# Patient Record
Sex: MALE | Race: WHITE | Employment: OTHER | URBAN - METROPOLITAN AREA
[De-identification: names, ages, dates, MRNs, and addresses within clinical notes are randomized per-mention and may not be internally consistent; named-entity substitution may affect disease eponyms.]

---

## 2017-01-04 ENCOUNTER — APPOINTMENT (OUTPATIENT)
Dept: LAB | Facility: CLINIC | Age: 77
End: 2017-01-04
Payer: MEDICARE

## 2017-01-04 ENCOUNTER — TRANSCRIBE ORDERS (OUTPATIENT)
Dept: LAB | Facility: CLINIC | Age: 77
End: 2017-01-04

## 2017-01-04 DIAGNOSIS — E72.11 HOMOCYSTINURIA (HCC): ICD-10-CM

## 2017-01-04 DIAGNOSIS — I25.10 ATHEROSCLEROTIC HEART DISEASE OF NATIVE CORONARY ARTERY WITHOUT ANGINA PECTORIS: ICD-10-CM

## 2017-01-04 LAB
CHOLEST SERPL-MCNC: 89 MG/DL (ref 50–200)
HCYS SERPL-SCNC: 10.1 UMOL/L (ref 5.3–14.2)
HDLC SERPL-MCNC: 43 MG/DL (ref 40–60)
LDLC SERPL CALC-MCNC: 34 MG/DL (ref 0–100)
TRIGL SERPL-MCNC: 59 MG/DL

## 2017-01-04 PROCEDURE — 83090 ASSAY OF HOMOCYSTEINE: CPT

## 2017-01-04 PROCEDURE — 36415 COLL VENOUS BLD VENIPUNCTURE: CPT

## 2017-01-04 PROCEDURE — 80061 LIPID PANEL: CPT

## 2017-03-31 ENCOUNTER — ALLSCRIPTS OFFICE VISIT (OUTPATIENT)
Dept: OTHER | Facility: OTHER | Age: 77
End: 2017-03-31

## 2017-06-30 ENCOUNTER — ALLSCRIPTS OFFICE VISIT (OUTPATIENT)
Dept: OTHER | Facility: OTHER | Age: 77
End: 2017-06-30

## 2017-09-12 ENCOUNTER — ALLSCRIPTS OFFICE VISIT (OUTPATIENT)
Dept: OTHER | Facility: OTHER | Age: 77
End: 2017-09-12

## 2017-10-26 NOTE — PROGRESS NOTES
Assessment  Assessed    1  Elevated homocysteine (270 4) (E72 11)   2  NSTEMI (non-ST elevated myocardial infarction) (410 70) (I21 4)   3  S/P CABG (coronary artery bypass graft) (V45 81) (Z95 1)   4  CAD (coronary artery disease), native coronary artery (414 01) (I25 10)    Plan  CABG, Elevated BP without diagnosis of hypertension, NSTEMI (non-ST elevated  myocardial infarction)    · Metoprolol Succinate ER 25 MG Oral Tablet Extended Release 24 Hour; TAKE 1  TABLET DAILY   Rx By: Keon Diamond; Dispense: 90 Days ; #:90 Tablet Extended Release 24 Hour; Refill: 3;For: CABG, Elevated BP without diagnosis of hypertension, NSTEMI (non-ST elevated myocardial infarction); LEXUS = N; Verified Transmission to [facility]; Last Updated By: System, SureScript[employer]; 9/12/2017 8:40:07 AM  CAD (coronary artery disease), native coronary artery, Elevated BP without diagnosis of  hypertension, NSTEMI (non-ST elevated myocardial infarction)    · Rosuvastatin Calcium 20 MG Oral Tablet; Take one tablet daily at night   Rx By: Keon Diamond; Dispense: 90 Days ; #:90 Tablet; Refill: 3;For: CAD (coronary artery disease), native coronary artery, Elevated BP without diagnosis of hypertension, NSTEMI (non-ST elevated myocardial infarction); LEXUS = N; Verified Transmission to [facility]; Last Updated By: System, SureScripts; 9/12/2017 8:35:59 AM  CAD (coronary artery disease), native coronary artery, Health Maintenance    · EKG/ECG- POC; Status:Complete;   Done: 75JWE1147   Perform: In Office; Due:30Cjr3165; Last Updated By:Freddie Garner; 9/12/2017 9:25:56 AM;Ordered; For:CAD (coronary artery disease), native coronary artery, Health Maintenance; Ordered By:Dayron Kaye;    Discussion/Summary  Cardiology Discussion Summary Free Text Note Form [facility]:   CABG/3V CAD-- healed well  asymptomatic  LDL at goal  Blood pressure well-controlledaspirin 162mgmetoprolol 25mg dailyswitch to rosuvastatin 20mg daily  recheck lipid panel  keep ldl below 11 GIDPY exercise   folic acid, Y-68, Q-9TBDLAWD homocysteine plus lipids on next blood draw  remains with low HDLfish oil + switch to rosuvastatin  12 months  Counseling Documentation With Imm: total time of encounter was 30 minutes minutes-- and-- Lab results plus med management minutes was spent counseling  Chief Complaint  Chief Complaint Free Text Note Form: Alfred Addison is here for a 12 month follow up and lab results  He denies any cardiac complaints at this time  A yearly EKG was done in the office today  History of Present Illness  HPI: initial visit: 74 yo gentleman with prior NSTEMI s/p 3V-CABG (LIMA to LAD, SVG to OM2, and SVG to PDA) s/p cardiac rehab walking one mile daily  I have reviewed all of his physical therapy notes and am appreciative of the excellent care  Compliant with his medications denying any significant symptoms  Denying any bleeding or rash  He denies having any chest pain or shortness of breath  No exercise limitations  Denies having any chest tightness  Denies any any muscle aches  Denies any bleeding or bruising  Has been very active  Does daily walks without any symptoms  12, 2017: He reports exercising without any limitations  He denies any recurrence of chest discomfort  He denies having any dizziness or lightheadedness  He denies having any medication intolerance  He denies any significant bleeding or bruising  Review of Systems  Cardiology Male ROS:     Cardiac: No complaints of chest pain, no palpitations, no fainiting  Skin: No complaints of nonhealing sores or skin rash  Genitourinary: frequent urination at night   Psychological: No complaints of feeling depressed, anxiety, panic attacks, or difficulty concentrating  Respiratory: no shortness of breath  HEENT: hearing problems   Gastrointestinal: heartburn   Hematologic: No complaints of bleeding disorders, anemia, blood clots, or excessive brusing     Neurological: No complaints of numbness, tingling, dizziness, weakness, seizures, headaches, syncope or fainting, AM fatigue, daytime sleepiness, no witnessed apnea episodes  Musculoskeletal: arthritis      Active Problems  Problems    1  Body mass index (BMI) of 25 0 to 25 9 in adult (V85 21) (Z68 25)   2  CABG   3  CAD (coronary artery disease), native coronary artery (414 01) (I25 10)   4  Elevated BP without diagnosis of hypertension (796 2) (R03 0)   5  Elevated homocysteine (270 4) (E72 11)   6  Encounter for prostate cancer screening (V76 44) (Z12 5)   7  Encounter for screening for malignant neoplasm of colon (V76 51) (Z12 11)   8  Flu vaccine need (V04 81) (Z23)   9  Medicare annual wellness visit, initial (V70 0) (Z00 00)   10  MTHFR mutation (270 4) (E72 12)   11  Need for influenza vaccination (V04 81) (Z23)   12  NSTEMI (non-ST elevated myocardial infarction) (410 70) (I21 4)   13  Plantar fasciitis (728 71) (M72 2)   14  Prediabetes (790 29) (R73 09)   15  S/P CABG (coronary artery bypass graft) (V45 81) (Z95 1)   16  Screening for genitourinary condition (V81 6) (Z13 89)   17  Vitamin D deficiency (268 9) (E55 9)          CAD (coronary artery disease), native coronary artery (414 01) (I25 10)          Past Medical History  Problems    1  Acute frontal sinusitis (461 1) (J01 10)   2  History of CAD (coronary artery disease) (414 00) (I25 10)   3  History of GERD (gastroesophageal reflux disease) (530 81) (K21 9)   4  History of Heart attack (410 90) (I21 3)   5  History of arthritis (V13 4) (Z87 39)   6  History of cataract (V12 49) (Z86 69)   7  History of colonic polyps (V12 72) (Z86 010)   8  History of herpes zoster (V12 09) (Z86 19)   9  History of hyperlipidemia (V12 29) (Z86 39)   10  History of hypertension (V12 59) (Z86 79)  Active Problems And Past Medical History Reviewed: The active problems and past medical history were reviewed and updated today  Surgical History  Problems    1  History of Back Surgery   2  History of CABG   3  CABG   4  History of Cataract Surgery   5  History of Colonoscopy (Fiberoptic) Screening   6  History of Hernia Repair   7  History of Hip Replacement   8  History of Inguinal Hernia Repair  Surgical History Reviewed: The surgical history was reviewed and updated today  Family History  Father    1  Family history of CAD (coronary artery disease)  Brother    2  Family history of Hyperlipidemia   3  Family history of Sudden cardiac death  Family History    4  Family history of Arthritis  Family History Reviewed: The family history was reviewed and updated today  Social History  Problems    · Exercise: Cycling   · Exercise: Walking   · Exercises daily (V49 89) (Z78 9)   · Former smoker (V15 82) (T11 739)   · Good sleep hygiene   ·    · No alcohol use   · Sleeps 8 - 10 hours a day  Social History Reviewed: The social history was reviewed and updated today  Former smoker (V15 82) (P37 839)     - Quit in JO[place] 22   1  Aspirin 81 MG TABS; TAKE 1 TABLET DAILY; Therapy: (Recorded:30Jun2017) to Recorded   2  Atorvastatin Calcium 80 MG Oral Tablet; TAKE 1/2 TABLET DAILY; Therapy: 52KAG3752 to (Evaluate:15Ddp1553)  Requested for: 82YGP5003; Last   Rx:94Old9437 Ordered   3  B-Complex/B-12 Oral Tablet; TAKE 1 TABLET DAILY; Therapy: (Recorded:03Oct2016) to Recorded   4  Calcium TABS; once daily; Therapy: (Recorded:31Mar2017) to Recorded   5  Fish Oil 1200 MG Oral Capsule; Once a day; Therapy: (Recorded:30Jun2017) to Recorded   6  Metoprolol Tartrate 25 MG Oral Tablet; take one tablet by mouth twice daily; Therapy: 54DLQ6220 to (Evaluate:36Fju5859)  Requested for: 30Jun2017; Last   Rx:37Med0033 Ordered   7  Vitamin D 2000 UNIT Oral Capsule; take 1 capsule daily; Therapy: 91RIN3723 to (Last Rx:47Icn2006) Ordered  Medication List Reviewed: The medication list was reviewed and updated today  Allergies  Medication    1   No Known Drug Allergies    Vitals  Vital Signs    Recorded: 12Sep2017 08:15AM   Heart Rate 55   Systolic 026, RUE, Sitting   Diastolic 70, RUE, Sitting   Height 5 ft 8 in   Weight 162 lb 9 oz   BMI Calculated 24 72   BSA Calculated 1 87   O2 Saturation 98     Physical Exam    Constitutional   General appearance: No acute distress, well appearing and well nourished  -- Elderly, Pleasant  Eyes   Conjunctiva and Sclera examination: Conjunctiva pink, sclera anicteric  Ears, Nose, Mouth, and Throat - External inspection of ears and nose: Normal without deformities or discharge  -- Nasal mucosa, septum, and turbinates: Normal, no edema or discharge  Neck   Neck and thyroid: Normal, supple, trachea midline, no thyromegaly  -- No JVD  Pulmonary   Respiratory effort: No increased work of breathing or signs of respiratory distress  -- CTAB  Cardiovascular   Palpation of heart: Normal PMI, no thrills  -- pmi mildly displaced  Auscultation of heart: Abnormal  -- rrr +s1 +s2 no murmurs  Pedal pulses: Normal, 2+ bilaterally  -- no edema  Examination of extremities for edema and/or varicosities: Normal     Chest - Chest: Abnormal -- well-healing sternal incision, sutures noted mid-line where prior external pacers/alice drain placed  -- well-healing, no evidence of dehiscience  Abdomen   Liver and spleen: No hepatomegaly or splenomegaly  Musculoskeletal Gait and station: Normal gait  -- Inspection/palpation of joints, bones, and muscles: Normal, ROM normal     Neurologic - Cranial nerves: II - XII intact  -- Sensation: No sensory loss  Psychiatric - Orientation to person, place, and time: Normal -- Mood and affect: Normal       Results/Data  Diagnostic Studies Reviewed Cardio: I personally reviewed the recording/images in the office today  My interpretation follows  Lab Review: [facility] Heart Panel Reviewed- Low vitamin D  Elevated Homocysteine  Elevated Blood Sugars  advanced lipid panel was reviewed in detail   It shows that his liver production of cholesterol has been adequately suppressed with statin medications  However his LDL type is predominantly small dense LDL  His HDL is currently his suboptimal  However he has a more predominant of alpha-1 HDL which is cardiac protective  His cholesterol absorption is also suboptimal  He has a high saturated fat and trans-saturated fat intake  Also his omega-3 fatty acid intake is low  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 and he has an elevated homocystine level  Therefore he is at higher risk for cardiac events  He's also noted to have elevated fasting blood sugars and a high insulin production and high insulin resistance which is suggestive of pre-diabetes  Given his recent non-STEMI and triple vessel bypass is crucial that we improve his cholesterol balance and his resting blood sugars  Echocardiogram/DAYANARA: 2014 Normal LV size, borderline LVH, normal LV systolci function EF55-60%  Normal atria  Catheterization: Cath- Proximal LAD 90% 1st diagonal CTO1st OM-100%RCA   Vascular imagin2014 Carotids- <50% right <50% left  Other: Surgical report reviewed- 65 minutes clamp time Dr Alex Kumar     ECG Report: 2014 NSR incomplete RBBB, no acute st changes      Signatures   Electronically signed by : MARGUERITE Prakash ; Sep 12 2017 12:33PM EST                       (Author)

## 2017-10-30 ENCOUNTER — GENERIC CONVERSION - ENCOUNTER (OUTPATIENT)
Dept: OTHER | Facility: OTHER | Age: 77
End: 2017-10-30

## 2017-10-30 ENCOUNTER — APPOINTMENT (OUTPATIENT)
Dept: LAB | Facility: CLINIC | Age: 77
End: 2017-10-30
Payer: MEDICARE

## 2017-10-30 ENCOUNTER — TRANSCRIBE ORDERS (OUTPATIENT)
Dept: LAB | Facility: CLINIC | Age: 77
End: 2017-10-30

## 2017-10-30 DIAGNOSIS — I21.4 NON-ST ELEVATION (NSTEMI) MYOCARDIAL INFARCTION (HCC): ICD-10-CM

## 2017-10-30 DIAGNOSIS — I25.10 ATHEROSCLEROTIC HEART DISEASE OF NATIVE CORONARY ARTERY WITHOUT ANGINA PECTORIS: ICD-10-CM

## 2017-10-30 DIAGNOSIS — E72.11 HOMOCYSTINURIA (HCC): ICD-10-CM

## 2017-10-30 LAB
ALBUMIN SERPL BCP-MCNC: 4.1 G/DL (ref 3.5–5)
ALP SERPL-CCNC: 71 U/L (ref 46–116)
ALT SERPL W P-5'-P-CCNC: 34 U/L (ref 12–78)
ANION GAP SERPL CALCULATED.3IONS-SCNC: 6 MMOL/L (ref 4–13)
AST SERPL W P-5'-P-CCNC: 20 U/L (ref 5–45)
BILIRUB SERPL-MCNC: 0.75 MG/DL (ref 0.2–1)
BUN SERPL-MCNC: 16 MG/DL (ref 5–25)
CALCIUM SERPL-MCNC: 8.8 MG/DL (ref 8.3–10.1)
CHLORIDE SERPL-SCNC: 109 MMOL/L (ref 100–108)
CHOLEST SERPL-MCNC: 89 MG/DL (ref 50–200)
CO2 SERPL-SCNC: 28 MMOL/L (ref 21–32)
CREAT SERPL-MCNC: 0.98 MG/DL (ref 0.6–1.3)
GFR SERPL CREATININE-BSD FRML MDRD: 74 ML/MIN/1.73SQ M
GLUCOSE P FAST SERPL-MCNC: 104 MG/DL (ref 65–99)
HCYS SERPL-SCNC: 11.3 UMOL/L (ref 5.3–14.2)
HDLC SERPL-MCNC: 44 MG/DL (ref 40–60)
LDLC SERPL CALC-MCNC: 37 MG/DL (ref 0–100)
POTASSIUM SERPL-SCNC: 3.9 MMOL/L (ref 3.5–5.3)
PROT SERPL-MCNC: 7.5 G/DL (ref 6.4–8.2)
SODIUM SERPL-SCNC: 143 MMOL/L (ref 136–145)
TRIGL SERPL-MCNC: 38 MG/DL

## 2017-10-30 PROCEDURE — 83090 ASSAY OF HOMOCYSTEINE: CPT

## 2017-10-30 PROCEDURE — 36415 COLL VENOUS BLD VENIPUNCTURE: CPT

## 2017-10-30 PROCEDURE — 80061 LIPID PANEL: CPT

## 2017-10-30 PROCEDURE — 80053 COMPREHEN METABOLIC PANEL: CPT

## 2018-01-11 NOTE — RESULT NOTES
Discussion/Summary   labs stable continue medications     Verified Results  (1) COMPREHENSIVE METABOLIC PANEL 79XWX0549 82:85CA [facility] Order Number: [id number]     Test Name Result Flag Reference   SODIUM 143 mmol/L  136-145   POTASSIUM 3 9 mmol/L  3 5-5 3   CHLORIDE 109 mmol/L H 100-108   CARBON DIOXIDE 28 mmol/L  21-32   ANION GAP (CALC) 6 mmol/L  4-13   BLOOD UREA NITROGEN 16 mg/dL  5-25   CREATININE 0 98 mg/dL  0 60-1 30   Standardized to IDMS reference method   CALCIUM 8 8 mg/dL  8 3-10 1   BILI, TOTAL 0 75 mg/dL  0 20-1 00   ALK PHOSPHATAS 71 U/L     ALT (SGPT) 34 U/L  12-78   Specimen collection should occur prior to Sulfasalazine and/or Sulfapyridine administration due to the potential for falsely depressed results  AST(SGOT) 20 U/L  5-45   Specimen collection should occur prior to Sulfasalazine administration due to the potential for falsely depressed results  ALBUMIN 4 1 g/dL  3 5-5 0   TOTAL PROTEIN 7 5 g/dL  6 4-8 2   eGFR 74 ml/min/1 73sq [facility] Disease Education Program recommendations are as follows:  GFR calculation is accurate only with a steady state creatinine  Chronic Kidney disease less than 60 ml/min/1 73 sq  meters  Kidney failure less than 15 ml/min/1 73 sq  meters  GLUCOSE FASTING 104 mg/dL H 65-99   Specimen collection should occur prior to Sulfasalazine administration due to the potential for falsely depressed results  Specimen collection should occur prior to Sulfapyridine administration due to the potential for falsely elevated results       (1) HOMOCYSTEINE 85YSK2119 07:57AM [place] Order Number: [id number]     Test Name Result Flag Reference   HOMOCYSTEINE 11 3 umol/L  5 3-14 2     (1) LIPID PANEL, FASTING 28XQY0466 07:57AM [place] Order Number: [id number]     Test Name Result Flag Reference   CHOLESTEROL 89 mg/dL     HDL,DIRECT 44 mg/dL  40-60   Specimen collection should occur prior to Metamizole administration due to the potential for falsley depressed results  LDL CHOLESTEROL CALCULATED 37 mg/dL  0-100   Triglyceride:        Normal <150 mg/dl   Borderline High 150-199 mg/dl   High 200-499 mg/dl   Very High >499 mg/dl      Cholesterol:       Desirable <200 mg/dl    Borderline High 200-239 mg/dl    High >239 mg/dl      HDL Cholesterol:       High>59 mg/dL    Low <41 mg/dL      This screening LDL is a calculated result  It does not have the accuracy of the Direct Measured LDL in the monitoring of patients with hyperlipidemia and/or statin therapy  Direct Measure LDL (XAV649) must be ordered separately in these patients  TRIGLYCERIDES 38 mg/dL  <=150   Specimen collection should occur prior to N-Acetylcysteine or Metamizole administration due to the potential for falsely depressed results

## 2018-01-12 NOTE — PROGRESS NOTES
Assessment    1  Medicare annual wellness visit, initial (V70 0) (Z00 00)   2  S/P CABG (coronary artery bypass graft) (V45 81) (Z95 1)   3  NSTEMI (non-ST elevated myocardial infarction) (410 70) (I21 4)   4  MTHFR mutation (270 4) (E72 12)   5  Body mass index (BMI) of 25 0 to 25 9 in adult (V85 21) (Z68 25)    Plan  CABG, CAD (coronary artery disease), native coronary artery, Elevated blood pressure  reading without diagnosis of hypertension, Elevated homocysteine, NSTEMI (non-ST  elevated myocardial infarction), Vitamin D deficiency    · (1) COMPREHENSIVE METABOLIC PANEL; Status:Active; Requested KRZYSZTOF:56GEM0905;    · (1) LIPID PANEL, FASTING; Status:Active; Requested OKC:37JWI9569;    · (1) VITAMIN D 25-HYDROXY; Status:Active; Requested HCO:49TGG8323; Health Maintenance    · Follow-up visit in 6 months Evaluation and Treatment  Follow-up  Status: Hold For -  Scheduling  Requested for: 27Jun2016  Medicare annual wellness visit, initial    · Decreasing the stress in your life may help your condition improve ; Status:Complete;    Done: 43SIC2890   · Eat foods that are high in calcium ; Status:Complete;   Done: 19UYT2169   · Stretch and warm up your muscles during the first 10 minutes , then cool down your  muscles for the last 10 minutes of exercise ; Status:Complete;   Done: 33DQY9597   · The plan of care for falls is detailed in the plan and/or discussion section of today's note ;  Status:Complete;   Done: 74PKG9648   · There are many things to consider when choosing a nursing home or long-term care  facility ; Status:Complete;   Done: 34EFG0433   · There are ways to decrease your stress and improve your sense of well-being  We  encourage you to keep active and exercise regularly  Make time to take care of yourself  and participate in activities that you enjoy  Stay connected to friends and family that can  support and comfort you    If at any time you have thoughts of harming yourself or  someone else, contact us immediately ; Status:Active; Requested RBY:10OKX0734;    · We encourage all of our patients to exercise regularly  30 minutes of exercise or physical  activity five or more days a week is recommended for children and adults ;  Status:Complete;   Done: 52VOB8567   · We recommend a colonoscopy ; Status:Complete;   Done: 10QGR2019   · We recommend routine visits to a dentist ; Status:Complete;   Done: 79YPY6559   · We recommend that you create an advance directive ; Status:Complete;   Done:  87DNT7856    Discussion/Summary    F/u labs PHM  continue medications  low fat diet  Impression: Initial Annual Wellness Visit  Cardiovascular screening and counseling: the risks and benefits of screening were discussed  Diabetes screening and counseling: the risks and benefits of screening were discussed  Colorectal cancer screening and counseling: the risks and benefits of screening were discussed and screening is current  Osteoporosis screening and counseling: the risks and benefits of screening were discussed  Glaucoma screening and counseling: the risks and benefits of screening were discussed  HIV screening and counseling: the risks and benefits of screening were discussed  Chief Complaint  pt present for an AWV  pt has not had recent labs  Colon done  ot feels well today  hg      History of Present Illness  The patient is being seen for the initial annual wellness visit  Medicare Screening and Risk Factors   Hospitalizations: no previous hospitalizations  Medicare Screening Tests Risk Questions   Abdominal aortic aneurysm risk assessment: over 72years of age  Osteoporosis risk assessment:  and over 48years of age  HIV risk assessment: none indicated  Drug and Alcohol Use: The patient is a former cigarette smoker and pt quit over 30 years ago  He is not ready to quit using tobacco  The patient reports rare alcohol use  Alcohol concern:    The patient has no concerns about alcohol abuse  He is not ready to quit drinking  He has never used illicit drugs  Diet and Physical Activity: Current diet includes well balanced meals and 3 cups of coffee per day  He exercises infrequently and pt walks  Exercise: walking 30 minutes per day  Mood Disorder and Cognitive Impairment Screening: PHQ-9 Depression Scale   Depression screening  negative for symptoms  He denies feeling down, depressed, or hopeless over the past two weeks  He denies feeling little interest or pleasure in doing things over the past two weeks  Cognitive impairment screening: denies difficulty learning/retaining new information, denies difficulty handling complex tasks, denies difficulty with reasoning, denies difficulty with spatial ability and orientation, denies difficulty with language and denies difficulty with behavior  Functional Ability/Level of Safety: Hearing is normal bilaterally, normal in the right ear, normal in the left ear and a hearing aid is not used  The patient is currently able to do activities of daily living without limitations, able to do instrumental activities of daily living without limitations, able to participate in social activities without limitations and able to drive without limitations  Activities of daily living details: does not need help using the phone, no transportation help needed, does not need help shopping, does not need help doing housework, does not need help doing laundry, does not need help managing medications and does not need help managing money  Fall risk factors:  no polypharmacy, no alcohol use, no mobility impairment, no deconditioning, no sedative use, up and go test was normal and no previous fall  Home safety risk factors:  no unfamiliar surroundings, no loose rugs, no poor household lighting, no uneven floors, no household clutter, grab bars in the bathroom and handrails on the stairs     Advance Directives: Advance directives: living will, but no durable power of  for health care directives and no advance directives  Concerns with the patient's end of life decisions: need copy  Co-Managers and Medical Equipment/Suppliers: See Patient Care Team   The patient is currently asymptomatic Symptoms Include: The patient currently has no urinary incontinence symptoms  Date of last glaucoma screen was       Patient Care Team    Care Team Member Role Specialty Office Number   [place] [facility] [phone]   Satya ALLEN  Cardiology [phone]   [place] [facility]  III  Cardiac Surgery [phone]     Review of Systems    Constitutional: negative  Head and Face: negative  Eyes: negative  ENT: negative  Cardiovascular: negative  Respiratory: negative  Gastrointestinal: negative  Integumentary and Breasts: no rashes  Neurological: no headache  Endocrine: no hot flashes and no night sweats  Active Problems    1  CABG   2  CAD (coronary artery disease), native coronary artery (414 01) (I25 10)   3  Elevated blood pressure reading without diagnosis of hypertension (796 2) (R03 0)   4  Elevated homocysteine (270 4) (E72 11)   5  Encounter for prostate cancer screening (V76 44) (Z12 5)   6  Encounter for screening for malignant neoplasm of colon (V76 51) (Z12 11)   7  MTHFR mutation (270 4) (E72 12)   8  Need for influenza vaccination (V04 81) (Z23)   9  NSTEMI (non-ST elevated myocardial infarction) (410 70) (I21 4)   10  Prediabetes (790 29) (R73 09)   11  S/P CABG (coronary artery bypass graft) (V45 81) (Z95 1)   12  Vitamin D deficiency (268 9) (E55 9)    Past Medical History    1  Acute frontal sinusitis (461 1) (J01 10)   2  History of CAD (coronary artery disease) (414 00) (I25 10)   3  History of GERD (gastroesophageal reflux disease) (530 81) (K21 9)   4  History of Heart attack (410 90) (I21 3)   5  History of arthritis (V13 4) (Z87 39)   6  History of cataract (V12 49) (Z86 69)   7   History of colonic polyps (V12 72) (Z86 010)   8  History of herpes zoster (V12 09) (Z86 19)   9  History of hyperlipidemia (V12 29) (Z86 39)   10  History of hypertension (V12 59) (Z86 79)    The active problems and past medical history were reviewed and updated today  Surgical History    1  History of Back Surgery   2  CABG   3  History of CABG   4  History of Cataract Surgery   5  History of Colonoscopy (Fiberoptic) Screening   6  History of Hernia Repair   7  History of Hip Replacement   8  History of Inguinal Hernia Repair    The surgical history was reviewed and updated today  Family History  Father    1  Family history of CAD (coronary artery disease)  Brother    2  Family history of Hyperlipidemia   3  Family history of Sudden cardiac death  Family History    4  Family history of Arthritis    The family history was reviewed and updated today  Social History    · Exercise: Cycling   · Exercise: Walking   · Exercises daily (V49 89) (Z78 9)   · Former smoker (V15 82) (I83 519)   · Good sleep hygiene   ·    · No alcohol use   · Sleeps 8 - 10 hours a day  The social history was reviewed and updated today  The social history was reviewed and is unchanged  Current Meds   1  Aspirin 81 MG TABS; TAKE 1 TABLET DAILY; Therapy: (Recorded:97Sjd0064) to Recorded   2  Atorvastatin Calcium 80 MG Oral Tablet; TAKE 1/2 TABLET DAILY; Therapy: 84CFQ1713 to (Evaluate:16Oct2016)  Requested for: 89Bmd5785; Last   Rx:22Oct2015 Ordered   3  Fish Oil 1200 MG Oral Capsule; Take as directed Recorded   4  Folic Acid 1 MG Oral Tablet; 1 every day; Therapy: 35VNC7539 to (Last Rx:25Jun2015) Ordered   5  Metoprolol Tartrate 25 MG Oral Tablet; TAKE 1 TABLET TWICE DAILY; Therapy: 31AQM5328 to (Evaluate:25Jun2016)  Requested for: 27Sxx0509; Last   Rx:94Vvp6830 Ordered   6  Vitamin D 2000 UNIT Oral Capsule; take 1 capsule daily;    Therapy: 24ISI5768 to (Last Rx:25Jun2015) Ordered    The medication list was reviewed and updated today  Allergies    1   No Known Drug Allergies    Immunizations  Influenza --- Catherine Reuben: 61YTC8371   PNEUMO (POLY) --- Catherine Reuben: 71AOB6632     Vitals  Signs [Data Includes: Current Encounter]   Recorded: 84UXF2999 12:39PM   Temperature: 97 4 F  Heart Rate: 70  Respiration: 16  Systolic: 700  Diastolic: 58  Height: 5 ft 8 in  Weight: 166 lb   BMI Calculated: 25 24  BSA Calculated: 1 89    Signatures   Electronically signed by : Leeann Amin MD; Jun 27 2016 12:53PM EST                       (Author)

## 2018-01-12 NOTE — RESULT NOTES
Verified Results  (1) COMPREHENSIVE METABOLIC PANEL [id number] 64:57PF Supriya Bridges Fee Order Number: [id number]  TW Order Number: [id number] Order Number: [id number]     Test Name Result Flag Reference   GLUCOSE,RANDM 104 mg/dL     If the patient is fasting, the ADA then defines impaired fasting glucose as > 100 mg/dL and diabetes as > or equal to 123 mg/dL  SODIUM 138 mmol/L  136-145   POTASSIUM 4 1 mmol/L  3 5-5 3   CHLORIDE 106 mmol/L  100-108   CARBON DIOXIDE 27 mmol/L  21-32   ANION GAP (CALC) 5 mmol/L  4-13   BLOOD UREA NITROGEN 17 mg/dL  5-25   CREATININE 0 93 mg/dL  0 60-1 30   Standardized to IDMS reference method   CALCIUM 8 9 mg/dL  8 3-10 1   BILI, TOTAL 0 61 mg/dL  0 20-1 00   ALK PHOSPHATAS 91 U/L     ALT (SGPT) 32 U/L  12-78   AST(SGOT) 22 U/L  5-45   ALBUMIN 3 9 g/dL  3 5-5 0   TOTAL PROTEIN 7 6 g/dL  6 4-8 2   eGFR Non-African American      >60 0 ml/min/1 73sq [facility] Disease Education Program recommendations are as follows:  GFR calculation is accurate only with a steady state creatinine  Chronic Kidney disease less than 60 ml/min/1 73 sq  meters  Kidney failure less than 15 ml/min/1 73 sq  meters  (1) LIPID PANEL, FASTING 28Jun2016 07:57AM Yogi Bridgesritesh Fee Order Number: [id number]  TW Order Number: [id number] Order Number: [id number]     Test Name Result Flag Reference   CHOLESTEROL 89 mg/dL     HDL,DIRECT 34 mg/dL L 40-60   Specimen collection should occur prior to Metamizole administration due to the potential for falsely depressed results     LDL CHOLESTEROL CALCULATED 44 mg/dL  0-100   Triglyceride:         Normal              <150 mg/dl       Borderline High    150-199 mg/dl       High               200-499 mg/dl       Very High          >499 mg/dl  Cholesterol:         Desirable        <200 mg/dl      Borderline High  200-239 mg/dl      High             >239 mg/dl  HDL Cholesterol:        High    >59 mg/dL      Low     <41 mg/dL  LDL CALCULATED:    This screening LDL is a calculated result  It does not have the accuracy of the Direct Measured LDL in the monitoring of patients with hyperlipidemia and/or statin therapy  Direct Measure LDL (IAA198) must be ordered separately in these patients  TRIGLYCERIDES 53 mg/dL  <=150   Specimen collection should occur prior to N-Acetylcysteine or Metamizole administration due to the potential for falsely depressed results  (1) VITAMIN D 25-HYDROXY 28Jun2016 07:57AM Mauricio Bridges Order Number: [id number]  TW Order Number: [id number]     Test Name Result Flag Reference   VIT D 25-HYDROX 40 6 ng/mL  30 0-100 0   This assay is a certified procedure of the CDC Vitamin D Standardization Certification Program (VDSCP)     Deficiency <20ng/ml   Insufficiency 20-30ng/ml   Sufficient  ng/ml     *Patients undergoing fluorescein dye angiography may retain small amounts of fluorescein in the body for 48-72 hours post procedure  Samples containing fluorescein can produce falsely elevated Vitamin D values  If the patient had this procedure, a specimen should be resubmitted post fluorescein clearance  Discussion/Summary   all labs stable or normal range   Continue medications

## 2018-01-14 VITALS
HEART RATE: 74 BPM | WEIGHT: 168 LBS | RESPIRATION RATE: 14 BRPM | TEMPERATURE: 98 F | BODY MASS INDEX: 25.46 KG/M2 | DIASTOLIC BLOOD PRESSURE: 60 MMHG | SYSTOLIC BLOOD PRESSURE: 130 MMHG | HEIGHT: 68 IN

## 2018-01-14 VITALS
DIASTOLIC BLOOD PRESSURE: 70 MMHG | WEIGHT: 162.56 LBS | HEART RATE: 55 BPM | OXYGEN SATURATION: 98 % | BODY MASS INDEX: 24.64 KG/M2 | HEIGHT: 68 IN | SYSTOLIC BLOOD PRESSURE: 140 MMHG

## 2018-01-14 VITALS
SYSTOLIC BLOOD PRESSURE: 126 MMHG | TEMPERATURE: 97.2 F | RESPIRATION RATE: 18 BRPM | DIASTOLIC BLOOD PRESSURE: 70 MMHG | WEIGHT: 161 LBS | HEART RATE: 76 BPM | HEIGHT: 68 IN | BODY MASS INDEX: 24.4 KG/M2

## 2018-03-02 RX ORDER — ROSUVASTATIN CALCIUM 20 MG/1
1 TABLET, COATED ORAL
COMMUNITY
Start: 2017-09-12 | End: 2018-05-29

## 2018-03-02 RX ORDER — AMOXICILLIN 500 MG
CAPSULE ORAL
COMMUNITY
End: 2019-06-02 | Stop reason: HOSPADM

## 2018-03-02 RX ORDER — METOPROLOL SUCCINATE 25 MG/1
1 TABLET, EXTENDED RELEASE ORAL DAILY
COMMUNITY
Start: 2017-09-12 | End: 2018-12-19 | Stop reason: SDUPTHER

## 2018-03-02 RX ORDER — MULTIVIT-MIN/IRON/FOLIC ACID/K 18-600-40
1 CAPSULE ORAL DAILY
COMMUNITY
Start: 2015-06-25

## 2018-03-06 ENCOUNTER — OFFICE VISIT (OUTPATIENT)
Dept: FAMILY MEDICINE CLINIC | Facility: CLINIC | Age: 78
End: 2018-03-06
Payer: MEDICARE

## 2018-03-06 VITALS
RESPIRATION RATE: 16 BRPM | BODY MASS INDEX: 25.16 KG/M2 | SYSTOLIC BLOOD PRESSURE: 128 MMHG | DIASTOLIC BLOOD PRESSURE: 60 MMHG | TEMPERATURE: 98.4 F | WEIGHT: 166 LBS | HEIGHT: 68 IN | HEART RATE: 64 BPM

## 2018-03-06 DIAGNOSIS — Z95.1 HX OF CABG: Primary | ICD-10-CM

## 2018-03-06 DIAGNOSIS — I21.4 NSTEMI (NON-ST ELEVATED MYOCARDIAL INFARCTION) (HCC): ICD-10-CM

## 2018-03-06 DIAGNOSIS — Z12.5 PROSTATE CANCER SCREENING: ICD-10-CM

## 2018-03-06 DIAGNOSIS — I25.10 CORONARY ARTERY DISEASE INVOLVING NATIVE CORONARY ARTERY OF NATIVE HEART WITHOUT ANGINA PECTORIS: ICD-10-CM

## 2018-03-06 DIAGNOSIS — G89.29 CHRONIC PAIN OF RIGHT HIP: ICD-10-CM

## 2018-03-06 DIAGNOSIS — M25.551 CHRONIC PAIN OF RIGHT HIP: ICD-10-CM

## 2018-03-06 DIAGNOSIS — I25.10 CORONARY ARTERIOSCLEROSIS: Primary | ICD-10-CM

## 2018-03-06 PROCEDURE — 99214 OFFICE O/P EST MOD 30 MIN: CPT | Performed by: FAMILY MEDICINE

## 2018-03-06 NOTE — PROGRESS NOTES
Subjective:           Sukhdev Galindo was seen today for hyperlipidemia, follow-up and hip pain  Diagnoses and all orders for this visit:    Hx of CABG stable , continue medications, Cardiology follow up    NSTEMI (non-ST elevated myocardial infarction) [facility])    Coronary artery disease involving native coronary artery of native heart without angina pectoris    Chronic pain of right hip  Ortho eval for x-ray and assessment prior surgery  Local aspercreme/lidocaine rub  Tylenol as needed  Labs as oredered            No orders of the defined types were placed in this encounter  Patient Instructions   F/u cardiology  Orthopedics   Labs as or   Continue medicationsdered  Kenton Martin is in for evaluation and follow-up stable coronary artery disease non STEMI MI history of CABG  He is stable does have some issues with his hip for which he had surgery some 15 years ago  He is compliant with his medications and up-to-date with labs    Vitals:    03/06/18 1010   BP: 128/60   Pulse: 64   Resp: 16   Temp: 98 4 °F (36 9 °C)       No Known Allergies    Current Outpatient Prescriptions on File Prior to Visit   Medication Sig Dispense Refill    aspirin 81 MG tablet Take 1 tablet by mouth daily      B Complex Vitamins (B-COMPLEX/B-12 PO) Take 1 tablet by mouth daily      Calcium 150 MG TABS Take by mouth daily      Cholecalciferol (VITAMIN D) 2000 units CAPS Take 1 capsule by mouth daily      metoprolol succinate (TOPROL-XL) 25 mg 24 hr tablet Take 1 tablet by mouth daily      Omega-3 Fatty Acids (FISH OIL) 1200 MG CAPS Take by mouth daily      rosuvastatin (CRESTOR) 20 MG tablet Take 1 tablet by mouth      metoprolol tartrate (LOPRESSOR) 25 mg tablet Take 1 tablet by mouth 2 (two) times a day       No current facility-administered medications on file prior to visit          Past Medical History:   Diagnosis Date    Arthritis     CAD (coronary artery disease)     Cataract     GERD (gastroesophageal reflux disease)     Heart attack     Herpes zoster     Hx of colonic polyps     Hyperlipidemia     Hypertension        Past Surgical History:   Procedure Laterality Date    BACK SURGERY      disc removed 1985 / R   [address]  11[place]13/2014    [place]    6/30/16   & CABG X3 with LIMA to LAD <SVG to OM-2, SVG to PDA  [place] Janice Kovacs / Vicky Rising   12/19[place]14       EYE SURGERY      cataract /  R   [address]      Groin / R   [address] /  Tarah Taylor   [address] [facility] [address]    2010            reports that he quit smoking about 39 years ago  He has never used smokeless tobacco  He reports that he does not drink alcohol or use drugs  reports that he quit smoking about 39 years ago  He has never used smokeless tobacco     The following portions of the patient's history were reviewed and updated as appropriate: allergies, current medications, past family history, past medical history, past social history, past surgical history and problem list     Review of Systems   Constitutional: Negative for appetite change, diaphoresis and fever  HENT: Negative for congestion, ear pain, postnasal drip, sinus pressure, tinnitus and voice change  Eyes: Negative for discharge and itching  Respiratory: Negative for cough, chest tightness, shortness of breath and stridor  Cardiovascular: Negative for chest pain and palpitations  Gastrointestinal: Negative for abdominal distention, blood in stool, diarrhea and vomiting  Endocrine: Negative for cold intolerance  Genitourinary: Negative for flank pain, genital sores and testicular pain  Musculoskeletal: Positive for arthralgias  Negative for joint swelling and myalgias  R Hip   Skin: Negative for rash  Neurological: Negative for tremors, speech difficulty and headaches  Hematological: Negative for adenopathy     Psychiatric/Behavioral: Negative for behavioral problems, dysphoric mood, hallucinations and suicidal ideas  Physical Exam   Constitutional: He is oriented to person, place, and time  He appears well-developed and well-nourished  HENT:   Head: Normocephalic  Eyes: Conjunctivae are normal  Pupils are equal, round, and reactive to light  Neck: Normal range of motion  Cardiovascular: Normal rate  Pulmonary/Chest: Effort normal and breath sounds normal    Abdominal: Soft  Bowel sounds are normal    Musculoskeletal: He exhibits edema  Neurological: He is alert and oriented to person, place, and time  Skin: Skin is warm  Capillary refill takes less than 2 seconds  Psychiatric: He has a normal mood and affect  His behavior is normal  Judgment and thought content normal    Nursing note and vitals reviewed

## 2018-03-12 ENCOUNTER — TRANSCRIBE ORDERS (OUTPATIENT)
Dept: ADMINISTRATIVE | Facility: HOSPITAL | Age: 78
End: 2018-03-12

## 2018-03-12 ENCOUNTER — APPOINTMENT (OUTPATIENT)
Dept: LAB | Facility: HOSPITAL | Age: 78
End: 2018-03-12
Attending: FAMILY MEDICINE
Payer: MEDICARE

## 2018-03-12 DIAGNOSIS — I25.10 CORONARY ARTERIOSCLEROSIS: ICD-10-CM

## 2018-03-12 DIAGNOSIS — Z12.5 PROSTATE CANCER SCREENING: ICD-10-CM

## 2018-03-12 LAB
ALBUMIN SERPL BCP-MCNC: 4.1 G/DL (ref 3.5–5)
ALP SERPL-CCNC: 60 U/L (ref 46–116)
ALT SERPL W P-5'-P-CCNC: 42 U/L (ref 12–78)
ANION GAP SERPL CALCULATED.3IONS-SCNC: 6 MMOL/L (ref 4–13)
AST SERPL W P-5'-P-CCNC: 26 U/L (ref 5–45)
BILIRUB SERPL-MCNC: 0.6 MG/DL (ref 0.2–1)
BUN SERPL-MCNC: 19 MG/DL (ref 5–25)
CALCIUM SERPL-MCNC: 9.4 MG/DL (ref 8.3–10.1)
CHLORIDE SERPL-SCNC: 106 MMOL/L (ref 100–108)
CHOLEST SERPL-MCNC: 112 MG/DL (ref 50–200)
CO2 SERPL-SCNC: 30 MMOL/L (ref 21–32)
CREAT SERPL-MCNC: 1 MG/DL (ref 0.6–1.3)
GFR SERPL CREATININE-BSD FRML MDRD: 71 ML/MIN/1.73SQ M
GLUCOSE P FAST SERPL-MCNC: 107 MG/DL (ref 65–99)
HDLC SERPL-MCNC: 44 MG/DL (ref 40–60)
LDLC SERPL CALC-MCNC: 54 MG/DL (ref 0–100)
POTASSIUM SERPL-SCNC: 4.1 MMOL/L (ref 3.5–5.3)
PROT SERPL-MCNC: 7.5 G/DL (ref 6.4–8.2)
PSA SERPL-MCNC: 1.6 NG/ML (ref 0–4)
SODIUM SERPL-SCNC: 142 MMOL/L (ref 136–145)
TRIGL SERPL-MCNC: 69 MG/DL

## 2018-03-12 PROCEDURE — 36415 COLL VENOUS BLD VENIPUNCTURE: CPT

## 2018-03-12 PROCEDURE — G0103 PSA SCREENING: HCPCS

## 2018-03-12 PROCEDURE — 80061 LIPID PANEL: CPT

## 2018-03-12 PROCEDURE — 80053 COMPREHEN METABOLIC PANEL: CPT

## 2018-03-13 ENCOUNTER — APPOINTMENT (OUTPATIENT)
Dept: RADIOLOGY | Facility: CLINIC | Age: 78
End: 2018-03-13
Payer: MEDICARE

## 2018-03-13 ENCOUNTER — OFFICE VISIT (OUTPATIENT)
Dept: OBGYN CLINIC | Facility: CLINIC | Age: 78
End: 2018-03-13
Payer: MEDICARE

## 2018-03-13 VITALS
BODY MASS INDEX: 25.52 KG/M2 | SYSTOLIC BLOOD PRESSURE: 169 MMHG | HEIGHT: 68 IN | HEART RATE: 66 BPM | DIASTOLIC BLOOD PRESSURE: 69 MMHG | WEIGHT: 168.4 LBS

## 2018-03-13 DIAGNOSIS — M25.551 PAIN IN RIGHT HIP: ICD-10-CM

## 2018-03-13 DIAGNOSIS — R26.9 ABNORMAL GAIT: ICD-10-CM

## 2018-03-13 DIAGNOSIS — Z96.641 HISTORY OF RIGHT HIP HEMIARTHROPLASTY: ICD-10-CM

## 2018-03-13 DIAGNOSIS — M70.61 TROCHANTERIC BURSITIS OF RIGHT HIP: Primary | ICD-10-CM

## 2018-03-13 PROCEDURE — 73502 X-RAY EXAM HIP UNI 2-3 VIEWS: CPT

## 2018-03-13 PROCEDURE — 99204 OFFICE O/P NEW MOD 45 MIN: CPT | Performed by: ORTHOPAEDIC SURGERY

## 2018-03-13 NOTE — PROGRESS NOTES
Assessment/Plan:  1  Trochanteric bursitis of right hip  Ambulatory referral to Physical Therapy    diclofenac sodium (VOLTAREN) 1 %   2  Pain in right hip  XR hip/pelv 2-3 vws right if performed    Ambulatory referral to Physical Therapy    diclofenac sodium (VOLTAREN) 1 %   3  Abnormal gait  Ambulatory referral to Physical Therapy   4  History of right hip hemiarthroplasty  Ambulatory referral to Physical Therapy     Los Degroot is a very pleasant 77year old male with right hip pain consistent with trochanteric bursitis, weakness of his right abductors causing a Trendelenburg gait, and a history of a right hip hemiarthroplasty 34 years ago that appears stable and without complication  We discussed these findings with him and his wife here in the office  They were significantly relieved that his prosthesis is not likely to be the source  We have referred him to formal physical therapy for strengthening of his abductors as well as stretching/modalities for his right trochanteric bursitis  We prescribed voltaren gel to use up to 4 times per day as needed for pain  He can continue with his ADLs without restriction  We would like him to return in 2 months for clinical reevaluation  Subjective: Right hip pain  Patient ID: Chelsea Shabazz is a 77 y o  male  Los Degroot is a very pleasant 77year old male presenting with his wife for a few years worth of activity related right hip pain  He does have a history significant for a jessi-arthroplasty of the right hip in 1984 for 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 a bone stimulator implant  He actually brought his operative report with him today  He denies any complications from that procedure  He has not has any dislocations  Over the past few years, his wife has noticed him limping on the right  He has lateral hip pain with increased activity such as walking or climbing ladders  He has not tried any walking aids, braces, or medications   The hip does not bother him at night or wake him from sleep  He denies back pain or parasthesias into the right lower extremity  Review of Systems   Constitutional: Negative  HENT: Negative  Eyes: Negative  Respiratory: Negative  Cardiovascular: Negative  Gastrointestinal: Negative  Endocrine: Negative  Genitourinary: Negative  Musculoskeletal: Positive for arthralgias, joint swelling and myalgias  Skin: Negative  Allergic/Immunologic: Negative  Neurological: Negative  Hematological: Negative  Psychiatric/Behavioral: Negative  Past Medical History:   Diagnosis Date    Arthritis     CAD (coronary artery disease)     Cataract     GERD (gastroesophageal reflux disease)     Heart attack     Herpes zoster     Hx of colonic polyps     Hyperlipidemia     Hypertension        Past Surgical History:   Procedure Laterality Date    BACK SURGERY      disc removed 1985 / R   [address]  11/13/2014    [place]    6/30/16   & CABG X3 with LIMA to LAD <SVG to OM-2, SVG to PDA  [place] Terrance Chapman / Zoey Filter   12/19[place]14       EYE SURGERY      cataract /  R   [address]      Groin / R   [address] [facility] /  Crystal Tejada   [address] [facility] [address]    2010         Family History   Problem Relation Age of Onset    Coronary artery disease Father     Hyperlipidemia Brother     Sudden death Brother      cardiac    Arthritis Family        Social History     Occupational History    Not on file       Social History Main Topics    Smoking status: Former Smoker     Quit date: 1979    Smokeless tobacco: Never Used    Alcohol use No    Drug use: No    Sexual activity: Not on file         Current Outpatient Prescriptions:     Ascorbic Acid (VITAMIN C PO), Take by mouth daily, Disp: , Rfl:     aspirin 81 MG tablet, Take 1 tablet by mouth daily, Disp: , Rfl:     B Complex Vitamins (B-COMPLEX/B-12 PO), Take 1 tablet by mouth daily, Disp: , Rfl:    Calcium 150 MG TABS, Take by mouth daily, Disp: , Rfl:     Cholecalciferol (VITAMIN D) 2000 units CAPS, Take 1 capsule by mouth daily, Disp: , Rfl:     diclofenac sodium (VOLTAREN) 1 %, Apply 2 g topically 4 (four) times a day as needed (lateral hip pain), Disp: 1 Tube, Rfl: 1    metoprolol succinate (TOPROL-XL) 25 mg 24 hr tablet, Take 1 tablet by mouth daily, Disp: , Rfl:     Omega-3 Fatty Acids (FISH OIL) 1200 MG CAPS, Take by mouth daily, Disp: , Rfl:     rosuvastatin (CRESTOR) 20 MG tablet, Take 1 tablet by mouth, Disp: , Rfl:     No Known Allergies    Objective:  Vitals:    03/13/18 1403   BP: 169/69   Pulse: 66       Body mass index is 25 61 kg/m²  Right Hip Exam     Comments:  Examination of his right hip reveals two well healed lateral incisions  It does appear that he has some atrophy of the lateral hip  There is no erythema, ecchymosis, or edema of the skin  He is non-tender over the lumbosacral spinous processes or paraspinal muscles  Negative straight leg raise bilaterally  Normal sensation to light touch in the L2-S1 nerve distributions  He does not have nay discomfort with log rolling the right hip  He has normal range of motion with no instability  There is mild groin and lateral pain with internal rotation  He does have a positive Veronika's sign with tenderness to palpation over the greater trochanter  There is no other tenderness  He has a negative KAYLEIGH's and Stingefield test  He ambulates with a slightly antalgic gait on the right  Physical Exam   Constitutional: He is oriented to person, place, and time  He appears well-developed and well-nourished  HENT:   Head: Normocephalic and atraumatic  Eyes: EOM are normal    Neck: Normal range of motion  Cardiovascular: Intact distal pulses  Pulmonary/Chest: Effort normal    Musculoskeletal:   See ortho exam   Neurological: He is alert and oriented to person, place, and time  Skin: Skin is warm and dry     Psychiatric: He has a normal mood and affect  His behavior is normal  Judgment and thought content normal        I have personally reviewed pertinent films in PACS of his pelvis and right hip which show a hemiarthroplasty in adequate position  There does not appear to be any loosening, fractures, or dislocation  There are small ossicifactions superolateral to the greater trochanter  There has been settling of the prosthesis, but no signs of loosening as there is good cortical fixation

## 2018-03-28 ENCOUNTER — EVALUATION (OUTPATIENT)
Dept: PHYSICAL THERAPY | Facility: CLINIC | Age: 78
End: 2018-03-28
Payer: MEDICARE

## 2018-03-28 DIAGNOSIS — R26.9 ABNORMAL GAIT: ICD-10-CM

## 2018-03-28 DIAGNOSIS — M70.61 TROCHANTERIC BURSITIS OF RIGHT HIP: ICD-10-CM

## 2018-03-28 DIAGNOSIS — M25.551 PAIN IN RIGHT HIP: Primary | ICD-10-CM

## 2018-03-28 DIAGNOSIS — Z96.641 HISTORY OF RIGHT HIP HEMIARTHROPLASTY: ICD-10-CM

## 2018-03-28 PROCEDURE — G8979 MOBILITY GOAL STATUS: HCPCS

## 2018-03-28 PROCEDURE — G8978 MOBILITY CURRENT STATUS: HCPCS

## 2018-03-28 PROCEDURE — 97161 PT EVAL LOW COMPLEX 20 MIN: CPT

## 2018-03-28 NOTE — PROGRESS NOTES
PT Evaluation     Today's date: 3/28/2018  Patient name: Marylene Sheehan  : 1940  MRN: [id number]  Referring provider: Sheri Elkins DO  Dx:   Encounter Diagnosis     ICD-10-CM    1  Pain in right hip M25 551 Ambulatory referral to Physical Therapy   2  Trochanteric bursitis of right hip M70 61 Ambulatory referral to Physical Therapy   3  Abnormal gait R26 9 Ambulatory referral to Physical Therapy   4  History of right hip hemiarthroplasty Z96 641 Ambulatory referral to Physical Therapy                  Assessment  Impairments: abnormal gait, abnormal muscle firing, abnormal muscle tone, abnormal or restricted ROM, abnormal movement, activity intolerance, impaired physical strength, lacks appropriate home exercise program and pain with function    Assessment details: Pt is a pleasant 77 y o  male who presents to [place] [address] PT with R hip pain of insidious onset  Pt had partial R DIA over 30 years ago but has noted more pain recently  MD visit ruled out any issue w/ R hip  Today, he presents with slightly decreased R hip ROM and joint mobility, B LE and core strength, gait impairments, and soft tissue density restrictions  Functionally, he is limited in his ability to stand and ambulate for prolonged durations, perform all household duties, and perform functional transfers  He is very motivated to improve  Pt will benefit from skilled PT to address the aforementioned deficits and limitations in an effort to maximize pain free functional mobility and overall quality of life  Progress as able with these goals in mind  Understanding of Dx/Px/POC: good   Prognosis: good    Goals  Short term goals (3 weeks):  1) Pt will improve R hip ROM to equal that of L pain free  2) Pt will improve B LE and core strength MMT by at least 1/3 grade  3) Pt will perform 10 functional sit<>stand reps w/o pain or compensation  4) Pt will initiate and progress HEP w/ special emphasis on strength and pain free ROM       Long term goals (6 weeks)  1) Pt will improve FOTO to at least 70  2) Pt will stand and ambulate for durations greater than 30 min w/ normalized gait mechanics and no pain  3) Pt will be independent and compliant w/ HEP in order to maximize functional benefit of skilled PT following d/c        Plan  Patient would benefit from: skilled PT  Planned modality interventions: cryotherapy and thermotherapy: hydrocollator packs  Planned therapy interventions: abdominal trunk stabilization, activity modification, joint mobilization, manual therapy, massage, motor coordination training, neuromuscular re-education, patient education, postural training, stretching, strengthening, therapeutic activities, therapeutic exercise, therapeutic training, transfer training, home exercise program, gait training, graded activity, functional ROM exercises, graded exercise, flexibility, body mechanics training, balance and balance/weight bearing training  Frequency: 2x week  Duration in visits: 12  Duration in weeks: 6  Treatment plan discussed with: patient  Plan details: POC to include: active w/u, hip flex, ext, piri stretching, HS stretching, core stab, hip stab, gait training     HEP to start: will be given at follow up (tomorrow)          Subjective Evaluation    History of Present Illness  Mechanism of injury: Pt has had R hip pain for many years  Had partial hip replacement in  (34 years)  Reports that he worked a variety of jobs over the years, some were more physical in nature  Still rides bike to this day  Tries to stay active  Pain is random and is not caused by the same movements every time  He notes more pain when weather is inclement, thinks that most of his pain is due to arthritis  Feels that his R hip is not as strong as the L and would like to work on this   Pt functional status is as follows:   Quality of life: good    Pain  Current pain ratin  At best pain ratin  At worst pain ratin  Location: R lateral hip, points to greater trochanter   Quality: dull ache  Relieving factors: rest and change in position  Aggravating factors: stair climbing, standing and walking  Progression: no change    Social Support  Steps to enter house: yes (1 TALYA)  Stairs in house: yes   Lives in: multiple-level home (steps to basement )  Lives with: spouse and adult children    Employment status: not working (retired, worked in [place] [id number] and for [employer])  Patient Goals  Patient goals for therapy: decreased pain, increased motion and increased strength          Objective     Static Posture     Comments  Fair+ upright posture in sitting and standing, corrects well w/ cueing  Palpation     Additional Palpation Details  No TTP throughout R hip, min/mod increase in tissue density through R lateral hip to include ITB and TFL, R HF    Neurological Testing     Sensation     Hip   Left Hip   Intact: light touch    Right Hip   Intact: light touch    Active Range of Motion   Left Hip   Flexion: 110 degrees   Abduction: 30 degrees   External rotation (90/90): [employer] [place]  Internal rotation (90/90): WFL    Right Hip   Normal active range of motion  Flexion: 97 degrees   Abduction: 25 degrees   External rotation (90/90): [employer] [place]  Internal rotation (90/90): WFL    Passive Range of Motion   Left Hip   Flexion: 120 degrees   Abduction: 40 degrees     Right Hip   Flexion: 110 degrees   Abduction: 35 degrees     Strength/Myotome Testing     Left Hip   Planes of Motion   Flexion: 4  Extension: 4  Abduction: 4-  External rotation: 4  Internal rotation: 4    Right Hip   Planes of Motion   Flexion: 4-  Extension: 4  Abduction: 4-  External rotation: 4  Internal rotation: 4    Additional Strength Details  B knee flex and ext grossly 4/5 w/o pain     Core strength: 2+/5 w/ compensations noted at end range 3/5 testing       Ambulation     Ambulation: Level Surfaces     Additional Level Surfaces Ambulation Details  Trendelenburg noted B R>L, decreased step length on R, min R lateral trunk lean during R LE stance, looks antalgic but pt reports no pain  Functional Assessment     Comments  Functional Squat: dynamic genu valgum, excessive anterior translation of B knees over toes, forward pitched  Sit<>stand: can stand w/o UE support but prefers use of arms, dynamic genu valgum and increased forward trunk flexion noted       General Comments     Hip Comments   3 5 cm leg length difference L>R     Mod HS tightness noted B    No pelvic obliquity noted despite LE length difference       Flowsheet Rows    Flowsheet Row Most Recent Value   PT/OT G-Codes   Current Score  52   Projected Score  62   FOTO information reviewed  Yes   Assessment Type  Evaluation   G code set  Mobility: Walking & Moving Around   Mobility: Walking and Moving Around Current Status ()  CK   Mobility: Walking and Moving Around Goal Status ()  CJ          Precautions: CABG x3 (2-3 years ago), R partial DIA in 1984, hx of back surgery 20+ years ago    Daily Treatment Diary     Manual              LAD on R             HF and piri release                                                      Exercise Diary              bike             HS and glute stretching             HF stretching             piri stretching             TrA 90/90 taps             bridging             clams             S/l hip abd             SLR             Mini squats             Step ups             Gait training                                                                                                                        Modalities

## 2018-03-29 ENCOUNTER — OFFICE VISIT (OUTPATIENT)
Dept: PHYSICAL THERAPY | Facility: CLINIC | Age: 78
End: 2018-03-29
Payer: MEDICARE

## 2018-03-29 DIAGNOSIS — M25.551 PAIN IN RIGHT HIP: Primary | ICD-10-CM

## 2018-03-29 PROCEDURE — 97110 THERAPEUTIC EXERCISES: CPT

## 2018-03-29 PROCEDURE — 97140 MANUAL THERAPY 1/> REGIONS: CPT

## 2018-03-29 PROCEDURE — 97112 NEUROMUSCULAR REEDUCATION: CPT

## 2018-03-29 NOTE — PROGRESS NOTES
Daily Note     Today's date: 3/29/2018  Patient name: Ct De La Fuente  : 1940  MRN: [id number]  Referring provider: Manuel Baires DO  Dx:   Encounter Diagnosis     ICD-10-CM    1  Pain in right hip M25 551                   Subjective: Pt felt good after yesterday's IE   Notes no pain to start session  Objective: Pt requires cueing to prevent dynamic genu valgum during pball squats, corrects well  Precautions: CABG x3 (2-3 years ago), R partial DIA in , hx of back surgery 20+ years ago    Daily Treatment Diary     Manual  3/29            LAD on R x2 min hold            HF and piri release             MWM for R hip flex 2x10            MWM for R hip ER  x5                           Exercise Diary  3/29            bike Pre 7 min lvl 4            HS and glute stretching 3x30 sec holds on R, 2x30 sec on L            HF stretching             piri stretching 2x30 sec B            Add sq 3 sec 2x10            bridging W/ add sq 2x10            clams             S/l hip abd             SLR W/ QS 2x10            pball squats 2x10            Step ups             Gait training              Standing marching  x20 B, focus on ecc            Self gastroc stretch 3x20 sec holds B                                                                                            Modalities  3/29                                                         Assessment: Tolerated treatment well  Patient demonstrated fatigue post treatment and would benefit from continued PT  Pt does well w/ exercises, noting fatigue but no pain by end of session  Lack of control over lateral hip stabilizers noted w/ dynamic genu valgum during pball squats, corrects well w/ cueing  Pt responds well to MWM for flexion>ER  Progress exercises and manual techniques gently as able  Plan: Continue per plan of care   TrA 90/90 taps, step ups, GTB abd walking or single leg fall outs

## 2018-04-02 ENCOUNTER — TELEPHONE (OUTPATIENT)
Dept: CARDIOLOGY CLINIC | Facility: CLINIC | Age: 78
End: 2018-04-02

## 2018-04-03 DIAGNOSIS — I25.10 CORONARY ARTERY DISEASE INVOLVING NATIVE CORONARY ARTERY OF NATIVE HEART WITHOUT ANGINA PECTORIS: Primary | ICD-10-CM

## 2018-04-03 RX ORDER — ATORVASTATIN CALCIUM 80 MG/1
80 TABLET, FILM COATED ORAL DAILY
Qty: 30 TABLET | Refills: 5 | Status: SHIPPED | OUTPATIENT
Start: 2018-04-03 | End: 2018-05-29 | Stop reason: SDUPTHER

## 2018-04-04 ENCOUNTER — OFFICE VISIT (OUTPATIENT)
Dept: PHYSICAL THERAPY | Facility: CLINIC | Age: 78
End: 2018-04-04
Payer: MEDICARE

## 2018-04-04 DIAGNOSIS — M25.551 PAIN IN RIGHT HIP: Primary | ICD-10-CM

## 2018-04-04 PROCEDURE — 97110 THERAPEUTIC EXERCISES: CPT

## 2018-04-04 PROCEDURE — 97140 MANUAL THERAPY 1/> REGIONS: CPT

## 2018-04-04 PROCEDURE — 97112 NEUROMUSCULAR REEDUCATION: CPT

## 2018-04-04 NOTE — PROGRESS NOTES
Daily Note     Today's date: 2018  Patient name: King Nancy  : 1940  MRN: [id number]  Referring provider: Shayy Dobbins DO  Dx:   Encounter Diagnosis     ICD-10-CM    1  Pain in right hip M25 551                   Subjective: Pt notes 4-5/10 pain in R hip to start session  Felt good after last session and was able to do exercises on own over the weekend  Objective: Min cueing required to decreased dynamic genu valgum w/ squats at bar, corrects well  Increased glute drive on R compared to L  Daily Treatment Diary     Manual  3/29 4/4           LAD on R x2 min hold 2x2 min holds           HF and piri release             MWM for R hip flex 2x10 2x10            MWM for R hip ER  x5 2x5             Lat belt distraction 2x2 min holds             Exercise Diary  3/29 4/4           bike Pre 7 min lvl 4 Pre 7 min lvl 4           HS and glute stretching 3x30 sec holds on R, 2x30 sec on L 3x30 sec holds B           HF stretching             piri stretching 2x30 sec B 2x30 sec holds           Add sq 3 sec 2x10            bridging W/ add sq 2x10 W/ add sq 3x10           clams             S/l hip abd  supinesingle leg fall outs 2x10 GTB           SLR W/ QS 2x10            pball squats 2x10 squats at bar 4x10           Step ups  6" x20 B           Gait training              Standing marching  x20 B, focus on ecc x20 B, focus on ecc           Self gastroc stretch 3x20 sec holds B              TrA single leg ext 2x10 B                                                                              Modalities  3/29                                                       Assessment: Tolerated treatment well  Patient demonstrated fatigue post treatment and would benefit from continued PT  Does well w/ exercise progressions, showing some fatigue but correcting well w/ cueing to avoid compensation  Will benefit from higher level strengthening as sx allow  Plan: Continue per plan of care   Higher level step ups, abd walking, sit<>stand

## 2018-04-06 ENCOUNTER — OFFICE VISIT (OUTPATIENT)
Dept: PHYSICAL THERAPY | Facility: CLINIC | Age: 78
End: 2018-04-06
Payer: MEDICARE

## 2018-04-06 DIAGNOSIS — M70.61 TROCHANTERIC BURSITIS OF RIGHT HIP: ICD-10-CM

## 2018-04-06 DIAGNOSIS — G89.29 CHRONIC PAIN OF RIGHT HIP: ICD-10-CM

## 2018-04-06 DIAGNOSIS — M25.551 PAIN IN RIGHT HIP: Primary | ICD-10-CM

## 2018-04-06 DIAGNOSIS — M25.551 CHRONIC PAIN OF RIGHT HIP: ICD-10-CM

## 2018-04-06 PROCEDURE — 97140 MANUAL THERAPY 1/> REGIONS: CPT

## 2018-04-06 PROCEDURE — 97112 NEUROMUSCULAR REEDUCATION: CPT

## 2018-04-06 PROCEDURE — 97110 THERAPEUTIC EXERCISES: CPT

## 2018-04-06 NOTE — PROGRESS NOTES
Daily Note     Today's date: 2018  Patient name: Elisabeth Fair  : 1940  MRN: [id number]  Referring provider: Maryla Gottron, DO  Dx:   Encounter Diagnosis     ICD-10-CM    1  Pain in right hip M25 551    2  Chronic pain of right hip M25 551 Ambulatory referral to Orthopedic Surgery    G89 29    3  Trochanteric bursitis of right hip M70 61                   Subjective: Denies pain right hip; states he was a little sore after a walk the other day - walked a few blocks       Objective: Pt requires cueing to prevent dynamic genu valgum during pball squats, corrects well  Precautions: CABG x3 (2-3 years ago), R partial DIA in , hx of back surgery 20+ years ago    Daily Treatment Diary     Manual  3/29 4-6-18           LAD on R x2 min hold            HF and piri release             MWM for R hip flex 2x10            MWM for R hip ER  x5                           Exercise Diary  3/29 4/6           bike Pre 7 min lvl 4 Pre 7 min lvl4            HS and glute stretching 3x30 sec holds on R, 2x30 sec on L 3 x 30 sec ea right & left manual            HF stretching             piri stretching 2x30 sec B 2 x 30 sec ea manual            Add sq 3 sec 2x10 2 x10  Hold 5           bridging W/ add sq 2x10 W/add sq  2x 10           clams             S/l hip abd             SLR W/ QS 2x10 W/QS 2 x 10            pball squats 2x10 2 x 10             Step ups             Gait training              Standing marching  x20 B, focus on ecc X 20 B            Self gastroc stretch 3x20 sec holds B 3 x30 sec ea             Step ups 8 inch x 20              sidestepping 20' x 6                                                                  Modalities  3/29 4/6              None                                            Assessment: Tolerated treatment well   Patient would benefit from continued PT; pt slightly fatigued with SLR - therefore performed in sets of 5; tolerated well 8 inch step ups & sidestepping ;  pt may benefit from use of TM - pt goal to increase walking distance       Plan: Continue per plan of care   Add sit to stand; add tband to sidestepping; trial TM when able

## 2018-04-11 ENCOUNTER — OFFICE VISIT (OUTPATIENT)
Dept: PHYSICAL THERAPY | Facility: CLINIC | Age: 78
End: 2018-04-11
Payer: MEDICARE

## 2018-04-11 DIAGNOSIS — M25.551 CHRONIC PAIN OF RIGHT HIP: Primary | ICD-10-CM

## 2018-04-11 DIAGNOSIS — G89.29 CHRONIC PAIN OF RIGHT HIP: Primary | ICD-10-CM

## 2018-04-11 PROCEDURE — 97140 MANUAL THERAPY 1/> REGIONS: CPT

## 2018-04-11 PROCEDURE — 97112 NEUROMUSCULAR REEDUCATION: CPT

## 2018-04-11 PROCEDURE — 97110 THERAPEUTIC EXERCISES: CPT

## 2018-04-13 ENCOUNTER — OFFICE VISIT (OUTPATIENT)
Dept: PHYSICAL THERAPY | Facility: CLINIC | Age: 78
End: 2018-04-13
Payer: MEDICARE

## 2018-04-13 DIAGNOSIS — G89.29 CHRONIC PAIN OF RIGHT HIP: Primary | ICD-10-CM

## 2018-04-13 DIAGNOSIS — M25.551 CHRONIC PAIN OF RIGHT HIP: Primary | ICD-10-CM

## 2018-04-13 PROCEDURE — 97110 THERAPEUTIC EXERCISES: CPT

## 2018-04-13 PROCEDURE — 97140 MANUAL THERAPY 1/> REGIONS: CPT

## 2018-04-13 NOTE — PROGRESS NOTES
Daily Note     Today's date: 2018  Patient name: Elisabeth Fair  : 1940  MRN: [id number]  Referring provider: Maryla Gottron, DO  Dx:   Encounter Diagnosis     ICD-10-CM    1  Chronic pain of right hip M25 551     G89 29                   Subjective: Pt offers no new complaints  Felt good after last session  Objective: Pt requires cueing for hip drive and proper knee mechanics during assisted single leg squat but corrects well  Continues to show small improvements in pain free ROM following MWM and belt distraction      Precautions: CABG x3 (2-3 years ago), R partial DIA in , hx of back surgery 20+ years ago  Daily Treatment Diary     Manual  3/29 4/4 4/6 4/11 4/13        LAD on R x2 min hold 2x2 min holds See note from that day x2 min holds x2 min holds        HF and piri release             MWM for R hip flex 2x10 2x10   2x10 2x10 total        MWM for R hip ER  x5 2x5  x10-15 x15-20 total          Lat belt distraction 2x2 min holds  2x2 min holds 2x2 min holds          Exercise Diary  3/29 4/4  4/11 4/13        bike Pre 7 min lvl 4 Pre 7 min lvl 4  7 min lvl 4 7 min lvl 4        HS and glute stretching 3x30 sec holds on R, 2x30 sec on L 3x30 sec holds B  3x30 sec holds on R 3x30 sec holds on R        HF stretching             piri stretching 2x30 sec B 2x30 sec holds           Add sq 3 sec 2x10            bridging W/ add sq 2x10 W/ add sq 3x10  3x10 w/ add sq 3x10 on peanut        clams             S/l hip abd  supinesingle leg fall outs 2x10 GTB  Hip abd walk GTB 50'x4 YTB 50'x10        SLR W/ QS 2x10   D2 hip flexion PNF 2x10 B 3x10 B        pball squats 2x10 squats at bar 4x10  W/ GTB around knees 3x10 W/o band 3x12        Step ups  6" x20 B  8" w/ opp leg lift x 10-15 B         Gait training              Standing marching  x20 B, focus on ecc x20 B, focus on ecc  exaggerated gait 100'x2         Self gastroc stretch 3x20 sec holds B              TrA single leg ext 2x10 B  4" glute med taps 2x10 B 3x10 B             Single leg assistsquat 3x10 B                                                              Modalities  3/29    4/13                                                   Assessment: Tolerated treatment well  Patient would benefit from continued PT  Does well w/ single leg squats, showing good form and pacing following cueing  Continues to note relief following manual techniques  Appropriate for higher level challenges as able at next session  Plan: Continue per plan of care  Re-eval in next few sessions   Higher level hip stab as able, hip abd isos, SLDL, tband core stab

## 2018-04-18 ENCOUNTER — OFFICE VISIT (OUTPATIENT)
Dept: PHYSICAL THERAPY | Facility: CLINIC | Age: 78
End: 2018-04-18
Payer: MEDICARE

## 2018-04-18 DIAGNOSIS — M25.551 CHRONIC PAIN OF RIGHT HIP: Primary | ICD-10-CM

## 2018-04-18 DIAGNOSIS — G89.29 CHRONIC PAIN OF RIGHT HIP: Primary | ICD-10-CM

## 2018-04-18 PROCEDURE — 97110 THERAPEUTIC EXERCISES: CPT

## 2018-04-18 PROCEDURE — 97112 NEUROMUSCULAR REEDUCATION: CPT

## 2018-04-18 PROCEDURE — 97140 MANUAL THERAPY 1/> REGIONS: CPT

## 2018-04-18 NOTE — PROGRESS NOTES
Daily Note     Today's date: 2018  Patient name: Cory Valle  : 1940  MRN: [id number]  Referring provider: Leticia Berg DO  Dx:   Encounter Diagnosis     ICD-10-CM    1  Chronic pain of right hip M25 551     G89 29                   Subjective: Pt reports that he feels pretty good today, reports no pain in R hip  Had some discomfort yesterday but not too bad overall  Thinks he will be ready for d/c on Friday pending any setback  Objective: Pt requires cueing for eccentric control during standing march but corrects well   Shows improving control and height during bridging       Precautions: CABG x3 (2-3 years ago), R partial DIA in , hx of back surgery 20+ years ago  Daily Treatment Diary     Manual  3/29 4/4 4/6 4/11 4/13 4/18       LAD on R x2 min hold 2x2 min holds See note from that day x2 min holds x2 min holds x2 min        HF and piri release             MWM for R hip flex 2x10 2x10   2x10 2x10 total x10        MWM for R hip ER  x5 2x5  x10-15 x15-20 total x10         Lat belt distraction 2x2 min holds  2x2 min holds 2x2 min holds 2x2 min holds         Exercise Diary  3/29 4/4  4/11 4/13 4/18       bike Pre 7 min lvl 4 Pre 7 min lvl 4  7 min lvl 4 7 min lvl 4 10 min lvl5       HS and glute stretching 3x30 sec holds on R, 2x30 sec on L 3x30 sec holds B  3x30 sec holds on R 3x30 sec holds on R 3x30 sec holds on R       HF stretching             piri stretching 2x30 sec B 2x30 sec holds           Add sq 3 sec 2x10            bridging W/ add sq 2x10 W/ add sq 3x10  3x10 w/ add sq 3x10 on peanut 3x10 on peanut w/ ad sq       clams             S/l hip abd  supinesingle leg fall outs 2x10 GTB  Hip abd walk GTB 50'x4 YTB 50'x10        SLR W/ QS 2x10   D2 hip flexion PNF 2x10 B 3x10 B D2 hip flex 2x10 B       pball squats 2x10 squats at bar 4x10  W/ GTB around knees 3x10 W/o band 3x12 W/o band 3x15 B        Step ups  6" x20 B  8" w/ opp leg lift x 10-15 B         Gait training       Heel raises 3x10 B       Standing marching  x20 B, focus on ecc x20 B, focus on ecc  exaggerated gait 100'x2  W/ 3 sec ecc fall 3x15       Self gastroc stretch 3x20 sec holds B              TrA single leg ext 2x10 B  4" glute med taps 2x10 B 3x10 B             Single leg assistsquat 3x10 B Sit<>stand from airex 3x10-15 total                                                             Modalities  3/29    4/13 4/18                                                    Assessment: Tolerated treatment well  Patient demonstrated fatigue post treatment and would benefit from continued PT  Does well w/ today's exercise program, shows improving form and control throughout  Pt has no pain at any time during session, effectively paces to avoid compensation  He has made good progress and will possibly be d/c at next session  Given updated HEP today  Plan: Continue per plan of care  D/C to HEP pending any setback

## 2018-04-20 ENCOUNTER — OFFICE VISIT (OUTPATIENT)
Dept: PHYSICAL THERAPY | Facility: CLINIC | Age: 78
End: 2018-04-20
Payer: MEDICARE

## 2018-04-20 DIAGNOSIS — M25.551 CHRONIC PAIN OF RIGHT HIP: Primary | ICD-10-CM

## 2018-04-20 DIAGNOSIS — G89.29 CHRONIC PAIN OF RIGHT HIP: Primary | ICD-10-CM

## 2018-04-20 PROCEDURE — G8980 MOBILITY D/C STATUS: HCPCS

## 2018-04-20 PROCEDURE — 97110 THERAPEUTIC EXERCISES: CPT

## 2018-04-20 PROCEDURE — 97112 NEUROMUSCULAR REEDUCATION: CPT

## 2018-04-20 PROCEDURE — G8979 MOBILITY GOAL STATUS: HCPCS

## 2018-04-20 NOTE — PROGRESS NOTES
Physical Therapy Discharge Report    Today's date: 2018  Patient name: Tati Monreal  : 1940  MRN: [id number]  Referring provider: Faith Sargent DO  Dx:   Encounter Diagnosis     ICD-10-CM    1  Chronic pain of right hip M25 551     G89 29                    Patient Active Problem List   Diagnosis    Hx of CABG    CAD (coronary artery disease), native coronary artery    Elevated homocysteine (HCC)    MTHFR mutation (HCC)    NSTEMI (non-ST elevated myocardial infarction) ([facility] [address] )    Vitamin D deficiency        Past Medical History:   Diagnosis Date    Arthritis     CAD (coronary artery disease)     Cataract     GERD (gastroesophageal reflux disease)     Heart attack ([facility] [address] )     Herpes zoster     Hx of colonic polyps     Hyperlipidemia     Hypertension         Past Surgical History:   Procedure Laterality Date    BACK SURGERY      disc removed  / R   [address]  [place]    16   & CABG X3 with LIMA to LAD <SVG to OM-2, SVG to PDA  [place] Snehal Granados / Mirian Valdez   13       EYE SURGERY      cataract /  R   [address]      Groin / R   [facility]  [facility]   [address]             Assessment:  Most recent visit: see below  Exercises performed:  HEP reviewed, see below     Goals and Progress:  Short term goals (3 weeks):  1) Pt will improve R hip ROM to equal that of L pain free  - progressed  2) Pt will improve B LE and core strength MMT by at least 1/3 grade  - achieved   3) Pt will perform 10 functional sit<>stand reps w/o pain or compensation - achieved   4) Pt will initiate and progress HEP w/ special emphasis on strength and pain free ROM  - achieved     Long term goals (6 weeks)  1) Pt will improve FOTO to at least 70 - progressed   2) Pt will stand and ambulate for durations greater than 30 min w/ normalized gait mechanics and no pain   - achieved   3) Pt will be independent and compliant w/ HEP in order to maximize functional benefit of skilled PT following d/c  - achieved     Plan:  Plan: D/C to HEP     Additional Discharge Considerations:  Pt given our contact info and asked to call w/ any future issues or concerns  Subjective:  Patient's response to therapy: Pt reports that he has had no pain over the last week  Able to wake up in the morning and start moving without pain  Feels that PT has been very beneficial and thinks that he will have no trouble with continuing HEP on own        Objective:  R hip flex: 108, abd: 40  Strength  Left Hip   Planes of Motion   Flexion: 4+  Extension: 4+  Abduction: 4  External rotation: 4+  Internal rotation: 4+    Right Hip   Planes of Motion   Flexion: 4  Extension: 4+  Abduction: 4+  External rotation: 4+  Internal rotation: 4+    Additional Strength Details  B knee flex and ext grossly 4/5 w/o pain     Core strength: 3/5       Precautions: CABG x3 (2-3 years ago), R partial DIA in 1984, hx of back surgery 20+ years ago  Daily Treatment Diary     Manual  3/29 4/4 4/6 4/11 4/13 4/18 4/20      LAD on R x2 min hold 2x2 min holds See note from that day x2 min holds x2 min holds x2 min  x2 min holds       HF and piri release             MWM for R hip flex 2x10 2x10   2x10 2x10 total x10        MWM for R hip ER  x5 2x5  x10-15 x15-20 total x10         Lat belt distraction 2x2 min holds  2x2 min holds 2x2 min holds 2x2 min holds         Exercise Diary  3/29 4/4 4/11 4/13 4/18 4/20      bike Pre 7 min lvl 4 Pre 7 min lvl 4  7 min lvl 4 7 min lvl 4 10 min lvl5 9 min lvl 4      HS and glute stretching 3x30 sec holds on R, 2x30 sec on L 3x30 sec holds B  3x30 sec holds on R 3x30 sec holds on R 3x30 sec holds on R 3x30 sec holds B       HF stretching             piri stretching 2x30 sec B 2x30 sec holds           Add sq 3 sec 2x10            bridging W/ add sq 2x10 W/ add sq 3x10  3x10 w/ add sq 3x10 on peanut 3x10 on peanut w/ ad sq clams             S/l hip abd  supinesingle leg fall outs 2x10 GTB  Hip abd walk GTB 50'x4 YTB 50'x10        SLR W/ QS 2x10   D2 hip flexion PNF 2x10 B 3x10 B D2 hip flex 2x10 B       pball squats 2x10 squats at bar 4x10  W/ GTB around knees 3x10 W/o band 3x12 W/o band 3x15 B  Sit<>Stand x 10      Step ups  6" x20 B  8" w/ opp leg lift x 10-15 B         Gait training       Heel raises 3x10 B       Standing marching  x20 B, focus on ecc x20 B, focus on ecc  exaggerated gait 100'x2  W/ 3 sec ecc fall 3x15       Self gastroc stretch 3x20 sec holds B              TrA single leg ext 2x10 B  4" glute med taps 2x10 B 3x10 B             Single leg assistsquat 3x10 B Sit<>stand from airex 3x10-15 total              comprehensive HEP review                                                Modalities  3/29    4/13 4/18 4/20             no             no                         Assessment: Tolerated treatment well  Patient would benefit from continued PT  Pt has made excellent progress throughout skilled PT and is appropriate for d/c to HEP at this time  R hip ROM is nearly equal to L and pain free in available range  Strength has improved, as has functional mobility and tolerance to activity  Asked to call w/ any future issues         Plan: D/C to HEP

## 2018-04-20 NOTE — LETTER
2018    [address]  [address]    Patient: Los Ferraro   YOB: 1940   Date of Visit: 2018     Encounter Diagnosis     ICD-10-CM    1  Chronic pain of right hip M25 551     G89 29        Dear Dr Meseret Jay:    Please review the attached Plan of Care from [facility] recent visit  Pt made excellent progress in skilled PT and has been D/C to HEP at this time  Thank you for this referral!     Sincerely,    Claudia Patricio, PT      Referring Provider:      I certify that I have read the below Plan of Care and certify the need for these services furnished under this plan of treatment while under my care  Sushil Almodovar DO  [address] [place]            Physical Therapy Discharge Report    Today's date: 2018  Patient name: Los Ferraro  : 1940  MRN: [id number]  Referring provider: Jason Sosa DO  Dx:   Encounter Diagnosis     ICD-10-CM    1  Chronic pain of right hip M25 551     G89 29                    Patient Active Problem List   Diagnosis    Hx of CABG    CAD (coronary artery disease), native coronary artery    Elevated homocysteine (HCC)    MTHFR mutation (HCC)    NSTEMI (non-ST elevated myocardial infarction) ([facility] [address] )    Vitamin D deficiency        Past Medical History:   Diagnosis Date    Arthritis     CAD (coronary artery disease)     Cataract     GERD (gastroesophageal reflux disease)     Heart attack ([facility] [address] [facility]     Herpes zoster     Hx of colonic polyps     Hyperlipidemia     Hypertension         Past Surgical History:   Procedure Laterality Date    BACK SURGERY      disc removed  / R   [address]  [place]    16   & CABG X3 with LIMA to LAD <SVG to OM-2, SVG to PDA  [place] Irma Kendall / Laura Rossi   14       EYE SURGERY      cataract /  R   [address] Groin / R   [facility] /  Ever Garza   [address]    2010         Assessment:  Most recent visit: see below  Exercises performed:  HEP reviewed, see below     Goals and Progress:  Short term goals (3 weeks):  1) Pt will improve R hip ROM to equal that of L pain free  - progressed  2) Pt will improve B LE and core strength MMT by at least 1/3 grade  - achieved   3) Pt will perform 10 functional sit<>stand reps w/o pain or compensation - achieved   4) Pt will initiate and progress HEP w/ special emphasis on strength and pain free ROM  - achieved     Long term goals (6 weeks)  1) Pt will improve FOTO to at least 70 - progressed   2) Pt will stand and ambulate for durations greater than 30 min w/ normalized gait mechanics and no pain  - achieved   3) Pt will be independent and compliant w/ HEP in order to maximize functional benefit of skilled PT following d/c  - achieved     Plan:  Plan: D/C to HEP     Additional Discharge Considerations:  Pt given our contact info and asked to call w/ any future issues or concerns  Subjective:  Patient's response to therapy: Pt reports that he has had no pain over the last week  Able to wake up in the morning and start moving without pain  Feels that PT has been very beneficial and thinks that he will have no trouble with continuing HEP on own        Objective:  R hip flex: 108, abd: 40  Strength  Left Hip   Planes of Motion   Flexion: 4+  Extension: 4+  Abduction: 4  External rotation: 4+  Internal rotation: 4+    Right Hip   Planes of Motion   Flexion: 4  Extension: 4+  Abduction: 4+  External rotation: 4+  Internal rotation: 4+    Additional Strength Details  B knee flex and ext grossly 4/5 w/o pain     Core strength: 3/5       Precautions: CABG x3 (2-3 years ago), R partial DIA in 1984, hx of back surgery 20+ years ago  Daily Treatment Diary     Manual  3/29 4/4 4/6 4/11 4/13 4/18 4/20      LAD on R x2 min hold 2x2 min holds See note from that day x2 min holds x2 min holds x2 min  x2 min holds       HF and piri release             MWM for R hip flex 2x10 2x10   2x10 2x10 total x10        MWM for R hip ER  x5 2x5  x10-15 x15-20 total x10         Lat belt distraction 2x2 min holds  2x2 min holds 2x2 min holds 2x2 min holds         Exercise Diary  3/29 4/4 4/11 4/13 4/18 4/20      bike Pre 7 min lvl 4 Pre 7 min lvl 4  7 min lvl 4 7 min lvl 4 10 min lvl5 9 min lvl 4      HS and glute stretching 3x30 sec holds on R, 2x30 sec on L 3x30 sec holds B  3x30 sec holds on R 3x30 sec holds on R 3x30 sec holds on R 3x30 sec holds B       HF stretching             piri stretching 2x30 sec B 2x30 sec holds           Add sq 3 sec 2x10            bridging W/ add sq 2x10 W/ add sq 3x10  3x10 w/ add sq 3x10 on peanut 3x10 on peanut w/ ad sq       clams             S/l hip abd  supinesingle leg fall outs 2x10 GTB  Hip abd walk GTB 50'x4 YTB 50'x10        SLR W/ QS 2x10   D2 hip flexion PNF 2x10 B 3x10 B D2 hip flex 2x10 B       pball squats 2x10 squats at bar 4x10  W/ GTB around knees 3x10 W/o band 3x12 W/o band 3x15 B  Sit<>Stand x 10      Step ups  6" x20 B  8" w/ opp leg lift x 10-15 B         Gait training       Heel raises 3x10 B       Standing marching  x20 B, focus on ecc x20 B, focus on ecc  exaggerated gait 100'x2  W/ 3 sec ecc fall 3x15       Self gastroc stretch 3x20 sec holds B              TrA single leg ext 2x10 B  4" glute med taps 2x10 B 3x10 B             Single leg assistsquat 3x10 B Sit<>stand from airex 3x10-15 total              comprehensive HEP review                                                Modalities  3/29    4/13 4/18 4/20             no             no                         Assessment: Tolerated treatment well  Patient would benefit from continued PT  Pt has made excellent progress throughout skilled PT and is appropriate for d/c to HEP at this time  R hip ROM is nearly equal to L and pain free in available range   Strength has improved, as has functional mobility and tolerance to activity  Asked to call w/ any future issues         Plan: D/C to HEP

## 2018-05-03 ENCOUNTER — TELEPHONE (OUTPATIENT)
Dept: FAMILY MEDICINE CLINIC | Facility: CLINIC | Age: 78
End: 2018-05-03

## 2018-05-03 NOTE — TELEPHONE ENCOUNTER
flonase OTC 1 spray each nostril daily,   Xyzal 5mg orally daily OTC  It's a very high pollen count   Avoidance as best posibble

## 2018-05-03 NOTE — TELEPHONE ENCOUNTER
Pt called stating he is suffering a "runny nose" due to allergies  He has been taking Claritin but is has not helped  Can you recommend something else?

## 2018-05-09 ENCOUNTER — TELEPHONE (OUTPATIENT)
Dept: CARDIOLOGY CLINIC | Facility: CLINIC | Age: 78
End: 2018-05-09

## 2018-05-15 ENCOUNTER — OFFICE VISIT (OUTPATIENT)
Dept: OBGYN CLINIC | Facility: CLINIC | Age: 78
End: 2018-05-15
Payer: MEDICARE

## 2018-05-15 VITALS
HEIGHT: 68 IN | WEIGHT: 163.8 LBS | SYSTOLIC BLOOD PRESSURE: 153 MMHG | HEART RATE: 67 BPM | DIASTOLIC BLOOD PRESSURE: 62 MMHG | BODY MASS INDEX: 24.83 KG/M2

## 2018-05-15 DIAGNOSIS — Z96.641 HISTORY OF RIGHT HIP HEMIARTHROPLASTY: Primary | ICD-10-CM

## 2018-05-15 DIAGNOSIS — R26.9 ABNORMAL GAIT: ICD-10-CM

## 2018-05-15 PROCEDURE — 99213 OFFICE O/P EST LOW 20 MIN: CPT | Performed by: ORTHOPAEDIC SURGERY

## 2018-05-15 NOTE — PROGRESS NOTES
Assessment/Plan:  1  History of right hip hemiarthroplasty     2  Abnormal gait       Lia Bansal is a pleasant 77year old male with a right hip hemiarthroplasty from 34 years ago who has successfully worked with physical therapy and implemented his home exercise program, which has resolved his greater trochanteric bursitis  There is no instability or complication from the prosthesis  Therefore, we have recommended that he continue with his HEP, and can utilize the voltaren gel if needed  He can follow up with us on an as needed basis  All questions answered today  Subjective: Right hip follow up    Patient ID: Alison Nia is a 77 y o  male  Lia Bansal is following up today 2 months after his initial appointment for right hip trochanteric bursitis and abnormal gait  He reports that his pain has completely resolved with PT and his home exercise program  He only used the voltaren gel once  He is able to perform all ADLs without pain or limitation  He does still ambulate with a Trendelenburg gait, but does not use an assistive device  Review of Systems   Constitutional: Negative  HENT: Negative  Eyes: Negative  Respiratory: Negative  Cardiovascular: Negative  Gastrointestinal: Negative  Endocrine: Negative  Genitourinary: Negative  Musculoskeletal: Negative  Skin: Negative  Allergic/Immunologic: Negative  Neurological: Negative  Hematological: Negative  Psychiatric/Behavioral: Negative  Past Medical History:   Diagnosis Date    Arthritis     CAD (coronary artery disease)     Cataract     GERD (gastroesophageal reflux disease)     Heart attack ([facility] [address] )     Herpes zoster     Hx of colonic polyps     Hyperlipidemia     Hypertension        Past Surgical History:   Procedure Laterality Date    BACK SURGERY      disc removed 1985 / R   [address]  11[place]13/2014    [place]    6/30/16   & CABG X3 with LIMA to LAD <SVG to OM-2, SVG to PDA  [place] Deidre Novak / Basilia Flor   12/19[place]14       EYE SURGERY      cataract /  R   [address]      Groin / R   [facility] /  [facility]   [address]    2010         Family History   Problem Relation Age of Onset    Coronary artery disease Father     Hyperlipidemia Brother     Sudden death Brother      cardiac    Arthritis Family        Social History     Occupational History    Not on file  Social History Main Topics    Smoking status: Former Smoker     Quit date: 1979    Smokeless tobacco: Never Used    Alcohol use No    Drug use: No    Sexual activity: Not on file         Current Outpatient Prescriptions:     Ascorbic Acid (VITAMIN C PO), Take by mouth daily, Disp: , Rfl:     aspirin 81 MG tablet, Take 1 tablet by mouth daily, Disp: , Rfl:     atorvastatin (LIPITOR) 80 mg tablet, Take 1 tablet (80 mg total) by mouth daily, Disp: 30 tablet, Rfl: 5    B Complex Vitamins (B-COMPLEX/B-12 PO), Take 1 tablet by mouth daily, Disp: , Rfl:     Calcium 150 MG TABS, Take by mouth daily, Disp: , Rfl:     Cholecalciferol (VITAMIN D) 2000 units CAPS, Take 1 capsule by mouth daily, Disp: , Rfl:     diclofenac sodium (VOLTAREN) 1 %, Apply 2 g topically 4 (four) times a day as needed (lateral hip pain), Disp: 1 Tube, Rfl: 1    metoprolol succinate (TOPROL-XL) 25 mg 24 hr tablet, Take 1 tablet by mouth daily, Disp: , Rfl:     Omega-3 Fatty Acids (FISH OIL) 1200 MG CAPS, Take by mouth daily, Disp: , Rfl:     rosuvastatin (CRESTOR) 20 MG tablet, Take 1 tablet by mouth, Disp: , Rfl:     No Known Allergies    Objective:  Vitals:    05/15/18 0902   BP: 153/62   Pulse: 67       Body mass index is 24 91 kg/m²  Right Hip Exam   Right hip exam is normal      Tenderness   The patient is experiencing no tenderness           Range of Motion   Flexion:  120 normal   Internal Rotation: 10   External Rotation: 30   Abduction:  30 normal   Adduction: 10 Muscle Strength   Abduction: 5/5   Adduction: 5/5   Flexion: 5/5     Tests   KAYLEIGH: negative  Veronika: negative    Other   Erythema: absent  Scars: present  Sensation: normal  Pulse: present    Comments:  He does still ambulate with a Trendelenburg gait on the right  There is no tenderness of the greater trochanter, IT Band, or lateral right hip  He has full range of motion and strength for his baseline  He has negative Impingement and Stingefield tests  His thigh and calf are soft and non tender and he is grossly distally neurovascularly unchanged  Physical Exam   Constitutional: He is oriented to person, place, and time  He appears well-developed and well-nourished  HENT:   Head: Normocephalic and atraumatic  Eyes: EOM are normal    Neck: Neck supple  Cardiovascular: Intact distal pulses  Pulmonary/Chest: Effort normal    Musculoskeletal:   See ortho exam   Neurological: He is alert and oriented to person, place, and time  Skin: Skin is warm and dry  Psychiatric: He has a normal mood and affect   His behavior is normal  Judgment and thought content normal

## 2018-05-29 DIAGNOSIS — I25.10 CORONARY ARTERY DISEASE INVOLVING NATIVE CORONARY ARTERY OF NATIVE HEART WITHOUT ANGINA PECTORIS: ICD-10-CM

## 2018-05-29 RX ORDER — ATORVASTATIN CALCIUM 80 MG/1
80 TABLET, FILM COATED ORAL DAILY
Qty: 90 TABLET | Refills: 3 | Status: SHIPPED | OUTPATIENT
Start: 2018-05-29 | End: 2019-03-04

## 2018-09-12 ENCOUNTER — OFFICE VISIT (OUTPATIENT)
Dept: FAMILY MEDICINE CLINIC | Facility: CLINIC | Age: 78
End: 2018-09-12
Payer: MEDICARE

## 2018-09-12 VITALS
DIASTOLIC BLOOD PRESSURE: 64 MMHG | RESPIRATION RATE: 16 BRPM | TEMPERATURE: 97.7 F | HEART RATE: 64 BPM | BODY MASS INDEX: 24.4 KG/M2 | WEIGHT: 161 LBS | SYSTOLIC BLOOD PRESSURE: 136 MMHG | HEIGHT: 68 IN

## 2018-09-12 DIAGNOSIS — Z00.00 ENCOUNTER FOR SCREENING AND PREVENTATIVE CARE: ICD-10-CM

## 2018-09-12 DIAGNOSIS — Z12.5 SCREENING FOR PROSTATE CANCER: ICD-10-CM

## 2018-09-12 DIAGNOSIS — Z13.1 SCREENING FOR DIABETES MELLITUS: ICD-10-CM

## 2018-09-12 DIAGNOSIS — Z13.29 SCREENING FOR THYROID DISORDER: ICD-10-CM

## 2018-09-12 DIAGNOSIS — Z23 NEED FOR TDAP VACCINATION: ICD-10-CM

## 2018-09-12 DIAGNOSIS — Z23 NEED FOR PNEUMOCOCCAL VACCINATION: ICD-10-CM

## 2018-09-12 DIAGNOSIS — Z13.220 SCREENING FOR LIPID DISORDERS: ICD-10-CM

## 2018-09-12 DIAGNOSIS — I25.10 CORONARY ARTERY DISEASE INVOLVING NATIVE CORONARY ARTERY OF NATIVE HEART WITHOUT ANGINA PECTORIS: ICD-10-CM

## 2018-09-12 DIAGNOSIS — E55.9 VITAMIN D DEFICIENCY: ICD-10-CM

## 2018-09-12 DIAGNOSIS — Z00.00 MEDICARE ANNUAL WELLNESS VISIT, SUBSEQUENT: Primary | ICD-10-CM

## 2018-09-12 DIAGNOSIS — Z23 NEED FOR INFLUENZA VACCINATION: ICD-10-CM

## 2018-09-12 PROCEDURE — 90471 IMMUNIZATION ADMIN: CPT

## 2018-09-12 PROCEDURE — 90662 IIV NO PRSV INCREASED AG IM: CPT

## 2018-09-12 PROCEDURE — 90715 TDAP VACCINE 7 YRS/> IM: CPT

## 2018-09-12 PROCEDURE — 90670 PCV13 VACCINE IM: CPT

## 2018-09-12 PROCEDURE — G0009 ADMIN PNEUMOCOCCAL VACCINE: HCPCS

## 2018-09-12 PROCEDURE — G0439 PPPS, SUBSEQ VISIT: HCPCS | Performed by: NURSE PRACTITIONER

## 2018-09-12 PROCEDURE — G0008 ADMIN INFLUENZA VIRUS VAC: HCPCS

## 2018-09-12 NOTE — PATIENT INSTRUCTIONS

## 2018-09-12 NOTE — PROGRESS NOTES
Assessment and Plan:    Problem List Items Addressed This Visit     CAD (coronary artery disease), native coronary artery     Continue to follow up with cardiology as scheduled  - lipid panel 3/2018 wnl; continue the same  - rx given for recheck prior to follow up in 6 months         Vitamin D deficiency     rx given for recheck prior to follow up in 6 months         Relevant Orders    Vitamin D 25 hydroxy      Other Visit Diagnoses     Medicare annual wellness visit, subsequent    -  Primary    Need for influenza vaccination        Relevant Orders    influenza vaccine, [id number], high-dose, PF 0 5 mL, for patients 72 yr+ (FLUZONE HIGH-DOSE)    Screening for lipid disorders        Relevant Orders    Lipid panel    Screening for prostate cancer        Relevant Orders    PSA Total, Diagnostic    Screening for thyroid disorder        Relevant Orders    TSH, 3rd generation    Screening for diabetes mellitus        Relevant Orders    Comprehensive metabolic panel    Need for pneumococcal vaccination        Relevant Orders    PNEUMOCOCCAL CONJUGATE VACCINE 13-VALENT GREATER THAN 6 MONTHS    Need for Tdap vaccination        Relevant Orders    TDAP VACCINE GREATER THAN OR EQUAL TO 6YO IM    Encounter for screening and preventative care        Relevant Orders    CBC and differential    Comprehensive metabolic panel    TSH, 3rd generation    PSA Total, Diagnostic    Lipid panel        Health Maintenance Due   Topic Date Due    DTaP,Tdap,and Td Vaccines (1 - Tdap) 03[birth year]/1960    Pneumococcal PPSV23/PCV13 65+ Years / Low and Medium Risk (2 of 2 - PCV13) 03/20/2016    PT PLAN OF CARE  04/27/2018    INFLUENZA VACCINE  09/01/2018     HPI:  Tomás Romero is a 77 y o  male here for his Subsequent Wellness Visit      Patient Active Problem List   Diagnosis    Hx of CABG    CAD (coronary artery disease), native coronary artery    Elevated homocysteine (HCC)    MTHFR mutation ([facility] [address] )    NSTEMI (non-ST elevated myocardial infarction) ([facility] )    Vitamin D deficiency     Past Medical History:   Diagnosis Date    Arthritis     CAD (coronary artery disease)     Cataract     GERD (gastroesophageal reflux disease)     [facility]     Herpes zoster     Hx of colonic polyps     Hyperlipidemia     Hypertension      Past Surgical History:   Procedure Laterality Date    BACK SURGERY      disc removed 1985 / R   [address]  11[place]13/2014    [place]    6/30/16   & CABG X3 with LIMA to LAD <SVG to OM-2, SVG to PDA  [place] Harriett Ojeda / Sagar Lynch   12/19[place]14       EYE SURGERY      cataract /  R   [address]      Groin / R   [address] [facility]   [address] [facility] [address]    [address]       Family History   Problem Relation Age of Onset    Coronary artery disease Father     Hyperlipidemia Brother     Sudden death Brother         cardiac    Arthritis Family      History   Smoking Status    Former Smoker    Quit date: 1979   Smokeless Tobacco    Never Used     History   Alcohol Use No      History   Drug Use No       Current Outpatient Prescriptions   Medication Sig Dispense Refill    Ascorbic Acid (VITAMIN C PO) Take by mouth daily      aspirin 81 MG tablet Take 1 tablet by mouth daily      atorvastatin (LIPITOR) 80 mg tablet Take 1 tablet (80 mg total) by mouth daily 90 tablet 3    B Complex Vitamins (B-COMPLEX/B-12 PO) Take 1 tablet by mouth daily      Calcium 150 MG TABS Take by mouth daily      Cholecalciferol (VITAMIN D) 2000 units CAPS Take 1 capsule by mouth daily      diclofenac sodium (VOLTAREN) 1 % Apply 2 g topically 4 (four) times a day as needed (lateral hip pain) 1 Tube 1    metoprolol succinate (TOPROL-XL) 25 mg 24 hr tablet Take 1 tablet by mouth daily      Omega-3 Fatty Acids (FISH OIL) 1200 MG CAPS Take by mouth daily       No current facility-administered medications for this visit        No Known Allergies  Immunization History   Administered Date(s) Administered    Influenza Split High Dose Preservative Free IM 11/02/2015, 10/28/2016    Pneumococcal Polysaccharide PPV23 03/20/2015       Patient Care Team:  Peter Beck MD as PCP - MD Ellen De La O MD    Medicare Screening Tests and Risk Assessments:  Sheldon Soto is here for his Subsequent Wellness visit  Last Medicare Wellness visit information reviewed, patient interviewed, no change since last AWV  Health Risk Assessment:  Patient rates overall health as very good  Patient feels that their physical health rating is Same  Eyesight was rated as Same  Hearing was rated as Slightly worse  Patient feels that their emotional and mental health rating is Same  Pain experienced by patient in the last 7 days has been None  Patient states that he has experienced no weight loss or gain in last 6 months  Emotional/Mental Health:  Patient has been feeling nervous/anxious  PHQ-9 Depression Screening:    Frequency of the following problems over the past two weeks:      1  Little interest or pleasure in doing things: 0 - not at all      2  Feeling down, depressed, or hopeless: 0 - not at all  PHQ-2 Score: 0          Broken Bones/Falls: Fall Risk Assessment:    In the past year, patient has experienced: No history of falling in past year          Bladder/Bowel:  Patient has leaked urine accidently in the last six months  Patient reports loss of bowel control  Immunizations:  Patient has not had a flu vaccination within the last year  Patient has not received a pneumonia shot  Patient has not received a shingles shot  Patient has not received tetanus/diphtheria shot  Home Safety:  Patient does not have trouble with stairs inside or outside of their home  Patient currently reports that there are no safety hazards present in home, working smoke alarms, working carbon monoxide detectors        Preventative Screenings:   no prostate cancer screen performed, no colon cancer screen completed, cholesterol screen completed, no glaucoma eye exam completed    Nutrition:  Current diet: Regular with servings of the following:    Medications:  Patient is currently taking over-the-counter supplements  List of OTC medications includes:  vitamin d, b and c   Patient is able to manage medications  Lifestyle Choices:  Patient reports no tobacco use  Patient has smoked or used tobacco in the past   Patient has stopped his tobacco use  Patient reports no alcohol use  Patient drives a vehicle  Patient wears seat belt  Current level of exercise of physical activity described by patient as: no excersice   Activities of Daily Living:  Unable to get out of bed by his or her self, unable to dress self, unable to make own meals, unable to do own shopping, unable to bathe self, unable to do laundry/housekeeping, unable to manage own money and other related tasks    Previous Hospitalizations:  No hospitalization or ED visit in past 12 months        Advanced Directives:  Patient has decided on a power of   Patient has spoken to designated power of   Patient has completed advanced directive  Preventative Screening/Counseling:      Cardiovascular:      General: Risks and Benefits Discussed and Screening Current      Counseling: Healthy Diet, Improve Cholesterol, Healthy Weight and Improve Blood Pressure          Diabetes:      Counseling: Healthy Diet and Healthy Weight          Colorectal Cancer:      General: Screening Not Indicated and Risks and Benefits Discussed          Prostate Cancer:      General: Risks and Benefits Discussed and Screening Current          Osteoporosis:      General: Risks and Benefits Discussed      Counseling: Calcium and Vitamin D Intake          AAA:  Male patient with age over [de-identified] years  Patient has history of tobacco use        General: Risks and Benefits Discussed and Screening Not Indicated          Glaucoma:      General: Risks and Benefits Discussed and Screening Current      Comments: Instructed pt to follow up with ophthalmology         HIV:      General: Screening Not Indicated          Hepatitis C:      General: Risks and Benefits Discussed and Screening Not Indicated        Advanced Directives:   Patient has living will for healthcare, has durable POA for healthcare,     Immunizations:      Influenza: Influenza Recommended Annually      Pneumococcal: Risks & Benefits Discussed and Pneumococcal Due Today      TDAP: Risks & Benefits Discussed and Tdap Vaccine Needed Today      Other Preventative Counseling (Non-Medicare):   Fall Prevention and Nutrition Counseling

## 2018-09-12 NOTE — ASSESSMENT & PLAN NOTE
Continue to follow up with cardiology as scheduled  - lipid panel 3/2018 wnl; continue the same  - rx given for recheck prior to follow up in 6 months

## 2018-09-17 ENCOUNTER — OFFICE VISIT (OUTPATIENT)
Dept: CARDIOLOGY CLINIC | Facility: CLINIC | Age: 78
End: 2018-09-17
Payer: MEDICARE

## 2018-09-17 VITALS
SYSTOLIC BLOOD PRESSURE: 120 MMHG | OXYGEN SATURATION: 98 % | HEART RATE: 60 BPM | DIASTOLIC BLOOD PRESSURE: 70 MMHG | WEIGHT: 160.5 LBS | BODY MASS INDEX: 24.4 KG/M2

## 2018-09-17 DIAGNOSIS — D50.9 IRON DEFICIENCY ANEMIA, UNSPECIFIED IRON DEFICIENCY ANEMIA TYPE: ICD-10-CM

## 2018-09-17 DIAGNOSIS — R06.00 EXERTIONAL DYSPNEA: ICD-10-CM

## 2018-09-17 DIAGNOSIS — D64.9 ANEMIA, UNSPECIFIED TYPE: Primary | ICD-10-CM

## 2018-09-17 PROCEDURE — 99214 OFFICE O/P EST MOD 30 MIN: CPT | Performed by: INTERNAL MEDICINE

## 2018-09-17 PROCEDURE — 93000 ELECTROCARDIOGRAM COMPLETE: CPT | Performed by: INTERNAL MEDICINE

## 2018-09-17 NOTE — PROGRESS NOTES
Cardiology Follow Up  King Nancy  1940  [id number]  [address] [facility] [address] [address] [phone]    1  Anemia, unspecified type  POCT ECG   2  Exertional dyspnea        Discussion/Plan:  CABG/3V CAD-- healed well  asymptomatic  LDL at goal  Blood pressure well-controlled  - aspirin 162mg  - metoprolol 25mg daily  - rosuvastatin 20mg daily  - daily exercise     Anemia/Dark stools- possible iron defiency anemia/has not had colonopscy? Health screening- not up to date- psa/colon screening     6 months  Interval History:  initial visit: 76 yo gentleman with prior NSTEMI s/p 3V-CABG (LIMA to LAD, SVG to OM2, and SVG to PDA) s/p cardiac rehab walking one mile daily  I have reviewed all of his physical therapy notes and am appreciative of the excellent care  Compliant with his medications denying any significant symptoms  Denying any bleeding or rash  He denies having any chest pain or shortness of breath      /3: No exercise limitations  Denies having any chest tightness  Denies any any muscle aches  Denies any bleeding or bruising  Has been very active  Does daily walks without any symptoms  12, 2017: He reports exercising without any limitations  He denies any recurrence of chest discomfort  He denies having any dizziness or lightheadedness  He denies having any medication intolerance  He denies any significant bleeding or bruising  09/17/2018: He denies having any chest tightness  He denies having any exertional shortness of breath  He has been compliant with his medications  He denies having any falls  He denies having any easy bleeding or bruising  Reviewed through his records         Patient Active Problem List   Diagnosis    Hx of CABG    CAD (coronary artery disease), native coronary artery    Elevated homocysteine (HCC)    [facility] mutation ([facility]    NSTEMI (non-ST elevated myocardial infarction) ([facility] )    Vitamin D deficiency     Past Medical History:   Diagnosis Date    Arthritis     CAD (coronary artery disease)     Cataract     GERD (gastroesophageal reflux disease)     Heart attack ([facility] )     Herpes zoster     Hx of colonic polyps     Hyperlipidemia     Hypertension      Social History     Social History    Marital status: /Civil Union     Spouse name: N/A    Number of children: N/A    Years of education: N/A     Occupational History    Not on file  Social History Main Topics    Smoking status: Former Smoker     Quit date: 1979    Smokeless tobacco: Never Used    Alcohol use No    Drug use: No    Sexual activity: Not on file     Other Topics Concern    Not on file     Social History Narrative    Exercise cycling / walking    Exercise daily     Good sleep hygiene    Sleeps 8-10 hrs a day       Family History   Problem Relation Age of Onset    Coronary artery disease Father     Hyperlipidemia Brother     Sudden death Brother         cardiac    Arthritis Family      Past Surgical History:   Procedure Laterality Date    BACK SURGERY      disc removed 1985 / R   [address]  11[place]13/2014    [place]    6/30/16   & CABG X3 with LIMA to LAD <SVG to OM-2, SVG to PDA  [place] Millie Cullen / Jose Bentley   12/19[place]14       EYE SURGERY      cataract /  R   [address]      Groin / R   [facility]  [facility]   [address]    2010         Current Outpatient Prescriptions:     Ascorbic Acid (VITAMIN C PO), Take by mouth daily, Disp: , Rfl:     aspirin 81 MG tablet, Take 1 tablet by mouth daily, Disp: , Rfl:     atorvastatin (LIPITOR) 80 mg tablet, Take 1 tablet (80 mg total) by mouth daily, Disp: 90 tablet, Rfl: 3    B Complex Vitamins (B-COMPLEX/B-12 PO), Take 1 tablet by mouth daily, Disp: , Rfl:     Calcium 150 MG TABS, Take by mouth daily, Disp: , Rfl:     Cholecalciferol (VITAMIN D) 2000 units CAPS, Take 1 capsule by mouth daily, Disp: , Rfl:     diclofenac sodium (VOLTAREN) 1 %, Apply 2 g topically 4 (four) times a day as needed (lateral hip pain), Disp: 1 Tube, Rfl: 1    metoprolol succinate (TOPROL-XL) 25 mg 24 hr tablet, Take 1 tablet by mouth daily, Disp: , Rfl:     Omega-3 Fatty Acids (FISH OIL) 1200 MG CAPS, Take by mouth daily, Disp: , Rfl:   No Known Allergies    Review of Systems:  Review of Systems   Constitutional: Negative  HENT: Negative  Eyes: Negative  Respiratory: Negative  Cardiovascular: Negative  Gastrointestinal: Negative  Endocrine: Negative  Genitourinary: Negative  Musculoskeletal: Negative  Skin: Negative  Allergic/Immunologic: Negative  Neurological: Negative  Hematological: Negative  Psychiatric/Behavioral: Negative  Vitals:    09/17/18 0937   BP: 120/70   BP Location: Right arm   Patient Position: Sitting   Cuff Size: Standard   Pulse: 60   SpO2: 98%   Weight: 72 8 kg (160 lb 8 oz)     Physical Exam:  Physical Exam   Constitutional: He is oriented to person, place, and time  No distress  HENT:   Head: Normocephalic and atraumatic  Right Ear: External ear normal    Left Ear: External ear normal    pale   Eyes: Conjunctivae are normal  Pupils are equal, round, and reactive to light  Right eye exhibits no discharge  Left eye exhibits no discharge  No scleral icterus  Neck: Normal range of motion  Neck supple  No JVD present  No tracheal deviation present  No thyromegaly present  Cardiovascular: Normal rate and regular rhythm  Exam reveals gallop  Exam reveals no friction rub  No murmur heard  Pulmonary/Chest: Effort normal and breath sounds normal  No stridor  No respiratory distress  He has no wheezes  He has no rales  He exhibits no tenderness  Abdominal: Soft  Bowel sounds are normal  He exhibits no distension and no mass  There is no tenderness   There is no rebound and no guarding  Musculoskeletal: Normal range of motion  He exhibits no edema, tenderness or deformity  Neurological: He is alert and oriented to person, place, and time  He has normal reflexes  No cranial nerve deficit  He exhibits normal muscle tone  Coordination normal    Skin: Skin is warm and dry  No rash noted  He is not diaphoretic  No erythema  No pallor  Psychiatric: He has a normal mood and affect  His behavior is normal  Judgment and thought content normal        Labs:     Lab Results   Component Value Date    WBC 13 55 (H) 11/17/2014    HGB 7 9 (L) 11/17/2014    HCT 22 4 (L) 11/17/2014    MCV 93 11/17/2014     11/17/2014     Lab Results   Component Value Date     03/12/2018    K 4 1 03/12/2018     03/12/2018    CO2 30 03/12/2018    ANIONGAP 12 8 12/30/2015    BUN 19 03/12/2018    CREATININE 1 00 03/12/2018    GLUCOSE 104 12/30/2015    GLUF 107 (H) 03/12/2018    CALCIUM 9 4 03/12/2018    AST 26 03/12/2018    ALT 42 03/12/2018    ALKPHOS 60 03/12/2018    PROT 7 5 12/30/2015    BILITOT 0 6 12/30/2015    EGFR 71 03/12/2018     Lab Results   Component Value Date    CHOL 95 (L) 12/30/2015    CHOL 80 (L) 06/24/2015    CHOL 105 11/13/2014     Lab Results   Component Value Date    HDL 44 03/12/2018    HDL 44 10/30/2017    HDL 43 01/04/2017     Lab Results   Component Value Date    LDLCALC 54 03/12/2018    LDLCALC 37 10/30/2017    LDLCALC 34 01/04/2017     Lab Results   Component Value Date    TRIG 69 03/12/2018    TRIG 38 10/30/2017    TRIG 59 01/04/2017     Lab Results   Component Value Date    HGBA1C 5 6 06/24/2015       Imaging & Testing   I have personally reviewed pertinent reports  EKG: Personally reviewed  Sinus bradycardia incomplete RBBB    Cardiac testing:   No results found for this or any previous visit        Bruce Yeboah  Please call with any questions or suggestions    A description of the counseling:   Goals and Barriers:  Patient's ability to self care:  Medication side effect reviewed with patient in detail and all their questions answered  "This note has been constructed using a voice recognition system  Therefore there may be syntax, spelling, and/or grammatical errors   Please call if you have any questions  "

## 2018-10-17 ENCOUNTER — TRANSCRIBE ORDERS (OUTPATIENT)
Dept: ADMINISTRATIVE | Facility: HOSPITAL | Age: 78
End: 2018-10-17

## 2018-10-17 ENCOUNTER — LAB (OUTPATIENT)
Dept: LAB | Facility: HOSPITAL | Age: 78
End: 2018-10-17
Payer: MEDICARE

## 2018-10-17 DIAGNOSIS — Z13.29 SCREENING FOR THYROID DISORDER: ICD-10-CM

## 2018-10-17 DIAGNOSIS — D64.9 ANEMIA, UNSPECIFIED TYPE: Primary | ICD-10-CM

## 2018-10-17 DIAGNOSIS — E55.9 VITAMIN D DEFICIENCY: ICD-10-CM

## 2018-10-17 DIAGNOSIS — D64.9 ANEMIA, UNSPECIFIED TYPE: ICD-10-CM

## 2018-10-17 DIAGNOSIS — Z00.00 ENCOUNTER FOR SCREENING AND PREVENTATIVE CARE: ICD-10-CM

## 2018-10-17 DIAGNOSIS — Z12.5 SCREENING FOR PROSTATE CANCER: ICD-10-CM

## 2018-10-17 DIAGNOSIS — Z13.220 SCREENING FOR LIPID DISORDERS: ICD-10-CM

## 2018-10-17 DIAGNOSIS — Z13.1 SCREENING FOR DIABETES MELLITUS: ICD-10-CM

## 2018-10-17 DIAGNOSIS — D50.9 IRON DEFICIENCY ANEMIA, UNSPECIFIED IRON DEFICIENCY ANEMIA TYPE: ICD-10-CM

## 2018-10-17 DIAGNOSIS — R06.00 EXERTIONAL DYSPNEA: ICD-10-CM

## 2018-10-17 LAB
25(OH)D3 SERPL-MCNC: 41.8 NG/ML (ref 30–100)
ALBUMIN SERPL BCP-MCNC: 3.8 G/DL (ref 3.5–5)
ALP SERPL-CCNC: 68 U/L (ref 46–116)
ALT SERPL W P-5'-P-CCNC: 35 U/L (ref 12–78)
ANION GAP SERPL CALCULATED.3IONS-SCNC: 7 MMOL/L (ref 4–13)
AST SERPL W P-5'-P-CCNC: 23 U/L (ref 5–45)
BASOPHILS # BLD AUTO: 0.02 THOUSANDS/ΜL (ref 0–0.1)
BASOPHILS NFR BLD AUTO: 0 % (ref 0–1)
BILIRUB SERPL-MCNC: 0.6 MG/DL (ref 0.2–1)
BUN SERPL-MCNC: 17 MG/DL (ref 5–25)
CALCIUM SERPL-MCNC: 8.9 MG/DL (ref 8.3–10.1)
CHLORIDE SERPL-SCNC: 107 MMOL/L (ref 100–108)
CHOLEST SERPL-MCNC: 91 MG/DL (ref 50–200)
CO2 SERPL-SCNC: 29 MMOL/L (ref 21–32)
CREAT SERPL-MCNC: 1.01 MG/DL (ref 0.6–1.3)
EOSINOPHIL # BLD AUTO: 0.14 THOUSAND/ΜL (ref 0–0.61)
EOSINOPHIL NFR BLD AUTO: 3 % (ref 0–6)
ERYTHROCYTE [DISTWIDTH] IN BLOOD BY AUTOMATED COUNT: 12.8 % (ref 11.6–15.1)
FERRITIN SERPL-MCNC: 124 NG/ML (ref 8–388)
GFR SERPL CREATININE-BSD FRML MDRD: 70 ML/MIN/1.73SQ M
GLUCOSE P FAST SERPL-MCNC: 103 MG/DL (ref 65–99)
HCT VFR BLD AUTO: 38.8 % (ref 36.5–49.3)
HDLC SERPL-MCNC: 43 MG/DL (ref 40–60)
HGB BLD-MCNC: 12.7 G/DL (ref 12–17)
IMM GRANULOCYTES # BLD AUTO: 0.05 THOUSAND/UL (ref 0–0.2)
IMM GRANULOCYTES NFR BLD AUTO: 1 % (ref 0–2)
LDLC SERPL CALC-MCNC: 42 MG/DL (ref 0–100)
LYMPHOCYTES # BLD AUTO: 1.41 THOUSANDS/ΜL (ref 0.6–4.47)
LYMPHOCYTES NFR BLD AUTO: 29 % (ref 14–44)
MCH RBC QN AUTO: 33.3 PG (ref 26.8–34.3)
MCHC RBC AUTO-ENTMCNC: 32.7 G/DL (ref 31.4–37.4)
MCV RBC AUTO: 102 FL (ref 82–98)
MONOCYTES # BLD AUTO: 0.69 THOUSAND/ΜL (ref 0.17–1.22)
MONOCYTES NFR BLD AUTO: 14 % (ref 4–12)
NEUTROPHILS # BLD AUTO: 2.64 THOUSANDS/ΜL (ref 1.85–7.62)
NEUTS SEG NFR BLD AUTO: 53 % (ref 43–75)
NONHDLC SERPL-MCNC: 48 MG/DL
NRBC BLD AUTO-RTO: 0 /100 WBCS
PLATELET # BLD AUTO: 176 THOUSANDS/UL (ref 149–390)
PMV BLD AUTO: 9.7 FL (ref 8.9–12.7)
POTASSIUM SERPL-SCNC: 4 MMOL/L (ref 3.5–5.3)
PROT SERPL-MCNC: 7 G/DL (ref 6.4–8.2)
PSA SERPL-MCNC: 1.6 NG/ML (ref 0–4)
RBC # BLD AUTO: 3.81 MILLION/UL (ref 3.88–5.62)
SODIUM SERPL-SCNC: 143 MMOL/L (ref 136–145)
TRIGL SERPL-MCNC: 29 MG/DL
TSH SERPL DL<=0.05 MIU/L-ACNC: 3.39 UIU/ML (ref 0.36–3.74)
WBC # BLD AUTO: 4.95 THOUSAND/UL (ref 4.31–10.16)

## 2018-10-17 PROCEDURE — 82306 VITAMIN D 25 HYDROXY: CPT

## 2018-10-17 PROCEDURE — 80053 COMPREHEN METABOLIC PANEL: CPT | Performed by: INTERNAL MEDICINE

## 2018-10-17 PROCEDURE — 36415 COLL VENOUS BLD VENIPUNCTURE: CPT | Performed by: INTERNAL MEDICINE

## 2018-10-17 PROCEDURE — 85025 COMPLETE CBC W/AUTO DIFF WBC: CPT

## 2018-10-17 PROCEDURE — 82728 ASSAY OF FERRITIN: CPT | Performed by: INTERNAL MEDICINE

## 2018-10-17 PROCEDURE — G0103 PSA SCREENING: HCPCS

## 2018-10-17 PROCEDURE — 80061 LIPID PANEL: CPT

## 2018-10-17 PROCEDURE — 84443 ASSAY THYROID STIM HORMONE: CPT

## 2018-10-24 ENCOUNTER — TELEPHONE (OUTPATIENT)
Dept: CARDIOLOGY CLINIC | Facility: CLINIC | Age: 78
End: 2018-10-24

## 2018-10-24 NOTE — TELEPHONE ENCOUNTER
----- Message from Dary Tolentino MD sent at 10/24/2018  9:09 AM EDT -----  Javier Baird looks good except for his fasting blood sugar   Kidney function/liver function are normal

## 2018-10-26 ENCOUNTER — TELEPHONE (OUTPATIENT)
Dept: FAMILY MEDICINE CLINIC | Facility: CLINIC | Age: 78
End: 2018-10-26

## 2018-10-26 NOTE — TELEPHONE ENCOUNTER
Spoke with Graham Rubio regarding pt getting a bill from [facility], advised that I contact pt to tell him to contact our billing department and ask what he is being billed for and if there is anything on our end that we can change we will  I called pt and spoke with him and he is aware of this and will call billing and give us an updated if we need to fix anything  Keeping this message open and awaiting a call back from the pt   Lars Ledesma

## 2018-10-26 NOTE — TELEPHONE ENCOUNTER
patient came into office today and dropped of bill from [facility] he has never received a bill before  af/rma

## 2018-12-19 ENCOUNTER — TELEPHONE (OUTPATIENT)
Dept: CARDIOLOGY CLINIC | Facility: CLINIC | Age: 78
End: 2018-12-19

## 2018-12-19 DIAGNOSIS — I21.4 NSTEMI (NON-ST ELEVATED MYOCARDIAL INFARCTION) (HCC): Primary | ICD-10-CM

## 2018-12-19 RX ORDER — METOPROLOL SUCCINATE 25 MG/1
25 TABLET, EXTENDED RELEASE ORAL DAILY
Qty: 90 TABLET | Refills: 3 | Status: SHIPPED | OUTPATIENT
Start: 2018-12-19 | End: 2019-03-04 | Stop reason: SDUPTHER

## 2019-01-29 ENCOUNTER — OFFICE VISIT (OUTPATIENT)
Dept: FAMILY MEDICINE CLINIC | Facility: CLINIC | Age: 79
End: 2019-01-29
Payer: MEDICARE

## 2019-01-29 VITALS
SYSTOLIC BLOOD PRESSURE: 144 MMHG | TEMPERATURE: 97.9 F | DIASTOLIC BLOOD PRESSURE: 68 MMHG | HEART RATE: 68 BPM | RESPIRATION RATE: 16 BRPM | BODY MASS INDEX: 24.37 KG/M2 | WEIGHT: 160.8 LBS | HEIGHT: 68 IN

## 2019-01-29 DIAGNOSIS — Z95.1 HX OF CABG: ICD-10-CM

## 2019-01-29 DIAGNOSIS — J06.9 UPPER RESPIRATORY TRACT INFECTION, UNSPECIFIED TYPE: Primary | ICD-10-CM

## 2019-01-29 DIAGNOSIS — I25.10 CORONARY ARTERY DISEASE INVOLVING NATIVE CORONARY ARTERY OF NATIVE HEART WITHOUT ANGINA PECTORIS: ICD-10-CM

## 2019-01-29 DIAGNOSIS — I21.4 NSTEMI (NON-ST ELEVATED MYOCARDIAL INFARCTION) (HCC): ICD-10-CM

## 2019-01-29 PROCEDURE — 99214 OFFICE O/P EST MOD 30 MIN: CPT | Performed by: FAMILY MEDICINE

## 2019-01-29 RX ORDER — AMOXICILLIN AND CLAVULANATE POTASSIUM 875; 125 MG/1; MG/1
1 TABLET, FILM COATED ORAL EVERY 12 HOURS SCHEDULED
Qty: 20 TABLET | Refills: 0 | Status: SHIPPED | OUTPATIENT
Start: 2019-01-29 | End: 2019-02-08

## 2019-01-29 NOTE — PROGRESS NOTES
Assessment/Plan:    Problem List Items Addressed This Visit     Hx of CABG    CAD (coronary artery disease), native coronary artery     Pt is on statin transferring to this office I will get a lipid panel and cmp         Relevant Orders    Comprehensive metabolic panel    Lipid Panel with Direct LDL reflex    NSTEMI (non-ST elevated myocardial infarction) (HCC)     No chest pain no SOB, will avoid sudafed in cold meds          Relevant Orders    Comprehensive metabolic panel    Lipid Panel with Direct LDL reflex      Other Visit Diagnoses     Upper respiratory tract infection, unspecified type    -  Primary    Relevant Medications    amoxicillin-clavulanate (AUGMENTIN) 875-125 mg per tablet      pt advised it is ok to mucinex DM  Pt would like to transfer back to [facility]    There are no Patient Instructions on file for this visit  Return in about 3 months (around 4/29/2019) for Recheck after his labs  Subjective:      Patient ID: Watson Rosas is a 77 y o  male  Chief Complaint   Patient presents with    Sore Throat    Cough     [facility]       Pt states his throat is sore  States every once in a while get chills  Stomach ach now and then  Head not very congested  Pt states he does cough - productive for phlem  This started 4 days ago  Pt states he has tried tea and honey as well as robitussin    Pt states he is on a blood thinner but I do not see on his med list        URI    This is a new problem  The current episode started in the past 7 days  Associated symptoms include abdominal pain, congestion, coughing and a sore throat  Pertinent negatives include no chest pain, diarrhea, dysuria, ear pain, headaches, joint pain, joint swelling, nausea, neck pain, plugged ear sensation, rash, rhinorrhea, sinus pain, sneezing, swollen glands, vomiting or wheezing         The following portions of the patient's history were reviewed and updated as appropriate: allergies, current medications, past family history, past medical history, past social history, past surgical history and problem list     Review of Systems   Constitutional: Negative for activity change, appetite change, chills, diaphoresis, fatigue, fever and unexpected weight change  HENT: Positive for congestion and sore throat  Negative for dental problem, ear pain, mouth sores, rhinorrhea, sinus pain, sinus pressure, sneezing and trouble swallowing  Eyes: Negative for photophobia, discharge and itching  Respiratory: Positive for cough  Negative for apnea, chest tightness, shortness of breath and wheezing  Cardiovascular: Negative for chest pain, palpitations and leg swelling  Gastrointestinal: Positive for abdominal pain  Negative for abdominal distention, blood in stool, diarrhea, nausea and vomiting  Endocrine: Negative for cold intolerance, heat intolerance, polydipsia, polyphagia and polyuria  Genitourinary: Negative for difficulty urinating and dysuria  Musculoskeletal: Negative for arthralgias, joint pain and neck pain  Skin: Negative for color change, rash and wound  Neurological: Negative for dizziness, syncope, speech difficulty and headaches  Hematological: Negative for adenopathy  Psychiatric/Behavioral: Negative for agitation and behavioral problems           Current Outpatient Prescriptions   Medication Sig Dispense Refill    Ascorbic Acid (VITAMIN C PO) Take by mouth daily      aspirin 81 MG tablet Take 1 tablet by mouth daily      atorvastatin (LIPITOR) 80 mg tablet Take 1 tablet (80 mg total) by mouth daily 90 tablet 3    B Complex Vitamins (B-COMPLEX/B-12 PO) Take 1 tablet by mouth daily      Calcium 150 MG TABS Take by mouth daily      Cholecalciferol (VITAMIN D) 2000 units CAPS Take 1 capsule by mouth daily      diclofenac sodium (VOLTAREN) 1 % Apply 2 g topically 4 (four) times a day as needed (lateral hip pain) 1 Tube 1    metoprolol succinate (TOPROL-XL) 25 mg 24 hr tablet Take 1 tablet (25 mg total) by mouth daily 90 tablet 3    Omega-3 Fatty Acids (FISH OIL) 1200 MG CAPS Take by mouth daily      amoxicillin-clavulanate (AUGMENTIN) 875-125 mg per tablet Take 1 tablet by mouth every 12 (twelve) hours for 10 days 20 tablet 0     No current facility-administered medications for this visit  Objective:    /68   Pulse 68   Temp 97 9 °F (36 6 °C)   Resp 16   Ht 5' 8" (1 727 m)   Wt 72 9 kg (160 lb 12 8 oz)   BMI 24 45 kg/m²        Physical Exam   Constitutional: He appears well-developed and well-nourished  No distress  HENT:   Head: Normocephalic and atraumatic  Right Ear: External ear normal  Tympanic membrane is bulging  Left Ear: External ear normal  Tympanic membrane is bulging  Nose: Mucosal edema present  Mouth/Throat: Oropharynx is clear and moist  No oropharyngeal exudate  Eyes: Pupils are equal, round, and reactive to light  EOM are normal  Right eye exhibits no discharge  Left eye exhibits no discharge  No scleral icterus  Neck: No thyromegaly present  Cardiovascular: Normal rate and normal heart sounds  No murmur heard  Pulmonary/Chest: Effort normal and breath sounds normal  No respiratory distress  He has no wheezes  Abdominal: Soft  Bowel sounds are normal  He exhibits no distension and no mass  There is no tenderness  There is no rebound and no guarding  Musculoskeletal: Normal range of motion  Neurological: He is alert  He displays normal reflexes  Coordination normal    Skin: Skin is warm and dry  No rash noted  He is not diaphoretic  No erythema  Psychiatric: He has a normal mood and affect  His behavior is normal    Nursing note and vitals reviewed               Zaid Gary DO

## 2019-03-04 ENCOUNTER — OFFICE VISIT (OUTPATIENT)
Dept: CARDIOLOGY CLINIC | Facility: CLINIC | Age: 79
End: 2019-03-04
Payer: MEDICARE

## 2019-03-04 ENCOUNTER — TELEPHONE (OUTPATIENT)
Dept: FAMILY MEDICINE CLINIC | Facility: CLINIC | Age: 79
End: 2019-03-04

## 2019-03-04 VITALS
WEIGHT: 162.2 LBS | HEART RATE: 61 BPM | SYSTOLIC BLOOD PRESSURE: 130 MMHG | DIASTOLIC BLOOD PRESSURE: 60 MMHG | BODY MASS INDEX: 24.58 KG/M2 | HEIGHT: 68 IN | OXYGEN SATURATION: 97 %

## 2019-03-04 DIAGNOSIS — Z12.11 SCREEN FOR COLON CANCER: Primary | ICD-10-CM

## 2019-03-04 DIAGNOSIS — I21.4 NSTEMI (NON-ST ELEVATED MYOCARDIAL INFARCTION) (HCC): ICD-10-CM

## 2019-03-04 PROCEDURE — 93000 ELECTROCARDIOGRAM COMPLETE: CPT | Performed by: INTERNAL MEDICINE

## 2019-03-04 PROCEDURE — 99214 OFFICE O/P EST MOD 30 MIN: CPT | Performed by: INTERNAL MEDICINE

## 2019-03-04 RX ORDER — ATORVASTATIN CALCIUM 40 MG/1
40 TABLET, FILM COATED ORAL DAILY
Qty: 90 TABLET | Refills: 3 | Status: SHIPPED | OUTPATIENT
Start: 2019-03-04 | End: 2020-03-03 | Stop reason: SDUPTHER

## 2019-03-04 RX ORDER — METOPROLOL SUCCINATE 25 MG/1
25 TABLET, EXTENDED RELEASE ORAL DAILY
Qty: 90 TABLET | Refills: 3 | Status: SHIPPED | OUTPATIENT
Start: 2019-03-04 | End: 2020-03-03 | Stop reason: SDUPTHER

## 2019-03-04 NOTE — PROGRESS NOTES
Cardiology Follow Up  Watson Rosas  1940  [id number]  [address] [facility] [place] [place]  [address] [id number] [phone]    1  NSTEMI (non-ST elevated myocardial infarction) (HCC)  POCT ECG    atorvastatin (LIPITOR) 40 mg tablet    metoprolol succinate (TOPROL-XL) 25 mg 24 hr tablet    aspirin 81 MG tablet      Discussion/Plan:  CABG/3V CAD-- ekg unchanged  healed well  asymptomatic  LDL at goal  Blood pressure well-controlled  - aspirin 81mg  - metoprolol 25mg daily  - atorvastatin 40 daily + recheck lipid six months  - daily exercise     Mild carotid artery disease bilateral    Dark stools/lower hemoglobin -consider cologuard? colonoscopy    Health screening- psa- normal     Interval History:  initial visit: 74 yo gentleman with prior NSTEMI s/p 3V-CABG (LIMA to LAD, SVG to OM2, and SVG to PDA) s/p cardiac rehab walking one mile daily  I have reviewed all of his physical therapy notes and am appreciative of the excellent care  Compliant with his medications denying any significant symptoms  Denying any bleeding or rash  He denies having any chest pain or shortness of breath      /3: No exercise limitations  Denies having any chest tightness  Denies any any muscle aches  Denies any bleeding or bruising  Has been very active  Does daily walks without any symptoms  12, 2017: He reports exercising without any limitations  He denies any recurrence of chest discomfort  He denies having any dizziness or lightheadedness  He denies having any medication intolerance  He denies any significant bleeding or bruising  09/17/2018: He denies having any chest tightness  He denies having any exertional shortness of breath  He has been compliant with his medications  He denies having any falls  He denies having any easy bleeding or bruising  Reviewed through his records      03/04/2019:  He denies having any exertional chest pain  He denies having exertional shortness of breath  He is compliant with his medications  We reviewed through his recent lab work which shows his cholesterol is well controlled         Patient Active Problem List   Diagnosis    Hx of CABG    CAD (coronary artery disease), native coronary artery    Elevated homocysteine (HCC)    MTHFR mutation (HCC)    NSTEMI (non-ST elevated myocardial infarction) ([place] [address] )    Vitamin D deficiency     Past Medical History:   Diagnosis Date    Arthritis     CAD (coronary artery disease)     Cataract     GERD (gastroesophageal reflux disease)     Heart attack ([facility] )     Herpes zoster     Hx of colonic polyps     Hyperlipidemia     Hypertension      Social History     Socioeconomic History    Marital status: /Civil Union     Spouse name: Not on file    Number of children: Not on file    Years of education: Not on file    Highest education level: Not on file   Occupational History    Not on file   Social Needs    Financial resource strain: Not on file    Food insecurity:     Worry: Not on file     Inability: Not on file    Transportation needs:     Medical: Not on file     Non-medical: Not on file   Tobacco Use    Smoking status: Former Smoker     Last attempt to quit:      Years since quittin 1    Smokeless tobacco: Never Used   Substance and Sexual Activity    Alcohol use: No    Drug use: No    Sexual activity: Not on file   Lifestyle    Physical activity:     Days per week: Not on file     Minutes per session: Not on file    Stress: Not on file   Relationships    Social connections:     Talks on phone: Not on file     Gets together: Not on file     Attends Mandaeism service: Not on file     Active member of club or organization: Not on file     Attends meetings of clubs or organizations: Not on file     Relationship status: Not on file    Intimate partner violence:     Fear of current or ex partner: Not on file Emotionally abused: Not on file     Physically abused: Not on file     Forced sexual activity: Not on file   Other Topics Concern    Not on file   Social History Narrative    Exercise cycling / walking    Exercise daily     Good sleep hygiene    Sleeps 8-10 hrs a day       Family History   Problem Relation Age of Onset    Coronary artery disease Father     Hyperlipidemia Brother     Sudden death Brother         cardiac    Arthritis Family      Past Surgical History:   Procedure Laterality Date    BACK SURGERY      disc removed 1985 / Janese Purchase   [address] [place]  11/13/2014    [place]    6/30/16   & CABG X3 with LIMA to LAD <SVG to OM-2, SVG to PDA  [place] Roz Umana / [place]   12/19[place]14       EYE SURGERY      cataract /  R   [address]      Groin / R   [address] /  Janese Purchase   [address] [facility]    2010         Current Outpatient Medications:     Ascorbic Acid (VITAMIN C PO), Take by mouth daily, Disp: , Rfl:     aspirin 81 MG tablet, Take 1 tablet (81 mg total) by mouth daily, Disp: 90 tablet, Rfl: 3    B Complex Vitamins (B-COMPLEX/B-12 PO), Take 1 tablet by mouth daily, Disp: , Rfl:     Calcium 150 MG TABS, Take by mouth daily, Disp: , Rfl:     Cholecalciferol (VITAMIN D) 2000 units CAPS, Take 1 capsule by mouth daily, Disp: , Rfl:     metoprolol succinate (TOPROL-XL) 25 mg 24 hr tablet, Take 1 tablet (25 mg total) by mouth daily, Disp: 90 tablet, Rfl: 3    Omega-3 Fatty Acids (FISH OIL) 1200 MG CAPS, Take by mouth daily, Disp: , Rfl:     atorvastatin (LIPITOR) 40 mg tablet, Take 1 tablet (40 mg total) by mouth daily, Disp: 90 tablet, Rfl: 3    diclofenac sodium (VOLTAREN) 1 %, Apply 2 g topically 4 (four) times a day as needed (lateral hip pain) (Patient not taking: Reported on 3/4/2019), Disp: 1 Tube, Rfl: 1  No Known Allergies    Review of Systems:  Review of Systems   Constitutional: Negative  HENT: Negative  Eyes: Negative  Respiratory: Negative  Cardiovascular: Negative  Gastrointestinal: Negative  Endocrine: Negative  Genitourinary: Negative  Musculoskeletal: Negative  Skin: Negative  Allergic/Immunologic: Negative  Neurological: Negative  Hematological: Negative  Psychiatric/Behavioral: Negative  Vitals:    03/04/19 1001   BP: 130/60   BP Location: Right arm   Patient Position: Sitting   Cuff Size: Standard   Pulse: 61   SpO2: 97%   Weight: 73 6 kg (162 lb 3 2 oz)   Height: 5' 8" (1 727 m)     Physical Exam:  Physical Exam   Constitutional: He is oriented to person, place, and time  No distress  HENT:   Head: Normocephalic and atraumatic  Right Ear: External ear normal    Left Ear: External ear normal    pale   Eyes: Pupils are equal, round, and reactive to light  Conjunctivae are normal  Right eye exhibits no discharge  Left eye exhibits no discharge  No scleral icterus  Neck: Normal range of motion  Neck supple  No JVD present  No tracheal deviation present  No thyromegaly present  Cardiovascular: Normal rate and regular rhythm  Exam reveals gallop  Exam reveals no friction rub  No murmur heard  Pulmonary/Chest: Effort normal and breath sounds normal  No stridor  No respiratory distress  He has no wheezes  He has no rales  He exhibits no tenderness  Abdominal: Soft  Bowel sounds are normal  He exhibits no distension and no mass  There is no tenderness  There is no rebound and no guarding  Musculoskeletal: Normal range of motion  He exhibits no edema, tenderness or deformity  Neurological: He is alert and oriented to person, place, and time  He has normal reflexes  No cranial nerve deficit  He exhibits normal muscle tone  Coordination normal    Skin: Skin is warm and dry  No rash noted  He is not diaphoretic  No erythema  No pallor  Psychiatric: He has a normal mood and affect   His behavior is normal  Judgment and thought content normal        Labs: Lab Results   Component Value Date    WBC 4 95 10/17/2018    HGB 12 7 10/17/2018    HCT 38 8 10/17/2018     (H) 10/17/2018     10/17/2018     Lab Results   Component Value Date     12/30/2015    K 4 0 10/17/2018     10/17/2018    CO2 29 10/17/2018    ANIONGAP 12 8 12/30/2015    BUN 17 10/17/2018    CREATININE 1 01 10/17/2018    GLUCOSE 104 12/30/2015    GLUF 103 (H) 10/17/2018    CALCIUM 8 9 10/17/2018    AST 23 10/17/2018    ALT 35 10/17/2018    ALKPHOS 68 10/17/2018    PROT 7 5 12/30/2015    BILITOT 0 6 12/30/2015    EGFR 70 10/17/2018     Lab Results   Component Value Date    CHOL 95 (L) 12/30/2015    CHOL 80 (L) 06/24/2015    CHOL 105 11/13/2014     Lab Results   Component Value Date    HDL 43 10/17/2018    HDL 44 03/12/2018    HDL 44 10/30/2017     Lab Results   Component Value Date    LDLCALC 42 10/17/2018    LDLCALC 54 03/12/2018    LDLCALC 37 10/30/2017     Lab Results   Component Value Date    TRIG 29 10/17/2018    TRIG 69 03/12/2018    TRIG 38 10/30/2017     Lab Results   Component Value Date    HGBA1C 5 6 06/24/2015       Imaging & Testing   I have personally reviewed pertinent reports  EKG: Personally reviewed  Sinus bradycardia incomplete RBBB    Cardiac testing:   No results found for this or any previous visit  Den Y[place]oah  Please call with any questions or suggestions    A description of the counseling:   Goals and Barriers:  Patient's ability to self care:  Medication side effect reviewed with patient in detail and all their questions answered  "This note has been constructed using a voice recognition system  Therefore there may be syntax, spelling, and/or grammatical errors   Please call if you have any questions  "

## 2019-03-04 NOTE — TELEPHONE ENCOUNTER
Josh Spence saw dr Jose G Navarro today and was told to call PCP to get an order for cologaurd  Please call wife, Marisela Abebasalina      Thank you

## 2019-04-29 ENCOUNTER — TRANSCRIBE ORDERS (OUTPATIENT)
Dept: ADMINISTRATIVE | Facility: HOSPITAL | Age: 79
End: 2019-04-29

## 2019-04-29 ENCOUNTER — TELEPHONE (OUTPATIENT)
Dept: FAMILY MEDICINE CLINIC | Facility: CLINIC | Age: 79
End: 2019-04-29

## 2019-04-29 ENCOUNTER — OFFICE VISIT (OUTPATIENT)
Dept: FAMILY MEDICINE CLINIC | Facility: CLINIC | Age: 79
End: 2019-04-29
Payer: MEDICARE

## 2019-04-29 ENCOUNTER — APPOINTMENT (OUTPATIENT)
Dept: LAB | Facility: CLINIC | Age: 79
End: 2019-04-29
Payer: MEDICARE

## 2019-04-29 VITALS
RESPIRATION RATE: 16 BRPM | HEIGHT: 68 IN | DIASTOLIC BLOOD PRESSURE: 50 MMHG | WEIGHT: 164 LBS | HEART RATE: 60 BPM | BODY MASS INDEX: 24.86 KG/M2 | TEMPERATURE: 97 F | SYSTOLIC BLOOD PRESSURE: 122 MMHG

## 2019-04-29 DIAGNOSIS — Z15.89 MTHFR MUTATION: ICD-10-CM

## 2019-04-29 DIAGNOSIS — Z95.1 HX OF CABG: ICD-10-CM

## 2019-04-29 DIAGNOSIS — N39.44 NOCTURNAL ENURESIS: ICD-10-CM

## 2019-04-29 DIAGNOSIS — I21.4 NSTEMI (NON-ST ELEVATED MYOCARDIAL INFARCTION) (HCC): ICD-10-CM

## 2019-04-29 DIAGNOSIS — E72.11 HOMOCYSTINURIA (HCC): ICD-10-CM

## 2019-04-29 DIAGNOSIS — I25.10 CORONARY ARTERY DISEASE INVOLVING NATIVE CORONARY ARTERY OF NATIVE HEART WITHOUT ANGINA PECTORIS: Primary | ICD-10-CM

## 2019-04-29 DIAGNOSIS — N40.0 ENLARGED PROSTATE ON RECTAL EXAMINATION: ICD-10-CM

## 2019-04-29 DIAGNOSIS — R79.89 ELEVATED HOMOCYSTEINE: Chronic | ICD-10-CM

## 2019-04-29 LAB — PSA SERPL-MCNC: 2.3 NG/ML (ref 0–4)

## 2019-04-29 PROCEDURE — 36415 COLL VENOUS BLD VENIPUNCTURE: CPT

## 2019-04-29 PROCEDURE — G0103 PSA SCREENING: HCPCS

## 2019-04-29 PROCEDURE — 99214 OFFICE O/P EST MOD 30 MIN: CPT | Performed by: FAMILY MEDICINE

## 2019-04-29 RX ORDER — TAMSULOSIN HYDROCHLORIDE 0.4 MG/1
0.4 CAPSULE ORAL
Qty: 30 CAPSULE | Refills: 5 | Status: SHIPPED | OUTPATIENT
Start: 2019-04-29 | End: 2019-05-06

## 2019-05-06 ENCOUNTER — TELEPHONE (OUTPATIENT)
Dept: FAMILY MEDICINE CLINIC | Facility: CLINIC | Age: 79
End: 2019-05-06

## 2019-05-06 DIAGNOSIS — N40.0 ENLARGED PROSTATE ON RECTAL EXAMINATION: Primary | ICD-10-CM

## 2019-05-06 RX ORDER — DUTASTERIDE 0.5 MG/1
0.5 CAPSULE, LIQUID FILLED ORAL DAILY
Qty: 30 CAPSULE | Refills: 5 | Status: SHIPPED | OUTPATIENT
Start: 2019-05-06 | End: 2019-12-27 | Stop reason: SDUPTHER

## 2019-05-28 ENCOUNTER — APPOINTMENT (OUTPATIENT)
Dept: LAB | Facility: HOSPITAL | Age: 79
DRG: 698 | End: 2019-05-28
Attending: UROLOGY
Payer: MEDICARE

## 2019-05-28 ENCOUNTER — TRANSCRIBE ORDERS (OUTPATIENT)
Dept: ADMINISTRATIVE | Facility: HOSPITAL | Age: 79
End: 2019-05-28

## 2019-05-28 DIAGNOSIS — R39.14 FEELING OF INCOMPLETE BLADDER EMPTYING: ICD-10-CM

## 2019-05-28 DIAGNOSIS — R39.12 WEAK URINARY STREAM: Primary | ICD-10-CM

## 2019-05-28 DIAGNOSIS — R39.12 WEAK URINARY STREAM: ICD-10-CM

## 2019-05-28 LAB
ANION GAP SERPL CALCULATED.3IONS-SCNC: 8 MMOL/L (ref 4–13)
BUN SERPL-MCNC: 16 MG/DL (ref 5–25)
CALCIUM SERPL-MCNC: 9.1 MG/DL (ref 8.3–10.1)
CHLORIDE SERPL-SCNC: 104 MMOL/L (ref 100–108)
CO2 SERPL-SCNC: 28 MMOL/L (ref 21–32)
CREAT SERPL-MCNC: 0.99 MG/DL (ref 0.6–1.3)
GFR SERPL CREATININE-BSD FRML MDRD: 72 ML/MIN/1.73SQ M
GLUCOSE SERPL-MCNC: 103 MG/DL (ref 65–140)
POTASSIUM SERPL-SCNC: 4 MMOL/L (ref 3.5–5.3)
SODIUM SERPL-SCNC: 140 MMOL/L (ref 136–145)

## 2019-05-28 PROCEDURE — 80048 BASIC METABOLIC PNL TOTAL CA: CPT

## 2019-05-28 PROCEDURE — 36415 COLL VENOUS BLD VENIPUNCTURE: CPT

## 2019-05-30 ENCOUNTER — TELEPHONE (OUTPATIENT)
Dept: CARDIOLOGY CLINIC | Facility: CLINIC | Age: 79
End: 2019-05-30

## 2019-05-31 ENCOUNTER — APPOINTMENT (EMERGENCY)
Dept: RADIOLOGY | Facility: HOSPITAL | Age: 79
DRG: 698 | End: 2019-05-31
Payer: MEDICARE

## 2019-05-31 ENCOUNTER — HOSPITAL ENCOUNTER (INPATIENT)
Facility: HOSPITAL | Age: 79
LOS: 2 days | Discharge: HOME | DRG: 698 | End: 2019-06-02
Attending: EMERGENCY MEDICINE | Admitting: INTERNAL MEDICINE
Payer: MEDICARE

## 2019-05-31 DIAGNOSIS — N39.0 UTI (URINARY TRACT INFECTION): Primary | ICD-10-CM

## 2019-05-31 DIAGNOSIS — A41.9 SEPSIS (HCC): ICD-10-CM

## 2019-05-31 DIAGNOSIS — R33.9 URINARY RETENTION: ICD-10-CM

## 2019-05-31 DIAGNOSIS — I21.4 NSTEMI (NON-ST ELEVATED MYOCARDIAL INFARCTION) (HCC): ICD-10-CM

## 2019-05-31 PROBLEM — T83.511A URINARY TRACT INFECTION ASSOCIATED WITH INDWELLING URETHRAL CATHETER (HCC): Status: ACTIVE | Noted: 2019-05-31

## 2019-05-31 LAB
ALBUMIN SERPL BCP-MCNC: 3.5 G/DL (ref 3.5–5)
ALP SERPL-CCNC: 62 U/L (ref 46–116)
ALT SERPL W P-5'-P-CCNC: 17 U/L (ref 12–78)
ANION GAP SERPL CALCULATED.3IONS-SCNC: 10 MMOL/L (ref 4–13)
AST SERPL W P-5'-P-CCNC: 12 U/L (ref 5–45)
BACTERIA UR QL AUTO: ABNORMAL /HPF
BASOPHILS # BLD MANUAL: 0 THOUSAND/UL (ref 0–0.1)
BASOPHILS NFR MAR MANUAL: 0 % (ref 0–1)
BILIRUB SERPL-MCNC: 0.7 MG/DL (ref 0.2–1)
BILIRUB UR QL STRIP: NEGATIVE
BUN SERPL-MCNC: 26 MG/DL (ref 5–25)
CALCIUM SERPL-MCNC: 9 MG/DL (ref 8.3–10.1)
CHLORIDE SERPL-SCNC: 102 MMOL/L (ref 100–108)
CLARITY UR: ABNORMAL
CO2 SERPL-SCNC: 24 MMOL/L (ref 21–32)
COLOR UR: YELLOW
CREAT SERPL-MCNC: 1.26 MG/DL (ref 0.6–1.3)
EOSINOPHIL # BLD MANUAL: 0 THOUSAND/UL (ref 0–0.4)
EOSINOPHIL NFR BLD MANUAL: 0 % (ref 0–6)
ERYTHROCYTE [DISTWIDTH] IN BLOOD BY AUTOMATED COUNT: 12.5 % (ref 11.6–15.1)
GFR SERPL CREATININE-BSD FRML MDRD: 54 ML/MIN/1.73SQ M
GLUCOSE SERPL-MCNC: 114 MG/DL (ref 65–140)
GLUCOSE UR STRIP-MCNC: NEGATIVE MG/DL
HCT VFR BLD AUTO: 35.6 % (ref 36.5–49.3)
HGB BLD-MCNC: 11.8 G/DL (ref 12–17)
HGB UR QL STRIP.AUTO: ABNORMAL
KETONES UR STRIP-MCNC: ABNORMAL MG/DL
LACTATE SERPL-SCNC: 1 MMOL/L (ref 0.5–2)
LEUKOCYTE ESTERASE UR QL STRIP: ABNORMAL
LIPASE SERPL-CCNC: 88 U/L (ref 73–393)
LYMPHOCYTES # BLD AUTO: 1.66 THOUSAND/UL (ref 0.6–4.47)
LYMPHOCYTES # BLD AUTO: 10 % (ref 14–44)
MCH RBC QN AUTO: 33.1 PG (ref 26.8–34.3)
MCHC RBC AUTO-ENTMCNC: 33.1 G/DL (ref 31.4–37.4)
MCV RBC AUTO: 100 FL (ref 82–98)
MONOCYTES # BLD AUTO: 1.99 THOUSAND/UL (ref 0–1.22)
MONOCYTES NFR BLD: 12 % (ref 4–12)
MYELOCYTES NFR BLD MANUAL: 2 % (ref 0–1)
NEUTROPHILS # BLD MANUAL: 12.58 THOUSAND/UL (ref 1.85–7.62)
NEUTS BAND NFR BLD MANUAL: 4 % (ref 0–8)
NEUTS SEG NFR BLD AUTO: 72 % (ref 43–75)
NITRITE UR QL STRIP: POSITIVE
NON-SQ EPI CELLS URNS QL MICRO: ABNORMAL /HPF
NRBC BLD AUTO-RTO: 0 /100 WBCS
OVALOCYTES BLD QL SMEAR: PRESENT
PH UR STRIP.AUTO: 5.5 [PH]
PLATELET # BLD AUTO: 164 THOUSANDS/UL (ref 149–390)
PLATELET BLD QL SMEAR: ADEQUATE
PMV BLD AUTO: 10 FL (ref 8.9–12.7)
POTASSIUM SERPL-SCNC: 3.9 MMOL/L (ref 3.5–5.3)
PROT SERPL-MCNC: 7 G/DL (ref 6.4–8.2)
PROT UR STRIP-MCNC: ABNORMAL MG/DL
RBC # BLD AUTO: 3.57 MILLION/UL (ref 3.88–5.62)
RBC #/AREA URNS AUTO: ABNORMAL /HPF
RBC MORPH BLD: PRESENT
SODIUM SERPL-SCNC: 136 MMOL/L (ref 136–145)
SP GR UR STRIP.AUTO: 1.02 (ref 1–1.03)
TOTAL CELLS COUNTED SPEC: 100
UROBILINOGEN UR QL STRIP.AUTO: 0.2 E.U./DL
WBC # BLD AUTO: 16.55 THOUSAND/UL (ref 4.31–10.16)
WBC #/AREA URNS AUTO: ABNORMAL /HPF

## 2019-05-31 PROCEDURE — 85007 BL SMEAR W/DIFF WBC COUNT: CPT | Performed by: EMERGENCY MEDICINE

## 2019-05-31 PROCEDURE — 87040 BLOOD CULTURE FOR BACTERIA: CPT | Performed by: EMERGENCY MEDICINE

## 2019-05-31 PROCEDURE — 1124F ACP DISCUSS-NO DSCNMKR DOCD: CPT | Performed by: INTERNAL MEDICINE

## 2019-05-31 PROCEDURE — 87077 CULTURE AEROBIC IDENTIFY: CPT | Performed by: EMERGENCY MEDICINE

## 2019-05-31 PROCEDURE — 71045 X-RAY EXAM CHEST 1 VIEW: CPT

## 2019-05-31 PROCEDURE — 81001 URINALYSIS AUTO W/SCOPE: CPT | Performed by: EMERGENCY MEDICINE

## 2019-05-31 PROCEDURE — 99285 EMERGENCY DEPT VISIT HI MDM: CPT

## 2019-05-31 PROCEDURE — 85027 COMPLETE CBC AUTOMATED: CPT | Performed by: EMERGENCY MEDICINE

## 2019-05-31 PROCEDURE — 87086 URINE CULTURE/COLONY COUNT: CPT | Performed by: EMERGENCY MEDICINE

## 2019-05-31 PROCEDURE — 87081 CULTURE SCREEN ONLY: CPT | Performed by: NURSE PRACTITIONER

## 2019-05-31 PROCEDURE — 80053 COMPREHEN METABOLIC PANEL: CPT | Performed by: EMERGENCY MEDICINE

## 2019-05-31 PROCEDURE — 36415 COLL VENOUS BLD VENIPUNCTURE: CPT | Performed by: EMERGENCY MEDICINE

## 2019-05-31 PROCEDURE — 96365 THER/PROPH/DIAG IV INF INIT: CPT

## 2019-05-31 PROCEDURE — 87186 SC STD MICRODIL/AGAR DIL: CPT | Performed by: EMERGENCY MEDICINE

## 2019-05-31 PROCEDURE — 83690 ASSAY OF LIPASE: CPT | Performed by: EMERGENCY MEDICINE

## 2019-05-31 PROCEDURE — 93005 ELECTROCARDIOGRAM TRACING: CPT

## 2019-05-31 PROCEDURE — 96361 HYDRATE IV INFUSION ADD-ON: CPT

## 2019-05-31 PROCEDURE — 99223 1ST HOSP IP/OBS HIGH 75: CPT | Performed by: INTERNAL MEDICINE

## 2019-05-31 PROCEDURE — 87185 SC STD ENZYME DETCJ PER NZM: CPT | Performed by: EMERGENCY MEDICINE

## 2019-05-31 PROCEDURE — 83605 ASSAY OF LACTIC ACID: CPT | Performed by: EMERGENCY MEDICINE

## 2019-05-31 RX ORDER — METOPROLOL SUCCINATE 25 MG/1
25 TABLET, EXTENDED RELEASE ORAL DAILY
Status: DISCONTINUED | OUTPATIENT
Start: 2019-06-01 | End: 2019-06-02 | Stop reason: HOSPADM

## 2019-05-31 RX ORDER — VITAMIN B COMPLEX
1 TABLET ORAL DAILY
Status: DISCONTINUED | OUTPATIENT
Start: 2019-06-01 | End: 2019-05-31 | Stop reason: ALTCHOICE

## 2019-05-31 RX ORDER — ASPIRIN 81 MG/1
81 TABLET, CHEWABLE ORAL
Status: DISCONTINUED | OUTPATIENT
Start: 2019-05-31 | End: 2019-05-31

## 2019-05-31 RX ORDER — ONDANSETRON 2 MG/ML
4 INJECTION INTRAMUSCULAR; INTRAVENOUS EVERY 6 HOURS PRN
Status: DISCONTINUED | OUTPATIENT
Start: 2019-05-31 | End: 2019-06-02 | Stop reason: HOSPADM

## 2019-05-31 RX ORDER — SACCHAROMYCES BOULARDII 250 MG
250 CAPSULE ORAL 2 TIMES DAILY
Status: DISCONTINUED | OUTPATIENT
Start: 2019-05-31 | End: 2019-06-02 | Stop reason: HOSPADM

## 2019-05-31 RX ORDER — CALCIUM CARBONATE 500(1250)
1 TABLET ORAL
Status: DISCONTINUED | OUTPATIENT
Start: 2019-06-01 | End: 2019-06-02 | Stop reason: HOSPADM

## 2019-05-31 RX ORDER — ASPIRIN 81 MG/1
81 TABLET, CHEWABLE ORAL
Status: DISCONTINUED | OUTPATIENT
Start: 2019-05-31 | End: 2019-06-01

## 2019-05-31 RX ORDER — ATORVASTATIN CALCIUM 40 MG/1
40 TABLET, FILM COATED ORAL DAILY
Status: DISCONTINUED | OUTPATIENT
Start: 2019-06-01 | End: 2019-06-02 | Stop reason: HOSPADM

## 2019-05-31 RX ORDER — CHOLECALCIFEROL (VITAMIN D3) 125 MCG
500 CAPSULE ORAL DAILY
Status: DISCONTINUED | OUTPATIENT
Start: 2019-06-01 | End: 2019-06-02 | Stop reason: HOSPADM

## 2019-05-31 RX ORDER — HEPARIN SODIUM 5000 [USP'U]/ML
5000 INJECTION, SOLUTION INTRAVENOUS; SUBCUTANEOUS EVERY 8 HOURS SCHEDULED
Status: DISCONTINUED | OUTPATIENT
Start: 2019-05-31 | End: 2019-06-02 | Stop reason: HOSPADM

## 2019-05-31 RX ORDER — CEFTRIAXONE 1 G/50ML
1000 INJECTION, SOLUTION INTRAVENOUS ONCE
Status: COMPLETED | OUTPATIENT
Start: 2019-05-31 | End: 2019-05-31

## 2019-05-31 RX ORDER — TAMSULOSIN HYDROCHLORIDE 0.4 MG/1
0.4 CAPSULE ORAL
Status: DISCONTINUED | OUTPATIENT
Start: 2019-05-31 | End: 2019-06-02 | Stop reason: HOSPADM

## 2019-05-31 RX ORDER — CEFTRIAXONE 1 G/50ML
1000 INJECTION, SOLUTION INTRAVENOUS EVERY 24 HOURS
Status: DISCONTINUED | OUTPATIENT
Start: 2019-06-01 | End: 2019-06-02 | Stop reason: HOSPADM

## 2019-05-31 RX ORDER — CHLORAL HYDRATE 500 MG
1000 CAPSULE ORAL 2 TIMES DAILY
Status: DISCONTINUED | OUTPATIENT
Start: 2019-05-31 | End: 2019-06-01

## 2019-05-31 RX ORDER — MELATONIN
2000 DAILY
Status: DISCONTINUED | OUTPATIENT
Start: 2019-06-01 | End: 2019-06-02 | Stop reason: HOSPADM

## 2019-05-31 RX ORDER — SODIUM CHLORIDE 9 MG/ML
75 INJECTION, SOLUTION INTRAVENOUS CONTINUOUS
Status: DISCONTINUED | OUTPATIENT
Start: 2019-05-31 | End: 2019-06-02 | Stop reason: HOSPADM

## 2019-05-31 RX ORDER — ASCORBIC ACID 500 MG
500 TABLET ORAL DAILY
Status: DISCONTINUED | OUTPATIENT
Start: 2019-06-01 | End: 2019-06-02 | Stop reason: HOSPADM

## 2019-05-31 RX ORDER — ACETAMINOPHEN 325 MG/1
650 TABLET ORAL EVERY 6 HOURS PRN
Status: DISCONTINUED | OUTPATIENT
Start: 2019-05-31 | End: 2019-06-02 | Stop reason: HOSPADM

## 2019-05-31 RX ADMIN — SODIUM CHLORIDE 75 ML/HR: 0.9 INJECTION, SOLUTION INTRAVENOUS at 19:24

## 2019-05-31 RX ADMIN — TAMSULOSIN HYDROCHLORIDE 0.4 MG: 0.4 CAPSULE ORAL at 19:26

## 2019-05-31 RX ADMIN — ASPIRIN 81 MG 81 MG: 81 TABLET ORAL at 22:21

## 2019-05-31 RX ADMIN — Medication 250 MG: at 19:26

## 2019-05-31 RX ADMIN — HEPARIN SODIUM 5000 UNITS: 5000 INJECTION INTRAVENOUS; SUBCUTANEOUS at 22:21

## 2019-05-31 RX ADMIN — Medication 1000 MG: at 19:26

## 2019-05-31 RX ADMIN — SODIUM CHLORIDE 1000 ML: 0.9 INJECTION, SOLUTION INTRAVENOUS at 14:05

## 2019-05-31 RX ADMIN — CEFTRIAXONE 1000 MG: 1 INJECTION, SOLUTION INTRAVENOUS at 15:14

## 2019-06-01 LAB
ANION GAP SERPL CALCULATED.3IONS-SCNC: 9 MMOL/L (ref 4–13)
BUN SERPL-MCNC: 24 MG/DL (ref 5–25)
CALCIUM SERPL-MCNC: 8.6 MG/DL (ref 8.3–10.1)
CHLORIDE SERPL-SCNC: 109 MMOL/L (ref 100–108)
CO2 SERPL-SCNC: 24 MMOL/L (ref 21–32)
CREAT SERPL-MCNC: 1.05 MG/DL (ref 0.6–1.3)
ERYTHROCYTE [DISTWIDTH] IN BLOOD BY AUTOMATED COUNT: 12.5 % (ref 11.6–15.1)
GFR SERPL CREATININE-BSD FRML MDRD: 67 ML/MIN/1.73SQ M
GLUCOSE SERPL-MCNC: 105 MG/DL (ref 65–140)
HCT VFR BLD AUTO: 30.8 % (ref 36.5–49.3)
HGB BLD-MCNC: 10.3 G/DL (ref 12–17)
LG PLATELETS BLD QL SMEAR: PRESENT
MAGNESIUM SERPL-MCNC: 2.3 MG/DL (ref 1.6–2.6)
MCH RBC QN AUTO: 33.6 PG (ref 26.8–34.3)
MCHC RBC AUTO-ENTMCNC: 33.4 G/DL (ref 31.4–37.4)
MCV RBC AUTO: 100 FL (ref 82–98)
NRBC BLD AUTO-RTO: 0 /100 WBCS
OVALOCYTES BLD QL SMEAR: PRESENT
PLATELET # BLD AUTO: 130 THOUSANDS/UL (ref 149–390)
PLATELET BLD QL SMEAR: ABNORMAL
PMV BLD AUTO: 10.2 FL (ref 8.9–12.7)
POTASSIUM SERPL-SCNC: 3.8 MMOL/L (ref 3.5–5.3)
RBC # BLD AUTO: 3.07 MILLION/UL (ref 3.88–5.62)
RBC MORPH BLD: PRESENT
SODIUM SERPL-SCNC: 142 MMOL/L (ref 136–145)
WBC # BLD AUTO: 10.05 THOUSAND/UL (ref 4.31–10.16)

## 2019-06-01 PROCEDURE — G8988 SELF CARE GOAL STATUS: HCPCS

## 2019-06-01 PROCEDURE — G8978 MOBILITY CURRENT STATUS: HCPCS

## 2019-06-01 PROCEDURE — 85027 COMPLETE CBC AUTOMATED: CPT | Performed by: NURSE PRACTITIONER

## 2019-06-01 PROCEDURE — 85007 BL SMEAR W/DIFF WBC COUNT: CPT | Performed by: NURSE PRACTITIONER

## 2019-06-01 PROCEDURE — 99232 SBSQ HOSP IP/OBS MODERATE 35: CPT | Performed by: NURSE PRACTITIONER

## 2019-06-01 PROCEDURE — G8979 MOBILITY GOAL STATUS: HCPCS

## 2019-06-01 PROCEDURE — 80048 BASIC METABOLIC PNL TOTAL CA: CPT | Performed by: NURSE PRACTITIONER

## 2019-06-01 PROCEDURE — G8987 SELF CARE CURRENT STATUS: HCPCS

## 2019-06-01 PROCEDURE — 97166 OT EVAL MOD COMPLEX 45 MIN: CPT

## 2019-06-01 PROCEDURE — 97530 THERAPEUTIC ACTIVITIES: CPT

## 2019-06-01 PROCEDURE — 83735 ASSAY OF MAGNESIUM: CPT | Performed by: NURSE PRACTITIONER

## 2019-06-01 PROCEDURE — 97162 PT EVAL MOD COMPLEX 30 MIN: CPT

## 2019-06-01 RX ORDER — ASPIRIN 81 MG/1
40.5 TABLET, CHEWABLE ORAL ONCE
Status: COMPLETED | OUTPATIENT
Start: 2019-06-01 | End: 2019-06-01

## 2019-06-01 RX ORDER — CHLORAL HYDRATE 500 MG
1000 CAPSULE ORAL ONCE
Status: COMPLETED | OUTPATIENT
Start: 2019-06-01 | End: 2019-06-01

## 2019-06-01 RX ORDER — DOCUSATE SODIUM 100 MG/1
100 CAPSULE, LIQUID FILLED ORAL DAILY PRN
Status: DISCONTINUED | OUTPATIENT
Start: 2019-06-01 | End: 2019-06-02 | Stop reason: HOSPADM

## 2019-06-01 RX ADMIN — ATORVASTATIN CALCIUM 40 MG: 40 TABLET, FILM COATED ORAL at 09:07

## 2019-06-01 RX ADMIN — HEPARIN SODIUM 5000 UNITS: 5000 INJECTION INTRAVENOUS; SUBCUTANEOUS at 14:30

## 2019-06-01 RX ADMIN — ACETAMINOPHEN 650 MG: 325 TABLET, FILM COATED ORAL at 00:09

## 2019-06-01 RX ADMIN — Medication 250 MG: at 09:07

## 2019-06-01 RX ADMIN — OXYCODONE HYDROCHLORIDE AND ACETAMINOPHEN 500 MG: 500 TABLET ORAL at 09:07

## 2019-06-01 RX ADMIN — CEFTRIAXONE 1000 MG: 1 INJECTION, SOLUTION INTRAVENOUS at 14:30

## 2019-06-01 RX ADMIN — Medication 1000 MG: at 09:07

## 2019-06-01 RX ADMIN — METOPROLOL SUCCINATE 25 MG: 25 TABLET, EXTENDED RELEASE ORAL at 09:07

## 2019-06-01 RX ADMIN — Medication 1000 MG: at 17:18

## 2019-06-01 RX ADMIN — CYANOCOBALAMIN TAB 500 MCG 500 MCG: 500 TAB at 09:07

## 2019-06-01 RX ADMIN — HEPARIN SODIUM 5000 UNITS: 5000 INJECTION INTRAVENOUS; SUBCUTANEOUS at 06:36

## 2019-06-01 RX ADMIN — DOCUSATE SODIUM 100 MG: 100 CAPSULE, LIQUID FILLED ORAL at 21:26

## 2019-06-01 RX ADMIN — VITAMIN D, TAB 1000IU (100/BT) 2000 UNITS: 25 TAB at 09:07

## 2019-06-01 RX ADMIN — CALCIUM 1 TABLET: 500 TABLET ORAL at 09:07

## 2019-06-01 RX ADMIN — TAMSULOSIN HYDROCHLORIDE 0.4 MG: 0.4 CAPSULE ORAL at 16:43

## 2019-06-01 RX ADMIN — ASPIRIN 81 MG 40.5 MG: 81 TABLET ORAL at 21:26

## 2019-06-01 RX ADMIN — HEPARIN SODIUM 5000 UNITS: 5000 INJECTION INTRAVENOUS; SUBCUTANEOUS at 21:26

## 2019-06-01 RX ADMIN — Medication 250 MG: at 17:18

## 2019-06-02 VITALS
HEIGHT: 70 IN | TEMPERATURE: 98 F | WEIGHT: 159.38 LBS | OXYGEN SATURATION: 98 % | SYSTOLIC BLOOD PRESSURE: 144 MMHG | DIASTOLIC BLOOD PRESSURE: 63 MMHG | RESPIRATION RATE: 18 BRPM | BODY MASS INDEX: 22.82 KG/M2 | HEART RATE: 50 BPM

## 2019-06-02 LAB
ANION GAP SERPL CALCULATED.3IONS-SCNC: 7 MMOL/L (ref 4–13)
BUN SERPL-MCNC: 17 MG/DL (ref 5–25)
CALCIUM SERPL-MCNC: 8.1 MG/DL (ref 8.3–10.1)
CHLORIDE SERPL-SCNC: 107 MMOL/L (ref 100–108)
CO2 SERPL-SCNC: 25 MMOL/L (ref 21–32)
CREAT SERPL-MCNC: 0.87 MG/DL (ref 0.6–1.3)
ERYTHROCYTE [DISTWIDTH] IN BLOOD BY AUTOMATED COUNT: 12.6 % (ref 11.6–15.1)
GFR SERPL CREATININE-BSD FRML MDRD: 82 ML/MIN/1.73SQ M
GLUCOSE SERPL-MCNC: 104 MG/DL (ref 65–140)
HCT VFR BLD AUTO: 31.5 % (ref 36.5–49.3)
HGB BLD-MCNC: 10.5 G/DL (ref 12–17)
MCH RBC QN AUTO: 33.1 PG (ref 26.8–34.3)
MCHC RBC AUTO-ENTMCNC: 33.3 G/DL (ref 31.4–37.4)
MCV RBC AUTO: 99 FL (ref 82–98)
MRSA NOSE QL CULT: NORMAL
PLATELET # BLD AUTO: 149 THOUSANDS/UL (ref 149–390)
PMV BLD AUTO: 9.8 FL (ref 8.9–12.7)
POTASSIUM SERPL-SCNC: 3.6 MMOL/L (ref 3.5–5.3)
RBC # BLD AUTO: 3.17 MILLION/UL (ref 3.88–5.62)
SODIUM SERPL-SCNC: 139 MMOL/L (ref 136–145)
WBC # BLD AUTO: 8.33 THOUSAND/UL (ref 4.31–10.16)

## 2019-06-02 PROCEDURE — 85027 COMPLETE CBC AUTOMATED: CPT | Performed by: NURSE PRACTITIONER

## 2019-06-02 PROCEDURE — 80048 BASIC METABOLIC PNL TOTAL CA: CPT | Performed by: NURSE PRACTITIONER

## 2019-06-02 PROCEDURE — 99239 HOSP IP/OBS DSCHRG MGMT >30: CPT | Performed by: NURSE PRACTITIONER

## 2019-06-02 RX ORDER — LEVOFLOXACIN 750 MG/1
750 TABLET ORAL EVERY 24 HOURS
Qty: 4 TABLET | Refills: 0 | Status: SHIPPED | OUTPATIENT
Start: 2019-06-03 | End: 2019-06-06 | Stop reason: HOSPADM

## 2019-06-02 RX ADMIN — CEFTRIAXONE 1000 MG: 1 INJECTION, SOLUTION INTRAVENOUS at 14:18

## 2019-06-02 RX ADMIN — LEVOFLOXACIN 750 MG: 500 TABLET, FILM COATED ORAL at 15:13

## 2019-06-02 RX ADMIN — Medication 250 MG: at 08:51

## 2019-06-02 RX ADMIN — ATORVASTATIN CALCIUM 40 MG: 40 TABLET, FILM COATED ORAL at 08:51

## 2019-06-02 RX ADMIN — HEPARIN SODIUM 5000 UNITS: 5000 INJECTION INTRAVENOUS; SUBCUTANEOUS at 14:18

## 2019-06-02 RX ADMIN — METOPROLOL SUCCINATE 25 MG: 25 TABLET, EXTENDED RELEASE ORAL at 08:51

## 2019-06-02 RX ADMIN — CYANOCOBALAMIN TAB 500 MCG 500 MCG: 500 TAB at 08:51

## 2019-06-02 RX ADMIN — CALCIUM 1 TABLET: 500 TABLET ORAL at 08:51

## 2019-06-02 RX ADMIN — VITAMIN D, TAB 1000IU (100/BT) 2000 UNITS: 25 TAB at 08:51

## 2019-06-02 RX ADMIN — OXYCODONE HYDROCHLORIDE AND ACETAMINOPHEN 500 MG: 500 TABLET ORAL at 08:51

## 2019-06-02 RX ADMIN — HEPARIN SODIUM 5000 UNITS: 5000 INJECTION INTRAVENOUS; SUBCUTANEOUS at 06:03

## 2019-06-03 ENCOUNTER — ANESTHESIA EVENT (OUTPATIENT)
Dept: PERIOP | Facility: HOSPITAL | Age: 79
End: 2019-06-03
Payer: MEDICARE

## 2019-06-03 ENCOUNTER — TRANSITIONAL CARE MANAGEMENT (OUTPATIENT)
Dept: FAMILY MEDICINE CLINIC | Facility: CLINIC | Age: 79
End: 2019-06-03

## 2019-06-03 LAB
BACTERIA UR CULT: ABNORMAL
BACTERIA UR CULT: ABNORMAL

## 2019-06-03 RX ORDER — CHLORAL HYDRATE 500 MG
1000 CAPSULE ORAL DAILY
COMMUNITY
End: 2019-06-06 | Stop reason: HOSPADM

## 2019-06-04 ENCOUNTER — ANESTHESIA (OUTPATIENT)
Dept: PERIOP | Facility: HOSPITAL | Age: 79
End: 2019-06-04
Payer: MEDICARE

## 2019-06-04 ENCOUNTER — HOSPITAL ENCOUNTER (OUTPATIENT)
Facility: HOSPITAL | Age: 79
Setting detail: OUTPATIENT SURGERY
Discharge: HOME | End: 2019-06-06
Attending: UROLOGY | Admitting: UROLOGY
Payer: MEDICARE

## 2019-06-04 DIAGNOSIS — N40.1 ENLARGED PROSTATE WITH LOWER URINARY TRACT SYMPTOMS (LUTS): ICD-10-CM

## 2019-06-04 PROCEDURE — 88305 TISSUE EXAM BY PATHOLOGIST: CPT | Performed by: PATHOLOGY

## 2019-06-04 RX ORDER — FENTANYL CITRATE/PF 50 MCG/ML
25 SYRINGE (ML) INJECTION
Status: DISCONTINUED | OUTPATIENT
Start: 2019-06-04 | End: 2019-06-04 | Stop reason: HOSPADM

## 2019-06-04 RX ORDER — SODIUM CHLORIDE, SODIUM LACTATE, POTASSIUM CHLORIDE, CALCIUM CHLORIDE 600; 310; 30; 20 MG/100ML; MG/100ML; MG/100ML; MG/100ML
125 INJECTION, SOLUTION INTRAVENOUS CONTINUOUS
Status: DISCONTINUED | OUTPATIENT
Start: 2019-06-04 | End: 2019-06-04 | Stop reason: SDUPTHER

## 2019-06-04 RX ORDER — DIPHENHYDRAMINE HYDROCHLORIDE 50 MG/ML
12.5 INJECTION INTRAMUSCULAR; INTRAVENOUS ONCE AS NEEDED
Status: DISCONTINUED | OUTPATIENT
Start: 2019-06-04 | End: 2019-06-04 | Stop reason: HOSPADM

## 2019-06-04 RX ORDER — ONDANSETRON 2 MG/ML
4 INJECTION INTRAMUSCULAR; INTRAVENOUS ONCE AS NEEDED
Status: DISCONTINUED | OUTPATIENT
Start: 2019-06-04 | End: 2019-06-04 | Stop reason: HOSPADM

## 2019-06-04 RX ORDER — ONDANSETRON 2 MG/ML
INJECTION INTRAMUSCULAR; INTRAVENOUS AS NEEDED
Status: DISCONTINUED | OUTPATIENT
Start: 2019-06-04 | End: 2019-06-04 | Stop reason: SURG

## 2019-06-04 RX ORDER — PROPOFOL 10 MG/ML
INJECTION, EMULSION INTRAVENOUS AS NEEDED
Status: DISCONTINUED | OUTPATIENT
Start: 2019-06-04 | End: 2019-06-04 | Stop reason: SURG

## 2019-06-04 RX ORDER — MAGNESIUM HYDROXIDE 1200 MG/15ML
LIQUID ORAL AS NEEDED
Status: DISCONTINUED | OUTPATIENT
Start: 2019-06-04 | End: 2019-06-04 | Stop reason: HOSPADM

## 2019-06-04 RX ORDER — DEXAMETHASONE SODIUM PHOSPHATE 4 MG/ML
INJECTION, SOLUTION INTRA-ARTICULAR; INTRALESIONAL; INTRAMUSCULAR; INTRAVENOUS; SOFT TISSUE AS NEEDED
Status: DISCONTINUED | OUTPATIENT
Start: 2019-06-04 | End: 2019-06-04 | Stop reason: SURG

## 2019-06-04 RX ORDER — LEVOFLOXACIN 500 MG/1
500 TABLET, FILM COATED ORAL EVERY 24 HOURS
Status: DISCONTINUED | OUTPATIENT
Start: 2019-06-05 | End: 2019-06-06 | Stop reason: HOSPADM

## 2019-06-04 RX ORDER — SODIUM CHLORIDE, SODIUM LACTATE, POTASSIUM CHLORIDE, CALCIUM CHLORIDE 600; 310; 30; 20 MG/100ML; MG/100ML; MG/100ML; MG/100ML
75 INJECTION, SOLUTION INTRAVENOUS CONTINUOUS
Status: DISCONTINUED | OUTPATIENT
Start: 2019-06-04 | End: 2019-06-06 | Stop reason: HOSPADM

## 2019-06-04 RX ORDER — CIPROFLOXACIN 2 MG/ML
400 INJECTION, SOLUTION INTRAVENOUS ONCE
Status: COMPLETED | OUTPATIENT
Start: 2019-06-04 | End: 2019-06-04

## 2019-06-04 RX ORDER — LABETALOL 20 MG/4 ML (5 MG/ML) INTRAVENOUS SYRINGE
10 ONCE
Status: COMPLETED | OUTPATIENT
Start: 2019-06-04 | End: 2019-06-04

## 2019-06-04 RX ORDER — GLYCINE 1.5 G/100ML
SOLUTION IRRIGATION AS NEEDED
Status: DISCONTINUED | OUTPATIENT
Start: 2019-06-04 | End: 2019-06-04 | Stop reason: HOSPADM

## 2019-06-04 RX ORDER — MEPERIDINE HYDROCHLORIDE 25 MG/ML
12.5 INJECTION INTRAMUSCULAR; INTRAVENOUS; SUBCUTANEOUS
Status: DISCONTINUED | OUTPATIENT
Start: 2019-06-04 | End: 2019-06-04 | Stop reason: HOSPADM

## 2019-06-04 RX ORDER — FENTANYL CITRATE 50 UG/ML
INJECTION, SOLUTION INTRAMUSCULAR; INTRAVENOUS AS NEEDED
Status: DISCONTINUED | OUTPATIENT
Start: 2019-06-04 | End: 2019-06-04 | Stop reason: SURG

## 2019-06-04 RX ORDER — METOPROLOL SUCCINATE 25 MG/1
25 TABLET, EXTENDED RELEASE ORAL DAILY
Status: DISCONTINUED | OUTPATIENT
Start: 2019-06-04 | End: 2019-06-06 | Stop reason: HOSPADM

## 2019-06-04 RX ORDER — SODIUM CHLORIDE, SODIUM LACTATE, POTASSIUM CHLORIDE, CALCIUM CHLORIDE 600; 310; 30; 20 MG/100ML; MG/100ML; MG/100ML; MG/100ML
50 INJECTION, SOLUTION INTRAVENOUS CONTINUOUS
Status: DISCONTINUED | OUTPATIENT
Start: 2019-06-04 | End: 2019-06-04 | Stop reason: ALTCHOICE

## 2019-06-04 RX ORDER — LIDOCAINE HYDROCHLORIDE 10 MG/ML
INJECTION, SOLUTION INFILTRATION; PERINEURAL AS NEEDED
Status: DISCONTINUED | OUTPATIENT
Start: 2019-06-04 | End: 2019-06-04 | Stop reason: SURG

## 2019-06-04 RX ADMIN — DEXAMETHASONE SODIUM PHOSPHATE 4 MG: 4 INJECTION, SOLUTION INTRA-ARTICULAR; INTRALESIONAL; INTRAMUSCULAR; INTRAVENOUS; SOFT TISSUE at 12:50

## 2019-06-04 RX ADMIN — FENTANYL CITRATE 12.5 MCG: 50 INJECTION, SOLUTION INTRAMUSCULAR; INTRAVENOUS at 12:55

## 2019-06-04 RX ADMIN — ONDANSETRON 4 MG: 2 INJECTION INTRAMUSCULAR; INTRAVENOUS at 12:50

## 2019-06-04 RX ADMIN — FENTANYL CITRATE 37.5 MCG: 50 INJECTION, SOLUTION INTRAMUSCULAR; INTRAVENOUS at 13:00

## 2019-06-04 RX ADMIN — CIPROFLOXACIN 400 MG: 2 INJECTION, SOLUTION INTRAVENOUS at 12:01

## 2019-06-04 RX ADMIN — FENTANYL CITRATE 25 MCG: 50 INJECTION, SOLUTION INTRAMUSCULAR; INTRAVENOUS at 14:26

## 2019-06-04 RX ADMIN — PROPOFOL 120 MG: 10 INJECTION, EMULSION INTRAVENOUS at 12:45

## 2019-06-04 RX ADMIN — FENTANYL CITRATE 25 MCG: 50 INJECTION, SOLUTION INTRAMUSCULAR; INTRAVENOUS at 13:03

## 2019-06-04 RX ADMIN — LABETALOL 20 MG/4 ML (5 MG/ML) INTRAVENOUS SYRINGE 10 MG: at 14:43

## 2019-06-04 RX ADMIN — FENTANYL CITRATE 25 MCG: 50 INJECTION, SOLUTION INTRAMUSCULAR; INTRAVENOUS at 13:28

## 2019-06-04 RX ADMIN — SODIUM CHLORIDE, SODIUM LACTATE, POTASSIUM CHLORIDE, AND CALCIUM CHLORIDE 125 ML/HR: .6; .31; .03; .02 INJECTION, SOLUTION INTRAVENOUS at 11:03

## 2019-06-04 RX ADMIN — LIDOCAINE HYDROCHLORIDE 50 MG: 10 INJECTION, SOLUTION INFILTRATION; PERINEURAL at 12:45

## 2019-06-05 LAB
ANION GAP SERPL CALCULATED.3IONS-SCNC: 9 MMOL/L (ref 4–13)
ATRIAL RATE: 73 BPM
BACTERIA BLD CULT: NORMAL
BACTERIA BLD CULT: NORMAL
BUN SERPL-MCNC: 14 MG/DL (ref 5–25)
CALCIUM SERPL-MCNC: 8.7 MG/DL (ref 8.3–10.1)
CHLORIDE SERPL-SCNC: 109 MMOL/L (ref 100–108)
CO2 SERPL-SCNC: 25 MMOL/L (ref 21–32)
CREAT SERPL-MCNC: 0.91 MG/DL (ref 0.6–1.3)
ERYTHROCYTE [DISTWIDTH] IN BLOOD BY AUTOMATED COUNT: 12.2 % (ref 11.6–15.1)
GFR SERPL CREATININE-BSD FRML MDRD: 79 ML/MIN/1.73SQ M
GLUCOSE SERPL-MCNC: 111 MG/DL (ref 65–140)
HCT VFR BLD AUTO: 33.3 % (ref 36.5–49.3)
HGB BLD-MCNC: 10.8 G/DL (ref 12–17)
MCH RBC QN AUTO: 32.8 PG (ref 26.8–34.3)
MCHC RBC AUTO-ENTMCNC: 32.4 G/DL (ref 31.4–37.4)
MCV RBC AUTO: 101 FL (ref 82–98)
P AXIS: 61 DEGREES
PLATELET # BLD AUTO: 194 THOUSANDS/UL (ref 149–390)
PMV BLD AUTO: 10.2 FL (ref 8.9–12.7)
POTASSIUM SERPL-SCNC: 3.8 MMOL/L (ref 3.5–5.3)
PR INTERVAL: 218 MS
QRS AXIS: 22 DEGREES
QRSD INTERVAL: 100 MS
QT INTERVAL: 382 MS
QTC INTERVAL: 420 MS
RBC # BLD AUTO: 3.29 MILLION/UL (ref 3.88–5.62)
SODIUM SERPL-SCNC: 143 MMOL/L (ref 136–145)
T WAVE AXIS: 57 DEGREES
VENTRICULAR RATE: 73 BPM
WBC # BLD AUTO: 8.77 THOUSAND/UL (ref 4.31–10.16)

## 2019-06-05 PROCEDURE — 93010 ELECTROCARDIOGRAM REPORT: CPT | Performed by: INTERNAL MEDICINE

## 2019-06-05 PROCEDURE — 80048 BASIC METABOLIC PNL TOTAL CA: CPT | Performed by: UROLOGY

## 2019-06-05 PROCEDURE — 85027 COMPLETE CBC AUTOMATED: CPT | Performed by: UROLOGY

## 2019-06-05 RX ORDER — DOCUSATE SODIUM 100 MG/1
100 CAPSULE, LIQUID FILLED ORAL 2 TIMES DAILY
Status: DISCONTINUED | OUTPATIENT
Start: 2019-06-05 | End: 2019-06-06 | Stop reason: HOSPADM

## 2019-06-05 RX ADMIN — LEVOFLOXACIN 500 MG: 500 TABLET, FILM COATED ORAL at 13:19

## 2019-06-05 RX ADMIN — DOCUSATE SODIUM 100 MG: 100 CAPSULE, LIQUID FILLED ORAL at 17:26

## 2019-06-05 RX ADMIN — METOPROLOL SUCCINATE 25 MG: 25 TABLET, EXTENDED RELEASE ORAL at 10:13

## 2019-06-05 RX ADMIN — DOCUSATE SODIUM 100 MG: 100 CAPSULE, LIQUID FILLED ORAL at 10:14

## 2019-06-05 RX ADMIN — SODIUM CHLORIDE, SODIUM LACTATE, POTASSIUM CHLORIDE, AND CALCIUM CHLORIDE 75 ML/HR: .6; .31; .03; .02 INJECTION, SOLUTION INTRAVENOUS at 04:18

## 2019-06-06 VITALS
HEIGHT: 70 IN | OXYGEN SATURATION: 98 % | BODY MASS INDEX: 22.41 KG/M2 | HEART RATE: 61 BPM | DIASTOLIC BLOOD PRESSURE: 53 MMHG | TEMPERATURE: 97.3 F | SYSTOLIC BLOOD PRESSURE: 140 MMHG | RESPIRATION RATE: 18 BRPM | WEIGHT: 156.53 LBS

## 2019-06-06 RX ADMIN — DOCUSATE SODIUM 100 MG: 100 CAPSULE, LIQUID FILLED ORAL at 08:17

## 2019-06-06 RX ADMIN — METOPROLOL SUCCINATE 25 MG: 25 TABLET, EXTENDED RELEASE ORAL at 08:17

## 2019-06-24 ENCOUNTER — OFFICE VISIT (OUTPATIENT)
Dept: FAMILY MEDICINE CLINIC | Facility: CLINIC | Age: 79
End: 2019-06-24
Payer: MEDICARE

## 2019-06-24 VITALS
BODY MASS INDEX: 22.22 KG/M2 | HEIGHT: 70 IN | HEART RATE: 60 BPM | TEMPERATURE: 98.1 F | WEIGHT: 155.2 LBS | RESPIRATION RATE: 16 BRPM | DIASTOLIC BLOOD PRESSURE: 52 MMHG | SYSTOLIC BLOOD PRESSURE: 128 MMHG

## 2019-06-24 DIAGNOSIS — I10 ESSENTIAL HYPERTENSION: ICD-10-CM

## 2019-06-24 DIAGNOSIS — I25.10 CORONARY ARTERY DISEASE INVOLVING NATIVE CORONARY ARTERY OF NATIVE HEART WITHOUT ANGINA PECTORIS: ICD-10-CM

## 2019-06-24 DIAGNOSIS — R35.1 BENIGN PROSTATIC HYPERPLASIA WITH NOCTURIA: ICD-10-CM

## 2019-06-24 DIAGNOSIS — N40.1 BENIGN PROSTATIC HYPERPLASIA WITH NOCTURIA: ICD-10-CM

## 2019-06-24 DIAGNOSIS — E78.2 MIXED HYPERLIPIDEMIA: ICD-10-CM

## 2019-06-24 DIAGNOSIS — T83.511A URINARY TRACT INFECTION ASSOCIATED WITH INDWELLING URETHRAL CATHETER, INITIAL ENCOUNTER (HCC): Primary | ICD-10-CM

## 2019-06-24 DIAGNOSIS — N39.0 URINARY TRACT INFECTION ASSOCIATED WITH INDWELLING URETHRAL CATHETER, INITIAL ENCOUNTER (HCC): Primary | ICD-10-CM

## 2019-06-24 DIAGNOSIS — N39.44 NOCTURNAL ENURESIS: ICD-10-CM

## 2019-06-24 DIAGNOSIS — Z95.1 HX OF CABG: ICD-10-CM

## 2019-06-24 PROBLEM — A41.9 SEPSIS (HCC): Status: RESOLVED | Noted: 2019-05-31 | Resolved: 2019-06-24

## 2019-06-24 PROBLEM — R33.9 URINARY RETENTION: Status: RESOLVED | Noted: 2019-05-31 | Resolved: 2019-06-24

## 2019-06-24 PROBLEM — Z90.79 S/P TURP: Status: ACTIVE | Noted: 2019-06-24

## 2019-06-24 PROCEDURE — 99214 OFFICE O/P EST MOD 30 MIN: CPT | Performed by: FAMILY MEDICINE

## 2019-06-24 RX ORDER — LEVOFLOXACIN 500 MG/1
500 TABLET, FILM COATED ORAL DAILY
Refills: 0 | COMMUNITY
Start: 2019-06-06 | End: 2019-06-24 | Stop reason: ALTCHOICE

## 2019-06-26 ENCOUNTER — APPOINTMENT (OUTPATIENT)
Dept: LAB | Facility: CLINIC | Age: 79
End: 2019-06-26
Payer: MEDICARE

## 2019-06-26 DIAGNOSIS — I10 ESSENTIAL HYPERTENSION: ICD-10-CM

## 2019-06-26 DIAGNOSIS — Z95.1 HX OF CABG: ICD-10-CM

## 2019-06-26 DIAGNOSIS — T83.511A URINARY TRACT INFECTION ASSOCIATED WITH INDWELLING URETHRAL CATHETER, INITIAL ENCOUNTER (HCC): ICD-10-CM

## 2019-06-26 DIAGNOSIS — R35.1 BENIGN PROSTATIC HYPERPLASIA WITH NOCTURIA: ICD-10-CM

## 2019-06-26 DIAGNOSIS — E78.2 MIXED HYPERLIPIDEMIA: ICD-10-CM

## 2019-06-26 DIAGNOSIS — N39.0 URINARY TRACT INFECTION ASSOCIATED WITH INDWELLING URETHRAL CATHETER, INITIAL ENCOUNTER (HCC): ICD-10-CM

## 2019-06-26 DIAGNOSIS — N40.1 BENIGN PROSTATIC HYPERPLASIA WITH NOCTURIA: ICD-10-CM

## 2019-06-26 DIAGNOSIS — I25.10 CORONARY ARTERY DISEASE INVOLVING NATIVE CORONARY ARTERY OF NATIVE HEART WITHOUT ANGINA PECTORIS: ICD-10-CM

## 2019-06-26 DIAGNOSIS — N39.44 NOCTURNAL ENURESIS: ICD-10-CM

## 2019-06-26 LAB
ALBUMIN SERPL BCP-MCNC: 4 G/DL (ref 3.5–5)
ALP SERPL-CCNC: 67 U/L (ref 46–116)
ALT SERPL W P-5'-P-CCNC: 27 U/L (ref 12–78)
ANION GAP SERPL CALCULATED.3IONS-SCNC: 3 MMOL/L (ref 4–13)
AST SERPL W P-5'-P-CCNC: 15 U/L (ref 5–45)
BILIRUB SERPL-MCNC: 0.79 MG/DL (ref 0.2–1)
BUN SERPL-MCNC: 21 MG/DL (ref 5–25)
CALCIUM SERPL-MCNC: 9.4 MG/DL (ref 8.3–10.1)
CHLORIDE SERPL-SCNC: 107 MMOL/L (ref 100–108)
CHOLEST SERPL-MCNC: 116 MG/DL (ref 50–200)
CO2 SERPL-SCNC: 28 MMOL/L (ref 21–32)
CREAT SERPL-MCNC: 1.2 MG/DL (ref 0.6–1.3)
ERYTHROCYTE [DISTWIDTH] IN BLOOD BY AUTOMATED COUNT: 13.2 % (ref 11.6–15.1)
GFR SERPL CREATININE-BSD FRML MDRD: 57 ML/MIN/1.73SQ M
GLUCOSE P FAST SERPL-MCNC: 103 MG/DL (ref 65–99)
HCT VFR BLD AUTO: 37.3 % (ref 36.5–49.3)
HDLC SERPL-MCNC: 43 MG/DL (ref 40–60)
HGB BLD-MCNC: 12.3 G/DL (ref 12–17)
LDLC SERPL CALC-MCNC: 60 MG/DL (ref 0–100)
MCH RBC QN AUTO: 33.3 PG (ref 26.8–34.3)
MCHC RBC AUTO-ENTMCNC: 33 G/DL (ref 31.4–37.4)
MCV RBC AUTO: 101 FL (ref 82–98)
PLATELET # BLD AUTO: 151 THOUSANDS/UL (ref 149–390)
PMV BLD AUTO: 11.3 FL (ref 8.9–12.7)
POTASSIUM SERPL-SCNC: 4.3 MMOL/L (ref 3.5–5.3)
PROT SERPL-MCNC: 7.6 G/DL (ref 6.4–8.2)
RBC # BLD AUTO: 3.69 MILLION/UL (ref 3.88–5.62)
SODIUM SERPL-SCNC: 138 MMOL/L (ref 136–145)
TRIGL SERPL-MCNC: 65 MG/DL
WBC # BLD AUTO: 6.17 THOUSAND/UL (ref 4.31–10.16)

## 2019-06-26 PROCEDURE — 85027 COMPLETE CBC AUTOMATED: CPT

## 2019-06-26 PROCEDURE — 36415 COLL VENOUS BLD VENIPUNCTURE: CPT

## 2019-06-26 PROCEDURE — 80061 LIPID PANEL: CPT

## 2019-06-26 PROCEDURE — 80053 COMPREHEN METABOLIC PANEL: CPT

## 2019-09-10 ENCOUNTER — OFFICE VISIT (OUTPATIENT)
Dept: CARDIOLOGY CLINIC | Facility: CLINIC | Age: 79
End: 2019-09-10
Payer: MEDICARE

## 2019-09-10 VITALS
HEIGHT: 70 IN | OXYGEN SATURATION: 97 % | WEIGHT: 158 LBS | DIASTOLIC BLOOD PRESSURE: 74 MMHG | SYSTOLIC BLOOD PRESSURE: 120 MMHG | BODY MASS INDEX: 22.62 KG/M2 | HEART RATE: 64 BPM

## 2019-09-10 DIAGNOSIS — Z95.1 HX OF CABG: ICD-10-CM

## 2019-09-10 DIAGNOSIS — I21.4 NSTEMI (NON-ST ELEVATED MYOCARDIAL INFARCTION) (HCC): Primary | ICD-10-CM

## 2019-09-10 PROCEDURE — 93000 ELECTROCARDIOGRAM COMPLETE: CPT | Performed by: INTERNAL MEDICINE

## 2019-09-10 PROCEDURE — 99214 OFFICE O/P EST MOD 30 MIN: CPT | Performed by: INTERNAL MEDICINE

## 2019-09-10 PROCEDURE — 1124F ACP DISCUSS-NO DSCNMKR DOCD: CPT | Performed by: INTERNAL MEDICINE

## 2019-09-10 RX ORDER — ASPIRIN 81 MG/1
81 TABLET ORAL
Qty: 90 TABLET | Refills: 3 | Status: SHIPPED | OUTPATIENT
Start: 2019-09-10 | End: 2020-09-17

## 2019-09-10 NOTE — PROGRESS NOTES
Cardiology Follow Up  Lloyd Goode  1940  [id number]  [address] [facility] [address] [address] [phone]    1  NSTEMI (non-ST elevated myocardial infarction) (HCC)  POCT ECG    aspirin (ECOTRIN LOW STRENGTH) 81 mg EC tablet   2  Hx of CABG  aspirin (ECOTRIN LOW STRENGTH) 81 mg EC tablet      Discussion/Plan:  CABG/3V CAD-- ekg unchanged  Significant 3 vessel cad   healed well  asymptomatic  LDL at goal  Blood pressure well-controlled  - aspirin 81mg  - metoprolol 25mg daily  - atorvastatin 40 daily  - daily exercise     Mild carotid artery disease bilateral- atorvastatin    Dark stools/lower hemoglobin -cologuard negative    Health screening- psa- normal    rtc- 6 months     Interval History:  initial visit: 76 yo gentleman with prior NSTEMI s/p 3V-CABG (LIMA to LAD, SVG to OM2, and SVG to PDA) s/p cardiac rehab walking one mile daily  I have reviewed all of his physical therapy notes and am appreciative of the excellent care  Compliant with his medications denying any significant symptoms  Denying any bleeding or rash  He denies having any chest pain or shortness of breath      /3: No exercise limitations  Denies having any chest tightness  Denies any any muscle aches  Denies any bleeding or bruising  Has been very active  Does daily walks without any symptoms  12, 2017: He reports exercising without any limitations  He denies any recurrence of chest discomfort  He denies having any dizziness or lightheadedness  He denies having any medication intolerance  He denies any significant bleeding or bruising  09/17/2018: He denies having any chest tightness  He denies having any exertional shortness of breath  He has been compliant with his medications  He denies having any falls  He denies having any easy bleeding or bruising  Reviewed through his records      03/04/2019:  He denies having any exertional chest pain  He denies having exertional shortness of breath  He is compliant with his medications  We reviewed through his recent lab work which shows his cholesterol is well controlled  09/10/2018:  He denies having exertional chest pain or shortness of breath  He is compliant with his medications  Reports having colon cancer screening  Which was negative  He denies having any bright red blood per rectum  He is compliant with his aspirin therapy  His EKG remains unchanged      Patient Active Problem List   Diagnosis    Hx of CABG    Coronary artery disease involving native coronary artery of native heart without angina pectoris    Elevated homocysteine (HCC)    MTHFR mutation ([place] [address] )    NSTEMI (non-ST elevated myocardial infarction) ([place] [address] )    Vitamin D deficiency    Nocturnal enuresis    Benign prostatic hyperplasia with nocturia    Essential hypertension    Mixed hyperlipidemia    GERD (gastroesophageal reflux disease)    Urinary tract infection associated with indwelling urethral catheter ([place] [address] )    S/P TURP     Past Medical History:   Diagnosis Date    Arthritis     CAD (coronary artery disease)     Cataract     Colon polyp     GERD (gastroesophageal reflux disease)     Heart attack ([place] [address] )     Herpes zoster     Hx of colonic polyps     Hyperlipidemia     Hypertension      Social History     Socioeconomic History    Marital status: /Civil Union     Spouse name: Not on file    Number of children: Not on file    Years of education: Not on file    Highest education level: Not on file   Occupational History    Not on file   Social Needs    Financial resource strain: Not on file    Food insecurity:     Worry: Not on file     Inability: Not on file    Transportation needs:     Medical: Not on file     Non-medical: Not on file   Tobacco Use    Smoking status: Former Smoker     Last attempt to quit:      Years since quittin 7    Smokeless tobacco: Never Used   Substance and Sexual Activity    Alcohol use: Not Currently    Drug use: No    Sexual activity: Not on file   Lifestyle    Physical activity:     Days per week: Not on file     Minutes per session: Not on file    Stress: Not on file   Relationships    Social connections:     Talks on phone: Not on file     Gets together: Not on file     Attends [employer] service: Not on file     Active member of club or organization: Not on file     Attends meetings of clubs or organizations: Not on file     Relationship status: Not on file    Intimate partner violence:     Fear of current or ex partner: Not on file     Emotionally abused: Not on file     Physically abused: Not on file     Forced sexual activity: Not on file   Other Topics Concern    Not on file   Social History Narrative    Exercise cycling / walking    Exercise daily     Good sleep hygiene    Sleeps 8-10 hrs a day       Family History   Problem Relation Age of Onset    Coronary artery disease Father     Hyperlipidemia Brother     Sudden death Brother         cardiac    Arthritis Family     Mental illness Neg Hx      Past Surgical History:   Procedure Laterality Date    BACK SURGERY      disc removed 1985 / Esme Chol   1985      CATARACT EXTRACTION      COLONOSCOPY      [place]ONARY ARTERY BYPASS GRAFT  11[place]13/2014    [place]    6/30/16   & CABG X3 with LIMA to LAD <SVG to OM-2, SVG to PDA  [place] Christopher Narayan / Gonzalez Garza   12/19[place]14       EYE SURGERY      cataract /  R   Aretha Jerryo [address]      Groin / R   [address] /  Esme Chol   [address]    2010      JOINT REPLACEMENT  1985     right hip    DE TRANSURETHRAL ELEC-SURG PROSTATECTOM N/A 6/4/2019    Procedure: CYSTOSCOPY, TRANSURETHRAL RESECTION OF PROSTATE (TURP);   Surgeon: Krystal Mishra MD;  Location: [facility];  Service: Urology       Current Outpatient Medications:     Ascorbic Acid (VITAMIN C PO), Take 500 mg by mouth daily , Disp: , Rfl:     atorvastatin (LIPITOR) 40 mg tablet, Take 1 tablet (40 mg total) by mouth daily, Disp: 90 tablet, Rfl: 3    Calcium 150 MG TABS, Take by mouth daily, Disp: , Rfl:     Cholecalciferol (VITAMIN D) 2000 units CAPS, Take 1 capsule by mouth daily, Disp: , Rfl:     dutasteride (AVODART) 0 5 mg capsule, Take 1 capsule (0 5 mg total) by mouth daily, Disp: 30 capsule, Rfl: 5    metoprolol succinate (TOPROL-XL) 25 mg 24 hr tablet, Take 1 tablet (25 mg total) by mouth daily, Disp: 90 tablet, Rfl: 3    B Complex Vitamins (B-COMPLEX/B-12 PO), Take 1 tablet by mouth daily, Disp: , Rfl:   No Known Allergies    Review of Systems:  Review of Systems   Constitutional: Negative  HENT: Negative  Eyes: Negative  Respiratory: Negative  Cardiovascular: Negative  Gastrointestinal: Negative  Endocrine: Negative  Genitourinary: Negative  Musculoskeletal: Negative  Skin: Negative  Allergic/Immunologic: Negative  Neurological: Negative  Hematological: Negative  Psychiatric/Behavioral: Negative  Vitals:    09/10/19 1252   BP: 120/74   BP Location: Right arm   Patient Position: Sitting   Cuff Size: Large   Pulse: 64   SpO2: 97%   Weight: 71 7 kg (158 lb)   Height: 5' 10" (1 778 m)     Physical Exam:  Physical Exam   Constitutional: He is oriented to person, place, and time  No distress  HENT:   Head: Normocephalic and atraumatic  Right Ear: External ear normal    Left Ear: External ear normal    pale   Eyes: Pupils are equal, round, and reactive to light  Conjunctivae are normal  Right eye exhibits no discharge  Left eye exhibits no discharge  No scleral icterus  Neck: Normal range of motion  Neck supple  No JVD present  No tracheal deviation present  No thyromegaly present  Cardiovascular: Normal rate and regular rhythm  Exam reveals gallop  Exam reveals no friction rub  No murmur heard    Pulmonary/Chest: Effort normal and breath sounds normal  No stridor  No respiratory distress  He has no wheezes  He has no rales  He exhibits no tenderness  Abdominal: Soft  Bowel sounds are normal  He exhibits no distension and no mass  There is no tenderness  There is no rebound and no guarding  Musculoskeletal: Normal range of motion  He exhibits no edema, tenderness or deformity  Neurological: He is alert and oriented to person, place, and time  He has normal reflexes  No cranial nerve deficit  He exhibits normal muscle tone  Coordination normal    Skin: Skin is warm and dry  No rash noted  He is not diaphoretic  No erythema  No pallor  Psychiatric: He has a normal mood and affect  His behavior is normal  Judgment and thought content normal        Labs:     Lab Results   Component Value Date    WBC 6 17 06/26/2019    HGB 12 3 06/26/2019    HCT 37 3 06/26/2019     (H) 06/26/2019     06/26/2019     Lab Results   Component Value Date     12/30/2015    K 4 3 06/26/2019     06/26/2019    CO2 28 06/26/2019    ANIONGAP 12 8 12/30/2015    BUN 21 06/26/2019    CREATININE 1 20 06/26/2019    GLUCOSE 104 12/30/2015    GLUF 103 (H) 06/26/2019    CALCIUM 9 4 06/26/2019    AST 15 06/26/2019    ALT 27 06/26/2019    ALKPHOS 67 06/26/2019    PROT 7 5 12/30/2015    BILITOT 0 6 12/30/2015    EGFR 57 06/26/2019     Lab Results   Component Value Date    CHOL 95 (L) 12/30/2015    CHOL 80 (L) 06/24/2015    CHOL 105 11/13/2014     Lab Results   Component Value Date    HDL 43 06/26/2019    HDL 43 10/17/2018    HDL 44 03/12/2018     Lab Results   Component Value Date    LDLCALC 60 06/26/2019    LDLCALC 42 10/17/2018    LDLCALC 54 03/12/2018     Lab Results   Component Value Date    TRIG 65 06/26/2019    TRIG 29 10/17/2018    TRIG 69 03/12/2018     Lab Results   Component Value Date    HGBA1C 5 6 06/24/2015       Imaging & Testing   I have personally reviewed pertinent reports  EKG: Personally reviewed      Sinus bradycardia incomplete RBBB  09/10/2019 normal sinus rhythm incomplete right bundle branch block no acute ST T wave changes from prior EKG    Cardiac testing:   CORONARY CIRCULATION:   Proximal LAD: There was a 90 % stenosis  1st diagonal: There was a 100 % stenosis  This lesion is a chronic total   occlusion  Mid circumflex: There was a 80 % stenosis  1st obtuse marginal: There was a 100 % stenosis  This lesion is a chronic    total   occlusion  Distal RCA: There was a 100 % stenosis  This lesion is a chronic   total occlusion  CARDIAC STRUCTURES:   Global left ventricular function was normal  EF calculated by contrast   ventriculography was 65 %  Gia Yeboah  Please call with any questions or suggestions    A description of the counseling:   Goals and Barriers:  Patient's ability to self care:  Medication side effect reviewed with patient in detail and all their questions answered  "This note has been constructed using a voice recognition system  Therefore there may be syntax, spelling, and/or grammatical errors   Please call if you have any questions  "

## 2019-10-24 ENCOUNTER — OFFICE VISIT (OUTPATIENT)
Dept: FAMILY MEDICINE CLINIC | Facility: CLINIC | Age: 79
End: 2019-10-24
Payer: MEDICARE

## 2019-10-24 VITALS
DIASTOLIC BLOOD PRESSURE: 76 MMHG | HEIGHT: 70 IN | TEMPERATURE: 97.6 F | OXYGEN SATURATION: 97 % | RESPIRATION RATE: 16 BRPM | HEART RATE: 54 BPM | WEIGHT: 161.4 LBS | BODY MASS INDEX: 23.11 KG/M2 | SYSTOLIC BLOOD PRESSURE: 138 MMHG

## 2019-10-24 DIAGNOSIS — E78.2 MIXED HYPERLIPIDEMIA: ICD-10-CM

## 2019-10-24 DIAGNOSIS — N39.0 URINARY TRACT INFECTION ASSOCIATED WITH INDWELLING URETHRAL CATHETER, INITIAL ENCOUNTER (HCC): ICD-10-CM

## 2019-10-24 DIAGNOSIS — I25.10 CORONARY ARTERY DISEASE INVOLVING NATIVE CORONARY ARTERY OF NATIVE HEART WITHOUT ANGINA PECTORIS: ICD-10-CM

## 2019-10-24 DIAGNOSIS — T83.511A URINARY TRACT INFECTION ASSOCIATED WITH INDWELLING URETHRAL CATHETER, INITIAL ENCOUNTER (HCC): ICD-10-CM

## 2019-10-24 DIAGNOSIS — Z13.820 SCREENING FOR OSTEOPOROSIS: ICD-10-CM

## 2019-10-24 DIAGNOSIS — Z00.00 MEDICARE ANNUAL WELLNESS VISIT, SUBSEQUENT: Primary | ICD-10-CM

## 2019-10-24 DIAGNOSIS — I10 ESSENTIAL HYPERTENSION: ICD-10-CM

## 2019-10-24 PROCEDURE — G0439 PPPS, SUBSEQ VISIT: HCPCS | Performed by: FAMILY MEDICINE

## 2019-10-24 RX ORDER — AMLODIPINE BESYLATE 2.5 MG/1
2.5 TABLET ORAL DAILY
Qty: 90 TABLET | Refills: 3 | Status: SHIPPED | OUTPATIENT
Start: 2019-10-24 | End: 2020-06-29 | Stop reason: SDUPTHER

## 2019-10-24 NOTE — PATIENT INSTRUCTIONS
Medicare Preventive Visit Patient Instructions  Thank you for completing your Welcome to Medicare Visit or Medicare Annual Wellness Visit today  Your next wellness visit will be due in one year (10/24/2020)  The screening/preventive services that you may require over the next 5-10 years are detailed below  Some tests may not apply to you based off risk factors and/or age  Screening tests ordered at today's visit but not completed yet may show as past due  Also, please note that scanned in results may not display below  Preventive Screenings:  Service Recommendations Previous Testing/Comments   Colorectal Cancer Screening  · Colonoscopy    · Fecal Occult Blood Test (FOBT)/Fecal Immunochemical Test (FIT)  · Fecal DNA/Cologuard Test  · Flexible Sigmoidoscopy Age: 54-65 years old   Colonoscopy: every 10 years (May be performed more frequently if at higher risk)  OR  FOBT/FIT: every 1 year  OR  Cologuard: every 3 years  OR  Sigmoidoscopy: every 5 years  Screening may be recommended earlier than age 48 if at higher risk for colorectal cancer  Also, an individualized decision between you and your healthcare provider will decide whether screening between the ages of 74-80 would be appropriate   Colonoscopy: 11/07/2011  FOBT/FIT: Not on file  Cologuard: 03/12/2019  Sigmoidoscopy: Not on file         Prostate Cancer Screening Individualized decision between patient and health care provider in men between ages of 53-78   Medicare will cover every 12 months beginning on the day after your 50th birthday PSA: 2 3 ng/mL     Screening Not Indicated     Hepatitis C Screening Once for adults born between 1945 and 1965  More frequently in patients at high risk for Hepatitis C Hep C Antibody: Not on file       Diabetes Screening 1-2 times per year if you're at risk for diabetes or have pre-diabetes Fasting glucose: 103 mg/dL   A1C: 5 6 % of total Hgb    Screening Current   Cholesterol Screening Once every 5 years if you don't have a lipid disorder  May order more often based on risk factors  Lipid panel: 06/26/2019    Screening Not Indicated  History Lipid Disorder      Other Preventive Screenings Covered by Medicare:  1  Abdominal Aortic Aneurysm (AAA) Screening: covered once if your at risk  You're considered to be at risk if you have a family history of AAA or a male between the age of 73-68 who smoking at least 100 cigarettes in your lifetime  2  Lung Cancer Screening: covers low dose CT scan once per year if you meet all of the following conditions: (1) Age 50-69; (2) No signs or symptoms of lung cancer; (3) Current smoker or have quit smoking within the last 15 years; (4) You have a tobacco smoking history of at least 30 pack years (packs per day x number of years you smoked); (5) You get a written order from a healthcare provider  3  Glaucoma Screening: covered annually if you're considered high risk: (1) You have diabetes OR (2) Family history of glaucoma OR (3)  aged 48 and older OR (3)  American aged 72 and older  3  Osteoporosis Screening: covered every 2 years if you meet one of the following conditions: (1) Have a vertebral abnormality; (2) On glucocorticoid therapy for more than 3 months; (3) Have primary hyperparathyroidism; (4) On osteoporosis medications and need to assess response to drug therapy  5  HIV Screening: covered annually if you're between the age of 12-76  Also covered annually if you are younger than 13 and older than 72 with risk factors for HIV infection  For pregnant patients, it is covered up to 3 times per pregnancy      Immunizations:  Immunization Recommendations   Influenza Vaccine Annual influenza vaccination during flu season is recommended for all persons aged >= 6 months who do not have contraindications   Pneumococcal Vaccine (Prevnar and Pneumovax)  * Prevnar = PCV13  * Pneumovax = PPSV23 Adults 25-60 years old: 1-3 doses may be recommended based on certain risk factors  Adults 72 years old: Prevnar (PCV13) vaccine recommended followed by Pneumovax (PPSV23) vaccine  If already received PPSV23 since turning 65, then PCV13 recommended at least one year after PPSV23 dose  Hepatitis B Vaccine 3 dose series if at intermediate or high risk (ex: diabetes, end stage renal disease, liver disease)   Tetanus (Td) Vaccine - COST NOT COVERED BY MEDICARE PART B Following completion of primary series, a booster dose should be given every 10 years to maintain immunity against tetanus  Td may also be given as tetanus wound prophylaxis  Tdap Vaccine - COST NOT COVERED BY MEDICARE PART B Recommended at least once for all adults  For pregnant patients, recommended with each pregnancy  Shingles Vaccine (Shingrix) - COST NOT COVERED BY MEDICARE PART B  2 shot series recommended in those aged 48 and above     Health Maintenance Due:  There are no preventive care reminders to display for this patient  Immunizations Due:      Topic Date Due    INFLUENZA VACCINE  07/01/2019     Advance Directives   What are advance directives? Advance directives are legal documents that state your wishes and plans for medical care  These plans are made ahead of time in case you lose your ability to make decisions for yourself  Advance directives can apply to any medical decision, such as the treatments you want, and if you want to donate organs  What are the types of advance directives? There are many types of advance directives, and each state has rules about how to use them  You may choose a combination of any of the following:  · Living will: This is a written record of the treatment you want  You can also choose which treatments you do not want, which to limit, and which to stop at a certain time  This includes surgery, medicine, IV fluid, and tube feedings  · Durable power of  for healthcare [facility]):   This is a written record that states who you want to make healthcare choices for you when you are unable to make them for yourself  This person, called a proxy, is usually a family member or a friend  You may choose more than 1 proxy  · Do not resuscitate (DNR) order:  A DNR order is used in case your heart stops beating or you stop breathing  It is a request not to have certain forms of treatment, such as CPR  A DNR order may be included in other types of advance directives  · Medical directive: This covers the care that you want if you are in a coma, near death, or unable to make decisions for yourself  You can list the treatments you want for each condition  Treatment may include pain medicine, surgery, blood transfusions, dialysis, IV or tube feedings, and a ventilator (breathing machine)  · Values history: This document has questions about your views, beliefs, and how you feel and think about life  This information can help others choose the care that you would choose  Why are advance directives important? An advance directive helps you control your care  Although spoken wishes may be used, it is better to have your wishes written down  Spoken wishes can be misunderstood, or not followed  Treatments may be given even if you do not want them  An advance directive may make it easier for your family to make difficult choices about your care  © Copyright [employer] 2018 Information is for End User's use only and may not be sold, redistributed or otherwise used for commercial purposes   All illustrations and images included in CareNotes® are the copyrighted property of [employer]  or [address] [place] [address] [employer]

## 2019-10-24 NOTE — PROGRESS NOTES
Assessment and Plan:     Problem List Items Addressed This Visit        Cardiovascular and Mediastinum    Coronary artery disease involving native coronary artery of native heart without angina pectoris    Relevant Medications    amLODIPine (NORVASC) 2 5 mg tablet    Essential hypertension    Relevant Medications    amLODIPine (NORVASC) 2 5 mg tablet       Genitourinary    Urinary tract infection associated with indwelling urethral catheter (HCC)       Other    Mixed hyperlipidemia    Relevant Orders    Comprehensive metabolic panel    Lipid Panel with Direct LDL reflex      Other Visit Diagnoses     Medicare annual wellness visit, subsequent    -  Primary    Screening for osteoporosis        Relevant Orders    DXA bone density spine hip and pelvis           Preventive health issues were discussed with patient, and age appropriate screening tests were ordered as noted in patient's After Visit Summary  Personalized health advice and appropriate referrals for health education or preventive services given if needed, as noted in patient's After Visit Summary       History of Present Illness:     Patient presents for Medicare Annual Wellness visit    Patient Care Team:  Shannan Acevedo DO as PCP - General (Family Medicine)  Jon Westbrook MD as Consulting Physician (Cardiothoracic Surgery)  Doron Castro MD as Consulting Physician (Cardiology)  Pastor Sanchez MD as Consulting Physician (Urology)     Problem List:     Patient Active Problem List   Diagnosis    Hx of CABG    Coronary artery disease involving native coronary artery of native heart without angina pectoris    Elevated homocysteine [facility]    MTHFR mutation ([facility] )    NSTEMI (non-ST elevated myocardial infarction) ([facility] )    Vitamin D deficiency    Nocturnal enuresis    Benign prostatic hyperplasia with nocturia    Essential hypertension    Mixed hyperlipidemia    GERD (gastroesophageal reflux disease)    Urinary tract infection associated with indwelling urethral catheter ([facility] )    S/P TURP      Past Medical and Surgical History:     Past Medical History:   Diagnosis Date    Arthritis     CAD (coronary artery disease)     Cataract     Colon polyp     GERD (gastroesophageal reflux disease)     Heart attack ([facility]     Herpes zoster     Hx of colonic polyps     Hyperlipidemia     Hypertension      Past Surgical History:   Procedure Laterality Date    BACK SURGERY      disc removed 1985      CATARACT EXTRACTION      COLONOSCOPY      [place]ONARY ARTERY BYPASS GRAFT  [place]    16   & CABG X3 with LIMA to LAD <SVG to OM-2, SVG to PDA  [place] Paul[place] Deshpande / Robert Ba   14       EYE SURGERY      cataract /  R   [address]      Groin / R   [address] /  [place]   [address]          JOINT REPLACEMENT  1985     right hip    NE TRANSURETHRAL ELEC-SURG PROSTATECTOM N/A 2019    Procedure: CYSTOSCOPY, TRANSURETHRAL RESECTION OF PROSTATE (TURP);   Surgeon: Swetha Zhang MD;  Location: [facility];  Service: Urology      Family History:     Family History   Problem Relation Age of Onset    Coronary artery disease Father     Hyperlipidemia Brother     Sudden death Brother         cardiac    Arthritis Family     Mental illness Neg Hx       Social History:     Social History     Socioeconomic History    Marital status: /Civil Union     Spouse name: None    Number of children: None    Years of education: None    Highest education level: None   Occupational History    None   Social Needs    Financial resource strain: None    Food insecurity:     Worry: None     Inability: None    Transportation needs:     Medical: None     Non-medical: None   Tobacco Use    Smoking status: Former Smoker     Last attempt to quit:      Years since quittin 8    Smokeless tobacco: Never Used   Substance and Sexual Activity    Alcohol use: Not Currently    Drug use: No    Sexual activity: None   Lifestyle    Physical activity:     Days per week: None     Minutes per session: None    Stress: None   Relationships    Social connections:     Talks on phone: None     Gets together: None     Attends Yarsanism service: None     Active member of club or organization: None     Attends meetings of clubs or organizations: None     Relationship status: None    Intimate partner violence:     Fear of current or ex partner: None     Emotionally abused: None     Physically abused: None     Forced sexual activity: None   Other Topics Concern    None   Social History Narrative    Exercise cycling / walking    Exercise daily     Good sleep hygiene    Sleeps 8-10 hrs a day        Medications and Allergies:     Current Outpatient Medications   Medication Sig Dispense Refill    Ascorbic Acid (VITAMIN C PO) Take 500 mg by mouth daily       aspirin (ECOTRIN LOW STRENGTH) 81 mg EC tablet Take 1 tablet (81 mg total) by mouth daily with breakfast 90 tablet 3    atorvastatin (LIPITOR) 40 mg tablet Take 1 tablet (40 mg total) by mouth daily 90 tablet 3    B Complex Vitamins (B-COMPLEX/B-12 PO) Take 1 tablet by mouth daily      Calcium 150 MG TABS Take by mouth daily      Cholecalciferol (VITAMIN D) 2000 units CAPS Take 1 capsule by mouth daily      dutasteride (AVODART) 0 5 mg capsule Take 1 capsule (0 5 mg total) by mouth daily 30 capsule 5    metoprolol succinate (TOPROL-XL) 25 mg 24 hr tablet Take 1 tablet (25 mg total) by mouth daily 90 tablet 3    amLODIPine (NORVASC) 2 5 mg tablet Take 1 tablet (2 5 mg total) by mouth daily 90 tablet 3     No current facility-administered medications for this visit        No Known Allergies   Immunizations:     Immunization History   Administered Date(s) Administered    Influenza Split High Dose Preservative Free IM 11/02/2015, 10/28/2016, 10/10/2019    Influenza, high dose seasonal 0 5 mL 09/12/2018    Pneumococcal Conjugate 13-Valent 09/12/2018    Pneumococcal Polysaccharide PPV23 03/20/2015    Tdap 09/12/2018      Health Maintenance: There are no preventive care reminders to display for this patient  Topic Date Due    INFLUENZA VACCINE  07/01/2019      Medicare Health Risk Assessment:     /76   Pulse (!) 54   Temp 97 6 °F (36 4 °C)   Resp 16   Ht 5' 10" (1 778 m)   Wt 73 2 kg (161 lb 6 4 oz)   SpO2 97%   BMI 23 16 kg/m²      Elroy Mcintyre is here for his Subsequent Wellness visit  Last Medicare Wellness visit information reviewed, patient interviewed, no change since last AWV  Health Risk Assessment:   Patient rates overall health as good  Patient feels that their physical health rating is same  Eyesight was rated as same  Hearing was rated as slightly worse  Patient feels that their emotional and mental health rating is same  Pain experienced in the last 7 days has been none  Patient states that he has experienced no weight loss or gain in last 6 months  Depression Screening:   PHQ-2 Score: 0      Fall Risk Screening: In the past year, patient has experienced: no history of falling in past year      Home Safety:  Patient does not have trouble with stairs inside or outside of their home  Patient has working smoke alarms and has working carbon monoxide detector  Home safety hazards include: none  Nutrition:   Current diet is Regular  Medications:   Patient is currently taking over-the-counter supplements  OTC medications include: see medication list  Patient is able to manage medications  Activities of Daily Living (ADLs)/Instrumental Activities of Daily Living (IADLs):   Walk and transfer into and out of bed and chair?: Yes  Dress and groom yourself?: Yes    Bathe or shower yourself?: Yes    Feed yourself?  Yes  Do your laundry/housekeeping?: Yes  Manage your money, pay your bills and track your expenses?: Yes  Make your own meals?: Yes    Do your own shopping?: Yes    Previous Hospitalizations:   Any hospitalizations or ED visits within the last 12 months?: No      Advance Care Planning:   Living will: Yes    Advanced directive: Yes      Cognitive Screening:   Provider or family/friend/caregiver concerned regarding cognition?: No    PREVENTIVE SCREENINGS      Cardiovascular Screening:    General: History Lipid Disorder, Screening Current and Risks and Benefits Discussed      Diabetes Screening:     General: Screening Current and Risks and Benefits Discussed      Colorectal Cancer Screening:     General: Screening Current and Risks and Benefits Discussed      Prostate Cancer Screening:    General: Screening Not Indicated, Risks and Benefits Discussed and Screening Current      Osteoporosis Screening:    General: Risks and Benefits Discussed    Due for: DXA Axial      Abdominal Aortic Aneurysm (AAA) Screening:    Risk factors include: tobacco use        General: Screening Not Indicated      Lung Cancer Screening:     General: Screening Not Indicated      Hepatitis C Screening:    General: Screening Not Indicated        Linda Mckeon DO

## 2019-12-27 DIAGNOSIS — N40.0 ENLARGED PROSTATE ON RECTAL EXAMINATION: ICD-10-CM

## 2019-12-27 RX ORDER — DUTASTERIDE 0.5 MG/1
CAPSULE, LIQUID FILLED ORAL
Qty: 30 CAPSULE | Refills: 0 | Status: SHIPPED | OUTPATIENT
Start: 2019-12-27 | End: 2020-01-31

## 2020-01-16 ENCOUNTER — TRANSCRIBE ORDERS (OUTPATIENT)
Dept: ADMINISTRATIVE | Facility: HOSPITAL | Age: 80
End: 2020-01-16

## 2020-01-16 ENCOUNTER — APPOINTMENT (OUTPATIENT)
Dept: LAB | Facility: HOSPITAL | Age: 80
End: 2020-01-16
Attending: FAMILY MEDICINE
Payer: MEDICARE

## 2020-01-16 DIAGNOSIS — I25.10 CORONARY ARTERY DISEASE INVOLVING NATIVE CORONARY ARTERY OF NATIVE HEART WITHOUT ANGINA PECTORIS: ICD-10-CM

## 2020-01-16 DIAGNOSIS — I21.4 NSTEMI (NON-ST ELEVATED MYOCARDIAL INFARCTION) (HCC): ICD-10-CM

## 2020-01-16 LAB
ALBUMIN SERPL BCP-MCNC: 4 G/DL (ref 3.5–5)
ALP SERPL-CCNC: 66 U/L (ref 46–116)
ALT SERPL W P-5'-P-CCNC: 32 U/L (ref 12–78)
ANION GAP SERPL CALCULATED.3IONS-SCNC: 8 MMOL/L (ref 4–13)
AST SERPL W P-5'-P-CCNC: 22 U/L (ref 5–45)
BILIRUB SERPL-MCNC: 0.5 MG/DL (ref 0.2–1)
BUN SERPL-MCNC: 22 MG/DL (ref 5–25)
CALCIUM SERPL-MCNC: 8.7 MG/DL (ref 8.3–10.1)
CHLORIDE SERPL-SCNC: 106 MMOL/L (ref 100–108)
CHOLEST SERPL-MCNC: 102 MG/DL (ref 50–200)
CO2 SERPL-SCNC: 29 MMOL/L (ref 21–32)
CREAT SERPL-MCNC: 1.09 MG/DL (ref 0.6–1.3)
GFR SERPL CREATININE-BSD FRML MDRD: 64 ML/MIN/1.73SQ M
GLUCOSE P FAST SERPL-MCNC: 109 MG/DL (ref 65–99)
HDLC SERPL-MCNC: 45 MG/DL
LDLC SERPL CALC-MCNC: 48 MG/DL (ref 0–100)
POTASSIUM SERPL-SCNC: 4.1 MMOL/L (ref 3.5–5.3)
PROT SERPL-MCNC: 7.3 G/DL (ref 6.4–8.2)
SODIUM SERPL-SCNC: 143 MMOL/L (ref 136–145)
TRIGL SERPL-MCNC: 44 MG/DL

## 2020-01-16 PROCEDURE — 80061 LIPID PANEL: CPT

## 2020-01-16 PROCEDURE — 80053 COMPREHEN METABOLIC PANEL: CPT

## 2020-01-16 PROCEDURE — 36415 COLL VENOUS BLD VENIPUNCTURE: CPT

## 2020-01-31 DIAGNOSIS — N40.0 ENLARGED PROSTATE ON RECTAL EXAMINATION: ICD-10-CM

## 2020-01-31 RX ORDER — DUTASTERIDE 0.5 MG/1
CAPSULE, LIQUID FILLED ORAL
Qty: 30 CAPSULE | Refills: 0 | Status: SHIPPED | OUTPATIENT
Start: 2020-01-31 | End: 2020-03-07

## 2020-03-03 ENCOUNTER — OFFICE VISIT (OUTPATIENT)
Dept: CARDIOLOGY CLINIC | Facility: CLINIC | Age: 80
End: 2020-03-03
Payer: MEDICARE

## 2020-03-03 VITALS
WEIGHT: 165 LBS | BODY MASS INDEX: 23.62 KG/M2 | DIASTOLIC BLOOD PRESSURE: 60 MMHG | HEIGHT: 70 IN | HEART RATE: 61 BPM | OXYGEN SATURATION: 98 % | SYSTOLIC BLOOD PRESSURE: 136 MMHG

## 2020-03-03 DIAGNOSIS — R06.00 EXERTIONAL DYSPNEA: ICD-10-CM

## 2020-03-03 DIAGNOSIS — I21.4 NSTEMI (NON-ST ELEVATED MYOCARDIAL INFARCTION) (HCC): ICD-10-CM

## 2020-03-03 DIAGNOSIS — Z95.1 HX OF CABG: ICD-10-CM

## 2020-03-03 DIAGNOSIS — D64.9 ANEMIA, UNSPECIFIED TYPE: ICD-10-CM

## 2020-03-03 DIAGNOSIS — I25.10 CORONARY ARTERY DISEASE INVOLVING NATIVE CORONARY ARTERY OF NATIVE HEART WITHOUT ANGINA PECTORIS: Primary | ICD-10-CM

## 2020-03-03 PROCEDURE — 93000 ELECTROCARDIOGRAM COMPLETE: CPT | Performed by: INTERNAL MEDICINE

## 2020-03-03 PROCEDURE — 3075F SYST BP GE 130 - 139MM HG: CPT | Performed by: INTERNAL MEDICINE

## 2020-03-03 PROCEDURE — 3008F BODY MASS INDEX DOCD: CPT | Performed by: INTERNAL MEDICINE

## 2020-03-03 PROCEDURE — 3078F DIAST BP <80 MM HG: CPT | Performed by: INTERNAL MEDICINE

## 2020-03-03 PROCEDURE — 4040F PNEUMOC VAC/ADMIN/RCVD: CPT | Performed by: INTERNAL MEDICINE

## 2020-03-03 PROCEDURE — 1036F TOBACCO NON-USER: CPT | Performed by: INTERNAL MEDICINE

## 2020-03-03 PROCEDURE — 99214 OFFICE O/P EST MOD 30 MIN: CPT | Performed by: INTERNAL MEDICINE

## 2020-03-03 PROCEDURE — 1160F RVW MEDS BY RX/DR IN RCRD: CPT | Performed by: INTERNAL MEDICINE

## 2020-03-03 RX ORDER — METOPROLOL SUCCINATE 25 MG/1
TABLET, EXTENDED RELEASE ORAL
Qty: 90 TABLET | Refills: 0
Start: 2020-03-03 | End: 2020-09-15 | Stop reason: ALTCHOICE

## 2020-03-03 RX ORDER — ATORVASTATIN CALCIUM 40 MG/1
20 TABLET, FILM COATED ORAL DAILY
Qty: 90 TABLET | Refills: 0
Start: 2020-03-03 | End: 2020-03-03 | Stop reason: SDUPTHER

## 2020-03-03 RX ORDER — ATORVASTATIN CALCIUM 40 MG/1
40 TABLET, FILM COATED ORAL DAILY
Qty: 90 TABLET | Refills: 0
Start: 2020-03-03 | End: 2020-05-11

## 2020-03-03 RX ORDER — CHLORAL HYDRATE 500 MG
CAPSULE ORAL
COMMUNITY
End: 2021-05-19 | Stop reason: HOSPADM

## 2020-03-03 RX ORDER — TAMSULOSIN HYDROCHLORIDE 0.4 MG/1
CAPSULE ORAL
COMMUNITY
Start: 2020-02-20 | End: 2020-05-22

## 2020-03-07 DIAGNOSIS — N40.0 ENLARGED PROSTATE ON RECTAL EXAMINATION: ICD-10-CM

## 2020-03-07 RX ORDER — DUTASTERIDE 0.5 MG/1
CAPSULE, LIQUID FILLED ORAL
Qty: 30 CAPSULE | Refills: 0 | Status: SHIPPED | OUTPATIENT
Start: 2020-03-07 | End: 2020-04-03

## 2020-04-03 DIAGNOSIS — N40.0 ENLARGED PROSTATE ON RECTAL EXAMINATION: ICD-10-CM

## 2020-04-03 RX ORDER — DUTASTERIDE 0.5 MG/1
CAPSULE, LIQUID FILLED ORAL
Qty: 30 CAPSULE | Refills: 0 | Status: SHIPPED | OUTPATIENT
Start: 2020-04-03 | End: 2020-05-05

## 2020-05-04 DIAGNOSIS — N40.0 ENLARGED PROSTATE ON RECTAL EXAMINATION: ICD-10-CM

## 2020-05-05 RX ORDER — DUTASTERIDE 0.5 MG/1
CAPSULE, LIQUID FILLED ORAL
Qty: 30 CAPSULE | Refills: 0 | Status: SHIPPED | OUTPATIENT
Start: 2020-05-05 | End: 2020-06-08

## 2020-05-08 DIAGNOSIS — I21.4 NSTEMI (NON-ST ELEVATED MYOCARDIAL INFARCTION) (HCC): ICD-10-CM

## 2020-05-11 RX ORDER — ATORVASTATIN CALCIUM 40 MG/1
TABLET, FILM COATED ORAL
Qty: 90 TABLET | Refills: 0 | Status: SHIPPED | OUTPATIENT
Start: 2020-05-11 | End: 2020-07-31

## 2020-05-22 DIAGNOSIS — N39.44 NOCTURNAL ENURESIS: Primary | ICD-10-CM

## 2020-05-22 RX ORDER — TAMSULOSIN HYDROCHLORIDE 0.4 MG/1
CAPSULE ORAL
Qty: 30 CAPSULE | Refills: 0 | Status: SHIPPED | OUTPATIENT
Start: 2020-05-22 | End: 2020-06-29 | Stop reason: SDUPTHER

## 2020-06-08 DIAGNOSIS — N40.0 ENLARGED PROSTATE ON RECTAL EXAMINATION: ICD-10-CM

## 2020-06-08 RX ORDER — DUTASTERIDE 0.5 MG/1
CAPSULE, LIQUID FILLED ORAL
Qty: 30 CAPSULE | Refills: 0 | Status: SHIPPED | OUTPATIENT
Start: 2020-06-08 | End: 2020-07-07 | Stop reason: SDUPTHER

## 2020-06-26 DIAGNOSIS — N39.44 NOCTURNAL ENURESIS: ICD-10-CM

## 2020-06-26 RX ORDER — TAMSULOSIN HYDROCHLORIDE 0.4 MG/1
CAPSULE ORAL
Qty: 30 CAPSULE | Refills: 0 | OUTPATIENT
Start: 2020-06-26

## 2020-06-26 NOTE — TELEPHONE ENCOUNTER
Patient was supposed to be seen in April for 6mo f/u  Please call to schedule appointment with Dr Erasmo Hurtado  Thank you    Edil Jolley MA

## 2020-06-29 DIAGNOSIS — I10 ESSENTIAL HYPERTENSION: ICD-10-CM

## 2020-06-29 DIAGNOSIS — N39.44 NOCTURNAL ENURESIS: ICD-10-CM

## 2020-06-30 RX ORDER — AMLODIPINE BESYLATE 2.5 MG/1
2.5 TABLET ORAL DAILY
Qty: 90 TABLET | Refills: 3 | Status: SHIPPED | OUTPATIENT
Start: 2020-06-30 | End: 2020-09-30 | Stop reason: SDUPTHER

## 2020-06-30 RX ORDER — TAMSULOSIN HYDROCHLORIDE 0.4 MG/1
0.4 CAPSULE ORAL
Qty: 90 CAPSULE | Refills: 1 | Status: SHIPPED | OUTPATIENT
Start: 2020-06-30 | End: 2020-11-09 | Stop reason: SDUPTHER

## 2020-07-07 ENCOUNTER — OFFICE VISIT (OUTPATIENT)
Dept: FAMILY MEDICINE CLINIC | Facility: CLINIC | Age: 80
End: 2020-07-07
Payer: MEDICARE

## 2020-07-07 VITALS
SYSTOLIC BLOOD PRESSURE: 126 MMHG | DIASTOLIC BLOOD PRESSURE: 64 MMHG | RESPIRATION RATE: 16 BRPM | HEIGHT: 70 IN | WEIGHT: 162 LBS | HEART RATE: 72 BPM | BODY MASS INDEX: 23.19 KG/M2 | TEMPERATURE: 98.2 F

## 2020-07-07 DIAGNOSIS — Z90.79 S/P TURP: ICD-10-CM

## 2020-07-07 DIAGNOSIS — K59.09 OTHER CONSTIPATION: ICD-10-CM

## 2020-07-07 DIAGNOSIS — I10 ESSENTIAL HYPERTENSION: ICD-10-CM

## 2020-07-07 DIAGNOSIS — N40.0 ENLARGED PROSTATE ON RECTAL EXAMINATION: ICD-10-CM

## 2020-07-07 DIAGNOSIS — R35.1 BENIGN PROSTATIC HYPERPLASIA WITH NOCTURIA: ICD-10-CM

## 2020-07-07 DIAGNOSIS — N40.1 BENIGN PROSTATIC HYPERPLASIA WITH NOCTURIA: ICD-10-CM

## 2020-07-07 DIAGNOSIS — E78.2 MIXED HYPERLIPIDEMIA: Primary | ICD-10-CM

## 2020-07-07 PROBLEM — N39.0 URINARY TRACT INFECTION ASSOCIATED WITH INDWELLING URETHRAL CATHETER (HCC): Status: RESOLVED | Noted: 2019-05-31 | Resolved: 2020-07-07

## 2020-07-07 PROBLEM — H90.5 SENSORINEURAL HEARING LOSS (SNHL): Status: ACTIVE | Noted: 2020-07-07

## 2020-07-07 PROBLEM — T83.511A URINARY TRACT INFECTION ASSOCIATED WITH INDWELLING URETHRAL CATHETER (HCC): Status: RESOLVED | Noted: 2019-05-31 | Resolved: 2020-07-07

## 2020-07-07 PROCEDURE — 3074F SYST BP LT 130 MM HG: CPT | Performed by: FAMILY MEDICINE

## 2020-07-07 PROCEDURE — 4040F PNEUMOC VAC/ADMIN/RCVD: CPT | Performed by: FAMILY MEDICINE

## 2020-07-07 PROCEDURE — 1160F RVW MEDS BY RX/DR IN RCRD: CPT | Performed by: FAMILY MEDICINE

## 2020-07-07 PROCEDURE — 99214 OFFICE O/P EST MOD 30 MIN: CPT | Performed by: FAMILY MEDICINE

## 2020-07-07 PROCEDURE — 1036F TOBACCO NON-USER: CPT | Performed by: FAMILY MEDICINE

## 2020-07-07 PROCEDURE — 3008F BODY MASS INDEX DOCD: CPT | Performed by: FAMILY MEDICINE

## 2020-07-07 PROCEDURE — 3078F DIAST BP <80 MM HG: CPT | Performed by: FAMILY MEDICINE

## 2020-07-07 RX ORDER — DUTASTERIDE 0.5 MG/1
0.5 CAPSULE, LIQUID FILLED ORAL DAILY
Qty: 90 CAPSULE | Refills: 1 | Status: SHIPPED | OUTPATIENT
Start: 2020-07-07 | End: 2020-12-15

## 2020-07-07 NOTE — PROGRESS NOTES
Assessment/Plan:    1  Mixed hyperlipidemia  Assessment & Plan:  Excellent number    Orders:  -     Comprehensive metabolic panel; Future; Expected date: 09/20/2020  -     CBC; Future; Expected date: 09/20/2020  -     Lipid Panel with Direct LDL reflex; Future; Expected date: 09/20/2020  -     TSH, 3rd generation; Future; Expected date: 09/20/2020    2  Benign prostatic hyperplasia with nocturia  -     CBC; Future; Expected date: 09/20/2020  -     TSH, 3rd generation; Future; Expected date: 09/20/2020    3  S/P TURP  -     CBC; Future; Expected date: 09/20/2020  -     TSH, 3rd generation; Future; Expected date: 09/20/2020    4  Essential hypertension  Assessment & Plan:  Excellent today    Orders:  -     CBC; Future; Expected date: 09/20/2020  -     TSH, 3rd generation; Future; Expected date: 09/20/2020    5  Enlarged prostate on rectal examination  -     dutasteride (AVODART) 0 5 mg capsule; Take 1 capsule (0 5 mg total) by mouth daily  -     CBC; Future; Expected date: 09/20/2020  -     TSH, 3rd generation; Future; Expected date: 09/20/2020    6  Other constipation  -     CBC; Future; Expected date: 09/20/2020  -     TSH, 3rd generation; Future; Expected date: 09/20/2020          There are no Patient Instructions on file for this visit  Return in about 4 months (around 11/7/2020) for AWV  Subjective:      Patient ID: Rodrigo Song is a 79 y o  male  Chief Complaint   Patient presents with    Hypertension     [facility]       Pt is here 6 month follow up  No Cp, no SOB      Pt states he needs a refill of his avodart  Pt was seen by urology - pt states they found he is not emptying his bladder all the way  Was recently seen for a televist by urology  Pt is asking if they can have an x rauy to see what this blockage is  Pt states he gets up once or twice a night to urinate    Pt states peior to his surgery he got up countless times and went many times during the day    Pt states when he takes a BM, states it comes out hard, states this has been for a month ro two  No cghnage in diet no blood in stool  No new meds  Pt was advised to take a stool softner states it did not help - did that for 3-4 weeks  The following portions of the patient's history were reviewed and updated as appropriate: allergies, current medications, past family history, past medical history, past social history, past surgical history and problem list     Review of Systems   Constitutional: Negative for activity change, appetite change, chills, diaphoresis, fatigue, fever and unexpected weight change  HENT: Negative for congestion, dental problem, ear pain, mouth sores, sinus pressure, sinus pain, sore throat and trouble swallowing  Eyes: Negative for photophobia, discharge and itching  Respiratory: Negative for apnea, chest tightness and shortness of breath  Cardiovascular: Negative for chest pain, palpitations and leg swelling  Gastrointestinal: Positive for constipation  Negative for abdominal distention, abdominal pain, blood in stool, nausea and vomiting  Endocrine: Negative for cold intolerance, heat intolerance, polydipsia, polyphagia and polyuria  Genitourinary: Negative for difficulty urinating  Nocturia   Musculoskeletal: Negative for arthralgias  Skin: Negative for color change and wound  Neurological: Negative for dizziness, syncope, speech difficulty and headaches  Hematological: Negative for adenopathy  Psychiatric/Behavioral: Negative for agitation and behavioral problems           Current Outpatient Medications   Medication Sig Dispense Refill    amLODIPine (NORVASC) 2 5 mg tablet Take 1 tablet (2 5 mg total) by mouth daily 90 tablet 3    Ascorbic Acid (VITAMIN C PO) Take 500 mg by mouth daily       aspirin (ECOTRIN LOW STRENGTH) 81 mg EC tablet Take 1 tablet (81 mg total) by mouth daily with breakfast 90 tablet 3    atorvastatin (LIPITOR) 40 mg tablet Take 1 tablet by mouth once daily 90 tablet 0    B Complex Vitamins (B-COMPLEX/B-12 PO) Take 1 tablet by mouth daily      Calcium 150 MG TABS Take by mouth daily      Cholecalciferol (VITAMIN D) 2000 units CAPS Take 1 capsule by mouth daily      dutasteride (AVODART) 0 5 mg capsule Take 1 capsule (0 5 mg total) by mouth daily 90 capsule 1    metoprolol succinate (TOPROL-XL) 25 mg 24 hr tablet Take half tablet for two weeks, than half tablet every other day for two week, than completely stop  90 tablet 0    Omega-3 Fatty Acids (OMEGA-3 FISH OIL) 1000 MG CAPS Take by mouth      tamsulosin (FLOMAX) 0 4 mg Take 1 capsule (0 4 mg total) by mouth daily with dinner 90 capsule 1     No current facility-administered medications for this visit  Objective:    /64   Pulse 72   Temp 98 2 °F (36 8 °C)   Resp 16   Ht 5' 10" (1 778 m)   Wt 73 5 kg (162 lb)   BMI 23 24 kg/m²        Physical Exam   Constitutional: He appears well-developed and well-nourished  No distress  HENT:   Head: Normocephalic and atraumatic  Right Ear: External ear normal    Left Ear: External ear normal    Nose: Nose normal    Mouth/Throat: Oropharynx is clear and moist  No oropharyngeal exudate  Eyes: Pupils are equal, round, and reactive to light  EOM are normal  Right eye exhibits no discharge  Left eye exhibits no discharge  No scleral icterus  Neck: No thyromegaly present  Cardiovascular: Normal rate and normal heart sounds  No murmur heard  Pulmonary/Chest: Effort normal and breath sounds normal  No respiratory distress  He has no wheezes  Abdominal: Soft  Bowel sounds are normal  He exhibits no distension and no mass  There is no tenderness  There is no rebound and no guarding  Musculoskeletal: Normal range of motion  Neurological: He is alert  He displays normal reflexes  Coordination normal    Skin: Skin is warm and dry  No rash noted  He is not diaphoretic  No erythema  Psychiatric: He has a normal mood and affect   His behavior is normal    Nursing note and vitals reviewed               Mohamud Mejia DO

## 2020-07-15 ENCOUNTER — TRANSCRIBE ORDERS (OUTPATIENT)
Dept: ADMINISTRATIVE | Facility: HOSPITAL | Age: 80
End: 2020-07-15

## 2020-07-15 ENCOUNTER — HOSPITAL ENCOUNTER (OUTPATIENT)
Dept: RADIOLOGY | Facility: HOSPITAL | Age: 80
Discharge: HOME | End: 2020-07-15
Attending: FAMILY MEDICINE
Payer: MEDICARE

## 2020-07-15 DIAGNOSIS — Z13.820 SCREENING FOR OSTEOPOROSIS: ICD-10-CM

## 2020-07-15 PROCEDURE — 77080 DXA BONE DENSITY AXIAL: CPT

## 2020-07-17 ENCOUNTER — TELEPHONE (OUTPATIENT)
Dept: FAMILY MEDICINE CLINIC | Facility: CLINIC | Age: 80
End: 2020-07-17

## 2020-07-17 PROBLEM — M85.89 OSTEOPENIA OF MULTIPLE SITES: Status: ACTIVE | Noted: 2020-07-17

## 2020-07-31 DIAGNOSIS — I21.4 NSTEMI (NON-ST ELEVATED MYOCARDIAL INFARCTION) (HCC): ICD-10-CM

## 2020-07-31 RX ORDER — ATORVASTATIN CALCIUM 40 MG/1
TABLET, FILM COATED ORAL
Qty: 90 TABLET | Refills: 0 | Status: SHIPPED | OUTPATIENT
Start: 2020-07-31 | End: 2020-09-15

## 2020-09-15 ENCOUNTER — OFFICE VISIT (OUTPATIENT)
Dept: CARDIOLOGY CLINIC | Facility: CLINIC | Age: 80
End: 2020-09-15
Payer: MEDICARE

## 2020-09-15 VITALS
HEART RATE: 64 BPM | TEMPERATURE: 97.9 F | BODY MASS INDEX: 23.34 KG/M2 | OXYGEN SATURATION: 98 % | DIASTOLIC BLOOD PRESSURE: 58 MMHG | SYSTOLIC BLOOD PRESSURE: 136 MMHG | WEIGHT: 163 LBS | HEIGHT: 70 IN

## 2020-09-15 DIAGNOSIS — Z95.1 HX OF CABG: ICD-10-CM

## 2020-09-15 DIAGNOSIS — I21.4 NSTEMI (NON-ST ELEVATED MYOCARDIAL INFARCTION) (HCC): ICD-10-CM

## 2020-09-15 DIAGNOSIS — I25.10 CORONARY ARTERY DISEASE INVOLVING NATIVE CORONARY ARTERY OF NATIVE HEART WITHOUT ANGINA PECTORIS: ICD-10-CM

## 2020-09-15 DIAGNOSIS — I10 ESSENTIAL HYPERTENSION: Primary | ICD-10-CM

## 2020-09-15 PROCEDURE — 93000 ELECTROCARDIOGRAM COMPLETE: CPT | Performed by: INTERNAL MEDICINE

## 2020-09-15 PROCEDURE — 99214 OFFICE O/P EST MOD 30 MIN: CPT | Performed by: INTERNAL MEDICINE

## 2020-09-15 RX ORDER — ROSUVASTATIN CALCIUM 20 MG/1
20 TABLET, COATED ORAL
Qty: 90 TABLET | Refills: 3 | Status: SHIPPED | OUTPATIENT
Start: 2020-09-15 | End: 2021-05-12 | Stop reason: SDUPTHER

## 2020-09-15 NOTE — PROGRESS NOTES
Cardiology Follow Up  Raford Loges  1940  [id number]  [address] [facility] [address] [address] [phone]    1  Essential hypertension  POCT ECG    rosuvastatin (CRESTOR) 20 MG tablet   2  Hx of CABG  POCT ECG    rosuvastatin (CRESTOR) 20 MG tablet   3  Coronary artery disease involving native coronary artery of native heart without angina pectoris  POCT ECG    rosuvastatin (CRESTOR) 20 MG tablet   4  NSTEMI (non-ST elevated myocardial infarction) (HCC)  rosuvastatin (CRESTOR) 20 MG tablet      Discussion/Plan:  CABG/3V CAD-- ekg unchanged  Significant 3 vessel cad   healed well  asymptomatic  LDL at goal  Blood pressure well-controlled  - aspirin 81mg  -  Switch to rosuvastatin  -- amlodpine  - daily exercise     Htn- amlodipine  controlled    First-degree heart block/incomplete right bundle branch block- unchanged  Continue to monitor- wean off metoprolol    Mild carotid artery disease bilateral- rosuvastatin+ aspirin    Dark stools/lower hemoglobin -cologuard negative    Health screening- psa- one year    rtc- 8 months     Interval History:  initial visit: 74 yo gentleman with prior NSTEMI s/p 3V-CABG (LIMA to LAD, SVG to OM2, and SVG to PDA) s/p cardiac rehab walking one mile daily  I have reviewed all of his physical therapy notes and am appreciative of the excellent care  Compliant with his medications denying any significant symptoms  Denying any bleeding or rash  He denies having any chest pain or shortness of breath      /3: No exercise limitations  Denies having any chest tightness  Denies any any muscle aches  Denies any bleeding or bruising  Has been very active  Does daily walks without any symptoms  12, 2017: He reports exercising without any limitations  He denies any recurrence of chest discomfort  He denies having any dizziness or lightheadedness   He denies having any medication intolerance  He denies any significant bleeding or bruising  09/17/2018: He denies having any chest tightness  He denies having any exertional shortness of breath  He has been compliant with his medications  He denies having any falls  He denies having any easy bleeding or bruising  Reviewed through his records  03/04/2019:  He denies having any exertional chest pain  He denies having exertional shortness of breath  He is compliant with his medications  We reviewed through his recent lab work which shows his cholesterol is well controlled  09/10/2018:  He denies having exertional chest pain or shortness of breath  He is compliant with his medications  Reports having colon cancer screening  Which was negative  He denies having any bright red blood per rectum  He is compliant with his aspirin therapy  His EKG remains unchanged  03/03/2020:  He is walking his dog without any significant shortness of breath or chest pain  His cholesterol remains controlled  His blood pressure is controlled  His EKG remains unchanged  He is compliant with his medications  09/15/2020:  He denies having significant chest tightness or shortness of breath  No major changes in his EKG  He is compliant with his medications  Denies having significant bleeding      Patient Active Problem List   Diagnosis    Hx of CABG    Coronary artery disease involving native coronary artery of native heart without angina pectoris    Elevated homocysteine    [facility]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 ([facility]    NSTEMI (non-ST elevated myocardial infarction) ([facility] )    Vitamin D deficiency    Nocturnal enuresis    Benign prostatic hyperplasia with nocturia    Essential hypertension    Mixed hyperlipidemia    GERD (gastroesophageal reflux disease)    S/P TURP    Sensorineural hearing loss (SNHL)    Other constipation    Osteopenia of multiple sites     Past Medical History:   Diagnosis Date    Arthritis     CAD (coronary artery disease)     Cataract     Colon polyp     GERD (gastroesophageal reflux disease)     Heart attack (HCC)     Herpes zoster     Hx of colonic polyps     Hyperlipidemia     Hypertension      Social History     Socioeconomic History    Marital status: /Civil Union     Spouse name: Not on file    Number of children: Not on file    Years of education: Not on file    Highest education level: Not on file   Occupational History    Not on file   Social Needs    Financial resource strain: Not on file    Food insecurity     Worry: Not on file     Inability: Not on file   [employer] needs     Medical: Not on file     Non-medical: Not on file   Tobacco Use    Smoking status: Former Smoker     Last attempt to quit:      Years since quittin 7    Smokeless tobacco: Never Used   Substance and Sexual Activity    Alcohol use: Not Currently    Drug use: No    Sexual activity: Not on file   Lifestyle    Physical activity     Days per week: Not on file     Minutes per session: Not on file    Stress: Not on file   Relationships    Social connections     Talks on phone: Not on file     Gets together: Not on file     Attends [employer] service: Not on file     Active member of club or organization: Not on file     Attends meetings of clubs or organizations: Not on file     Relationship status: Not on file    Intimate partner violence     Fear of current or ex partner: Not on file     Emotionally abused: Not on file     Physically abused: Not on file     Forced sexual activity: Not on file   Other Topics Concern    Not on file   Social History Narrative    Exercise cycling / walking    Exercise daily     Good sleep hygiene    Sleeps 8-10 hrs a day       Family History   Problem Relation Age of Onset    Coronary artery disease Father     Hyperlipidemia Brother     Sudden death Brother         cardiac    Arthritis Family     Mental illness Neg Hx      Past Surgical History:   Procedure Laterality Date    BACK SURGERY      disc removed 1985 / R   1985      CATARACT EXTRACTION      COLONOSCOPY      [place]ONARY ARTERY BYPASS GRAFT  11/13/2014    [place]    6/30/16   & CABG X3 with LIMA to LAD <SVG to OM-2, SVG to PDA  [place] Romero Koyanagi / Vicky Velazco   12/19[place]14       EYE SURGERY      cataract /  R   Aretha M[address]      Groin / R   [address] [facility]  [facility]   [address]    2010      JOINT REPLACEMENT  1985     right hip    [place] TRANSURETHRAL ELEC-SURG PROSTATECTOM N/A 6/4/2019    Procedure: CYSTOSCOPY, TRANSURETHRAL RESECTION OF PROSTATE (TURP); Surgeon: Cass Guerra MD;  Location: [facility];  Service: Urology       Current Outpatient Medications:     amLODIPine (NORVASC) 2 5 mg tablet, Take 1 tablet (2 5 mg total) by mouth daily, Disp: 90 tablet, Rfl: 3    Ascorbic Acid (VITAMIN C PO), Take 500 mg by mouth daily , Disp: , Rfl:     aspirin (ECOTRIN LOW STRENGTH) 81 mg EC tablet, Take 1 tablet (81 mg total) by mouth daily with breakfast, Disp: 90 tablet, Rfl: 3    B Complex Vitamins (B-COMPLEX/B-12 PO), Take 1 tablet by mouth daily, Disp: , Rfl:     Calcium 150 MG TABS, Take by mouth daily, Disp: , Rfl:     Cholecalciferol (VITAMIN D) 2000 units CAPS, Take 1 capsule by mouth daily, Disp: , Rfl:     dutasteride (AVODART) 0 5 mg capsule, Take 1 capsule (0 5 mg total) by mouth daily, Disp: 90 capsule, Rfl: 1    Omega-3 Fatty Acids (OMEGA-3 FISH OIL) 1000 MG CAPS, Take by mouth, Disp: , Rfl:     tamsulosin (FLOMAX) 0 4 mg, Take 1 capsule (0 4 mg total) by mouth daily with dinner, Disp: 90 capsule, Rfl: 1    rosuvastatin (CRESTOR) 20 MG tablet, Take 1 tablet (20 mg total) by mouth daily at bedtime, Disp: 90 tablet, Rfl: 3  No Known Allergies    Review of Systems:  Review of Systems   Constitutional: Negative  HENT: Negative  Eyes: Negative  Respiratory: Negative  Cardiovascular: Negative      Gastrointestinal: Negative  Endocrine: Negative  Genitourinary: Negative  Musculoskeletal: Negative  Skin: Negative  Allergic/Immunologic: Negative  Neurological: Negative  Hematological: Negative  Psychiatric/Behavioral: Negative  Vitals:    09/15/20 0858   BP: 136/58   BP Location: Left arm   Patient Position: Sitting   Cuff Size: Standard   Pulse: 64   Temp: 97 9 °F (36 6 °C)   TempSrc: Temporal   SpO2: 98%   Weight: 73 9 kg (163 lb)   Height: 5' 10" (1 778 m)     Physical Exam:  Physical Exam   Constitutional: He is oriented to person, place, and time  No distress  HENT:   Head: Normocephalic and atraumatic  Right Ear: External ear normal    Left Ear: External ear normal    pale   Eyes: Pupils are equal, round, and reactive to light  Conjunctivae are normal  Right eye exhibits no discharge  Left eye exhibits no discharge  No scleral icterus  Neck: Normal range of motion  Neck supple  No JVD present  No tracheal deviation present  No thyromegaly present  Cardiovascular: Normal rate and regular rhythm  Exam reveals gallop  Exam reveals no friction rub  No murmur heard  Pulmonary/Chest: Effort normal and breath sounds normal  No stridor  No respiratory distress  He has no wheezes  He has no rales  He exhibits no tenderness  Abdominal: Soft  Bowel sounds are normal  He exhibits no distension and no mass  There is no abdominal tenderness  There is no rebound and no guarding  Musculoskeletal: Normal range of motion  General: No tenderness, deformity or edema  Neurological: He is alert and oriented to person, place, and time  He has normal reflexes  No cranial nerve deficit  He exhibits normal muscle tone  Coordination normal    Skin: Skin is warm and dry  No rash noted  He is not diaphoretic  No erythema  No pallor  Psychiatric: He has a normal mood and affect   His behavior is normal  Judgment and thought content normal        Labs:     Lab Results   Component Value Date    WBC 6 17 06/26/2019    HGB 12 3 06/26/2019    HCT 37 3 06/26/2019     (H) 06/26/2019     06/26/2019     Lab Results   Component Value Date     12/30/2015    K 4 1 01/16/2020     01/16/2020    CO2 29 01/16/2020    ANIONGAP 12 8 12/30/2015    BUN 22 01/16/2020    CREATININE 1 09 01/16/2020    GLUCOSE 104 12/30/2015    GLUF 109 (H) 01/16/2020    CALCIUM 8 7 01/16/2020    AST 22 01/16/2020    ALT 32 01/16/2020    ALKPHOS 66 01/16/2020    PROT 7 5 12/30/2015    BILITOT 0 6 12/30/2015    EGFR 64 01/16/2020     Lab Results   Component Value Date    CHOL 95 (L) 12/30/2015    CHOL 80 (L) 06/24/2015    CHOL 105 11/13/2014     Lab Results   Component Value Date    HDL 45 01/16/2020    HDL 43 06/26/2019    HDL 43 10/17/2018     Lab Results   Component Value Date    LDLCALC 48 01/16/2020    LDLCALC 60 06/26/2019    LDLCALC 42 10/17/2018     Lab Results   Component Value Date    TRIG 44 01/16/2020    TRIG 65 06/26/2019    TRIG 29 10/17/2018     Lab Results   Component Value Date    HGBA1C 5 6 06/24/2015       Imaging & Testing   I have personally reviewed pertinent reports  EKG: Personally reviewed  Sinus bradycardia incomplete RBBB  09/10/2019 normal sinus rhythm incomplete right bundle branch block no acute ST T wave changes from prior EKG  Sinus rhythm with first-degree AV block incomplete right bundle branch block    Cardiac testing:   CORONARY CIRCULATION:   Proximal LAD: There was a 90 % stenosis  1st diagonal: There was a 100 % stenosis  This lesion is a chronic total   occlusion  Mid circumflex: There was a 80 % stenosis  1st obtuse marginal: There was a 100 % stenosis  This lesion is a chronic    total   occlusion  Distal RCA: There was a 100 % stenosis  This lesion is a chronic   total occlusion  CARDIAC STRUCTURES:   Global left ventricular function was normal  EF calculated by contrast   ventriculography was 65 %         Maxim Yeboah  Please call with any questions or suggestions    A description of the counseling:   Goals and Barriers:  Patient's ability to self care:  Medication side effect reviewed with patient in detail and all their questions answered  "This note has been constructed using a voice recognition system  Therefore there may be syntax, spelling, and/or grammatical errors   Please call if you have any questions  "

## 2020-09-17 DIAGNOSIS — Z95.1 HX OF CABG: ICD-10-CM

## 2020-09-17 DIAGNOSIS — I21.4 NSTEMI (NON-ST ELEVATED MYOCARDIAL INFARCTION) (HCC): ICD-10-CM

## 2020-09-17 RX ORDER — HYDROGEN PEROXIDE 2.65 ML/100ML
LIQUID ORAL; TOPICAL
Qty: 90 TABLET | Refills: 0 | Status: SHIPPED | OUTPATIENT
Start: 2020-09-17 | End: 2021-01-05

## 2020-09-30 DIAGNOSIS — I10 ESSENTIAL HYPERTENSION: ICD-10-CM

## 2020-09-30 RX ORDER — AMLODIPINE BESYLATE 2.5 MG/1
2.5 TABLET ORAL DAILY
Qty: 90 TABLET | Refills: 3 | Status: SHIPPED | OUTPATIENT
Start: 2020-09-30 | End: 2021-09-27 | Stop reason: SDUPTHER

## 2020-11-02 ENCOUNTER — APPOINTMENT (OUTPATIENT)
Dept: LAB | Facility: HOSPITAL | Age: 80
End: 2020-11-02
Attending: FAMILY MEDICINE
Payer: MEDICARE

## 2020-11-02 ENCOUNTER — TRANSCRIBE ORDERS (OUTPATIENT)
Dept: ADMINISTRATIVE | Facility: HOSPITAL | Age: 80
End: 2020-11-02

## 2020-11-02 DIAGNOSIS — I10 ESSENTIAL HYPERTENSION: ICD-10-CM

## 2020-11-02 DIAGNOSIS — I25.10 CORONARY ARTERY DISEASE INVOLVING NATIVE CORONARY ARTERY OF NATIVE HEART WITHOUT ANGINA PECTORIS: ICD-10-CM

## 2020-11-02 DIAGNOSIS — R35.1 BENIGN PROSTATIC HYPERPLASIA WITH NOCTURIA: ICD-10-CM

## 2020-11-02 DIAGNOSIS — K59.09 OTHER CONSTIPATION: ICD-10-CM

## 2020-11-02 DIAGNOSIS — Z90.79 S/P TURP: ICD-10-CM

## 2020-11-02 DIAGNOSIS — E78.2 MIXED HYPERLIPIDEMIA: ICD-10-CM

## 2020-11-02 DIAGNOSIS — Z95.1 HX OF CABG: ICD-10-CM

## 2020-11-02 DIAGNOSIS — N40.1 BENIGN PROSTATIC HYPERPLASIA WITH NOCTURIA: ICD-10-CM

## 2020-11-02 DIAGNOSIS — N40.0 ENLARGED PROSTATE ON RECTAL EXAMINATION: ICD-10-CM

## 2020-11-02 LAB
ALBUMIN SERPL BCP-MCNC: 3.9 G/DL (ref 3.5–5)
ALP SERPL-CCNC: 68 U/L (ref 46–116)
ALT SERPL W P-5'-P-CCNC: 31 U/L (ref 12–78)
ANION GAP SERPL CALCULATED.3IONS-SCNC: 7 MMOL/L (ref 4–13)
AST SERPL W P-5'-P-CCNC: 20 U/L (ref 5–45)
BILIRUB SERPL-MCNC: 0.6 MG/DL (ref 0.2–1)
BUN SERPL-MCNC: 19 MG/DL (ref 5–25)
CALCIUM SERPL-MCNC: 8.9 MG/DL (ref 8.3–10.1)
CHLORIDE SERPL-SCNC: 106 MMOL/L (ref 100–108)
CHOLEST SERPL-MCNC: 94 MG/DL (ref 50–200)
CO2 SERPL-SCNC: 28 MMOL/L (ref 21–32)
CREAT SERPL-MCNC: 1.1 MG/DL (ref 0.6–1.3)
ERYTHROCYTE [DISTWIDTH] IN BLOOD BY AUTOMATED COUNT: 12.7 % (ref 11.6–15.1)
GFR SERPL CREATININE-BSD FRML MDRD: 63 ML/MIN/1.73SQ M
GLUCOSE P FAST SERPL-MCNC: 113 MG/DL (ref 65–99)
HCT VFR BLD AUTO: 39.4 % (ref 36.5–49.3)
HDLC SERPL-MCNC: 43 MG/DL
HGB BLD-MCNC: 12.6 G/DL (ref 12–17)
LDLC SERPL CALC-MCNC: 43 MG/DL (ref 0–100)
MCH RBC QN AUTO: 33.4 PG (ref 26.8–34.3)
MCHC RBC AUTO-ENTMCNC: 32 G/DL (ref 31.4–37.4)
MCV RBC AUTO: 105 FL (ref 82–98)
PLATELET # BLD AUTO: 170 THOUSANDS/UL (ref 149–390)
PMV BLD AUTO: 9.8 FL (ref 8.9–12.7)
POTASSIUM SERPL-SCNC: 4.1 MMOL/L (ref 3.5–5.3)
PROT SERPL-MCNC: 7.1 G/DL (ref 6.4–8.2)
RBC # BLD AUTO: 3.77 MILLION/UL (ref 3.88–5.62)
SODIUM SERPL-SCNC: 141 MMOL/L (ref 136–145)
TRIGL SERPL-MCNC: 42 MG/DL
TSH SERPL DL<=0.05 MIU/L-ACNC: 4.35 UIU/ML (ref 0.36–3.74)
WBC # BLD AUTO: 5.25 THOUSAND/UL (ref 4.31–10.16)

## 2020-11-02 PROCEDURE — 85027 COMPLETE CBC AUTOMATED: CPT

## 2020-11-02 PROCEDURE — 80053 COMPREHEN METABOLIC PANEL: CPT

## 2020-11-02 PROCEDURE — 80061 LIPID PANEL: CPT

## 2020-11-02 PROCEDURE — 84443 ASSAY THYROID STIM HORMONE: CPT

## 2020-11-02 PROCEDURE — 36415 COLL VENOUS BLD VENIPUNCTURE: CPT

## 2020-11-06 ENCOUNTER — TRANSCRIBE ORDERS (OUTPATIENT)
Dept: ADMINISTRATIVE | Facility: HOSPITAL | Age: 80
End: 2020-11-06

## 2020-11-06 DIAGNOSIS — R33.8 OTHER RETENTION OF URINE: Primary | ICD-10-CM

## 2020-11-09 ENCOUNTER — OFFICE VISIT (OUTPATIENT)
Dept: FAMILY MEDICINE CLINIC | Facility: CLINIC | Age: 80
End: 2020-11-09
Payer: MEDICARE

## 2020-11-09 VITALS
HEIGHT: 70 IN | TEMPERATURE: 98 F | HEART RATE: 83 BPM | OXYGEN SATURATION: 96 % | BODY MASS INDEX: 22.9 KG/M2 | RESPIRATION RATE: 18 BRPM | SYSTOLIC BLOOD PRESSURE: 124 MMHG | WEIGHT: 160 LBS | DIASTOLIC BLOOD PRESSURE: 60 MMHG

## 2020-11-09 DIAGNOSIS — E78.2 MIXED HYPERLIPIDEMIA: ICD-10-CM

## 2020-11-09 DIAGNOSIS — R35.1 BENIGN PROSTATIC HYPERPLASIA WITH NOCTURIA: ICD-10-CM

## 2020-11-09 DIAGNOSIS — N39.44 NOCTURNAL ENURESIS: ICD-10-CM

## 2020-11-09 DIAGNOSIS — R79.89 ABNORMAL TSH: ICD-10-CM

## 2020-11-09 DIAGNOSIS — I10 ESSENTIAL HYPERTENSION: ICD-10-CM

## 2020-11-09 DIAGNOSIS — Z95.1 HX OF CABG: ICD-10-CM

## 2020-11-09 DIAGNOSIS — K64.4 EXTERNAL HEMORRHOIDS: ICD-10-CM

## 2020-11-09 DIAGNOSIS — Z00.00 MEDICARE ANNUAL WELLNESS VISIT, SUBSEQUENT: Primary | ICD-10-CM

## 2020-11-09 DIAGNOSIS — N40.1 BENIGN PROSTATIC HYPERPLASIA WITH NOCTURIA: ICD-10-CM

## 2020-11-09 DIAGNOSIS — R73.09 ELEVATED GLUCOSE: ICD-10-CM

## 2020-11-09 DIAGNOSIS — I25.10 CORONARY ARTERY DISEASE INVOLVING NATIVE CORONARY ARTERY OF NATIVE HEART WITHOUT ANGINA PECTORIS: ICD-10-CM

## 2020-11-09 PROCEDURE — G0439 PPPS, SUBSEQ VISIT: HCPCS | Performed by: FAMILY MEDICINE

## 2020-11-09 RX ORDER — HYDROCORTISONE 25 MG/G
CREAM TOPICAL 2 TIMES DAILY
Qty: 28 G | Refills: 1 | Status: SHIPPED | OUTPATIENT
Start: 2020-11-09 | End: 2021-05-17

## 2020-11-09 RX ORDER — TAMSULOSIN HYDROCHLORIDE 0.4 MG/1
0.4 CAPSULE ORAL
Qty: 90 CAPSULE | Refills: 1 | Status: SHIPPED | OUTPATIENT
Start: 2020-11-09 | End: 2021-07-07

## 2020-11-17 ENCOUNTER — HOSPITAL ENCOUNTER (OUTPATIENT)
Dept: RADIOLOGY | Facility: HOSPITAL | Age: 80
Discharge: HOME | End: 2020-11-17
Attending: UROLOGY
Payer: MEDICARE

## 2020-11-17 DIAGNOSIS — R33.8 OTHER RETENTION OF URINE: ICD-10-CM

## 2020-11-17 PROCEDURE — 76770 US EXAM ABDO BACK WALL COMP: CPT

## 2020-12-09 ENCOUNTER — TRANSCRIBE ORDERS (OUTPATIENT)
Dept: ADMINISTRATIVE | Facility: HOSPITAL | Age: 80
End: 2020-12-09

## 2020-12-09 ENCOUNTER — LAB (OUTPATIENT)
Dept: LAB | Facility: HOSPITAL | Age: 80
End: 2020-12-09
Attending: FAMILY MEDICINE
Payer: MEDICARE

## 2020-12-09 DIAGNOSIS — I10 ESSENTIAL HYPERTENSION: ICD-10-CM

## 2020-12-09 DIAGNOSIS — E78.2 MIXED HYPERLIPIDEMIA: ICD-10-CM

## 2020-12-09 DIAGNOSIS — N40.1 BENIGN PROSTATIC HYPERPLASIA WITH NOCTURIA: ICD-10-CM

## 2020-12-09 DIAGNOSIS — R35.1 BENIGN PROSTATIC HYPERPLASIA WITH NOCTURIA: ICD-10-CM

## 2020-12-09 DIAGNOSIS — I25.10 CORONARY ARTERY DISEASE INVOLVING NATIVE CORONARY ARTERY OF NATIVE HEART WITHOUT ANGINA PECTORIS: ICD-10-CM

## 2020-12-09 DIAGNOSIS — R73.09 ELEVATED GLUCOSE: ICD-10-CM

## 2020-12-09 DIAGNOSIS — R79.89 ABNORMAL TSH: ICD-10-CM

## 2020-12-09 LAB
EST. AVERAGE GLUCOSE BLD GHB EST-MCNC: 111 MG/DL
HBA1C MFR BLD: 5.5 %
TSH SERPL DL<=0.05 MIU/L-ACNC: 2.77 UIU/ML (ref 0.36–3.74)

## 2020-12-09 PROCEDURE — 84443 ASSAY THYROID STIM HORMONE: CPT

## 2020-12-09 PROCEDURE — 83036 HEMOGLOBIN GLYCOSYLATED A1C: CPT

## 2020-12-09 PROCEDURE — 36415 COLL VENOUS BLD VENIPUNCTURE: CPT

## 2020-12-15 DIAGNOSIS — N40.0 ENLARGED PROSTATE ON RECTAL EXAMINATION: ICD-10-CM

## 2020-12-15 RX ORDER — DUTASTERIDE 0.5 MG/1
CAPSULE, LIQUID FILLED ORAL
Qty: 90 CAPSULE | Refills: 0 | Status: SHIPPED | OUTPATIENT
Start: 2020-12-15 | End: 2021-03-17

## 2020-12-27 DIAGNOSIS — I21.4 NSTEMI (NON-ST ELEVATED MYOCARDIAL INFARCTION) (HCC): ICD-10-CM

## 2020-12-27 DIAGNOSIS — Z95.1 HX OF CABG: ICD-10-CM

## 2021-01-12 ENCOUNTER — TELEPHONE (OUTPATIENT)
Dept: CARDIOLOGY CLINIC | Facility: CLINIC | Age: 81
End: 2021-01-12

## 2021-01-12 RX ORDER — HYDROGEN PEROXIDE 2.65 ML/100ML
LIQUID ORAL; TOPICAL
Qty: 90 TABLET | Refills: 3 | Status: SHIPPED | OUTPATIENT
Start: 2021-01-12 | End: 2021-05-12 | Stop reason: SDUPTHER

## 2021-02-08 ENCOUNTER — TELEPHONE (OUTPATIENT)
Dept: FAMILY MEDICINE CLINIC | Facility: CLINIC | Age: 81
End: 2021-02-08

## 2021-02-08 NOTE — TELEPHONE ENCOUNTER
Called and let wife Gama Solorio know that yes Dr Colby Sykes thinks they should have vaccine and gave them information on how to register  No further action needed    Susanna Loaiza MA

## 2021-03-17 DIAGNOSIS — N40.0 ENLARGED PROSTATE ON RECTAL EXAMINATION: ICD-10-CM

## 2021-03-17 RX ORDER — DUTASTERIDE 0.5 MG/1
CAPSULE, LIQUID FILLED ORAL
Qty: 90 CAPSULE | Refills: 0 | Status: SHIPPED | OUTPATIENT
Start: 2021-03-17 | End: 2021-07-07

## 2021-05-10 ENCOUNTER — OFFICE VISIT (OUTPATIENT)
Dept: FAMILY MEDICINE CLINIC | Facility: CLINIC | Age: 81
End: 2021-05-10
Payer: MEDICARE

## 2021-05-10 VITALS
HEART RATE: 73 BPM | RESPIRATION RATE: 16 BRPM | TEMPERATURE: 97.2 F | OXYGEN SATURATION: 97 % | DIASTOLIC BLOOD PRESSURE: 58 MMHG | WEIGHT: 162 LBS | HEIGHT: 70 IN | SYSTOLIC BLOOD PRESSURE: 120 MMHG | BODY MASS INDEX: 23.19 KG/M2

## 2021-05-10 DIAGNOSIS — I10 ESSENTIAL HYPERTENSION: ICD-10-CM

## 2021-05-10 DIAGNOSIS — I25.10 CORONARY ARTERY DISEASE INVOLVING NATIVE CORONARY ARTERY OF NATIVE HEART WITHOUT ANGINA PECTORIS: Primary | ICD-10-CM

## 2021-05-10 DIAGNOSIS — E72.11 HOMOCYSTINURIA (HCC): ICD-10-CM

## 2021-05-10 DIAGNOSIS — N40.1 BENIGN PROSTATIC HYPERPLASIA WITH NOCTURIA: ICD-10-CM

## 2021-05-10 DIAGNOSIS — Z95.1 HX OF CABG: ICD-10-CM

## 2021-05-10 DIAGNOSIS — R35.1 BENIGN PROSTATIC HYPERPLASIA WITH NOCTURIA: ICD-10-CM

## 2021-05-10 DIAGNOSIS — E78.2 MIXED HYPERLIPIDEMIA: ICD-10-CM

## 2021-05-10 PROCEDURE — 99214 OFFICE O/P EST MOD 30 MIN: CPT | Performed by: FAMILY MEDICINE

## 2021-05-10 NOTE — PROGRESS NOTES
Assessment/Plan:    1  Coronary artery disease involving native coronary artery of native heart without angina pectoris  -     CBC; Future  -     Comprehensive metabolic panel; Future  -     Lipid Panel with Direct LDL reflex; Future    2  Essential hypertension  Assessment & Plan:  stable    Orders:  -     CBC; Future  -     Comprehensive metabolic panel; Future  -     Lipid Panel with Direct LDL reflex; Future    3  Mixed hyperlipidemia  -     CBC; Future  -     Comprehensive metabolic panel; Future  -     Lipid Panel with Direct LDL reflex; Future    4  Benign prostatic hyperplasia with nocturia    5  Hx of CABG  -     CBC; Future  -     Comprehensive metabolic panel; Future  -     Lipid Panel with Direct LDL reflex; Future    6  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[facility] )    BMI Counseling: Body mass index is 23 24 kg/m²  The BMI is above normal  Nutrition recommendations include decreasing portion sizes  Exercise recommendations include moderate physical activity 150 minutes/week  No pharmacotherapy was ordered  There are no Patient Instructions on file for this visit  Return in about 6 months (around 11/10/2021)  Subjective:      Patient ID: Leeann Valle is a 79 y o  male  Chief Complaint   Patient presents with    Follow-up     6 month  mz cma       Pt is here for a 6 month follow up  Pt feels well  No CP, no Sob  Pt is vaccinated against covid    Pt states he sees Urology  - has appt tomorrow    Hypertension - patient has been taking his blood pressure medication regularly  Associated symptoms:  Swelling:  no  Leg cramps: no    Hyperlipidemia-  he  has been taking their cholesterol medication regularly    Associated symptoms:  Myalgias: no      The following portions of the patient's history were reviewed and updated as appropriate: allergies, current medications, past family history, past medical history, past social history, past surgical history and problem list     Review of Systems   Constitutional: Negative for activity change, appetite change, chills, diaphoresis, fatigue, fever and unexpected weight change  HENT: Negative for congestion, dental problem, ear pain, mouth sores, sinus pressure, sinus pain, sore throat and trouble swallowing  Eyes: Negative for photophobia, discharge and itching  Respiratory: Negative for apnea, chest tightness and shortness of breath  Cardiovascular: Negative for chest pain, palpitations and leg swelling  Gastrointestinal: Negative for abdominal distention, abdominal pain, blood in stool, nausea and vomiting  Endocrine: Negative for cold intolerance, heat intolerance, polydipsia, polyphagia and polyuria  Genitourinary: Negative for difficulty urinating  Musculoskeletal: Negative for arthralgias  Skin: Negative for color change and wound  Neurological: Negative for dizziness, syncope, speech difficulty and headaches  Hematological: Negative for adenopathy  Psychiatric/Behavioral: Negative for agitation and behavioral problems           Current Outpatient Medications   Medication Sig Dispense Refill    amLODIPine (NORVASC) 2 5 mg tablet Take 1 tablet (2 5 mg total) by mouth daily 90 tablet 3    Ascorbic Acid (VITAMIN C PO) Take 500 mg by mouth daily       B Complex Vitamins (B-COMPLEX/B-12 PO) Take 1 tablet by mouth daily      Calcium 150 MG TABS Take by mouth daily      Cholecalciferol (VITAMIN D) 2000 units CAPS Take 1 capsule by mouth daily      dutasteride (AVODART) 0 5 mg capsule Take 1 capsule by mouth once daily 90 capsule 0    EQ Aspirin Adult Low Dose 81 MG EC tablet Take 1 tablet by mouth once daily with breakfast 90 tablet 3    hydrocortisone (ANUSOL-HC) 2 5 % rectal cream Apply topically 2 (two) times a day 28 g 1    Omega-3 Fatty Acids (OMEGA-3 FISH OIL) 1000 MG CAPS Take by mouth      rosuvastatin (CRESTOR) 20 MG tablet Take 1 tablet (20 mg total) by mouth daily at bedtime 90 tablet 3    tamsulosin (FLOMAX) 0 4 mg Take 1 capsule (0 4 mg total) by mouth daily with dinner 90 capsule 1     No current facility-administered medications for this visit  Objective:    /58   Pulse 73   Temp (!) 97 2 °F (36 2 °C)   Resp 16   Ht 5' 10" (1 778 m)   Wt 73 5 kg (162 lb)   SpO2 97%   BMI 23 24 kg/m²        Physical Exam  Vitals signs and nursing note reviewed  Constitutional:       General: He is not in acute distress  Appearance: He is well-developed  He is not diaphoretic  HENT:      Head: Normocephalic and atraumatic  Right Ear: External ear normal       Left Ear: External ear normal       Nose: Nose normal       Mouth/Throat:      Pharynx: No oropharyngeal exudate  Eyes:      General: No scleral icterus  Right eye: No discharge  Left eye: No discharge  Pupils: Pupils are equal, round, and reactive to light  Neck:      Thyroid: No thyromegaly  Cardiovascular:      Rate and Rhythm: Normal rate  Heart sounds: Normal heart sounds  No murmur  Pulmonary:      Effort: Pulmonary effort is normal  No respiratory distress  Breath sounds: Normal breath sounds  No wheezing  Abdominal:      General: Bowel sounds are normal  There is no distension  Palpations: Abdomen is soft  There is no mass  Tenderness: There is no abdominal tenderness  There is no guarding or rebound  Musculoskeletal: Normal range of motion  Skin:     General: Skin is warm and dry  Findings: No erythema or rash  Neurological:      Mental Status: He is alert        Coordination: Coordination normal       Deep Tendon Reflexes: Reflexes normal    Psychiatric:         Behavior: Behavior normal                 Sinai Omalley DO

## 2021-05-12 ENCOUNTER — OFFICE VISIT (OUTPATIENT)
Dept: CARDIOLOGY CLINIC | Facility: CLINIC | Age: 81
End: 2021-05-12
Payer: MEDICARE

## 2021-05-12 VITALS
SYSTOLIC BLOOD PRESSURE: 132 MMHG | HEART RATE: 62 BPM | DIASTOLIC BLOOD PRESSURE: 56 MMHG | OXYGEN SATURATION: 97 % | HEIGHT: 70 IN | TEMPERATURE: 98.6 F | BODY MASS INDEX: 23.05 KG/M2 | WEIGHT: 161 LBS

## 2021-05-12 DIAGNOSIS — I25.10 CORONARY ARTERY DISEASE INVOLVING NATIVE CORONARY ARTERY OF NATIVE HEART WITHOUT ANGINA PECTORIS: Primary | ICD-10-CM

## 2021-05-12 DIAGNOSIS — I21.4 NSTEMI (NON-ST ELEVATED MYOCARDIAL INFARCTION) (HCC): ICD-10-CM

## 2021-05-12 DIAGNOSIS — Z95.1 HX OF CABG: ICD-10-CM

## 2021-05-12 DIAGNOSIS — I10 ESSENTIAL HYPERTENSION: ICD-10-CM

## 2021-05-12 DIAGNOSIS — R06.00 EXERTIONAL DYSPNEA: ICD-10-CM

## 2021-05-12 PROCEDURE — 99213 OFFICE O/P EST LOW 20 MIN: CPT | Performed by: INTERNAL MEDICINE

## 2021-05-12 PROCEDURE — 93000 ELECTROCARDIOGRAM COMPLETE: CPT | Performed by: INTERNAL MEDICINE

## 2021-05-12 RX ORDER — ASPIRIN 81 MG/1
81 TABLET ORAL
Qty: 90 TABLET | Refills: 3 | Status: SHIPPED | OUTPATIENT
Start: 2021-05-12 | End: 2021-05-19 | Stop reason: HOSPADM

## 2021-05-12 RX ORDER — ROSUVASTATIN CALCIUM 20 MG/1
20 TABLET, COATED ORAL
Qty: 90 TABLET | Refills: 3 | Status: SHIPPED | OUTPATIENT
Start: 2021-05-12 | End: 2022-08-01

## 2021-05-12 NOTE — PROGRESS NOTES
Cardiology Follow Up  Leeann Valle  1940  [id number]  [address] [facility] [address] [address] [phone]    1  Coronary artery disease involving native coronary artery of native heart without angina pectoris  POCT ECG    rosuvastatin (CRESTOR) 20 MG tablet   2  NSTEMI (non-ST elevated myocardial infarction) (HCC)  POCT ECG    aspirin (EQ Aspirin Adult Low Dose) 81 mg EC tablet    rosuvastatin (CRESTOR) 20 MG tablet   3  Essential hypertension  POCT ECG    rosuvastatin (CRESTOR) 20 MG tablet   4  Exertional dyspnea  POCT ECG   5  Hx of CABG  aspirin (EQ Aspirin Adult Low Dose) 81 mg EC tablet    rosuvastatin (CRESTOR) 20 MG tablet      Discussion/Plan:  CABG/3V CAD-- ekg unchanged  Significant 3 vessel cad   healed well  asymptomatic  LDL at goal  Blood pressure well-controlled  - aspirin 81mg  -   rosuvastatin  -- amlodpine  - daily exercise     Htn- amlodipine  controlled    First-degree heart block/incomplete right bundle branch block- unchanged  Continue to monitor- wean off metoprolol    Mild carotid artery disease bilateral- rosuvastatin+ aspirin    Dark stools-cologuard negative    Health screening- psa- one year    rtc- 1 year     Interval History:  initial visit: 74 yo gentleman with prior NSTEMI s/p 3V-CABG (LIMA to LAD, SVG to OM2, and SVG to PDA) s/p cardiac rehab walking one mile daily  I have reviewed all of his physical therapy notes and am appreciative of the excellent care  Compliant with his medications denying any significant symptoms  Denying any bleeding or rash  He denies having any chest pain or shortness of breath      /3: No exercise limitations  Denies having any chest tightness  Denies any any muscle aches  Denies any bleeding or bruising  Has been very active  Does daily walks without any symptoms  12, 2017: He reports exercising without any limitations  He denies any recurrence of chest discomfort  He denies having any dizziness or lightheadedness  He denies having any medication intolerance  He denies any significant bleeding or bruising  09/17/2018: He denies having any chest tightness  He denies having any exertional shortness of breath  He has been compliant with his medications  He denies having any falls  He denies having any easy bleeding or bruising  Reviewed through his records  03/04/2019:  He denies having any exertional chest pain  He denies having exertional shortness of breath  He is compliant with his medications  We reviewed through his recent lab work which shows his cholesterol is well controlled  09/10/2018:  He denies having exertional chest pain or shortness of breath  He is compliant with his medications  Reports having colon cancer screening  Which was negative  He denies having any bright red blood per rectum  He is compliant with his aspirin therapy  His EKG remains unchanged  03/03/2020:  He is walking his dog without any significant shortness of breath or chest pain  His cholesterol remains controlled  His blood pressure is controlled  His EKG remains unchanged  He is compliant with his medications  09/15/2020:  He denies having significant chest tightness or shortness of breath  No major changes in his EKG  He is compliant with his medications  Denies having significant bleeding  05/12/2021:  He denies having any major chest tightness or shortness of breath  He is compliant with medications  No major change in EKG      Patient Active Problem List   Diagnosis    Hx of CABG    Coronary artery disease involving native coronary artery of native heart without angina pectoris    Elevated homocysteine    MTHFR mutation    [facility]STEM[facility]non-ST elevated [facility] infarction) [facility]    Vitamin D deficiency    Nocturnal enuresis    Benign prostatic hyperplasia with nocturia    Essential hypertension    Mixed hyperlipidemia    GERD (gastroesophageal reflux disease)    S/P TURP    Sensorineural hearing loss (SNHL)    Other constipation    Osteopenia of multiple sites    External hemorrhoids    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HCC)     Past Medical History:   Diagnosis Date    Arthritis     CAD (coronary artery disease)     Cataract     Colon polyp     GERD (gastroesophageal reflux disease)     Heart attack (HCC)     Herpes zoster     Hx of colonic polyps     Hyperlipidemia     Hypertension      Social History     Socioeconomic History    Marital status: /Civil Union     Spouse name: Not on file    Number of children: Not on file    Years of education: Not on file    Highest education level: Not on file   Occupational History    Not on file   Social Needs    Financial resource strain: Not on file    Food insecurity     Worry: Not on file     Inability: Not on file   [employer] needs     Medical: Not on file     Non-medical: Not on file   Tobacco Use    Smoking status: Former Smoker     Quit date:      Years since quittin 3    Smokeless tobacco: Never Used   Substance and Sexual Activity    Alcohol use: Not Currently    Drug use: No    Sexual activity: Not on file   Lifestyle    Physical activity     Days per week: Not on file     Minutes per session: Not on file    Stress: Not on file   Relationships    Social connections     Talks on phone: Not on file     Gets together: Not on file     Attends Jewish service: Not on file     Active member of club or organization: Not on file     Attends meetings of clubs or organizations: Not on file     Relationship status: Not on file    Intimate partner violence     Fear of current or ex partner: Not on file     Emotionally abused: Not on file     Physically abused: Not on file     Forced sexual activity: Not on file   Other Topics Concern    Not on file   Social History Narrative    Exercise cycling / walking    Exercise daily     Good sleep hygiene    Sleeps 8-10 hrs a day       Family History   Problem Relation Age of Onset    Coronary artery disease Father     Hyperlipidemia Brother     Sudden death Brother         cardiac    Arthritis Family     Mental illness Neg Hx      Past Surgical History:   Procedure Laterality Date    BACK SURGERY      disc removed 1985 / R   1985      CATARACT EXTRACTION      COLONOSCOPY      [place]ONARY ARTERY BYPASS GRAFT  11/13/2014    [place]    6/30/16   & CABG X3 with LIMA to LAD <SVG to OM-2, SVG to PDA  [place] Abigail Gill / Sarah Beth Dao   12/19[place]14       EYE SURGERY      cataract /  R   Aretha Jerry[address]      Groin / R   [address] [facility]  [facility]   [address]    [address]      JOINT REPLACEMENT  1985     right hip    CA TRANSURETHRAL ELEC-SURG PROSTATECTOM N/A 6/4/2019    Procedure: CYSTOSCOPY, TRANSURETHRAL RESECTION OF PROSTATE (TURP);   Surgeon: Idalmis Bansal MD;  Location: [facility];  Service: Urology       Current Outpatient Medications:     amLODIPine (NORVASC) 2 5 mg tablet, Take 1 tablet (2 5 mg total) by mouth daily, Disp: 90 tablet, Rfl: 3    Ascorbic Acid (VITAMIN C PO), Take 500 mg by mouth daily , Disp: , Rfl:     aspirin (EQ Aspirin Adult Low Dose) 81 mg EC tablet, Take 1 tablet (81 mg total) by mouth daily with breakfast, Disp: 90 tablet, Rfl: 3    B Complex Vitamins (B-COMPLEX/B-12 PO), Take 1 tablet by mouth daily, Disp: , Rfl:     Calcium 150 MG TABS, Take by mouth daily, Disp: , Rfl:     Cholecalciferol (VITAMIN D) 2000 units CAPS, Take 1 capsule by mouth daily, Disp: , Rfl:     dutasteride (AVODART) 0 5 mg capsule, Take 1 capsule by mouth once daily, Disp: 90 capsule, Rfl: 0    hydrocortisone (ANUSOL-HC) 2 5 % rectal cream, Apply topically 2 (two) times a day, Disp: 28 g, Rfl: 1    Omega-3 Fatty Acids (OMEGA-3 FISH OIL) 1000 MG CAPS, Take by mouth, Disp: , Rfl:     rosuvastatin (CRESTOR) 20 MG tablet, Take 1 tablet (20 mg total) by mouth daily at bedtime, Disp: 90 tablet, Rfl: 3    tamsulosin (FLOMAX) 0 4 mg, Take 1 capsule (0 4 mg total) by mouth daily with dinner, Disp: 90 capsule, Rfl: 1  No Known Allergies    Review of Systems:  Review of Systems   Constitutional: Negative  HENT: Negative  Eyes: Negative  Respiratory: Negative  Cardiovascular: Negative  Gastrointestinal: Negative  Endocrine: Negative  Genitourinary: Negative  Musculoskeletal: Negative  Skin: Negative  Allergic/Immunologic: Negative  Neurological: Negative  Hematological: Negative  Psychiatric/Behavioral: Negative  Vitals:    05/12/21 1109   BP: 132/56   BP Location: Right arm   Patient Position: Sitting   Cuff Size: Standard   Pulse: 62   Temp: 98 6 °F (37 °C)   SpO2: 97%   Weight: 73 kg (161 lb)   Height: 5' 10" (1 778 m)     Physical Exam:  Physical Exam   Constitutional: He is oriented to person, place, and time  He appears well-developed and well-nourished  No distress  HENT:   Head: Normocephalic and atraumatic  Right Ear: External ear normal    Left Ear: External ear normal    pale   Eyes: Pupils are equal, round, and reactive to light  Conjunctivae are normal  Right eye exhibits no discharge  Left eye exhibits no discharge  No scleral icterus  Neck: Normal range of motion  Neck supple  No JVD present  No tracheal deviation present  No thyromegaly present  Cardiovascular: Normal rate and regular rhythm  Exam reveals gallop  Exam reveals no friction rub  No murmur heard  Pulmonary/Chest: Effort normal and breath sounds normal  No stridor  No respiratory distress  He has no wheezes  He has no rales  He exhibits no tenderness  Abdominal: Soft  Bowel sounds are normal  He exhibits no distension and no mass  There is no abdominal tenderness  There is no rebound and no guarding  Musculoskeletal: Normal range of motion  General: No tenderness, deformity or edema     Neurological: He is alert and oriented to person, place, and time  He has normal reflexes  No cranial nerve deficit  He exhibits normal muscle tone  Coordination normal    Skin: Skin is warm and dry  No rash noted  He is not diaphoretic  No erythema  No pallor  Psychiatric: He has a normal mood and affect  His behavior is normal  Judgment and thought content normal        Labs:     Lab Results   Component Value Date    WBC 5 25 11/02/2020    HGB 12 6 11/02/2020    HCT 39 4 11/02/2020     (H) 11/02/2020     11/02/2020     Lab Results   Component Value Date     12/30/2015    K 4 1 11/02/2020     11/02/2020    CO2 28 11/02/2020    ANIONGAP 12 8 12/30/2015    BUN 19 11/02/2020    CREATININE 1 10 11/02/2020    GLUCOSE 104 12/30/2015    GLUF 113 (H) 11/02/2020    CALCIUM 8 9 11/02/2020    AST 20 11/02/2020    ALT 31 11/02/2020    ALKPHOS 68 11/02/2020    PROT 7 5 12/30/2015    BILITOT 0 6 12/30/2015    EGFR 63 11/02/2020     Lab Results   Component Value Date    CHOL 95 (L) 12/30/2015    CHOL 80 (L) 06/24/2015    CHOL 105 11/13/2014     Lab Results   Component Value Date    HDL 43 11/02/2020    HDL 45 01/16/2020    HDL 43 06/26/2019     Lab Results   Component Value Date    LDLCALC 43 11/02/2020    LDLCALC 48 01/16/2020    LDLCALC 60 06/26/2019     Lab Results   Component Value Date    TRIG 42 11/02/2020    TRIG 44 01/16/2020    TRIG 65 06/26/2019     Lab Results   Component Value Date    HGBA1C 5 5 12/09/2020       Imaging & Testing   I have personally reviewed pertinent reports  EKG: Personally reviewed  Sinus bradycardia incomplete RBBB  09/10/2019 normal sinus rhythm incomplete right bundle branch block no acute ST T wave changes from prior EKG  Sinus rhythm with first-degree AV block incomplete right bundle branch block    Cardiac testing:   CORONARY CIRCULATION:   Proximal LAD: There was a 90 % stenosis  1st diagonal: There was a 100 % stenosis  This lesion is a chronic total   occlusion     Mid circumflex: There was a 80 % stenosis  1st obtuse marginal: There was a 100 % stenosis  This lesion is a chronic    total   occlusion  Distal RCA: There was a 100 % stenosis  This lesion is a chronic   total occlusion  CARDIAC STRUCTURES:   Global left ventricular function was normal  EF calculated by contrast   ventriculography was 65 %  Rich Yeboah  Please call with any questions or suggestions    A description of the counseling:   Goals and Barriers:  Patient's ability to self care:  Medication side effect reviewed with patient in detail and all their questions answered  "This note has been constructed using a voice recognition system  Therefore there may be syntax, spelling, and/or grammatical errors   Please call if you have any questions  "

## 2021-05-17 ENCOUNTER — TELEPHONE (OUTPATIENT)
Dept: FAMILY MEDICINE CLINIC | Facility: CLINIC | Age: 81
End: 2021-05-17

## 2021-05-17 ENCOUNTER — HOSPITAL ENCOUNTER (EMERGENCY)
Facility: HOSPITAL | Age: 81
Discharge: HOME | End: 2021-05-17
Attending: EMERGENCY MEDICINE | Admitting: EMERGENCY MEDICINE
Payer: MEDICARE

## 2021-05-17 VITALS
RESPIRATION RATE: 18 BRPM | SYSTOLIC BLOOD PRESSURE: 149 MMHG | DIASTOLIC BLOOD PRESSURE: 92 MMHG | WEIGHT: 156 LBS | TEMPERATURE: 98.2 F | BODY MASS INDEX: 22.38 KG/M2 | HEART RATE: 66 BPM | OXYGEN SATURATION: 99 %

## 2021-05-17 VITALS
WEIGHT: 161 LBS | HEART RATE: 80 BPM | BODY MASS INDEX: 23.1 KG/M2 | OXYGEN SATURATION: 96 % | TEMPERATURE: 97.2 F | SYSTOLIC BLOOD PRESSURE: 145 MMHG | RESPIRATION RATE: 16 BRPM | DIASTOLIC BLOOD PRESSURE: 65 MMHG

## 2021-05-17 DIAGNOSIS — K56.41 FECAL IMPACTION IN RECTUM (HCC): Primary | ICD-10-CM

## 2021-05-17 DIAGNOSIS — R33.9 URINARY RETENTION: ICD-10-CM

## 2021-05-17 DIAGNOSIS — K59.00 CONSTIPATION: ICD-10-CM

## 2021-05-17 DIAGNOSIS — R33.9 URINARY RETENTION: Primary | ICD-10-CM

## 2021-05-17 LAB
ANION GAP SERPL CALCULATED.3IONS-SCNC: 12 MMOL/L (ref 4–13)
BACTERIA UR QL AUTO: ABNORMAL /HPF
BASOPHILS # BLD MANUAL: 0 THOUSAND/UL (ref 0–0.1)
BASOPHILS NFR MAR MANUAL: 0 % (ref 0–1)
BILIRUB UR QL STRIP: NEGATIVE
BILIRUB UR QL STRIP: NEGATIVE
BUN SERPL-MCNC: 17 MG/DL (ref 5–25)
CALCIUM SERPL-MCNC: 8.8 MG/DL (ref 8.3–10.1)
CHLORIDE SERPL-SCNC: 104 MMOL/L (ref 100–108)
CLARITY UR: CLEAR
CLARITY UR: CLEAR
CO2 SERPL-SCNC: 24 MMOL/L (ref 21–32)
COLOR UR: ABNORMAL
COLOR UR: YELLOW
CREAT SERPL-MCNC: 1.06 MG/DL (ref 0.6–1.3)
EOSINOPHIL # BLD MANUAL: 0 THOUSAND/UL (ref 0–0.4)
EOSINOPHIL NFR BLD MANUAL: 0 % (ref 0–6)
ERYTHROCYTE [DISTWIDTH] IN BLOOD BY AUTOMATED COUNT: 12.5 % (ref 11.6–15.1)
GFR SERPL CREATININE-BSD FRML MDRD: 65 ML/MIN/1.73SQ M
GLUCOSE SERPL-MCNC: 153 MG/DL (ref 65–140)
GLUCOSE UR STRIP-MCNC: NEGATIVE MG/DL
GLUCOSE UR STRIP-MCNC: NEGATIVE MG/DL
HCT VFR BLD AUTO: 36.2 % (ref 36.5–49.3)
HGB BLD-MCNC: 12.2 G/DL (ref 12–17)
HGB UR QL STRIP.AUTO: ABNORMAL
HGB UR QL STRIP.AUTO: NEGATIVE
KETONES UR STRIP-MCNC: NEGATIVE MG/DL
KETONES UR STRIP-MCNC: NEGATIVE MG/DL
LEUKOCYTE ESTERASE UR QL STRIP: ABNORMAL
LEUKOCYTE ESTERASE UR QL STRIP: NEGATIVE
LYMPHOCYTES # BLD AUTO: 0.53 THOUSAND/UL (ref 0.6–4.47)
LYMPHOCYTES # BLD AUTO: 4 % (ref 14–44)
MACROCYTES BLD QL AUTO: PRESENT
MCH RBC QN AUTO: 34.4 PG (ref 26.8–34.3)
MCHC RBC AUTO-ENTMCNC: 33.7 G/DL (ref 31.4–37.4)
MCV RBC AUTO: 102 FL (ref 82–98)
MONOCYTES # BLD AUTO: 0.79 THOUSAND/UL (ref 0–1.22)
MONOCYTES NFR BLD: 6 % (ref 4–12)
NEUTROPHILS # BLD MANUAL: 11.75 THOUSAND/UL (ref 1.85–7.62)
NEUTS BAND NFR BLD MANUAL: 9 % (ref 0–8)
NEUTS SEG NFR BLD AUTO: 80 % (ref 43–75)
NITRITE UR QL STRIP: NEGATIVE
NITRITE UR QL STRIP: NEGATIVE
NON-SQ EPI CELLS URNS QL MICRO: ABNORMAL /HPF
NRBC BLD AUTO-RTO: 0 /100 WBCS
OVALOCYTES BLD QL SMEAR: PRESENT
PH UR STRIP.AUTO: 6.5 [PH]
PH UR STRIP.AUTO: 7 [PH]
PLATELET # BLD AUTO: 144 THOUSANDS/UL (ref 149–390)
PLATELET BLD QL SMEAR: ADEQUATE
PMV BLD AUTO: 10.3 FL (ref 8.9–12.7)
POTASSIUM SERPL-SCNC: 3.8 MMOL/L (ref 3.5–5.3)
PROT UR STRIP-MCNC: NEGATIVE MG/DL
PROT UR STRIP-MCNC: NEGATIVE MG/DL
RBC # BLD AUTO: 3.55 MILLION/UL (ref 3.88–5.62)
RBC #/AREA URNS AUTO: ABNORMAL /HPF
RBC MORPH BLD: PRESENT
SODIUM SERPL-SCNC: 140 MMOL/L (ref 136–145)
SP GR UR STRIP.AUTO: 1.01 (ref 1–1.03)
SP GR UR STRIP.AUTO: 1.02 (ref 1–1.03)
TOTAL CELLS COUNTED SPEC: 100
UROBILINOGEN UR QL STRIP.AUTO: 0.2 E.U./DL
UROBILINOGEN UR QL STRIP.AUTO: 0.2 E.U./DL
VARIANT LYMPHS # BLD AUTO: 1 %
WBC # BLD AUTO: 13.2 THOUSAND/UL (ref 4.31–10.16)
WBC #/AREA URNS AUTO: ABNORMAL /HPF

## 2021-05-17 PROCEDURE — 82746 ASSAY OF FOLIC ACID SERUM: CPT | Performed by: NURSE PRACTITIONER

## 2021-05-17 PROCEDURE — 80048 BASIC METABOLIC PNL TOTAL CA: CPT | Performed by: EMERGENCY MEDICINE

## 2021-05-17 PROCEDURE — 81001 URINALYSIS AUTO W/SCOPE: CPT | Performed by: EMERGENCY MEDICINE

## 2021-05-17 PROCEDURE — 85007 BL SMEAR W/DIFF WBC COUNT: CPT | Performed by: EMERGENCY MEDICINE

## 2021-05-17 PROCEDURE — 99284 EMERGENCY DEPT VISIT MOD MDM: CPT | Performed by: EMERGENCY MEDICINE

## 2021-05-17 PROCEDURE — 99283 EMERGENCY DEPT VISIT LOW MDM: CPT

## 2021-05-17 PROCEDURE — 36415 COLL VENOUS BLD VENIPUNCTURE: CPT | Performed by: EMERGENCY MEDICINE

## 2021-05-17 PROCEDURE — 51702 INSERT TEMP BLADDER CATH: CPT | Performed by: EMERGENCY MEDICINE

## 2021-05-17 PROCEDURE — 81003 URINALYSIS AUTO W/O SCOPE: CPT | Performed by: EMERGENCY MEDICINE

## 2021-05-17 PROCEDURE — 85027 COMPLETE CBC AUTOMATED: CPT | Performed by: EMERGENCY MEDICINE

## 2021-05-17 NOTE — ED CARE HANDOFF
Emergency Department Sign Out Note        Sign out and transfer of care from Dr David Bradley  See Separate Emergency Department note  The patient, Tor Bloch, was evaluated by the previous provider for Fecal Impaction/Urinary Retention  Workup Completed:  Lab work    ED Course / Workup Pending (followup): After fecal impaction by Dr David Bradley patient has soapsuds enema and then had a significant bowel movement  Patient also history cath which drained his urinary bladder  As per the plan and Dr David Bradley and Urology after decompression patient should be able to urinate his own  Therefore and all Townsend not placed and patient was sent home  Discussed with patient treatment for constipation home which she is already on Senokot work with his primary care doctor for  He will also follow-up with Urology  Had if he has any trouble voiding  after going home he is return to the ER for catheter  Procedures  MDM    Disposition  Final diagnoses:   Fecal impaction in rectum [facility])   Constipation   Urinary retention     Time reflects when diagnosis was documented in both MDM as applicable and the Disposition within this note     Time User Action Codes Description Comment    5/17/2021  7:55 AM Soheila [place] Add [K56 41] Fecal impaction in rectum ([address] )     5/17/2021  7:55 AM Soheila [place] Add [K59 00] Constipation     5/17/2021  7:55 AM Soheila [place] Add [R33 9] Urinary retention       ED Disposition     ED Disposition Condition Date/Time Comment    Discharge Stable Mon May 17, 2021  7:55 AM Tor Bloch discharge to home/self care              Follow-up Information     Follow up With Specialties Details Why Contact Info Additional Information    Benigno Cade DO Family Medicine, Wound Care In 1 week  [address] [facility] [address]  [address] [facility] Emergency Department Emergency Medicine  If symptoms worsen [address] [facility] [address]  [address] [address] Emergency Department, [place], [place], [address]        Discharge Medication List as of 5/17/2021  7:55 AM      CONTINUE these medications which have NOT CHANGED    Details   amLODIPine (NORVASC) 2 5 mg tablet Take 1 tablet (2 5 mg total) by mouth daily, Starting Wed 9/30/2020, Normal      Ascorbic Acid (VITAMIN C PO) Take 500 mg by mouth daily , Historical Med      aspirin (EQ Aspirin Adult Low Dose) 81 mg EC tablet Take 1 tablet (81 mg total) by mouth daily with breakfast, Starting Wed 5/12/2021, Normal      B Complex Vitamins (B-COMPLEX/B-12 PO) Take 1 tablet by mouth daily, Historical Med      Calcium 150 MG TABS Take by mouth daily, Historical Med      Cholecalciferol (VITAMIN D) 2000 units CAPS Take 1 capsule by mouth daily, Starting Thu 6/25/2015, Historical Med      dutasteride (AVODART) 0 5 mg capsule Take 1 capsule by mouth once daily, Normal      Omega-3 Fatty Acids (OMEGA-3 FISH OIL) 1000 MG CAPS Take by mouth, Historical Med      rosuvastatin (CRESTOR) 20 MG tablet Take 1 tablet (20 mg total) by mouth daily at bedtime, Starting Wed 5/12/2021, Normal      tamsulosin (FLOMAX) 0 4 mg Take 1 capsule (0 4 mg total) by mouth daily with dinner, Starting Mon 11/9/2020, Normal           No discharge procedures on file         ED Provider  Electronically Signed by     Arlette Fajardo,   05/17/21 [address] ALEJANDRO, DO  05/18/21 0888

## 2021-05-17 NOTE — ED PROVIDER NOTES
History  Chief Complaint   Patient presents with    Urinary Retention     pt reports of dribbling urine today  pt was here eaer and had an enema and had urinary retention then  Patient was seen here early this morning with fecal impaction and urinary retention  Both areas of concern were relieved  Patient left without Townsend catheter present however states he has not had significant urine output since this morning  He has lower abdominal distention and urinary urgency  No fever          Prior to Admission Medications   Prescriptions Last Dose Informant Patient Reported? Taking?    Ascorbic Acid (VITAMIN C PO)  Self Yes No   Sig: Take 500 mg by mouth daily    B Complex Vitamins (B-COMPLEX/B-12 PO)  Self Yes No   Sig: Take 1 tablet by mouth daily   Calcium 150 MG TABS  Self Yes No   Sig: Take by mouth daily   Cholecalciferol (VITAMIN D) 2000 units CAPS  Self Yes No   Sig: Take 1 capsule by mouth daily   Omega-3 Fatty Acids (OMEGA-3 FISH OIL) 1000 MG CAPS  Self Yes No   Sig: Take by mouth   amLODIPine (NORVASC) 2 5 mg tablet  Self No No   Sig: Take 1 tablet (2 5 mg total) by mouth daily   aspirin (EQ Aspirin Adult Low Dose) 81 mg EC tablet   No No   Sig: Take 1 tablet (81 mg total) by mouth daily with breakfast   dutasteride (AVODART) 0 5 mg capsule  Self No No   Sig: Take 1 capsule by mouth once daily   rosuvastatin (CRESTOR) 20 MG tablet   No No   Sig: Take 1 tablet (20 mg total) by mouth daily at bedtime   tamsulosin (FLOMAX) 0 4 mg  Self No No   Sig: Take 1 capsule (0 4 mg total) by mouth daily with dinner      Facility-Administered Medications: None       Past Medical History:   Diagnosis Date    Arthritis     CAD (coronary artery disease)     Cataract     Colon polyp     GERD (gastroesophageal reflux disease)     Heart attack (HCC)     Herpes zoster     Hx of colonic polyps     Hyperlipidemia     Hypertension        Past Surgical History:   Procedure Laterality Date    BACK SURGERY      disc removed  / R         CATARACT EXTRACTION      COLONOSCOPY      [place]ONARY ARTERY BYPASS GRAFT  [place]    16   & CABG X3 with LIMA to LAD <SVG to OM-2, SVG to PDA  [place] Kapil Whyte / Saman De Paz   14       EYE SURGERY      cataract /  R   Aretha Mário [address]      Groin / R   [address] /  [facility]   [address]          JOINT REPLACEMENT  1985     right hip    [place] TRANSURETHRAL ELEC-SURG PROSTATECTOM N/A 2019    Procedure: CYSTOSCOPY, TRANSURETHRAL RESECTION OF PROSTATE (TURP); Surgeon: Monster Neville MD;  [facility]: [facility];  Service: Urology       Family History   Problem Relation Age of Onset    Coronary artery disease Father     Hyperlipidemia Brother     Sudden death Brother         cardiac    Arthritis Family     Mental illness Neg Hx      I have reviewed and agree with the history as documented  E-Cigarette/Vaping    E-Cigarette Use Never User      E-Cigarette/Vaping Substances    Nicotine No     THC No     CBD No      Social History     Tobacco Use    Smoking status: Former Smoker     Quit date:      Years since quittin 4    Smokeless tobacco: Never Used   Substance Use Topics    Alcohol use: Not Currently    Drug use: No       Review of Systems   Constitutional: Negative for chills and fever  HENT: Negative for congestion and sore throat  Eyes: Negative for photophobia  Respiratory: Negative for cough and shortness of breath  Cardiovascular: Negative for chest pain  Gastrointestinal: Positive for abdominal distention  Negative for vomiting  Genitourinary: Positive for difficulty urinating  Negative for dysuria and hematuria  Musculoskeletal: Negative for back pain  Skin: Negative for rash  Neurological: Negative for headaches  Hematological: Does not bruise/bleed easily  Psychiatric/Behavioral: Negative for confusion     All other systems reviewed and are negative  Physical Exam  Physical Exam  Vitals signs and nursing note reviewed  Constitutional:       Appearance: Normal appearance  HENT:      Head: Normocephalic  Right Ear: External ear normal       Left Ear: External ear normal       Nose: Nose normal       Mouth/Throat:      Pharynx: Oropharynx is clear  Eyes:      Conjunctiva/sclera: Conjunctivae normal    Neck:      Musculoskeletal: Normal range of motion  Cardiovascular:      Rate and Rhythm: Normal rate and regular rhythm  Pulmonary:      Effort: Pulmonary effort is normal    Abdominal:      General: There is distension  Palpations: Abdomen is soft  Genitourinary:     Penis: Normal     Musculoskeletal: Normal range of motion  Skin:     General: Skin is warm and dry  Capillary Refill: Capillary refill takes less than 2 seconds  Neurological:      General: No focal deficit present  Mental Status: He is alert and oriented to person, place, and time     Psychiatric:         Mood and Affect: Mood normal          Behavior: Behavior normal          Vital Signs  ED Triage Vitals [05/17/21 1700]   Temperature Pulse Respirations Blood Pressure SpO2   98 2 °F (36 8 °C) 65 18 120/59 98 %      Temp Source Heart Rate Source Patient Position - Orthostatic VS BP Location FiO2 (%)   Tympanic Monitor -- -- --      Pain Score       --           Vitals:    05/17/21 1700   BP: 120/59   Pulse: 65         Visual Acuity      ED Medications  Medications - No data to display    Diagnostic Studies  Results Reviewed     Procedure Component Value Units Date/Time    Urine Microscopic [[id number]]  (Abnormal) Collected: 05/17/21 1729    Lab Status: Final result Specimen: Urine, Indwelling Townsend Catheter Updated: 05/17/21 1744     RBC, UA Innumerable /hpf      WBC, UA 0-1 /hpf      Epithelial Cells Occasional /hpf      Bacteria, UA Occasional /hpf     UA (URINE) with reflex to Scope [[id number]]  (Abnormal) Collected: 05/17/21 1729    Lab Status: Final result Specimen: Urine, Indwelling Townsend Catheter Updated: 05/17/21 1737     Color, UA Light Yellow     Clarity, UA Clear     Specific Gravity, UA 1 010     pH, UA 7 0     Leukocytes, UA Trace     Nitrite, UA Negative     Protein, UA Negative mg/dl      Glucose, UA Negative mg/dl      Ketones, UA Negative mg/dl      Urobilinogen, UA 0 2 E U /dl      Bilirubin, UA Negative     Blood, UA Large                 No orders to display              Procedures  Procedures         ED Course                                           MDM  Number of Diagnoses or Management Options  Diagnosis management comments: Bedside bladder scan shows almost 800 cc of urine in the bladder  12 [place] Townsend catheter inserted by me with return of clear yellow urine  We will leave the catheter in place until Urology follow-up      Disposition  Final diagnoses:   Urinary retention     Time reflects when diagnosis was documented in both MDM as applicable and the Disposition within this note     Time User Action Codes Description Comment    5/17/2021  5:52 PM Mic Brink Add [R33 9] Urinary retention       ED Disposition     ED Disposition Condition Date/Time Comment    Discharge Stable Mon May 17, 2021  5:52 PM Polina Cornejo discharge to home/self care  Follow-up Information     Follow up With Specialties Details Why Bree Ma MD Urology Schedule an appointment as soon as possible for a visit in 1 day  [address] [place] [address]  [phone]            Patient's Medications   Discharge Prescriptions    No medications on file     No discharge procedures on file      PDMP Review     None          ED Provider  Electronically Signed by           El Suazo MD  05/17/21 9648

## 2021-05-17 NOTE — ED PROVIDER NOTES
History  No chief complaint on file  69-year-old male presents with urinary retention where he is dribbling urine for the past few days making not enough urine as well as leaking stool  Denies any abdominal pain nausea vomiting diarrhea fevers chills dysuria urgency frequency of the symptoms  History of BPH noted  History provided by:  Patient   used: No        Cannot display prior to admission medications because the patient has not been admitted in this contact  Past Medical History:   Diagnosis Date    Arthritis     CAD (coronary artery disease)     Cataract     Colon polyp     GERD (gastroesophageal reflux disease)     Heart attack ([facility] [address] )     Herpes zoster     Hx of colonic polyps     Hyperlipidemia     Hypertension        Past Surgical History:   Procedure Laterality Date    BACK SURGERY      disc removed 1985 / R   1985      CATARACT EXTRACTION      COLONOSCOPY      [place]ONARY ARTERY BYPASS GRAFT  11/13/2014    [place]    6/30/16   & CABG X3 with LIMA to LAD <SVG to OM-2, SVG to PDA  [place] Ariel Brandon / Josafat Going   12/19[place]14       EYE SURGERY      cataract /  R   [address]      Groin / R   [address] [facility] /  [facility]   [address]    2010      JOINT REPLACEMENT  1985     right hip    VT TRANSURETHRAL ELEC-SURG PROSTATECTOM N/A 6/4/2019    Procedure: CYSTOSCOPY, TRANSURETHRAL RESECTION OF PROSTATE (TURP); Surgeon: Padma Neumann MD;  Location: [facility];  Service: Urology       Family History   Problem Relation Age of Onset    Coronary artery disease Father     Hyperlipidemia Brother     Sudden death Brother         cardiac    Arthritis Family     Mental illness Neg Hx      I have reviewed and agree with the history as documented      E-Cigarette/Vaping    E-Cigarette Use Never User      E-Cigarette/Vaping Substances    Nicotine No     THC No     CBD No      Social History     Tobacco Use    Smoking status: Former Smoker     Quit date:      Years since quittin 4    Smokeless tobacco: Never Used   Substance Use Topics    Alcohol use: Not Currently    Drug use: No       Review of Systems   Constitutional: Negative  HENT: Negative  Eyes: Negative  Respiratory: Negative  Cardiovascular: Negative  Gastrointestinal: Positive for diarrhea  Endocrine: Negative  Genitourinary: Positive for difficulty urinating  Musculoskeletal: Negative  Skin: Negative  Allergic/Immunologic: Negative  Neurological: Negative  Hematological: Negative  Psychiatric/Behavioral: Negative  All other systems reviewed and are negative  Physical Exam  Physical Exam  Vitals signs and nursing note reviewed  Constitutional:       Appearance: He is well-developed  HENT:      Head: Normocephalic and atraumatic  Eyes:      Pupils: Pupils are equal, round, and reactive to light  Neck:      Musculoskeletal: Normal range of motion and neck supple  Cardiovascular:      Rate and Rhythm: Normal rate and regular rhythm  Pulmonary:      Effort: Pulmonary effort is normal       Breath sounds: Normal breath sounds  Abdominal:      General: Bowel sounds are normal       Palpations: Abdomen is soft  Genitourinary:     Comments: Fecal impaction noted which I disimpacted  Musculoskeletal: Normal range of motion  Skin:     General: Skin is warm and dry  Neurological:      Mental Status: He is alert and oriented to person, place, and time  Vital Signs  ED Triage Vitals   Temp Pulse Resp BP SpO2   -- -- -- -- --      Temp src Heart Rate Source Patient Position - Orthostatic VS BP Location FiO2 (%)   -- -- -- -- --      Pain Score       --           There were no vitals filed for this visit        Visual Acuity      ED Medications  Medications - No data to display    Diagnostic Studies  Results Reviewed     None                 No orders to display Procedures  Procedures         ED Course                                           MDM  Number of Diagnoses or Management Options  Constipation:   Fecal impaction in rectum [facility]):   Urinary retention:   Diagnosis management comments: Spoke to Dr Annabel Trotter the urologist   He recommended no catheter at this time but to straight cath and release the patient who will wait on his own because the fecal impaction has been disimpacted he was also given enema in the ED  Labs pending care transitioned to Dr Minna Sosa and/or Complexity of Data Reviewed  Clinical lab tests: ordered  Tests in the medicine section of CPT®: ordered    Patient Progress  Patient progress: stable      Disposition  Final diagnoses:   None     ED Disposition     None      Follow-up Information    None         Patient's Medications   Discharge Prescriptions    No medications on file     No discharge procedures on file      PDMP Review     None          ED Provider  Electronically Signed by           Tomie Ahumada, DO  05/18/21 6603

## 2021-05-17 NOTE — TELEPHONE ENCOUNTER
Patient stated that her  was in the ER for Impaction and urinary retention  Patient wife stated that patient has not urinated since this morning  ER note states that if patient has not urinated patient is to return to the ER  I double checked with Dr Varun Redding he stated to tell Vin Durand to return with patient to the hospital to have a catheter placed  appt was also made for 5/24/2021  Wife ok with returning to ER      Omaira Nielson MA

## 2021-05-18 ENCOUNTER — HOSPITAL ENCOUNTER (OUTPATIENT)
Facility: HOSPITAL | Age: 81
Setting detail: OBSERVATION
Discharge: HOME | End: 2021-05-19
Attending: EMERGENCY MEDICINE | Admitting: FAMILY MEDICINE
Payer: MEDICARE

## 2021-05-18 ENCOUNTER — ANESTHESIA (OUTPATIENT)
Dept: PERIOP | Facility: HOSPITAL | Age: 81
End: 2021-05-18
Payer: MEDICARE

## 2021-05-18 ENCOUNTER — ANESTHESIA EVENT (OUTPATIENT)
Dept: PERIOP | Facility: HOSPITAL | Age: 81
End: 2021-05-18
Payer: MEDICARE

## 2021-05-18 DIAGNOSIS — R31.9 HEMATURIA: Primary | ICD-10-CM

## 2021-05-18 DIAGNOSIS — R31.0 GROSS HEMATURIA: ICD-10-CM

## 2021-05-18 DIAGNOSIS — R33.9 URINARY RETENTION: ICD-10-CM

## 2021-05-18 DIAGNOSIS — D64.9 ANEMIA: ICD-10-CM

## 2021-05-18 DIAGNOSIS — Z90.79 S/P TURP: ICD-10-CM

## 2021-05-18 PROBLEM — D72.829 LEUKOCYTOSIS: Status: RESOLVED | Noted: 2021-05-18 | Resolved: 2021-05-18

## 2021-05-18 PROBLEM — D69.6 THROMBOCYTOPENIA (HCC): Status: ACTIVE | Noted: 2021-05-18

## 2021-05-18 PROBLEM — N40.0 BPH (BENIGN PROSTATIC HYPERPLASIA): Status: ACTIVE | Noted: 2021-05-18

## 2021-05-18 PROBLEM — D72.829 LEUKOCYTOSIS: Status: ACTIVE | Noted: 2021-05-18

## 2021-05-18 LAB
ANION GAP SERPL CALCULATED.3IONS-SCNC: 10 MMOL/L (ref 4–13)
APTT PPP: 41 SECONDS (ref 23–37)
BASOPHILS # BLD MANUAL: 0 THOUSAND/UL (ref 0–0.1)
BASOPHILS NFR MAR MANUAL: 0 % (ref 0–1)
BUN SERPL-MCNC: 19 MG/DL (ref 5–25)
CALCIUM SERPL-MCNC: 8.7 MG/DL (ref 8.3–10.1)
CHLORIDE SERPL-SCNC: 101 MMOL/L (ref 100–108)
CO2 SERPL-SCNC: 26 MMOL/L (ref 21–32)
CREAT SERPL-MCNC: 0.94 MG/DL (ref 0.6–1.3)
EOSINOPHIL # BLD MANUAL: 0 THOUSAND/UL (ref 0–0.4)
EOSINOPHIL NFR BLD MANUAL: 0 % (ref 0–6)
ERYTHROCYTE [DISTWIDTH] IN BLOOD BY AUTOMATED COUNT: 12.7 % (ref 11.6–15.1)
FERRITIN SERPL-MCNC: 271 NG/ML (ref 8–388)
GFR SERPL CREATININE-BSD FRML MDRD: 75 ML/MIN/1.73SQ M
GLUCOSE SERPL-MCNC: 103 MG/DL (ref 65–140)
HCT VFR BLD AUTO: 32.9 % (ref 36.5–49.3)
HCT VFR BLD AUTO: 34.4 % (ref 36.5–49.3)
HGB BLD-MCNC: 11 G/DL (ref 12–17)
HGB BLD-MCNC: 11.5 G/DL (ref 12–17)
INR PPP: 1.19 (ref 0.84–1.19)
IRON SATN MFR SERPL: 10 %
IRON SERPL-MCNC: 19 UG/DL (ref 65–175)
LYMPHOCYTES # BLD AUTO: 0.81 THOUSAND/UL (ref 0.6–4.47)
LYMPHOCYTES # BLD AUTO: 7 % (ref 14–44)
MCH RBC QN AUTO: 34.3 PG (ref 26.8–34.3)
MCHC RBC AUTO-ENTMCNC: 33.4 G/DL (ref 31.4–37.4)
MCV RBC AUTO: 103 FL (ref 82–98)
MONOCYTES # BLD AUTO: 1.16 THOUSAND/UL (ref 0–1.22)
MONOCYTES NFR BLD: 10 % (ref 4–12)
NEUTROPHILS # BLD MANUAL: 9.59 THOUSAND/UL (ref 1.85–7.62)
NEUTS BAND NFR BLD MANUAL: 5 % (ref 0–8)
NEUTS SEG NFR BLD AUTO: 78 % (ref 43–75)
NRBC BLD AUTO-RTO: 0 /100 WBCS
PLATELET # BLD AUTO: 145 THOUSANDS/UL (ref 149–390)
PLATELET BLD QL SMEAR: ABNORMAL
PMV BLD AUTO: 9.8 FL (ref 8.9–12.7)
POLYCHROMASIA BLD QL SMEAR: PRESENT
POTASSIUM SERPL-SCNC: 3.6 MMOL/L (ref 3.5–5.3)
PROTHROMBIN TIME: 15 SECONDS (ref 11.6–14.5)
RBC # BLD AUTO: 3.35 MILLION/UL (ref 3.88–5.62)
RBC MORPH BLD: PRESENT
SARS-COV-2 RNA RESP QL NAA+PROBE: NEGATIVE
SODIUM SERPL-SCNC: 137 MMOL/L (ref 136–145)
TIBC SERPL-MCNC: 190 UG/DL (ref 250–450)
TOTAL CELLS COUNTED SPEC: 100
VIT B12 SERPL-MCNC: 878 PG/ML (ref 100–900)
WBC # BLD AUTO: 11.56 THOUSAND/UL (ref 4.31–10.16)

## 2021-05-18 PROCEDURE — 80048 BASIC METABOLIC PNL TOTAL CA: CPT | Performed by: PHYSICIAN ASSISTANT

## 2021-05-18 PROCEDURE — 85610 PROTHROMBIN TIME: CPT | Performed by: PHYSICIAN ASSISTANT

## 2021-05-18 PROCEDURE — 83540 ASSAY OF IRON: CPT | Performed by: NURSE PRACTITIONER

## 2021-05-18 PROCEDURE — 1124F ACP DISCUSS-NO DSCNMKR DOCD: CPT | Performed by: PHYSICIAN ASSISTANT

## 2021-05-18 PROCEDURE — 85018 HEMOGLOBIN: CPT | Performed by: FAMILY MEDICINE

## 2021-05-18 PROCEDURE — U0003 INFECTIOUS AGENT DETECTION BY NUCLEIC ACID (DNA OR RNA); SEVERE ACUTE RESPIRATORY SYNDROME CORONAVIRUS 2 (SARS-COV-2) (CORONAVIRUS DISEASE [COVID-19]), AMPLIFIED PROBE TECHNIQUE, MAKING USE OF HIGH THROUGHPUT TECHNOLOGIES AS DESCRIBED BY CMS-2020-01-R: HCPCS | Performed by: PHYSICIAN ASSISTANT

## 2021-05-18 PROCEDURE — 83550 IRON BINDING TEST: CPT | Performed by: NURSE PRACTITIONER

## 2021-05-18 PROCEDURE — 99284 EMERGENCY DEPT VISIT MOD MDM: CPT

## 2021-05-18 PROCEDURE — 85014 HEMATOCRIT: CPT | Performed by: FAMILY MEDICINE

## 2021-05-18 PROCEDURE — 93005 ELECTROCARDIOGRAM TRACING: CPT

## 2021-05-18 PROCEDURE — 85027 COMPLETE CBC AUTOMATED: CPT | Performed by: PHYSICIAN ASSISTANT

## 2021-05-18 PROCEDURE — 99220 PR INITIAL OBSERVATION CARE/DAY 70 MINUTES: CPT | Performed by: FAMILY MEDICINE

## 2021-05-18 PROCEDURE — 85730 THROMBOPLASTIN TIME PARTIAL: CPT | Performed by: PHYSICIAN ASSISTANT

## 2021-05-18 PROCEDURE — 99285 EMERGENCY DEPT VISIT HI MDM: CPT | Performed by: PHYSICIAN ASSISTANT

## 2021-05-18 PROCEDURE — 82728 ASSAY OF FERRITIN: CPT | Performed by: NURSE PRACTITIONER

## 2021-05-18 PROCEDURE — 85007 BL SMEAR W/DIFF WBC COUNT: CPT | Performed by: PHYSICIAN ASSISTANT

## 2021-05-18 PROCEDURE — 36415 COLL VENOUS BLD VENIPUNCTURE: CPT | Performed by: PHYSICIAN ASSISTANT

## 2021-05-18 PROCEDURE — 82607 VITAMIN B-12: CPT | Performed by: NURSE PRACTITIONER

## 2021-05-18 PROCEDURE — U0005 INFEC AGEN DETEC AMPLI PROBE: HCPCS | Performed by: PHYSICIAN ASSISTANT

## 2021-05-18 RX ORDER — FENTANYL CITRATE/PF 50 MCG/ML
25 SYRINGE (ML) INJECTION
Status: DISCONTINUED | OUTPATIENT
Start: 2021-05-18 | End: 2021-05-18 | Stop reason: HOSPADM

## 2021-05-18 RX ORDER — SUCCINYLCHOLINE/SOD CL,ISO/PF 100 MG/5ML
SYRINGE (ML) INTRAVENOUS AS NEEDED
Status: DISCONTINUED | OUTPATIENT
Start: 2021-05-18 | End: 2021-05-18

## 2021-05-18 RX ORDER — CEFAZOLIN SODIUM 1 G/50ML
1000 SOLUTION INTRAVENOUS EVERY 8 HOURS
Status: COMPLETED | OUTPATIENT
Start: 2021-05-19 | End: 2021-05-19

## 2021-05-18 RX ORDER — ONDANSETRON 2 MG/ML
4 INJECTION INTRAMUSCULAR; INTRAVENOUS EVERY 6 HOURS PRN
Status: DISCONTINUED | OUTPATIENT
Start: 2021-05-18 | End: 2021-05-19 | Stop reason: HOSPADM

## 2021-05-18 RX ORDER — MELATONIN
1000 DAILY
Status: DISCONTINUED | OUTPATIENT
Start: 2021-05-19 | End: 2021-05-19 | Stop reason: HOSPADM

## 2021-05-18 RX ORDER — SODIUM CHLORIDE 9 MG/ML
75 INJECTION, SOLUTION INTRAVENOUS CONTINUOUS
Status: DISCONTINUED | OUTPATIENT
Start: 2021-05-18 | End: 2021-05-19 | Stop reason: HOSPADM

## 2021-05-18 RX ORDER — PROPOFOL 10 MG/ML
INJECTION, EMULSION INTRAVENOUS AS NEEDED
Status: DISCONTINUED | OUTPATIENT
Start: 2021-05-18 | End: 2021-05-18

## 2021-05-18 RX ORDER — POLYETHYLENE GLYCOL 3350 17 G/17G
17 POWDER, FOR SOLUTION ORAL DAILY PRN
Status: DISCONTINUED | OUTPATIENT
Start: 2021-05-18 | End: 2021-05-19 | Stop reason: HOSPADM

## 2021-05-18 RX ORDER — DEXAMETHASONE SODIUM PHOSPHATE 4 MG/ML
INJECTION, SOLUTION INTRA-ARTICULAR; INTRALESIONAL; INTRAMUSCULAR; INTRAVENOUS; SOFT TISSUE AS NEEDED
Status: DISCONTINUED | OUTPATIENT
Start: 2021-05-18 | End: 2021-05-18

## 2021-05-18 RX ORDER — ACETAMINOPHEN 325 MG/1
650 TABLET ORAL EVERY 6 HOURS SCHEDULED
Status: DISCONTINUED | OUTPATIENT
Start: 2021-05-18 | End: 2021-05-19 | Stop reason: HOSPADM

## 2021-05-18 RX ORDER — ONDANSETRON 2 MG/ML
INJECTION INTRAMUSCULAR; INTRAVENOUS AS NEEDED
Status: DISCONTINUED | OUTPATIENT
Start: 2021-05-18 | End: 2021-05-18

## 2021-05-18 RX ORDER — ACETAMINOPHEN 325 MG/1
650 TABLET ORAL EVERY 6 HOURS SCHEDULED
Status: DISCONTINUED | OUTPATIENT
Start: 2021-05-18 | End: 2021-05-18 | Stop reason: SDUPTHER

## 2021-05-18 RX ORDER — CALCIUM CARBONATE 200(500)MG
1000 TABLET,CHEWABLE ORAL DAILY PRN
Status: DISCONTINUED | OUTPATIENT
Start: 2021-05-18 | End: 2021-05-19 | Stop reason: HOSPADM

## 2021-05-18 RX ORDER — CEFAZOLIN SODIUM 2 G/50ML
2000 SOLUTION INTRAVENOUS
Status: DISCONTINUED | OUTPATIENT
Start: 2021-05-18 | End: 2021-05-19 | Stop reason: HOSPADM

## 2021-05-18 RX ORDER — FENTANYL CITRATE 50 UG/ML
INJECTION, SOLUTION INTRAMUSCULAR; INTRAVENOUS AS NEEDED
Status: DISCONTINUED | OUTPATIENT
Start: 2021-05-18 | End: 2021-05-18

## 2021-05-18 RX ORDER — TAMSULOSIN HYDROCHLORIDE 0.4 MG/1
0.4 CAPSULE ORAL
Status: DISCONTINUED | OUTPATIENT
Start: 2021-05-18 | End: 2021-05-19 | Stop reason: HOSPADM

## 2021-05-18 RX ORDER — ONDANSETRON 2 MG/ML
4 INJECTION INTRAMUSCULAR; INTRAVENOUS ONCE AS NEEDED
Status: DISCONTINUED | OUTPATIENT
Start: 2021-05-18 | End: 2021-05-18 | Stop reason: HOSPADM

## 2021-05-18 RX ORDER — PRAVASTATIN SODIUM 40 MG
40 TABLET ORAL
Status: DISCONTINUED | OUTPATIENT
Start: 2021-05-18 | End: 2021-05-19 | Stop reason: HOSPADM

## 2021-05-18 RX ORDER — ATROPA BELLADONNA AND OPIUM 16.2; 3 MG/1; MG/1
30 SUPPOSITORY RECTAL EVERY 8 HOURS PRN
Status: DISCONTINUED | OUTPATIENT
Start: 2021-05-18 | End: 2021-05-19 | Stop reason: HOSPADM

## 2021-05-18 RX ORDER — AMLODIPINE BESYLATE 2.5 MG/1
2.5 TABLET ORAL DAILY
Status: DISCONTINUED | OUTPATIENT
Start: 2021-05-19 | End: 2021-05-19 | Stop reason: HOSPADM

## 2021-05-18 RX ORDER — FINASTERIDE 5 MG/1
5 TABLET, FILM COATED ORAL DAILY
Status: DISCONTINUED | OUTPATIENT
Start: 2021-05-19 | End: 2021-05-19 | Stop reason: HOSPADM

## 2021-05-18 RX ORDER — DOCUSATE SODIUM 100 MG/1
100 CAPSULE, LIQUID FILLED ORAL 2 TIMES DAILY
Status: DISCONTINUED | OUTPATIENT
Start: 2021-05-18 | End: 2021-05-19 | Stop reason: HOSPADM

## 2021-05-18 RX ORDER — ACETAMINOPHEN 325 MG/1
650 TABLET ORAL EVERY 6 HOURS PRN
Status: DISCONTINUED | OUTPATIENT
Start: 2021-05-18 | End: 2021-05-19 | Stop reason: HOSPADM

## 2021-05-18 RX ORDER — GLYCINE 1.5 G/100ML
SOLUTION IRRIGATION AS NEEDED
Status: DISCONTINUED | OUTPATIENT
Start: 2021-05-18 | End: 2021-05-18 | Stop reason: HOSPADM

## 2021-05-18 RX ORDER — DOCUSATE SODIUM 100 MG/1
100 CAPSULE, LIQUID FILLED ORAL 2 TIMES DAILY
Status: DISCONTINUED | OUTPATIENT
Start: 2021-05-18 | End: 2021-05-18 | Stop reason: SDUPTHER

## 2021-05-18 RX ORDER — ONDANSETRON 2 MG/ML
4 INJECTION INTRAMUSCULAR; INTRAVENOUS EVERY 6 HOURS PRN
Status: DISCONTINUED | OUTPATIENT
Start: 2021-05-18 | End: 2021-05-18 | Stop reason: SDUPTHER

## 2021-05-18 RX ADMIN — Medication 100 MG: at 16:37

## 2021-05-18 RX ADMIN — PRAVASTATIN SODIUM 40 MG: 40 TABLET ORAL at 18:32

## 2021-05-18 RX ADMIN — FENTANYL CITRATE 50 MCG: 50 INJECTION, SOLUTION INTRAMUSCULAR; INTRAVENOUS at 16:37

## 2021-05-18 RX ADMIN — PROPOFOL 120 MG: 10 INJECTION, EMULSION INTRAVENOUS at 16:37

## 2021-05-18 RX ADMIN — ACETAMINOPHEN 650 MG: 325 TABLET, FILM COATED ORAL at 18:32

## 2021-05-18 RX ADMIN — TAMSULOSIN HYDROCHLORIDE 0.4 MG: 0.4 CAPSULE ORAL at 18:32

## 2021-05-18 RX ADMIN — ONDANSETRON 4 MG: 2 INJECTION INTRAMUSCULAR; INTRAVENOUS at 16:40

## 2021-05-18 RX ADMIN — FENTANYL CITRATE 50 MCG: 50 INJECTION, SOLUTION INTRAMUSCULAR; INTRAVENOUS at 16:40

## 2021-05-18 RX ADMIN — DEXAMETHASONE SODIUM PHOSPHATE 4 MG: 4 INJECTION, SOLUTION INTRA-ARTICULAR; INTRALESIONAL; INTRAMUSCULAR; INTRAVENOUS; SOFT TISSUE at 16:40

## 2021-05-18 RX ADMIN — CEFAZOLIN SODIUM 2000 MG: 2 SOLUTION INTRAVENOUS at 16:37

## 2021-05-18 RX ADMIN — SODIUM CHLORIDE 75 ML/HR: 0.9 INJECTION, SOLUTION INTRAVENOUS at 15:29

## 2021-05-18 RX ADMIN — SODIUM CHLORIDE 75 ML/HR: 0.9 INJECTION, SOLUTION INTRAVENOUS at 20:32

## 2021-05-18 NOTE — ANESTHESIA POSTPROCEDURE EVALUATION
Post-Op Assessment Note    CV Status:  Stable    Pain management: adequate     Mental Status:  Alert and awake   Hydration Status:  Euvolemic   PONV Controlled:  Controlled   Airway Patency:  Patent      Post Op Vitals Reviewed: Yes      Staff: Anesthesiologist         No complications documented      BP   134/76   Temp      Pulse 77   Resp   14   SpO2   99

## 2021-05-18 NOTE — ASSESSMENT & PLAN NOTE
· Hemoglobin 11 5 with a baseline near 12  · Check B12, folate, iron panel  · Monitor hemoglobin closely given hematuria

## 2021-05-18 NOTE — ASSESSMENT & PLAN NOTE
· Patient presented to ED 5/17 am where he was found to have urinary retention and fecal impaction  Disimpacted with enema  Urinary retention relieved with straight catheterization  Patient discharged home without catheter  · Patient return to ED 5/17 pm where he was found to have ongoing urinary retention  A Townsend catheter was inserted  Patient was discharged home with catheter and plans to follow-up with urology    · Patient was evaluated by Urology on 05/18 where he was noted to have hematuria and clots  · Per Urology, patient to undergo cystoscopy this afternoon  · Continue Avodart and Flomax  · Gentle IV hydration  · NPO  · Monitor hemoglobin, I&O

## 2021-05-18 NOTE — OP NOTE
OPERATIVE REPORT- Dr Radha Galvin  PATIENT NAME: Colonel Ledesma    :  1940  MRN: [id number]  Pt Location: [place] OR ROOM 04    SURGERY DATE: 2021    Surgeon: Smita Damon MD    Pre-op Diagnosis:  1  Gross hematuria  2  Urinary retention    Post-op Diagnosis:  1  Same  2  Bulbar urethral stricture    Procedure:  1  Cystoscopy with clot evacuation  2  Fulguration of prostate bleeding  3  Difficult Townsend    Specimen(s): * No specimens in log *    Estimated Blood Loss:  011 mL    Complications: None    Drains:  [address] [place] Townsend continuous irrigation of normal saline    Anesthesia type: Gen endotracheal    Indications for surgery:  62-year-old gentleman with urinary retention presumably due to constipation the other day  He had a Townsend placed to drain the bladder then was discharged to home and came back because he could not pee and had a Townsend placed  The wife stated that there was good urine output but it has been bloody coming out around the catheter  He was seen in the office today  The Townsend catheter was removed and after a few hours the patient was uncomfortable and passing blood from his penis again unable to void  Cystoscopy was done and there was a good number of clot within the bladder which was irrigated out over about 45 minutes  I tried to place a Townsend catheter to alleviate his retention  He was not constipated at all on rectal examination  I was unable to get a Townsend catheter in even with a catheter guide so he is being brought to the operating room today for cystoscopy clot evacuation and fulguration of bleeding and in order to get a catheter in place  Findings:  The prostatic urethra was filled with clot and was apparently status post significant trauma  Possibly the catheter was inflated within the prostatic urethra but having attempted catheters myself today it is possible that it was related to the catheter placements  No one can be sure      Procedure and Technique:   After obtaining consent and identifying the patient, antibiotics were given as ordered and the patient was brought to the room  All appropriate leads and monitors were placed and the patient was appropriately positioned on the table  Anesthesia was administered and the patient was sterilely prepped and draped  A timeout was performed where the patient name, , procedure, antibiotics, allergies, etc  were discussed  All in the room were satisfied before the start of the operative procedure  What follows are the operative findings and events  Cystoscopy was undertaken with a 32 [place]ian continuous flow sheath and a direct visualizing obturator  There was a bulbar urethral stricture which was passed through and the prostate was found irregular  The bladder was filled with some clot this was hand irrigated out with an Packetmotion evacuator  There were no lesions within the bladder  No stones  A rollerball was used to fulgurate the prostatic urethra  There was good hemostasis at the end  I attempted to place a 21 [place]ian catheter with a catheter guide with no luck  I then passed the 21 [place] three-way directly through the bulbar stricture  The balloon was inflated with 20 mL of sterile water and set to gravity drainage  Continuous irrigation was established  The procedure was terminated and the patient was awakened without incident and transferred to the PACU in satisfactory condition  Plan:  1  He will remain in hospital overnight and hopefully can get discharged home with the catheter and have her removed in a week when the bleeding has stopped and things have healed  He may need to hold his aspirin and fish oil for the next week to allow this to heal     SIGNATURE: Anuj Dominguez MD  DATE: May 18, 2021  TIME: 5:09 PM    Portions of the record may have been created with voice recognition software    Occasional wrong word or "sound alike" substitutions may have occurred due to the inherent limitations of voice recognition software  Read the chart carefully and recognize, using context, where substitutions have occurred

## 2021-05-18 NOTE — H&P
Radha 44  H&P- Polina Cornejo 1940, 79 y o  male MRN: [id number]  Unit/Bed#: ED 06 Encounter: [id number]  Primary Care Provider: Cindy Escalante DO   Date and time admitted to hospital: 5/18/2021  2:36 PM    * Urinary retention  Assessment & Plan  · Patient presented to ED 5/17 am where he was found to have urinary retention and fecal impaction  Disimpacted with enema  Urinary retention relieved with straight catheterization  Patient discharged home without catheter  · Patient return to ED 5/17 pm where he was found to have ongoing urinary retention  A Townsend catheter was inserted  Patient was discharged home with catheter and plans to follow-up with urology  · Patient was evaluated by Urology on 05/18 where he was noted to have hematuria and clots  · Per Urology, patient to undergo cystoscopy this afternoon  · Continue Avodart and Flomax  · Gentle IV hydration  · NPO  · Monitor hemoglobin, I&O    Gross hematuria  Assessment & Plan  · Patient found to have urinary retention this week    He was seen by his urologist earlier in the day and was noted to have hematuria with clots for which is urologist recommended medical attention in the emergency department  · Plan for cystoscopy this afternoon  · Gentle IV hydration  · Monitor hemoglobin  · Hold DVT prophylaxis  · SCDs only    BPH (benign prostatic hyperplasia)  Assessment & Plan  · Continue on Flomax and Avodart  · Urology following    Thrombocytopenia ([facility] [address] )  Assessment & Plan  · Platelets 684, trend counts daily    Anemia  Assessment & Plan  · Hemoglobin 11 5 with a baseline near 12  · Check B12, folate, iron panel  · Monitor hemoglobin closely given hematuria    Leukocytosis  Assessment & Plan  · WBC 11 6, likely reactive  · UA from yesterday without signs of infection  · Monitor fever curve and CBC with diff    Hypertension  Assessment & Plan  · Blood pressure stable  · Continue Norvasc 2 5 mg daily    VTE Prophylaxis: Pharmacologic VTE Prophylaxis contraindicated due to hematuria   / sequential compression device   Code Status: full code, level 1  POLST: POLST form is not discussed and not completed at this time  Discussion with family: wife at bedside     Anticipated Length of Stay:  Patient will be admitted on an Observation basis with an anticipated length of stay of  Less than 2 midnights  Justification for Hospital Stay: hematuria, cystoscopy this afternoon     Total Time for Visit, including Counseling / Coordination of Care: 60 minutes  Greater than 50% of this total time spent on direct patient counseling and coordination of care  Chief Complaint:   Hematuria, urinary retention     History of Present Illness:    Rodrigo Song is a 79 y o  male with a PMH including who presents after recommendation by his urologist   Patient was evaluated at [address] [place] [address] ED on 05/17 in the morning for urinary retention where he was dribbling urine for the past few days and not making enough urine as well as leaking stool  Patient was given an enema for disimpaction and straight cath to release urine after discussion with urologist   Patient was discharged from the ED without a mayorga catheter  Patient return to the ED in the evening due to decreased urine output associated with abdominal distension and urinary urgency  At that time bladder scan showed 800 mL of urine in the bladder and a 16 [place]ian mayorga catheter was inserted with return of clear yellow urine  Patient was discharged from the emergency department with catheter in place with urology follow-up  Patient was seen by his urologist earlier today who noted hematuria with clots and recommended returning to the emergency department for evaluation  Patient will be admitted for cystoscopy later this afternoon  Patient is without any other complaints  Patient denies headache, dizziness, lightheadedness, chest pain, shortness of breath, nausea, vomiting      Review of Systems:    Review of Systems   Constitutional: Negative for chills and fever  HENT: Negative for ear pain and sore throat  Eyes: Negative for pain and visual disturbance  Respiratory: Negative for cough and shortness of breath  Cardiovascular: Negative for chest pain and palpitations  Gastrointestinal: Negative for abdominal pain and vomiting  Genitourinary: Positive for difficulty urinating and hematuria  Negative for dysuria  Musculoskeletal: Negative for arthralgias and back pain  Skin: Negative for color change and rash  Neurological: Negative for seizures and syncope  All other systems reviewed and are negative  Past Medical and Surgical History:     Past Medical History:   Diagnosis Date    Arthritis     CAD (coronary artery disease)     Cataract     Colon polyp     GERD (gastroesophageal reflux disease)     [address] [facility] [address] [address]     Herpes zoster     Hx of colonic polyps     Hyperlipidemia     Hypertension        Past Surgical History:   Procedure Laterality Date    BACK SURGERY      disc removed 1985 / R   1985      CATARACT EXTRACTION      COLONOSCOPY      [place]ONARY ARTERY BYPASS GRAFT  [place]13/2014    [place]    6/30/16   & CABG X3 with LIMA to LAD <SVG to OM-2, SVG to PDA  [place] Roz Umana / Leeanna Pressley   12/19[place]14       EYE SURGERY      cataract /  R   [address]      Groin / R   [address] [facility] /  [place]   [address]    [address]      JOINT REPLACEMENT  1985     right hip    [place] TRANSURETHRAL ELEC-SURG PROSTATECTOM N/A 6/4/2019    Procedure: CYSTOSCOPY, TRANSURETHRAL RESECTION OF PROSTATE (TURP); Surgeon: Mari Neri MD;  Location: [facility];  Service: Urology       Meds/Allergies:    Prior to Admission medications    Medication Sig Start Date End Date Taking?  Authorizing Provider   amLODIPine (NORVASC) 2 5 mg tablet Take 1 tablet (2 5 mg total) by mouth daily 9/30/20   Poncho Albert,  Ascorbic Acid (VITAMIN C PO) Take 500 mg by mouth daily     Historical Provider, MD   aspirin (EQ Aspirin Adult Low Dose) 81 mg EC tablet Take 1 tablet (81 mg total) by mouth daily with breakfast 21   Lisa Murray MD   B Complex Vitamins (B-COMPLEX/B-12 PO) Take 1 tablet by mouth daily    Historical Provider, MD   Calcium 150 MG TABS Take by mouth daily    Historical Provider, MD   Cholecalciferol (VITAMIN D) 2000 units CAPS Take 1 capsule by mouth daily 6/25/15   Roddy Provider, MD   dutasteride (AVODART) 0 5 mg capsule Take 1 capsule by mouth once daily 3/17/21   Nadeem Kaufman DO   Omega-3 Fatty Acids (OMEGA-3 FISH OIL) 1000 MG CAPS Take by mouth    Historical Provider, MD   rosuvastatin (CRESTOR) 20 MG tablet Take 1 tablet (20 mg total) by mouth daily at bedtime 21   Lisa Murray MD   tamsulosin [facility]) 0 4 mg Take 1 capsule (0 4 mg total) by mouth daily with dinner 20   Nadeem Kaufman DO     I have reviewed home medications with patient personally      Allergies: No Known Allergies    Social History:     Marital Status: /Civil Union   Occupation: retired   Patient Pre-hospital Living Situation: home with wife  Patient Pre-hospital Level of Mobility: independent   Patient Pre-hospital Diet Restrictions: none   Substance Use History:   Social History     Substance and Sexual Activity   Alcohol Use Not Currently     Social History     Tobacco Use   Smoking Status Former Smoker    Quit date: 5    Years since quittin 4   Smokeless Tobacco Never Used     Social History     Substance and Sexual Activity   Drug Use No       Family History:    Family History   Problem Relation Age of Onset    Coronary artery disease Father     Hyperlipidemia Brother     Sudden death Brother         cardiac    Arthritis Family     Mental illness Neg Hx        Physical Exam:     Vitals:   Blood Pressure: 157/69 (21 1433)  Pulse: 67 (21 1433)  Temperature: 97 6 °F (36 4 °C) (05/18/21 1433)  Temp Source: Tympanic (05/18/21 1433)  Respirations: 18 (05/18/21 1433)  Weight - Scale: 71 8 kg (158 lb 3 2 oz) (05/18/21 1433)  SpO2: 98 % (05/18/21 1433)    Physical Exam  Vitals signs and nursing note reviewed  Constitutional:       General: He is not in acute distress  Appearance: He is well-developed  He is not ill-appearing, toxic-appearing or diaphoretic  Comments: Pleasant, talkative gentleman resting in bed on room air    HENT:      Head: Normocephalic and atraumatic  Eyes:      Conjunctiva/sclera: Conjunctivae normal    Neck:      Musculoskeletal: Normal range of motion and neck supple  Cardiovascular:      Rate and Rhythm: Normal rate and regular rhythm  Heart sounds: No murmur  Pulmonary:      Effort: Pulmonary effort is normal  No respiratory distress  Breath sounds: Normal breath sounds  No wheezing, rhonchi or rales  Abdominal:      General: Bowel sounds are normal  There is no distension  Palpations: Abdomen is soft  Tenderness: There is no abdominal tenderness  There is no right CVA tenderness, left CVA tenderness or guarding  Musculoskeletal: Normal range of motion  Right lower leg: No edema  Left lower leg: No edema  Skin:     General: Skin is warm and dry  Capillary Refill: Capillary refill takes less than 2 seconds  Neurological:      Mental Status: He is alert and oriented to person, place, and time  Mental status is at baseline  Psychiatric:         Mood and Affect: Mood normal          Behavior: Behavior normal          Thought Content: Thought content normal          Judgment: Judgment normal              Additional Data:     Lab Results: I have personally reviewed pertinent reports        Results from last 7 days   Lab Units 05/18/21  1455   WBC Thousand/uL 11 56*   HEMOGLOBIN g/dL 11 5*   HEMATOCRIT % 34 4*   PLATELETS Thousands/uL 145*   BANDS PCT % 5   LYMPHO PCT % 7*   MONO PCT % 10   EOS PCT % 0     Results from last 7 days   Lab Units 05/18/21  1455   SODIUM mmol/L 137   POTASSIUM mmol/L 3 6   CHLORIDE mmol/L 101   CO2 mmol/L 26   BUN mg/dL 19   CREATININE mg/dL 0 94   ANION GAP mmol/L 10   CALCIUM mg/dL 8 7   GLUCOSE RANDOM mg/dL 103     Results from last 7 days   Lab Units 05/18/21  1455   INR  1 19                   Imaging: I have personally reviewed pertinent reports  No orders to display       EKG, Pathology, and Other Studies Reviewed on Admission:   · EKG: NSR    Allscripts / Epic Records Reviewed: Yes     ** Please Note: This note has been constructed using a voice recognition system   **

## 2021-05-18 NOTE — ED PROVIDER NOTES
History  Chief Complaint   Patient presents with    Blood in Urine     States he has blood and clots in urine and Dr Radha Galvin will be doing surgery to fix a " tear in there "  Last ate 9 am eggs, toast and water  79year old male hx HTN, HLD, CAD, BPH presents with difficulty urinating x2 days  He was seen in the ER twice yesterday for urinary retention/difficulty urinating  Initially he had a straight cath done to help with urination, however returned due to increased urinary retention and had a catheter placed at second visit  He saw urology today who removed the catheter which only emitted gross hematuria and clots  He is being taken to the OR later today by urology  No fever, chills, chest pain, difficulty breathing, shortness of breath  No abdominal pain, nausea, vomiting, diarrhea, constipation  No headache, dizziness, lightheadedness, weakness  Prior to Admission Medications   Prescriptions Last Dose Informant Patient Reported? Taking?    Ascorbic Acid (VITAMIN C PO)  Self Yes No   Sig: Take 500 mg by mouth daily    B Complex Vitamins (B-COMPLEX/B-12 PO)  Self Yes No   Sig: Take 1 tablet by mouth daily   Calcium 150 MG TABS  Self Yes No   Sig: Take by mouth daily   Cholecalciferol (VITAMIN D) 2000 units CAPS  Self Yes No   Sig: Take 1 capsule by mouth daily   Omega-3 Fatty Acids (OMEGA-3 FISH OIL) 1000 MG CAPS  Self Yes No   Sig: Take by mouth   amLODIPine (NORVASC) 2 5 mg tablet  Self No No   Sig: Take 1 tablet (2 5 mg total) by mouth daily   aspirin (EQ Aspirin Adult Low Dose) 81 mg EC tablet   No No   Sig: Take 1 tablet (81 mg total) by mouth daily with breakfast   dutasteride (AVODART) 0 5 mg capsule  Self No No   Sig: Take 1 capsule by mouth once daily   rosuvastatin (CRESTOR) 20 MG tablet   No No   Sig: Take 1 tablet (20 mg total) by mouth daily at bedtime   tamsulosin (FLOMAX) 0 4 mg  Self No No   Sig: Take 1 capsule (0 4 mg total) by mouth daily with dinner      Facility-Administered Medications: None       Past Medical History:   Diagnosis Date    Arthritis     CAD (coronary artery disease)     Cataract     Colon polyp     GERD (gastroesophageal reflux disease)     Heart attack (HCC)     Herpes zoster     Hx of colonic polyps     Hyperlipidemia     Hypertension        Past Surgical History:   Procedure Laterality Date    BACK SURGERY      disc removed  / R         CATARACT EXTRACTION      COLONOSCOPY      [place]ONARY ARTERY BYPASS GRAFT  [place]    16   & CABG X3 with LIMA to LAD <SVG to OM-2, SVG to PDA  [place] Lesvia Garsia / Brenda Oneal   14       EYE SURGERY      cataract /  R   [address]      Groin / R   [address] [facility] /  [facility]   [address]          JOINT REPLACEMENT  1985     right hip    [place] TRANSURETHRAL ELEC-SURG PROSTATECTOM N/A 2019    Procedure: CYSTOSCOPY, TRANSURETHRAL RESECTION OF PROSTATE (TURP); Surgeon: Ana Potts MD;  Location: [facility];  Service: Urology       Family History   Problem Relation Age of Onset    Coronary artery disease Father     Hyperlipidemia Brother     Sudden death Brother         cardiac    Arthritis Family     Mental illness Neg Hx      I have reviewed and agree with the history as documented  E-Cigarette/Vaping    E-Cigarette Use Never User      E-Cigarette/Vaping Substances    Nicotine No     THC No     CBD No      Social History     Tobacco Use    Smoking status: Former Smoker     Quit date: 1979     Years since quittin 4    Smokeless tobacco: Never Used   Substance Use Topics    Alcohol use: Not Currently    Drug use: No       Review of Systems   Constitutional: Negative for chills and fever  HENT: Negative for sneezing and sore throat  Respiratory: Negative for cough and shortness of breath  Cardiovascular: Negative for chest pain, palpitations and leg swelling     Gastrointestinal: Negative for abdominal pain, constipation, diarrhea, nausea and vomiting  Genitourinary: Positive for difficulty urinating and hematuria  Negative for decreased urine volume, discharge, dysuria, enuresis, flank pain, frequency, genital sores, penile pain, penile swelling, scrotal swelling, testicular pain and urgency  Musculoskeletal: Negative for back pain, gait problem, joint swelling and myalgias  Skin: Negative for color change, pallor, rash and wound  Neurological: Negative for dizziness, syncope, weakness, light-headedness, numbness and headaches  All other systems reviewed and are negative  Physical Exam  Physical Exam  Vitals signs and nursing note reviewed  Constitutional:       General: He is not in acute distress  Appearance: Normal appearance  He is well-developed and normal weight  He is not diaphoretic  HENT:      Head: Normocephalic and atraumatic  Nose: Nose normal    Eyes:      Extraocular Movements: Extraocular movements intact  Conjunctiva/sclera: Conjunctivae normal    Neck:      Musculoskeletal: Normal range of motion  Cardiovascular:      Rate and Rhythm: Normal rate and regular rhythm  Pulses: Normal pulses  Heart sounds: Normal heart sounds  No murmur  No friction rub  No gallop  Pulmonary:      Effort: Pulmonary effort is normal  No respiratory distress  Breath sounds: Normal breath sounds  No stridor  No wheezing or rales  Comments: Sp02 is 98% indicating adequate oxygenation on room air  Abdominal:      General: Abdomen is flat  Bowel sounds are normal  There is no distension  Palpations: Abdomen is soft  Tenderness: There is no abdominal tenderness  There is no guarding or rebound  Comments: Abdomen soft, nontender, nondistended  No peritoneal signs   Musculoskeletal: Normal range of motion  Skin:     General: Skin is warm and dry  Capillary Refill: Capillary refill takes less than 2 seconds        Coloration: Skin is not pale       Findings: No erythema or rash  Neurological:      Mental Status: He is alert  Mental status is at baseline  Vital Signs  ED Triage Vitals [05/18/21 1433]   Temperature Pulse Respirations Blood Pressure SpO2   97 6 °F (36 4 °C) 67 18 157/69 98 %      Temp Source Heart Rate Source Patient Position - Orthostatic VS BP Location FiO2 (%)   Tympanic Monitor Sitting Left arm --      Pain Score       --           Vitals:    05/18/21 1433   BP: 157/69   Pulse: 67   Patient Position - Orthostatic VS: Sitting         Visual Acuity      ED Medications  Medications   sodium chloride 0 9 % infusion (75 mL/hr Intravenous New Bag 5/18/21 1529)   ceFAZolin (ANCEF) IVPB (premix in dextrose) 2,000 mg 50 mL ( Intravenous MAR Hold 5/18/21 1620)   docusate sodium (COLACE) capsule 100 mg ( Oral Dose Auto Held 5/21/21 1800)       Diagnostic Studies  Results Reviewed     Procedure Component Value Units Date/Time    Iron Saturation % [[id number]] Collected: 05/18/21 1455    Lab Status: In process Specimen: Blood from Arm, Left Updated: 05/18/21 1606    Ferritin [[id number]] Collected: 05/18/21 1455    Lab Status: In process Specimen: Blood from Arm, Left Updated: 05/18/21 1606    Vitamin B12 [[id number]] Collected: 05/18/21 1455    Lab Status: In process Specimen: Blood from Arm, Left Updated: 05/18/21 1605    Novel Coronavirus (Covid-19),PCR [facility] - 2 Hour Stat [[id number]]  (Normal) Collected: 05/18/21 1458    Lab Status: Final result Specimen: Nares from Nasopharyngeal Swab Updated: 05/18/21 1600     SARS-CoV-2 Negative    Narrative: The specimen collection materials, transport medium, and/or testing methodology utilized in the production of these test results have been proven to be reliable in a limited validation with an abbreviated program under the Emergency Utilization Authorization provided by the FDA    Testing reported as "Presumptive positive" will be confirmed with secondary testing to ensure result accuracy  Clinical caution and judgement should be used with the interpretation of these results with consideration of the clinical impression and other laboratory testing  Testing reported as "Positive" or "Negative" has been proven to be accurate according to standard laboratory validation requirements  All testing is performed with control materials showing appropriate reactivity at standard intervals  Occult blood 1-3, stool [[id number]]     Lab Status: No result Specimen: Stool     Folate [[id number]]     Lab Status: No result Specimen: Blood     CBC and differential [[id number]]  (Abnormal) Collected: 05/18/21 1455    Lab Status: Final result Specimen: Blood from Arm, Left Updated: 05/18/21 1535     WBC 11 56 Thousand/uL      RBC 3 35 Million/uL      Hemoglobin 11 5 g/dL      Hematocrit 34 4 %       fL      MCH 34 3 pg      MCHC 33 4 g/dL      RDW 12 7 %      MPV 9 8 fL      Platelets 354 Thousands/uL      nRBC 0 /100 WBCs     Narrative: This is an appended report  These results have been appended to a previously verified report      Manual Differential(PHLEBS Do Not Order) [[id number]]  (Abnormal) Collected: 05/18/21 1455    Lab Status: Final result Specimen: Blood from Arm, Left Updated: 05/18/21 1535     Segmented % 78 %      Bands % 5 %      Lymphocytes % 7 %      Monocytes % 10 %      Eosinophils, % 0 %      Basophils % 0 %      Absolute Neutrophils 9 59 Thousand/uL      Lymphocytes Absolute 0 81 Thousand/uL      Monocytes Absolute 1 16 Thousand/uL      Eosinophils Absolute 0 00 Thousand/uL      Basophils Absolute 0 00 Thousand/uL      Total Counted 100     RBC Morphology Present     Polychromasia Present     Platelet Estimate Borderline    Protime-INR [[id number]]  (Abnormal) Collected: 05/18/21 1455    Lab Status: Final result Specimen: Blood from Arm, Left Updated: 05/18/21 1527     Protime 15 0 seconds      INR 1 19    APTT [[id number]]  (Abnormal) Collected: 05/18/21 1455    Lab Status: Final result Specimen: Blood from Arm, Left Updated: 05/18/21 1527     PTT 41 seconds     Basic metabolic panel [[id number]] Collected: 05/18/21 1455    Lab Status: Final result Specimen: Blood from Arm, Left Updated: 05/18/21 1523     Sodium 137 mmol/L      Potassium 3 6 mmol/L      Chloride 101 mmol/L      CO2 26 mmol/L      ANION GAP 10 mmol/L      BUN 19 mg/dL      Creatinine 0 94 mg/dL      Glucose 103 mg/dL      Calcium 8 7 mg/dL      eGFR 75 ml/min/1 73sq m     Narrative:      Peter guidelines for Chronic Kidney Disease (CKD):     Stage 1 with normal or high GFR (GFR > 90 mL/min/1 73 square meters)    Stage 2 Mild CKD (GFR = 60-89 mL/min/1 73 square meters)    Stage 3A Moderate CKD (GFR = 45-59 mL/min/1 73 square meters)    Stage 3B Moderate CKD (GFR = 30-44 mL/min/1 73 square meters)    Stage 4 Severe CKD (GFR = 15-29 mL/min/1 73 square meters)    Stage 5 End Stage CKD (GFR <15 mL/min/1 73 square meters)  Note: GFR calculation is accurate only with a steady state creatinine                 No orders to display              Procedures  ECG 12 Lead Documentation Only    Date/Time: 5/18/2021 2:52 PM  Performed by: Chela Ochoa PA-C  Authorized by: Chela Ochoa PA-C     Indications / Diagnosis:  Pre op  Patient location:  ED  Interpretation:     Interpretation: normal    Rate:     ECG rate:  70    ECG rate assessment: normal    Rhythm:     Rhythm: sinus rhythm    Ectopy:     Ectopy: none    QRS:     QRS axis:  Normal    QRS intervals:  Normal  Conduction:     Conduction: abnormal      Abnormal conduction: incomplete RBBB    ST segments:     ST segments:  Normal  T waves:     T waves: normal               ED Course                             SBIRT 20yo+      Most Recent Value   SBIRT (22 yo +)   In order to provide better care to our patients, we are screening all of our patients for alcohol and drug use  Would it be okay to ask you these screening questions?   Yes Filed at: 05/18/2021 1456   Initial Alcohol Screen: US AUDIT-C    1  How often do you have a drink containing alcohol?  0 Filed at: 05/18/2021 1456   2  How many drinks containing alcohol do you have on a typical day you are drinking? 0 Filed at: 05/18/2021 1456   3b  FEMALE Any Age, or MALE 65+: How often do you have 4 or more drinks on one occassion? 0 Filed at: 05/18/2021 1456   Audit-C Score  0 Filed at: 05/18/2021 1456   IMAN: How many times in the past year have you    Used an illegal drug or used a prescription medication for non-medical reasons? Never Filed at: 05/18/2021 1456                    MDM  Number of Diagnoses or Management Options  Hematuria:   Urinary retention:   Diagnosis management comments: Patient to go to OR for cystoscopy        Amount and/or Complexity of Data Reviewed  Clinical lab tests: ordered and reviewed  Tests in the medicine section of CPT®: reviewed and ordered  Discussion of test results with the performing providers: yes  Review and summarize past medical records: yes  Discuss the patient with other providers: yes  Independent visualization of images, tracings, or specimens: yes        Disposition  Final diagnoses:   Hematuria   Urinary retention     Time reflects when diagnosis was documented in both MDM as applicable and the Disposition within this note     Time User Action Codes Description Comment    5/18/2021  3:10 PM Yolette Heaps Add [R31 9] Hematuria     5/18/2021  3:10 PM Yolette Heaps Add [R33 9] Urinary retention     5/18/2021  3:25 PM Kaylynn Vargas Add [Z90 79] S/P TURP       ED Disposition     ED Disposition Condition Date/Time Comment    Admit Stable Tue May 18, 2021  3:10 PM Case was discussed with Dr Mónica Vaca and the patient's admission status was agreed to be Admission Status: observation status to the service of Dr Mónica Vaca           Follow-up Information    None         Current Discharge Medication List      CONTINUE these medications which have NOT CHANGED Details   amLODIPine (NORVASC) 2 5 mg tablet Take 1 tablet (2 5 mg total) by mouth daily  Qty: 90 tablet, Refills: 3    Comments: Please call patient  When rx is ready to be p/u--thanks  Associated Diagnoses: Essential hypertension      Ascorbic Acid (VITAMIN C PO) Take 500 mg by mouth daily       aspirin (EQ Aspirin Adult Low Dose) 81 mg EC tablet Take 1 tablet (81 mg total) by mouth daily with breakfast  Qty: 90 tablet, Refills: 3    Associated Diagnoses: NSTEMI (non-ST elevated myocardial infarction) ([facility] [address] ); Hx of CABG      B Complex Vitamins (B-COMPLEX/B-12 PO) Take 1 tablet by mouth daily      Calcium 150 MG TABS Take by mouth daily      Cholecalciferol (VITAMIN D) 2000 units CAPS Take 1 capsule by mouth daily      dutasteride (AVODART) 0 5 mg capsule Take 1 capsule by mouth once daily  Qty: 90 capsule, Refills: 0    Associated Diagnoses: Enlarged prostate on rectal examination      Omega-3 Fatty Acids (OMEGA-3 FISH OIL) 1000 MG CAPS Take by mouth      rosuvastatin (CRESTOR) 20 MG tablet Take 1 tablet (20 mg total) by mouth daily at bedtime  Qty: 90 tablet, Refills: 3    Associated Diagnoses: Essential hypertension; Hx of CABG; Coronary artery disease involving native coronary artery of native heart without angina pectoris; NSTEMI (non-ST elevated myocardial infarction) ([facility])      tamsulosin (FLOMAX) 0 4 mg Take 1 capsule (0 4 mg total) by mouth daily with dinner  Qty: 90 capsule, Refills: 1    Associated Diagnoses: Nocturnal enuresis           No discharge procedures on file      PDMP Review     None          ED Provider  Electronically Signed by           Vic Reeves PA-C  05/18/21 0233

## 2021-05-18 NOTE — ASSESSMENT & PLAN NOTE
· Patient found to have urinary retention this week    He was seen by his urologist earlier in the day and was noted to have hematuria with clots for which is urologist recommended medical attention in the emergency department  · Plan for cystoscopy this afternoon  · Gentle IV hydration  · Monitor hemoglobin  · Hold DVT prophylaxis  · SCDs only

## 2021-05-18 NOTE — PLAN OF CARE
Problem: Potential for Falls  Goal: Patient will remain free of falls  Description: INTERVENTIONS:  - Assess patient frequently for physical needs  -  Identify cognitive and physical deficits and behaviors that affect risk of falls    -  [place] fall precautions as indicated by assessment   - Educate patient/family on patient safety including physical limitations  - Instruct patient to call for assistance with activity based on assessment  - Modify environment to reduce risk of injury  - Consider OT/PT consult to assist with strengthening/mobility  Outcome: Progressing     Problem: PAIN - ADULT  Goal: Verbalizes/displays adequate comfort level or baseline comfort level  Description: Interventions:  - Encourage patient to monitor pain and request assistance  - Assess pain using appropriate pain scale  - Administer analgesics based on type and severity of pain and evaluate response  - Implement non-pharmacological measures as appropriate and evaluate response  - Consider cultural and social influences on pain and pain management  - Notify physician/advanced practitioner if interventions unsuccessful or patient reports new pain  Outcome: Progressing     Problem: INFECTION - ADULT  Goal: Absence or prevention of progression during hospitalization  Description: INTERVENTIONS:  - Assess and monitor for signs and symptoms of infection  - Monitor lab/diagnostic results  - Monitor all insertion sites, i e  indwelling lines, tubes, and drains  - [place] appropriate cooling/warming therapies per order  - Administer medications as ordered  - Instruct and encourage patient and family to use good hand hygiene technique  - Identify and instruct in appropriate isolation precautions for identified infection/condition  Outcome: Progressing  Goal: Absence of fever/infection during neutropenic period  Description: INTERVENTIONS:  - Monitor WBC    Outcome: Progressing     Problem: SAFETY ADULT  Goal: Patient will remain free of falls  Description: INTERVENTIONS:  - Assess patient frequently for physical needs  -  Identify cognitive and physical deficits and behaviors that affect risk of falls    -  [place] fall precautions as indicated by assessment   - Educate patient/family on patient safety including physical limitations  - Instruct patient to call for assistance with activity based on assessment  - Modify environment to reduce risk of injury  - Consider OT/PT consult to assist with strengthening/mobility  Outcome: Progressing  Goal: Maintain or return to baseline ADL function  Description: INTERVENTIONS:  -  Assess patient's ability to carry out ADLs; assess patient's baseline for ADL function and identify physical deficits which impact ability to perform ADLs (bathing, care of mouth/teeth, toileting, grooming, dressing, etc )  - Assess/evaluate cause of self-care deficits   - Assess range of motion  - Assess patient's mobility; develop plan if impaired  - Assess patient's need for assistive devices and provide as appropriate  - Encourage maximum independence but intervene and supervise when necessary  - Involve family in performance of ADLs  - Assess for home care needs following discharge   - Consider OT consult to assist with ADL evaluation and planning for discharge  - Provide patient education as appropriate  Outcome: Progressing  Goal: Maintain or return mobility status to optimal level  Description: INTERVENTIONS:  - Assess patient's baseline mobility status (ambulation, transfers, stairs, etc )    - Identify cognitive and physical deficits and behaviors that affect mobility  - Identify mobility aids required to assist with transfers and/or ambulation (gait belt, sit-to-stand, lift, walker, cane, etc )  - [place] fall precautions as indicated by assessment  - Record patient progress and toleration of activity level on Mobility SBAR; progress patient to next Phase/Stage  - Instruct patient to call for assistance with activity based on assessment  - Consider rehabilitation consult to assist with strengthening/weightbearing, etc   Outcome: Progressing

## 2021-05-18 NOTE — ANESTHESIA PREPROCEDURE EVALUATION
Procedure:  CYSTOSCOPY EVACUATION OF CLOTS WITH POSSIBLE FULGURATION OF PROSTATE (N/A Bladder)    Relevant Problems   CARDIO   (+) Coronary artery disease involving native coronary artery of native heart without angina pectoris   (+) Hx of CABG   (+) Hypertension   (+) Mixed hyperlipidemia   (+) NSTEMI (non-ST elevated myocardial infarction) (HCC)      GI/HEPATIC   (+) GERD (gastroesophageal reflux disease)      /RENAL   (+) BPH (benign prostatic hyperplasia)      HEMATOLOGY   (+) Anemia   (+) Thrombocytopenia (HCC)        Physical Exam    Airway    Mallampati score: II  TM Distance: >3 FB  Neck ROM: full     Dental       Cardiovascular  Cardiovascular exam normal    Pulmonary  Pulmonary exam normal     Other Findings        Anesthesia Plan  ASA Score- 3 Emergent    Anesthesia Type- general with ASA Monitors  Additional Monitors:   Airway Plan: ETT  Plan Factors-    Chart reviewed  EKG reviewed  Imaging results reviewed  Existing labs reviewed  Patient summary reviewed  Patient is not a current smoker  Induction- intravenous and rapid sequence induction  Postoperative Plan- Plan for postoperative opioid use  Informed Consent- Anesthetic plan and risks discussed with patient  I personally reviewed this patient with the CRNA  Discussed and agreed on the Anesthesia Plan with the CRNA  Irasema Irby

## 2021-05-18 NOTE — ASSESSMENT & PLAN NOTE
· WBC 11 6, likely reactive  · UA from yesterday without signs of infection  · Monitor fever curve and CBC with diff

## 2021-05-19 ENCOUNTER — HOSPITAL ENCOUNTER (EMERGENCY)
Facility: HOSPITAL | Age: 81
Discharge: HOME | End: 2021-05-19
Attending: EMERGENCY MEDICINE | Admitting: EMERGENCY MEDICINE
Payer: MEDICARE

## 2021-05-19 VITALS
DIASTOLIC BLOOD PRESSURE: 55 MMHG | WEIGHT: 158.73 LBS | SYSTOLIC BLOOD PRESSURE: 128 MMHG | TEMPERATURE: 97.3 F | OXYGEN SATURATION: 98 % | HEIGHT: 70 IN | HEART RATE: 62 BPM | RESPIRATION RATE: 16 BRPM | BODY MASS INDEX: 22.72 KG/M2

## 2021-05-19 VITALS
DIASTOLIC BLOOD PRESSURE: 63 MMHG | WEIGHT: 157 LBS | OXYGEN SATURATION: 100 % | RESPIRATION RATE: 18 BRPM | SYSTOLIC BLOOD PRESSURE: 140 MMHG | BODY MASS INDEX: 22.53 KG/M2 | TEMPERATURE: 98.8 F | HEART RATE: 71 BPM

## 2021-05-19 DIAGNOSIS — T83.9XXA PROBLEM WITH URINARY CATHETER (HCC): Primary | ICD-10-CM

## 2021-05-19 LAB
ALBUMIN SERPL BCP-MCNC: 3.2 G/DL (ref 3.5–5)
ALP SERPL-CCNC: 56 U/L (ref 46–116)
ALT SERPL W P-5'-P-CCNC: 31 U/L (ref 12–78)
ANION GAP SERPL CALCULATED.3IONS-SCNC: 8 MMOL/L (ref 4–13)
AST SERPL W P-5'-P-CCNC: 19 U/L (ref 5–45)
BACTERIA UR QL AUTO: ABNORMAL /HPF
BILIRUB SERPL-MCNC: 0.46 MG/DL (ref 0.2–1)
BILIRUB UR QL STRIP: ABNORMAL
BUN SERPL-MCNC: 15 MG/DL (ref 5–25)
CALCIUM ALBUM COR SERPL-MCNC: 9 MG/DL (ref 8.3–10.1)
CALCIUM SERPL-MCNC: 8.4 MG/DL (ref 8.3–10.1)
CHLORIDE SERPL-SCNC: 108 MMOL/L (ref 100–108)
CLARITY UR: ABNORMAL
CO2 SERPL-SCNC: 26 MMOL/L (ref 21–32)
COLOR UR: YELLOW
CREAT SERPL-MCNC: 0.89 MG/DL (ref 0.6–1.3)
ERYTHROCYTE [DISTWIDTH] IN BLOOD BY AUTOMATED COUNT: 12.8 % (ref 11.6–15.1)
FOLATE SERPL-MCNC: >20 NG/ML (ref 3.1–17.5)
GFR SERPL CREATININE-BSD FRML MDRD: 80 ML/MIN/1.73SQ M
GLUCOSE SERPL-MCNC: 120 MG/DL (ref 65–140)
GLUCOSE UR STRIP-MCNC: NEGATIVE MG/DL
HCT VFR BLD AUTO: 32 % (ref 36.5–49.3)
HGB BLD-MCNC: 10.6 G/DL (ref 12–17)
HGB UR QL STRIP.AUTO: ABNORMAL
KETONES UR STRIP-MCNC: ABNORMAL MG/DL
LEUKOCYTE ESTERASE UR QL STRIP: ABNORMAL
MAGNESIUM SERPL-MCNC: 2.5 MG/DL (ref 1.6–2.6)
MCH RBC QN AUTO: 34.1 PG (ref 26.8–34.3)
MCHC RBC AUTO-ENTMCNC: 33.1 G/DL (ref 31.4–37.4)
MCV RBC AUTO: 103 FL (ref 82–98)
NITRITE UR QL STRIP: NEGATIVE
NON-SQ EPI CELLS URNS QL MICRO: ABNORMAL /HPF
PH UR STRIP.AUTO: 5.5 [PH]
PHOSPHATE SERPL-MCNC: 3.4 MG/DL (ref 2.3–4.1)
PLATELET # BLD AUTO: 144 THOUSANDS/UL (ref 149–390)
PMV BLD AUTO: 10.2 FL (ref 8.9–12.7)
POTASSIUM SERPL-SCNC: 3.8 MMOL/L (ref 3.5–5.3)
PROT SERPL-MCNC: 6.4 G/DL (ref 6.4–8.2)
PROT UR STRIP-MCNC: ABNORMAL MG/DL
RBC # BLD AUTO: 3.11 MILLION/UL (ref 3.88–5.62)
RBC #/AREA URNS AUTO: ABNORMAL /HPF
SODIUM SERPL-SCNC: 142 MMOL/L (ref 136–145)
SP GR UR STRIP.AUTO: >=1.03 (ref 1–1.03)
UROBILINOGEN UR QL STRIP.AUTO: 1 E.U./DL
WBC # BLD AUTO: 8.35 THOUSAND/UL (ref 4.31–10.16)
WBC #/AREA URNS AUTO: ABNORMAL /HPF

## 2021-05-19 PROCEDURE — 81001 URINALYSIS AUTO W/SCOPE: CPT | Performed by: EMERGENCY MEDICINE

## 2021-05-19 PROCEDURE — 80053 COMPREHEN METABOLIC PANEL: CPT | Performed by: NURSE PRACTITIONER

## 2021-05-19 PROCEDURE — 99217 PR OBSERVATION CARE DISCHARGE MANAGEMENT: CPT | Performed by: NURSE PRACTITIONER

## 2021-05-19 PROCEDURE — 97162 PT EVAL MOD COMPLEX 30 MIN: CPT

## 2021-05-19 PROCEDURE — 99284 EMERGENCY DEPT VISIT MOD MDM: CPT | Performed by: EMERGENCY MEDICINE

## 2021-05-19 PROCEDURE — 97530 THERAPEUTIC ACTIVITIES: CPT

## 2021-05-19 PROCEDURE — 85027 COMPLETE CBC AUTOMATED: CPT | Performed by: UROLOGY

## 2021-05-19 PROCEDURE — 83735 ASSAY OF MAGNESIUM: CPT | Performed by: NURSE PRACTITIONER

## 2021-05-19 PROCEDURE — 84100 ASSAY OF PHOSPHORUS: CPT | Performed by: NURSE PRACTITIONER

## 2021-05-19 PROCEDURE — 99283 EMERGENCY DEPT VISIT LOW MDM: CPT

## 2021-05-19 RX ORDER — PHENAZOPYRIDINE HYDROCHLORIDE 100 MG/1
100 TABLET, FILM COATED ORAL ONCE
Status: DISCONTINUED | OUTPATIENT
Start: 2021-05-19 | End: 2021-05-19

## 2021-05-19 RX ORDER — PHENAZOPYRIDINE HYDROCHLORIDE 200 MG/1
200 TABLET, FILM COATED ORAL 3 TIMES DAILY
Qty: 5 TABLET | Refills: 0 | Status: SHIPPED | OUTPATIENT
Start: 2021-05-19 | End: 2021-05-19 | Stop reason: CLARIF

## 2021-05-19 RX ORDER — PHENAZOPYRIDINE HYDROCHLORIDE 100 MG/1
200 TABLET, FILM COATED ORAL ONCE
Status: DISCONTINUED | OUTPATIENT
Start: 2021-05-19 | End: 2021-05-19

## 2021-05-19 RX ADMIN — CEFAZOLIN SODIUM 1000 MG: 1 SOLUTION INTRAVENOUS at 00:20

## 2021-05-19 RX ADMIN — DOCUSATE SODIUM 100 MG: 100 CAPSULE, LIQUID FILLED ORAL at 08:43

## 2021-05-19 RX ADMIN — ACETAMINOPHEN 650 MG: 325 TABLET, FILM COATED ORAL at 06:39

## 2021-05-19 RX ADMIN — AMLODIPINE BESYLATE 2.5 MG: 2.5 TABLET ORAL at 08:43

## 2021-05-19 RX ADMIN — ACETAMINOPHEN 650 MG: 325 TABLET, FILM COATED ORAL at 00:16

## 2021-05-19 RX ADMIN — FINASTERIDE 5 MG: 5 TABLET, FILM COATED ORAL at 08:43

## 2021-05-19 RX ADMIN — CEFAZOLIN SODIUM 1000 MG: 1 SOLUTION INTRAVENOUS at 08:43

## 2021-05-19 RX ADMIN — Medication 1000 UNITS: at 08:43

## 2021-05-19 NOTE — ASSESSMENT & PLAN NOTE
· Patient found to have urinary retention this week  He was seen by his urologist 5/18 and was noted to have hematuria with clots for which is urologist recommended medical attention in the emergency department  · Cystoscopy performed on 05/18, CBI overnight, as noted above clear and patient is to be discharged to home with Townsend catheter and follow up with Urology on Tuesday May 25th  · As noted above hold fish oil and aspirin until seen in office

## 2021-05-19 NOTE — ASSESSMENT & PLAN NOTE
· Hemoglobin 11 5 with a baseline near 12  · Iron 19, recommend iron supplement  · Hemoglobin 10 6 today  · Follow up PCP in one to two weeks

## 2021-05-19 NOTE — DISCHARGE INSTRUCTIONS
Urinary Retention in Men   WHAT YOU NEED TO KNOW:   What is urinary retention? Urinary retention is a condition that develops when your bladder does not empty completely when you urinate  What causes urinary retention? · An enlarged prostate    · Blockages, such as a stone, growth, or narrowing of your urethra    · A weak bladder muscle    · Nerve damage from diabetes, stroke, or spinal cord injury    · Bladder diverticula, which are pockets of urine that form in your bladder and do not empty    · Certain medicines, such as narcotics, antihistamines, or antidepressants    What are the signs and symptoms of urinary retention? · Frequent urination, or the urge to urinate right after you finish    · An urge to urinate, but your urine does not come out or dribbles out slowly and weakly    · Frequent urine leaks that happen during the day or while you sleep    · Pain or pressure when you urinate    · Pain or stiffness in your abdomen, lower back, hips, or upper thighs    · Blood in your urine    How is urinary retention diagnosed? Your healthcare provider will ask about your health history and the medicines you take  He will press or tap on your lower abdomen  You may need any of the following tests:  · A digital rectal exam  is when healthcare providers carefully feel the size of your prostate  · A post void residual  test will show how much urine is left in your bladder after you urinate  You will be asked to urinate and then healthcare providers will use a small ultrasound machine to check how much urine is left in your bladder  · Blood or urine tests  may show infection or prostate specific antigen (PSA) levels  PSA may be elevated in prostate cancer  · An ultrasound  uses sound waves to show pictures on a monitor  An ultrasound may be done to show bladder stones, infection, or other problems      · A CT scan , or CAT scan, is a type of x-ray that is taken of your prostate, kidneys, and bladder  The pictures may show what is causing your urinary retention  You may be given a dye before the pictures are taken to help healthcare providers see the pictures better  Tell the healthcare provider if you have ever had an allergic reaction to contrast dye  How is urinary retention treated? · A Townsend catheter  is a tube put into your bladder to drain urine into a bag  Keep the bag below your waist  This will prevent urine from flowing back into your bladder and causing an infection or other problems  Also, keep the tube free of kinks so the urine will drain properly  Do not pull on the catheter  This can cause pain and bleeding, and may cause the catheter to come out  · Medicines  can help decrease the size of your prostate, fight infection, and help you urinate more easily  · Surgery  may be needed to treat the condition that is causing your urinary retention  When should I contact my healthcare provider? · You have a fever  · You have pain when you urinate  · You have blood in your urine  · You have problems with your catheter  · You have questions or concerns about your condition or care  When should I seek immediate care or call 911? · You have severe abdominal pain  · You are breathing faster than usual     · Your heartbeat is faster than usual     · Your face, hands, feet, or ankles are swollen  CARE AGREEMENT:   You have the right to help plan your care  Learn about your health condition and how it may be treated  Discuss treatment options with your healthcare providers to decide what care you want to receive  You always have the right to refuse treatment  The above information is an  only  It is not intended as medical advice for individual conditions or treatments  Talk to your doctor, nurse or pharmacist before following any medical regimen to see if it is safe and effective for you    © Copyright [employer] 2020 Information is for End User's use only and may not be sold, redistributed or otherwise used for commercial purposes  All illustrations and images included in CareNotes® are the copyrighted property of A D A M , Inc  or Desiree Ross      Townsend Catheter Placement and Care   WHAT YOU NEED TO KNOW:   A Townsend catheter is a sterile tube that is inserted into your bladder to drain urine  It is also called an indwelling urinary catheter  DISCHARGE INSTRUCTIONS:   Seek care immediately if:   · Your catheter comes out  · You suddenly have material that looks like sand in the tubing or drainage bag  · No urine is draining into the bag and you have checked the system  · You have pain in your hip, back, pelvis, or lower abdomen  · You are confused or cannot think clearly  Call your doctor or urologist if:   · You have a fever  · You have bladder spasms for more than 1 day after the catheter is placed  · You see blood in the tubing or drainage bag  · You have a rash or itching where the catheter tube is secured to your skin  · Urine leaks from or around the catheter, tubing, or drainage bag  · The closed drainage system has accidently come open or apart  · You see a layer of crystals inside the tubing  · You have questions or concerns about your condition or care  Care for your catheter and drainage bag: You can reduce your risk for infection and injury by caring for your catheter and drainage bag properly  · Wash your hands often  Wash before and after you touch your catheter, tubing, or drainage bag  Use soap and water  Wear clean disposable gloves when you care for your catheter or disconnect the drainage bag  Wash your hands before you prepare or eat food  · Clean your genital area 2 times every day  Clean your catheter area and anal opening after every bowel movement  ? For men:  Use a soapy cloth to clean the tip of your penis  Start where the catheter enters   Wipe backward making sure to pull back the foreskin  Then use a cloth with clear water in the same direction to clean away the soap  ? For women:  Use a soapy cloth to clean the area that the catheter enters your body  Make sure to separate your labia and wipe toward the anus  Then use a cloth with clear water and wipe in the same direction  · Secure the catheter tube  so you do not pull or move the catheter  This helps prevent pain and bladder spasms  Healthcare providers will show you how to use medical tape or a strap to secure the catheter tube to your body  · Keep a closed drainage system  Your catheter should always be attached to the drainage bag to form a closed system  Do not disconnect any part of the closed system unless you need to change the bag  · Keep the drainage bag below the level of your waist   This helps stop urine from moving back up the tubing and into your bladder  Do not loop or kink the tubing  This can cause urine to back up and collect in your bladder  Do not let the drainage bag touch or lie on the floor  · Empty the drainage bag when needed  The weight of a full drainage bag can be painful  Empty the drainage bag every 3 to 6 hours or when it is ? full  · Clean and change the drainage bag as directed  Ask your healthcare provider how often you should change the drainage bag and what cleaning solution to use  Wear disposable gloves when you change the bag  Do not allow the end of the catheter or tubing to touch anything  Clean the ends with an alcohol pad before you reconnect them  What to do if problems develop:   · No urine is draining into the bag:      ? Check for kinks in the tubing and straighten them out  ? Check the tape or strap used to secure the catheter tube to your skin  Make sure it is not blocking the tube  ? Make sure you are not sitting or lying on the tubing      ? Make sure the urine bag is hanging below the level of your waist     · Urine leaks from or around the catheter, tubing, or drainage bag:  Check if the closed drainage system has accidently come open or apart  Clean the catheter and tubing ends with a new alcohol pad and reconnect them  Follow up with your doctor or urologist as directed:  Write down your questions so you remember to ask them during your visits  © Copyright [address] Information is for End User's use only and may not be sold, redistributed or otherwise used for commercial purposes  All illustrations and images included in CareNotes® are the copyrighted property of [employer] , Inc  or [facility] Rashaun No   The above information is an  only  It is not intended as medical advice for individual conditions or treatments  Talk to your doctor, nurse or pharmacist before following any medical regimen to see if it is safe and effective for you  Urinary Leg Bag   WHAT YOU NEED TO KNOW:   What is a urinary leg bag? A urinary leg bag holds urine that drains from your catheter  It fits under your clothes and allows you to do your normal daily activities  How do I use a urinary leg bag? · Wash your hands  before and after you touch your catheter, tubing, or drainage bag  Use soap and water  This reduces the risk of infection  · Strap your leg bag to your thigh or calf  Make sure the straps are comfortable  The straps can cause problems with blood flow in your leg if they are too tight  · Clean the tip of the drainage tube with alcohol  before attaching it to your catheter  This helps prevent bacteria from getting into your catheter  · The connecting tube should not pull on your catheter  Skin breakdown can occur if there is constant pulling on the catheter  · Check the tube often to make sure it is not kinked or twisted  Blockage in the tube can cause urine to back up into your bladder  Your urine must flow straight through the tube into your leg bag       · Always keep the leg bag below your bladder  This prevents urine from the bag going back into your bladder, which may cause an infection  · Empty your leg bag when it is ½ full, or every 3 hours  A full bag may break or disconnect from the catheter  · Change to your bedside bag before you go to bed  Your bedside bag can hold more urine  Do not use your leg bag at night because it could become too full or break  · Clean your leg bag after every use  Fill the bag with 2 parts vinegar and 3 parts water  Let it soak for 20 minutes, then rinse and let dry  Follow your healthcare provider's instruction on replacing your leg bag with a new one  CARE AGREEMENT:   You have the right to help plan your care  Learn about your health condition and how it may be treated  Discuss treatment options with your healthcare providers to decide what care you want to receive  You always have the right to refuse treatment  The above information is an  only  It is not intended as medical advice for individual conditions or treatments  Talk to your doctor, nurse or pharmacist before following any medical regimen to see if it is safe and effective for you  © Copyright [address] Information is for End User's use only and may not be sold, redistributed or otherwise used for commercial purposes   All illustrations and images included in CareNotes® are the copyrighted property of [employer] A [employer] , Inc  or [address] [place] [address] [employer]

## 2021-05-19 NOTE — DISCHARGE INSTR - AVS FIRST PAGE
Please do not take your aspirin and fish oil until seen by Dr Benjamín Aaron next week in the office

## 2021-05-19 NOTE — CASE MANAGEMENT
PEREZ form discussed and he verbally states understanding  A copy provided to patient and a copy placed in bin to be scanned into EMR

## 2021-05-19 NOTE — ED PROVIDER NOTES
History  Chief Complaint   Patient presents with    Urinary Catheter Problem     Patient was D/C from hospital Doesn't think his mayorga is draining properly, yellow urine is noted in leg bag     Pt in the ER with c/o no drainage from his mayorga catheter  Pt was recently discharged after he had a mayorga catheter placed by Urology for urinary retention  He denies abd pain or spasms  Pt is not currently on abx  History provided by:  Patient   used: No    Difficulty Urinating  Presenting symptoms: no dysuria    Presenting symptoms comment:  Decreased urine output  Relieved by:  None tried  Worsened by:  Nothing  Ineffective treatments:  None tried  Associated symptoms: no abdominal pain, no diarrhea, no fever, no flank pain, no hematuria, no nausea and no vomiting        Prior to Admission Medications   Prescriptions Last Dose Informant Patient Reported? Taking?    Ascorbic Acid (VITAMIN C PO)  Self Yes No   Sig: Take 500 mg by mouth daily    B Complex Vitamins (B-COMPLEX/B-12 PO)  Self Yes No   Sig: Take 1 tablet by mouth daily   Calcium 150 MG TABS  Self Yes No   Sig: Take by mouth daily   Cholecalciferol (VITAMIN D) 2000 units CAPS  Self Yes No   Sig: Take 1 capsule by mouth daily   amLODIPine (NORVASC) 2 5 mg tablet  Self No No   Sig: Take 1 tablet (2 5 mg total) by mouth daily   dutasteride (AVODART) 0 5 mg capsule  Self No No   Sig: Take 1 capsule by mouth once daily   rosuvastatin (CRESTOR) 20 MG tablet   No No   Sig: Take 1 tablet (20 mg total) by mouth daily at bedtime   tamsulosin (FLOMAX) 0 4 mg  Self No No   Sig: Take 1 capsule (0 4 mg total) by mouth daily with dinner      Facility-Administered Medications: None       Past Medical History:   Diagnosis Date    Arthritis     CAD (coronary artery disease)     Cataract     Colon polyp     GERD (gastroesophageal reflux disease)     Heart attack (HCC)     Herpes zoster     Hx of colonic polyps     Hyperlipidemia     Hypertension Past Surgical History:   Procedure Laterality Date    BACK SURGERY      disc removed  / R   1985      CATARACT EXTRACTION      COLONOSCOPY      [place]ONARY ARTERY BYPASS GRAFT  [place]    16   & CABG X3 with LIMA to LAD <SVG to OM-2, SVG to PDA  [place] Magdaleno Perez / Gary Lamb   14       EYE SURGERY      cataract /  R   Aretha M[address]      Groin / R   [address] /  [facility]   [address]         [address]     right hip    NE CYSTOURETHROSCOPY W/IRRIG & EVAC CLOTS N/A 2021    Procedure: CYSTOSCOPY EVACUATION OF CLOTS WITH  FULGURATION OF PROSTATE;  Surgeon: Faraz Saenz MD;  Location: [address] [place] [address];  Service: Urology    NE TRANSURETHRAL ELEC-SURG PROSTATECTOM N/A 2019    Procedure: CYSTOSCOPY, TRANSURETHRAL RESECTION OF PROSTATE (TURP); Surgeon: Faraz Saenz MD;  Location: [facility];  Service: Urology       Family History   Problem Relation Age of Onset    Coronary artery disease Father     Hyperlipidemia Brother     Sudden death Brother         cardiac    Arthritis Family     Mental illness Neg Hx      I have reviewed and agree with the history as documented  E-Cigarette/Vaping    E-Cigarette Use Never User      E-Cigarette/Vaping Substances    Nicotine No     THC No     CBD No      Social History     Tobacco Use    Smoking status: Former Smoker     Quit date:      Years since quittin 4    Smokeless tobacco: Never Used   Substance Use Topics    Alcohol use: Not Currently    Drug use: No       Review of Systems   Constitutional: Negative for chills and fever  Respiratory: Negative for cough, shortness of breath and wheezing  Cardiovascular: Negative for chest pain and palpitations  Gastrointestinal: Negative for abdominal pain, constipation, diarrhea, nausea and vomiting  Genitourinary: Negative for dysuria, flank pain, hematuria and urgency  Musculoskeletal: Negative for back pain  Skin: Negative for color change and rash  All other systems reviewed and are negative  Physical Exam  Physical Exam  Vitals signs and nursing note reviewed  Constitutional:       General: He is not in acute distress  Appearance: He is well-developed  HENT:      Head: Normocephalic and atraumatic  Right Ear: External ear normal       Left Ear: External ear normal       Nose: Nose normal    Eyes:      Conjunctiva/sclera: Conjunctivae normal       Pupils: Pupils are equal, round, and reactive to light  Neck:      Musculoskeletal: Normal range of motion and neck supple  Musculoskeletal: Normal range of motion  General: No tenderness  Skin:     General: Skin is warm and dry  Neurological:      Mental Status: He is alert and oriented to person, place, and time  Psychiatric:         Behavior: Behavior normal          Thought Content:  Thought content normal          Judgment: Judgment normal          Vital Signs  ED Triage Vitals [05/19/21 1504]   Temperature Pulse Respirations Blood Pressure SpO2   98 8 °F (37 1 °C) 71 18 140/63 100 %      Temp Source Heart Rate Source Patient Position - Orthostatic VS BP Location FiO2 (%)   Tympanic Monitor Sitting Right arm --      Pain Score       No Pain           Vitals:    05/19/21 1504   BP: 140/63   Pulse: 71   Patient Position - Orthostatic VS: Sitting         Visual Acuity      ED Medications  Medications - No data to display    Diagnostic Studies  Results Reviewed     Procedure Component Value Units Date/Time    Urine Microscopic [[id number]]  (Abnormal) Collected: 05/19/21 1534    Lab Status: Final result Specimen: Urine, Indwelling Townsend Catheter Updated: 05/19/21 1547     RBC, UA 10-20 /hpf      WBC, UA 4-10 /hpf      Epithelial Cells Occasional /hpf      Bacteria, UA Moderate /hpf     UA w Reflex to Microscopic w Reflex to Culture [[id number]]  (Abnormal) Collected: 05/19/21 1534    Lab Status: Final result Specimen: Urine, Indwelling Townsend Catheter Updated: 05/19/21 1540     Color, UA Yellow     Clarity, UA Cloudy     Specific Gravity, UA >=1 030     pH, UA 5 5     Leukocytes, UA Moderate     Nitrite, UA Negative     Protein,  (2+) mg/dl      Glucose, UA Negative mg/dl      Ketones, UA Trace mg/dl      Urobilinogen, UA 1 0 E U /dl      Bilirubin, UA Interference- unable to analyze     Blood, UA Large                 No orders to display              Procedures  Procedures         ED Course                                           MDM  Number of Diagnoses or Management Options  Problem with urinary catheter [facility]): new and requires workup  Diagnosis management comments: I reviewed pt with Dr Lilibeth Larsen  He agrees with discharging pt to home without abx and will await cultures       Amount and/or Complexity of Data Reviewed  Clinical lab tests: ordered and reviewed    Risk of Complications, Morbidity, and/or Mortality  Presenting problems: high  Diagnostic procedures: high  Management options: high    Patient Progress  Patient progress: improved      Disposition  Final diagnoses:   Problem with urinary catheter [facility])     Time reflects when diagnosis was documented in both MDM as applicable and the Disposition within this note     Time User Action Codes Description Comment    5/19/2021  3:30 PM Maryuri Schultz  9XXA] Problem with urinary catheter ([address] )     5/19/2021  3:30 PM Shayy Stapleton [N32 [id number]] Bladder spasms     5/19/2021  3:50 PM Deyvi Owen [M37 06] Bladder spasms       ED Disposition     ED Disposition Condition Date/Time Comment    Discharge Stable Wed May 19, 2021  3:30 PM Polina Cornejo discharge to home/self care              Follow-up Information     Follow up With Specialties Details Why Contact Philip Escalante DO Family Medicine, Wound Care Schedule an appointment as soon as possible for a visit in 2 days for follow up [address] [facility] [address] [place] [address]  [phone]      Kaleb Adam MD Urology Schedule an appointment as soon as possible for a visit in 2 days for follow up Aretha Gill  [phone]            Discharge Medication List as of 5/19/2021  4:25 PM      CONTINUE these medications which have NOT CHANGED    Details   amLODIPine (NORVASC) 2 5 mg tablet Take 1 tablet (2 5 mg total) by mouth daily, Starting Wed 9/30/2020, Normal      Ascorbic Acid (VITAMIN C PO) Take 500 mg by mouth daily , Historical Med      B Complex Vitamins (B-COMPLEX/B-12 PO) Take 1 tablet by mouth daily, Historical Med      Calcium 150 MG TABS Take by mouth daily, Historical Med      Cholecalciferol (VITAMIN D) 2000 units CAPS Take 1 capsule by mouth daily, Starting u 6/25/2015, Historical Med      dutasteride (AVODART) 0 5 mg capsule Take 1 capsule by mouth once daily, Normal      rosuvastatin (CRESTOR) 20 MG tablet Take 1 tablet (20 mg total) by mouth daily at bedtime, Starting Wed 5/12/2021, Normal      tamsulosin (FLOMAX) 0 4 mg Take 1 capsule (0 4 mg total) by mouth daily with dinner, Starting Mon 11/9/2020, Normal           No discharge procedures on file      PDMP Review     None          ED Provider  Electronically Signed by           Governor Sanya DO  05/19/21 6143

## 2021-05-19 NOTE — DISCHARGE SUMMARY
Jeraldun 44  Discharge- Maryse Orta 1940, 79 y o  male MRN: [id number]  Unit/Bed#: [address] [place] [address] Encounter: [id number]  Primary Care Provider: Pina Puga DO   Date and time admitted to hospital: 5/18/2021  2:36 PM    * Urinary retention  Assessment & Plan  · Patient presented to ED 5/17 am where he was found to have urinary retention and fecal impaction  Disimpacted with enema  Urinary retention relieved with straight catheterization  Patient discharged home without catheter  · Patient return to ED 5/17 pm where he was found to have ongoing urinary retention  A Townsend catheter was inserted  Patient was discharged home with catheter and plans to follow-up with urology  · Patient was evaluated by Urology on 05/18 where he was noted to have hematuria and clots  · Patient underwent cystoscopy on 05/18, had CBI postoperatively, currently clear, CBI stopped urine continues clear  · Discussed with Urology plan for patient to be discharged with Townsend catheter and to follow-up next Tuesday in the office for voiding trial  · Hold fish oil and aspirin until seen in Urology office on Tuesday  · Continue Avodart and Flomax  · Discharged home today  · Follow-up with PCP 1-2 weeks post discharge    BPH (benign prostatic hyperplasia)  Assessment & Plan  · Continue on Flomax and Avodart  · Urology following  · Follow-up outpatient as noted above  Gross hematuria  Assessment & Plan  · Patient found to have urinary retention this week  He was seen by his urologist 5/18 and was noted to have hematuria with clots for which is urologist recommended medical attention in the emergency department  · Cystoscopy performed on 05/18, CBI overnight, as noted above clear and patient is to be discharged to home with Townsend catheter and follow up with Urology on Tuesday May 25th  · As noted above hold fish oil and aspirin until seen in office        Leukocytosis  Assessment & Plan  · WBC 11 6, likely reactive  · UA from yesterday without signs of infection  · Resolved, currently 8 35    Hypertension  Assessment & Plan  · Blood pressure stable  · Continue Norvasc 2 5 mg daily  · Follow-up with PCP 1-2 weeks post discharge    Anemia  Assessment & Plan  · Hemoglobin 11 5 with a baseline near 12  · Iron 19, recommend iron supplement  · Hemoglobin 10 6 today  · Follow up PCP in one to two weeks  Thrombocytopenia [facility])  Assessment & Plan  · Platelets 911  · Repeat CBC Monday, May 24, 2021 outpatient  · Follow up PCP          Discharging Physician / Practitioner: NAEL Funk  PCP: Ross Shrestha DO  Admission Date:   Admission Orders (From admission, onward)     Ordered        05/18/21 1511  Place in Observation  Once                   Discharge Date: 05/19/21    Resolved Problems  Date Reviewed: 5/19/2021    None          Consultations During Hospital Stay:  · Urology    Procedures Performed:     · Cystoscopy with CBI postoperatively    Significant Findings / Test Results:     · None    Incidental Findings:   · Iron deficiency     Test Results Pending at Discharge (will require follow up): · None     Outpatient Tests Requested:  · CBC on Monday May 93FB, 0702    Complications:  None    Reason for Admission:  Urinary retention    Hospital Course:     Rodrigo Song is a 79 y o  male patient past medical history HTN, HLD, CAD and BPH who originally presented to the hospital on 5/18/2021 due to urinary retention  Patient was seen in the ER twice on 05/17 for urinary retention, difficulty urinating  Initially had straight catheterization done to help with urination, however returned to the ER again with increased urinary retention and had a Townsend catheter placed at his 2nd visit  On 05/18 he was seen by Urology in the office who removed the catheter and was noted to have gross hematuria and clots  He was advised to present to the emergency department for further evaluation and treatment  Patient was admitted and taken to the OR for cystoscopy by Urology  Postoperatively patient had CBI overnight, urine currently is clear  Urology is recommending that patient continue with Townsend catheter and be seen in the office on Tuesday for voiding trial at that time  Also recommending to hold fish oil and aspirin to aid with healing and prevent further hematuria  Patient was noted to have some anemia, low with iron, recommend oral supplementation and follow-up with PCP  PT/OT was ordered during hospitalization, no rehab recommended  Patient reports that he has had Townsend catheters in the past and is familiar with their care, is declining visiting nurses as per discussion with case management  Patient will be discharged home today with follow up with urology next week  Please see above list of diagnoses and related plan for additional information  Condition at Discharge: good     Discharge Day Visit / Exam:     Subjective:  Patient seen sitting up in bed, eating  Vitals: Blood Pressure: 128/55 (05/19/21 0713)  Pulse: 62 (05/19/21 0713)  Temperature: (!) 97 3 °F (36 3 °C) (05/19/21 0713)  Temp Source: Oral (05/19/21 0713)  Respirations: 16 (05/19/21 0713)  Height: 5' 10" (177 8 cm) (05/18/21 1805)  Weight - Scale: 72 kg (158 lb 11 7 oz) (05/19/21 0600)  SpO2: 98 % (05/19/21 0713)  Exam:   Physical Exam  Vitals signs and nursing note reviewed  Constitutional:       General: He is not in acute distress  Appearance: Normal appearance  HENT:      Head: Normocephalic  Nose: Nose normal       Mouth/Throat:      Mouth: Mucous membranes are moist    Eyes:      Extraocular Movements: Extraocular movements intact  Neck:      Musculoskeletal: Normal range of motion  Cardiovascular:      Rate and Rhythm: Normal rate and regular rhythm  Pulses: Normal pulses  Heart sounds: Normal heart sounds     Pulmonary:      Effort: Pulmonary effort is normal       Breath sounds: Normal breath sounds  Abdominal:      General: Bowel sounds are normal       Palpations: Abdomen is soft  Genitourinary:     Comments: Townsend catheter present, clear yellow urine noted  Musculoskeletal: Normal range of motion  Right lower leg: No edema  Left lower leg: No edema  Skin:     General: Skin is warm and dry  Capillary Refill: Capillary refill takes less than 2 seconds  Neurological:      Mental Status: He is alert and oriented to person, place, and time  Psychiatric:         Mood and Affect: Mood normal          Behavior: Behavior normal          Thought Content: Thought content normal          Judgment: Judgment normal            Discussion with Family: I spoke with the patient at the bedside; I have answered all questions to the best of my ability    Discharge instructions/Information to patient and family:   See after visit summary for information provided to patient and family  Provisions for Follow-Up Care:  See after visit summary for information related to follow-up care and any pertinent home health orders  Disposition:     Home    For Discharges to [facility]:   · Not Applicable to this Patient - Not Applicable to this Patient    Planned Readmission: No      Discharge Statement:  I spent greater than 30 minutes discharging the patient  This time was spent on the day of discharge  I had direct contact with the patient on the day of discharge  Greater than 50% of the total time was spent examining patient, answering all patient questions, arranging and discussing plan of care with patient as well as directly providing post-discharge instructions  Additional time then spent on discharge activities  Discharge Medications:  See after visit summary for reconciled discharge medications provided to patient and family        ** Please Note: This note has been constructed using a voice recognition system **

## 2021-05-19 NOTE — ED NOTES
Dr Heidi Tatum in to speak with patient and wife and provided discharge instructions       Melanie Gutierres RN  05/19/21 6308

## 2021-05-19 NOTE — OCCUPATIONAL THERAPY NOTE
OCCUPATIONAL THERAPY       05/19/21 1115   Note Type   Note type Screen   Cancel Reasons   (Independent with ADLs; no skilled OT needs)   Licensure   [place] License Number  Christiano Zurita MS, OTR/L, Lic# [id number]

## 2021-05-19 NOTE — PHYSICAL THERAPY NOTE
PT EVALUATION       05/19/21 1110   PT Last Visit   PT Visit Date 05/19/21   Note Type   Note type Evaluation   Pain Assessment   Pain Assessment Tool Pain Assessment not indicated - pt denies pain   Home Living   Type of [facility] One level  (1 TALYA)   Home Equipment   (No DME per pt)   Prior Function   Level of [place] Independent with ADLs and functional mobility   Lives With Spouse   Receives Help From Family   ADL Assistance Independent   IADLs Independent   Comments Pt ambulates without an assistive device prior to admission   Restrictions/Precautions   Other Precautions Fall Risk   General   Additional Pertinent History Pt was seen in the ED on 05/17/2021 with urinary retention and leaking stool  At that time he was straight cath and given an enema and sent home with a Townsend catheter  Pt followed up with his urologist and was found to have hematuria and clots  Pt was admitted for cystoscopy on 05/18/2021  Family/Caregiver Present No   Cognition   Overall Cognitive Status WFL   Arousal/Participation Cooperative   Orientation Level Oriented X4   Following Commands Follows all commands and directions without difficulty   RLE Assessment   RLE Assessment WFL  (4-/5; RLE is 1/2 inch shorter than LLE)   LLE Assessment   LLE Assessment WFL  (4-/5)   Bed Mobility   Supine to Sit 7  Independent   Sit to Supine 7  Independent   Transfers   Sit to Stand   (S/I)   Additional items Assist x 1;Verbal cues   Stand to Sit   (S/I)   Additional items Assist x 1;Verbal cues   Ambulation/Elevation   Gait pattern   (Mild, generalized unsteadiness)   Gait Assistance   (S to occasional minimal assist)   Additional items Assist x 1;Verbal cues; Tactile cues   Assistive Device None   Distance 150 ft with change in direction   Balance   Static Sitting Good   Dynamic Sitting Good  (Pt donned bilateral slipper socks-I)   Static Standing Fair +   Dynamic Standing   (F-/F)   Ambulatory   (F-/F)   Activity Tolerance   Activity Tolerance Patient tolerated treatment well   Assessment   Problem List Decreased endurance; Impaired balance;Decreased mobility; Decreased coordination;Decreased safety awareness   Assessment Patient seen for Physical Therapy evaluation  Patient admitted with Urinary retention  Comorbidities affecting patient's physical performance include:  Hypertension, anemia, BPH, CABG, CAD,  NSTEMI, status post TURP  Personal factors affecting patient at time of initial evaluation include: lives in one story house, stairs to enter home, inability to navigate community distances, inability to navigate level surfaces without external assistance and inability to perform dynamic tasks in community  Prior to admission, patient was independent with functional mobility without assistive device, independent with ADLS, independent with IADLS, living with spouse in a one level home with 1 steps to enter, ambulating household distance and ambulating community distances  Please find objective findings from Physical Therapy assessment regarding body systems outlined above with impairments and limitations including weakness, decreased ROM, impaired balance, decreased endurance, impaired coordination, gait deviations, decreased activity tolerance, decreased functional mobility tolerance, decreased safety awareness and fall risk  The Barthel Index was used as a functional outcome tool presenting with a score of 65 today indicating moderate limitations of functional mobility and ADLS  Patient's clinical presentation is currently evolving as seen in patient's presentation of vital sign response, increased fall risk, new onset of impairment of functional mobility, decreased endurance and new onset of weakness  Pt would benefit from continued Physical Therapy treatment to address deficits as defined above and maximize level of functional mobility   As demonstrated by objective findings, the assigned level of complexity for this evaluation is moderate  The patient's [facility] Basic Mobility Inpatient Short Form Raw Score is 20, Standardized Score is 43 99  A standardized score greater than 42 9 suggests the patient may benefit from discharge to home  Please also refer to the recommendation of the Physical Therapist for safe discharge planning  Goals   Patient Goals go home   STG Expiration Date 05/26/21   Short Term Goal #1 Transfers-I   Short Term Goal #2 Pt will ambulate without an assistive device functional household distances-I; up/down 1 step-S/I so pt can enter/exit his home   LTG Expiration Date 06/02/21   Long Term Goal #1 Pt will return to I functional mobility and gait functional household and community distances without an assistive device-I   Long Term Goal #2 Up/down steps-I; balance will be good for all gait and functional mobility; strength LEs - 4/5   Plan   Treatment/Interventions ADL retraining;Functional transfer training;LE strengthening/ROM; Elevations; Therapeutic exercise; Endurance training;Patient/family training;Equipment eval/education; Bed mobility;Gait training; Compensatory technique education   PT Frequency 5x/wk   Recommendation   PT Discharge Recommendation No rehabilitation needs   [facility] Basic Mobility Inpatient   Turning in Bed Without Bedrails 4   Lying on Back to Sitting on Edge of Flat Bed 4   Moving Bed to Chair 3   Standing Up From Chair 3   Walk in Room 3   Climb 3-5 Stairs 3   Basic Mobility Inpatient Raw Score 20   Basic Mobility Standardized Score 43 99   Barthel Index   Feeding 10   Bathing 5   Grooming Score 5   Dressing Score 10   Bladder Score 0   Bowels Score 5   Toilet Use Score 5   Transfers (Bed/Chair) Score 10   Mobility (Level Surface) Score 10   Stairs Score 5   Barthel Index Score 72   Licensure   [place] License Number  Frieda Olguin  [id number]     Time In:1110  Time Out:1120  Total Time: 10 mins      S:  It feels good to get up and walk  O:  Pt transfers sit to stand with S/independence and ambulates without an assistive device 150 ft with change in direction with S/independence  Pt transfers stand to sit with S/independence  A:  Pt is near baseline level of mobility and anticipates DC this afternoon  P:  Continue per PT POC  DC plan-home with wife and no rehabilitation needs      Carmen Contreras [place]   [id number]

## 2021-05-19 NOTE — NURSING NOTE
Pt discharged from [address] [place] [address]  IV removed prior to discharge  Pt left with all their belongings  Pt left via walking accompanied by PCA and wife  Discharge instructions gone over with patient and wife  No prescriptions written  All questions answered

## 2021-05-19 NOTE — ASSESSMENT & PLAN NOTE
· Patient presented to ED 5/17 am where he was found to have urinary retention and fecal impaction  Disimpacted with enema  Urinary retention relieved with straight catheterization  Patient discharged home without catheter  · Patient return to ED 5/17 pm where he was found to have ongoing urinary retention  A Townsend catheter was inserted  Patient was discharged home with catheter and plans to follow-up with urology    · Patient was evaluated by Urology on 05/18 where he was noted to have hematuria and clots  · Patient underwent cystoscopy on 05/18, had CBI postoperatively, currently clear, CBI stopped urine continues clear  · Discussed with Urology plan for patient to be discharged with Townsend catheter and to follow-up next Tuesday in the office for voiding trial  · Hold fish oil and aspirin until seen in Urology office on Tuesday  · Continue Avodart and Flomax  · Discharged home today  · Follow-up with PCP 1-2 weeks post discharge

## 2021-05-19 NOTE — CASE MANAGEMENT
LOS: 1 DAY  UNPLANNED RA RISK SCORE: N/A (OBS)  RA: NO  BUNDLE: NO    CM met with patient and discussed dc planning, available services and the role of CM  Patient states he lives with his wife Vj Delgado  They live in a house  No steps to enter or inside  He is indepednet of all ADL's and ambulates without assistance  He still drives and owns no DME  He has had [facility] in the past after he had CABG surgery  He does not recall which agency he used  He states he has never been to [address] [place] [address] Dr ANDREW for rehab  He declines all services at this time  Patient denies any h/o MH or D&A problems  Patient states he has medication coverage and can afford all medications  He uses the [employer] in [place]  His PCP is Dr Tay Barrier  He states he drives himself to appointments normally but his wife also drives if he needs a ride  Patient does not have a POA or a LW and declines information at this time  Patient will have a ride home with his wife at [place]  CM to follow

## 2021-05-19 NOTE — ASSESSMENT & PLAN NOTE
· WBC 11 6, likely reactive  · UA from yesterday without signs of infection  · Resolved, currently 8 35

## 2021-05-19 NOTE — ED NOTES
Townsend leg bag emptied of about 50 cc urine  Patient given water to drink       Kiran Painting RN  05/19/21 2007

## 2021-05-19 NOTE — ASSESSMENT & PLAN NOTE
· Blood pressure stable  · Continue Norvasc 2 5 mg daily  · Follow-up with PCP 1-2 weeks post discharge

## 2021-05-21 ENCOUNTER — VBI (OUTPATIENT)
Dept: ADMINISTRATIVE | Facility: OTHER | Age: 81
End: 2021-05-21

## 2021-05-21 NOTE — TELEPHONE ENCOUNTER
Watson Ralph    ED Visit Information     Ed visit date: 05/19/2021  Diagnosis Description: Problem with urinary catheter [facility])  In Network? Yes Klaus Heady  Discharge status: Home  Discharged with meds ? No  Number of ED visits to date: 2  ED Severity:NA     Outreach Information    Outreach successful: Yes 3  Date letter mailed:NA  Date Finalized:05/26/2021    Care Coordination    Follow up appointment with pcp: no no  Transportation issues ?  NA    Value Base Outreach    05/21/2021 1:24 PM - [facility]  05/25/2021 8:35 AM  05/26/2021 12:15 PM

## 2021-05-23 LAB
ATRIAL RATE: 70 BPM
P AXIS: 87 DEGREES
PR INTERVAL: 204 MS
QRS AXIS: 15 DEGREES
QRSD INTERVAL: 96 MS
QT INTERVAL: 384 MS
QTC INTERVAL: 414 MS
T WAVE AXIS: 42 DEGREES
VENTRICULAR RATE: 70 BPM

## 2021-05-23 PROCEDURE — 93010 ELECTROCARDIOGRAM REPORT: CPT | Performed by: INTERNAL MEDICINE

## 2021-07-07 DIAGNOSIS — N40.0 ENLARGED PROSTATE ON RECTAL EXAMINATION: ICD-10-CM

## 2021-07-07 DIAGNOSIS — N39.44 NOCTURNAL ENURESIS: ICD-10-CM

## 2021-07-07 RX ORDER — DUTASTERIDE 0.5 MG/1
CAPSULE, LIQUID FILLED ORAL
Qty: 90 CAPSULE | Refills: 0 | Status: SHIPPED | OUTPATIENT
Start: 2021-07-07 | End: 2021-09-29

## 2021-07-07 RX ORDER — TAMSULOSIN HYDROCHLORIDE 0.4 MG/1
CAPSULE ORAL
Qty: 90 CAPSULE | Refills: 0 | Status: SHIPPED | OUTPATIENT
Start: 2021-07-07 | End: 2021-10-12

## 2021-09-07 ENCOUNTER — TELEPHONE (OUTPATIENT)
Dept: CARDIOLOGY CLINIC | Facility: CLINIC | Age: 81
End: 2021-09-07

## 2021-09-07 NOTE — TELEPHONE ENCOUNTER
Patient states that Dr Dayan Parker had taken him off 81 mg asa  He has not taken it in a few months and would like to know if he should restart it  Please advise

## 2021-09-13 NOTE — TELEPHONE ENCOUNTER
Advised pt's wife that he should start the aspirin back up  She forgot to ask about the fish oil  Can he restart that as well? Please advise

## 2021-09-27 DIAGNOSIS — I10 ESSENTIAL HYPERTENSION: ICD-10-CM

## 2021-09-27 RX ORDER — AMLODIPINE BESYLATE 2.5 MG/1
2.5 TABLET ORAL DAILY
Qty: 90 TABLET | Refills: 1 | Status: SHIPPED | OUTPATIENT
Start: 2021-09-27 | End: 2022-03-22 | Stop reason: SDUPTHER

## 2021-09-29 DIAGNOSIS — N40.0 ENLARGED PROSTATE ON RECTAL EXAMINATION: ICD-10-CM

## 2021-09-29 RX ORDER — DUTASTERIDE 0.5 MG/1
CAPSULE, LIQUID FILLED ORAL
Qty: 90 CAPSULE | Refills: 0 | Status: SHIPPED | OUTPATIENT
Start: 2021-09-29 | End: 2021-12-30

## 2021-10-12 DIAGNOSIS — N39.44 NOCTURNAL ENURESIS: ICD-10-CM

## 2021-10-12 RX ORDER — TAMSULOSIN HYDROCHLORIDE 0.4 MG/1
CAPSULE ORAL
Qty: 90 CAPSULE | Refills: 0 | Status: SHIPPED | OUTPATIENT
Start: 2021-10-12 | End: 2022-01-19

## 2021-10-22 ENCOUNTER — APPOINTMENT (OUTPATIENT)
Dept: LAB | Facility: HOSPITAL | Age: 81
End: 2021-10-22
Attending: FAMILY MEDICINE
Payer: MEDICARE

## 2021-10-22 DIAGNOSIS — Z95.1 HX OF CABG: ICD-10-CM

## 2021-10-22 DIAGNOSIS — E78.2 MIXED HYPERLIPIDEMIA: ICD-10-CM

## 2021-10-22 DIAGNOSIS — I25.10 CORONARY ARTERY DISEASE INVOLVING NATIVE CORONARY ARTERY OF NATIVE HEART WITHOUT ANGINA PECTORIS: ICD-10-CM

## 2021-10-22 DIAGNOSIS — I10 ESSENTIAL HYPERTENSION: ICD-10-CM

## 2021-10-22 LAB
ALBUMIN SERPL BCP-MCNC: 4.1 G/DL (ref 3.5–5)
ALP SERPL-CCNC: 71 U/L (ref 46–116)
ALT SERPL W P-5'-P-CCNC: 32 U/L (ref 12–78)
ANION GAP SERPL CALCULATED.3IONS-SCNC: 8 MMOL/L (ref 4–13)
AST SERPL W P-5'-P-CCNC: 16 U/L (ref 5–45)
BILIRUB SERPL-MCNC: 0.57 MG/DL (ref 0.2–1)
BUN SERPL-MCNC: 20 MG/DL (ref 5–25)
CALCIUM SERPL-MCNC: 8.8 MG/DL (ref 8.3–10.1)
CHLORIDE SERPL-SCNC: 106 MMOL/L (ref 100–108)
CHOLEST SERPL-MCNC: 97 MG/DL (ref 50–200)
CO2 SERPL-SCNC: 28 MMOL/L (ref 21–32)
CREAT SERPL-MCNC: 1 MG/DL (ref 0.6–1.3)
ERYTHROCYTE [DISTWIDTH] IN BLOOD BY AUTOMATED COUNT: 12.8 % (ref 11.6–15.1)
GFR SERPL CREATININE-BSD FRML MDRD: 70 ML/MIN/1.73SQ M
GLUCOSE P FAST SERPL-MCNC: 104 MG/DL (ref 65–99)
HCT VFR BLD AUTO: 38.9 % (ref 36.5–49.3)
HDLC SERPL-MCNC: 51 MG/DL
HGB BLD-MCNC: 12.7 G/DL (ref 12–17)
LDLC SERPL CALC-MCNC: 42 MG/DL (ref 0–100)
MCH RBC QN AUTO: 34.5 PG (ref 26.8–34.3)
MCHC RBC AUTO-ENTMCNC: 32.6 G/DL (ref 31.4–37.4)
MCV RBC AUTO: 106 FL (ref 82–98)
PLATELET # BLD AUTO: 145 THOUSANDS/UL (ref 149–390)
PMV BLD AUTO: 10.3 FL (ref 8.9–12.7)
POTASSIUM SERPL-SCNC: 4.3 MMOL/L (ref 3.5–5.3)
PROT SERPL-MCNC: 7.4 G/DL (ref 6.4–8.2)
RBC # BLD AUTO: 3.68 MILLION/UL (ref 3.88–5.62)
SODIUM SERPL-SCNC: 142 MMOL/L (ref 136–145)
TRIGL SERPL-MCNC: 22 MG/DL
WBC # BLD AUTO: 4.85 THOUSAND/UL (ref 4.31–10.16)

## 2021-10-22 PROCEDURE — 80053 COMPREHEN METABOLIC PANEL: CPT

## 2021-10-22 PROCEDURE — 80061 LIPID PANEL: CPT

## 2021-10-22 PROCEDURE — 85027 COMPLETE CBC AUTOMATED: CPT

## 2021-10-22 PROCEDURE — 36415 COLL VENOUS BLD VENIPUNCTURE: CPT

## 2021-11-05 ENCOUNTER — RA CDI HCC (OUTPATIENT)
Dept: OTHER | Facility: HOSPITAL | Age: 81
End: 2021-11-05

## 2021-11-09 ENCOUNTER — OFFICE VISIT (OUTPATIENT)
Dept: FAMILY MEDICINE CLINIC | Facility: CLINIC | Age: 81
End: 2021-11-09
Payer: MEDICARE

## 2021-11-09 VITALS
OXYGEN SATURATION: 98 % | SYSTOLIC BLOOD PRESSURE: 130 MMHG | HEIGHT: 70 IN | TEMPERATURE: 97.2 F | BODY MASS INDEX: 22.9 KG/M2 | WEIGHT: 160 LBS | HEART RATE: 61 BPM | DIASTOLIC BLOOD PRESSURE: 60 MMHG | RESPIRATION RATE: 20 BRPM

## 2021-11-09 DIAGNOSIS — I10 BENIGN ESSENTIAL HYPERTENSION: Primary | ICD-10-CM
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
## 2021-11-09 DIAGNOSIS — D69.6 THROMBOCYTOPENIA (HCC): ICD-10-CM

## 2021-11-09 DIAGNOSIS — I25.10 CORONARY ARTERY DISEASE INVOLVING NATIVE CORONARY ARTERY OF NATIVE HEART WITHOUT ANGINA PECTORIS: ICD-10-CM

## 2021-11-09 DIAGNOSIS — M25.551 HIP PAIN, CHRONIC, RIGHT: ICD-10-CM

## 2021-11-09 DIAGNOSIS — G89.29 HIP PAIN, CHRONIC, RIGHT: ICD-10-CM

## 2021-11-09 DIAGNOSIS — D64.9 ANEMIA, UNSPECIFIED TYPE: ICD-10-CM

## 2021-11-09 DIAGNOSIS — E78.2 MIXED HYPERLIPIDEMIA: ICD-10-CM

## 2021-11-09 DIAGNOSIS — Z00.00 MEDICARE ANNUAL WELLNESS VISIT, SUBSEQUENT: ICD-10-CM

## 2021-11-09 DIAGNOSIS — Z23 NEED FOR VACCINATION: ICD-10-CM

## 2021-11-09 PROCEDURE — 99214 OFFICE O/P EST MOD 30 MIN: CPT | Performed by: FAMILY MEDICINE

## 2021-11-09 PROCEDURE — G0008 ADMIN INFLUENZA VIRUS VAC: HCPCS

## 2021-11-09 PROCEDURE — G0439 PPPS, SUBSEQ VISIT: HCPCS | Performed by: FAMILY MEDICINE

## 2021-11-09 PROCEDURE — 90662 IIV NO PRSV INCREASED AG IM: CPT

## 2021-11-10 ENCOUNTER — HOSPITAL ENCOUNTER (OUTPATIENT)
Dept: RADIOLOGY | Facility: HOSPITAL | Age: 81
Discharge: HOME | End: 2021-11-10
Attending: FAMILY MEDICINE
Payer: MEDICARE

## 2021-11-10 DIAGNOSIS — G89.29 HIP PAIN, CHRONIC, RIGHT: ICD-10-CM

## 2021-11-10 DIAGNOSIS — M25.551 HIP PAIN, CHRONIC, RIGHT: ICD-10-CM

## 2021-11-10 PROCEDURE — 73502 X-RAY EXAM HIP UNI 2-3 VIEWS: CPT

## 2021-11-19 ENCOUNTER — OFFICE VISIT (OUTPATIENT)
Dept: GASTROENTEROLOGY | Facility: CLINIC | Age: 81
End: 2021-11-19
Payer: MEDICARE

## 2021-11-19 VITALS
DIASTOLIC BLOOD PRESSURE: 60 MMHG | BODY MASS INDEX: 22.68 KG/M2 | HEIGHT: 70 IN | WEIGHT: 158.4 LBS | HEART RATE: 61 BPM | SYSTOLIC BLOOD PRESSURE: 144 MMHG | TEMPERATURE: 96.6 F

## 2021-11-19 DIAGNOSIS — R19.8 CHANGE IN BOWEL MOVEMENT: Primary | ICD-10-CM

## 2021-11-19 DIAGNOSIS — R19.8 CHANGE IN BOWEL MOVEMENT: ICD-10-CM

## 2021-11-19 PROCEDURE — 99204 OFFICE O/P NEW MOD 45 MIN: CPT | Performed by: INTERNAL MEDICINE

## 2021-11-19 RX ORDER — POLYETHYLENE GLYCOL 3350 17 G/17G
17 POWDER, FOR SOLUTION ORAL DAILY
COMMUNITY

## 2021-11-19 RX ORDER — SOD SULF/POT CHLORIDE/MAG SULF 1.479 G
1 TABLET ORAL ONCE
Qty: 24 TABLET | Refills: 0 | Status: SHIPPED | COMMUNITY
Start: 2021-11-19 | End: 2021-11-22 | Stop reason: SDUPTHER

## 2021-11-19 RX ORDER — HYDROGEN PEROXIDE 2.65 ML/100ML
LIQUID ORAL; TOPICAL
COMMUNITY
Start: 2021-11-08

## 2021-11-22 RX ORDER — SOD SULF/POT CHLORIDE/MAG SULF 1.479 G
1 TABLET ORAL ONCE
Qty: 24 TABLET | Refills: 0 | Status: SHIPPED | COMMUNITY
Start: 2021-11-22 | End: 2021-11-22

## 2021-12-07 ENCOUNTER — TELEPHONE (OUTPATIENT)
Dept: PREADMISSION TESTING | Facility: HOSPITAL | Age: 81
End: 2021-12-07

## 2021-12-07 NOTE — PRE-PROCEDURE INSTRUCTIONS
Pre-Surgery Instructions:   Medication Instructions    amLODIPine (NORVASC) 2 5 mg tablet Instructed patient per Anesthesia Guidelines   Ascorbic Acid (VITAMIN C PO) Patient was instructed by Physician and understands   B Complex Vitamins (B-COMPLEX/B-12 PO) Patient was instructed by Physician and understands   Calcium 150 MG TABS Patient was instructed by Physician and understands   Cholecalciferol (VITAMIN D) 2000 units CAPS Patient was instructed by Physician and understands   dutasteride (AVODART) 0 5 mg capsule Patient was instructed by Physician and understands   EQ Aspirin Adult Low Dose 81 MG EC tablet Patient was instructed by Physician and understands   polyethylene glycol (MIRALAX) 17 g packet Patient was instructed by Physician and understands   rosuvastatin (CRESTOR) 20 MG tablet Patient was instructed by Physician and understands   tamsulosin (FLOMAX) 0 4 mg Patient was instructed by Physician and understands  Pt to take amlodipine a m of procedure

## 2021-12-13 ENCOUNTER — ANESTHESIA (OUTPATIENT)
Dept: ANESTHESIOLOGY | Facility: HOSPITAL | Age: 81
End: 2021-12-13

## 2021-12-13 ENCOUNTER — ANESTHESIA EVENT (OUTPATIENT)
Dept: ANESTHESIOLOGY | Facility: HOSPITAL | Age: 81
End: 2021-12-13

## 2021-12-14 ENCOUNTER — HOSPITAL ENCOUNTER (OUTPATIENT)
Dept: GASTROENTEROLOGY | Facility: AMBULARY SURGERY CENTER | Age: 81
Setting detail: OUTPATIENT SURGERY
Discharge: HOME | End: 2021-12-14
Attending: INTERNAL MEDICINE | Admitting: INTERNAL MEDICINE
Payer: MEDICARE

## 2021-12-14 ENCOUNTER — ANESTHESIA EVENT (OUTPATIENT)
Dept: GASTROENTEROLOGY | Facility: AMBULARY SURGERY CENTER | Age: 81
End: 2021-12-14

## 2021-12-14 ENCOUNTER — ANESTHESIA (OUTPATIENT)
Dept: GASTROENTEROLOGY | Facility: AMBULARY SURGERY CENTER | Age: 81
End: 2021-12-14

## 2021-12-14 VITALS
WEIGHT: 158 LBS | DIASTOLIC BLOOD PRESSURE: 66 MMHG | HEART RATE: 66 BPM | OXYGEN SATURATION: 98 % | BODY MASS INDEX: 22.62 KG/M2 | RESPIRATION RATE: 18 BRPM | HEIGHT: 70 IN | TEMPERATURE: 96.2 F | SYSTOLIC BLOOD PRESSURE: 163 MMHG

## 2021-12-14 DIAGNOSIS — R19.8 CHANGE IN BOWEL MOVEMENT: ICD-10-CM

## 2021-12-14 DIAGNOSIS — K64.4 EXTERNAL HEMORRHOIDS: Primary | ICD-10-CM

## 2021-12-14 PROCEDURE — 88305 TISSUE EXAM BY PATHOLOGIST: CPT | Performed by: PATHOLOGY

## 2021-12-14 PROCEDURE — 45385 COLONOSCOPY W/LESION REMOVAL: CPT | Performed by: INTERNAL MEDICINE

## 2021-12-14 RX ORDER — PROPOFOL 10 MG/ML
INJECTION, EMULSION INTRAVENOUS AS NEEDED
Status: DISCONTINUED | OUTPATIENT
Start: 2021-12-14 | End: 2021-12-14

## 2021-12-14 RX ORDER — SODIUM CHLORIDE, SODIUM LACTATE, POTASSIUM CHLORIDE, CALCIUM CHLORIDE 600; 310; 30; 20 MG/100ML; MG/100ML; MG/100ML; MG/100ML
75 INJECTION, SOLUTION INTRAVENOUS CONTINUOUS
Status: DISCONTINUED | OUTPATIENT
Start: 2021-12-14 | End: 2021-12-18 | Stop reason: HOSPADM

## 2021-12-14 RX ORDER — SODIUM CHLORIDE, SODIUM LACTATE, POTASSIUM CHLORIDE, CALCIUM CHLORIDE 600; 310; 30; 20 MG/100ML; MG/100ML; MG/100ML; MG/100ML
INJECTION, SOLUTION INTRAVENOUS CONTINUOUS PRN
Status: DISCONTINUED | OUTPATIENT
Start: 2021-12-14 | End: 2021-12-14

## 2021-12-14 RX ORDER — HYDROCORTISONE ACETATE 25 MG/1
25 SUPPOSITORY RECTAL 2 TIMES DAILY
Qty: 12 SUPPOSITORY | Refills: 0 | Status: SHIPPED | OUTPATIENT
Start: 2021-12-14 | End: 2022-05-09

## 2021-12-14 RX ORDER — PROPOFOL 10 MG/ML
INJECTION, EMULSION INTRAVENOUS CONTINUOUS PRN
Status: DISCONTINUED | OUTPATIENT
Start: 2021-12-14 | End: 2021-12-14

## 2021-12-14 RX ADMIN — PROPOFOL 100 MCG/KG/MIN: 10 INJECTION, EMULSION INTRAVENOUS at 08:43

## 2021-12-14 RX ADMIN — SODIUM CHLORIDE, SODIUM LACTATE, POTASSIUM CHLORIDE, AND CALCIUM CHLORIDE: .6; .31; .03; .02 INJECTION, SOLUTION INTRAVENOUS at 08:40

## 2021-12-14 RX ADMIN — SODIUM CHLORIDE, SODIUM LACTATE, POTASSIUM CHLORIDE, AND CALCIUM CHLORIDE 75 ML/HR: .6; .31; .03; .02 INJECTION, SOLUTION INTRAVENOUS at 08:02

## 2021-12-14 RX ADMIN — LIDOCAINE HYDROCHLORIDE 40 MG: 20 INJECTION, SOLUTION INTRAVENOUS at 08:43

## 2021-12-14 RX ADMIN — PROPOFOL 80 MG: 10 INJECTION, EMULSION INTRAVENOUS at 08:43

## 2021-12-30 DIAGNOSIS — N40.0 ENLARGED PROSTATE ON RECTAL EXAMINATION: ICD-10-CM

## 2021-12-30 RX ORDER — DUTASTERIDE 0.5 MG/1
CAPSULE, LIQUID FILLED ORAL
Qty: 90 CAPSULE | Refills: 0 | Status: SHIPPED | OUTPATIENT
Start: 2021-12-30 | End: 2022-04-11 | Stop reason: SDUPTHER

## 2022-01-04 ENCOUNTER — OFFICE VISIT (OUTPATIENT)
Dept: LAB | Facility: HOSPITAL | Age: 82
End: 2022-01-04
Attending: SPECIALIST
Payer: MEDICARE

## 2022-01-04 DIAGNOSIS — Z01.818 PREOP TESTING: ICD-10-CM

## 2022-01-04 DIAGNOSIS — C67.9 MALIGNANT NEOPLASM OF URINARY BLADDER, UNSPECIFIED SITE (HCC): ICD-10-CM

## 2022-01-04 LAB
ATRIAL RATE: 63 BPM
P AXIS: 65 DEGREES
PR INTERVAL: 244 MS
QRS AXIS: 0 DEGREES
QRSD INTERVAL: 82 MS
QT INTERVAL: 384 MS
QTC INTERVAL: 392 MS
T WAVE AXIS: 21 DEGREES
VENTRICULAR RATE: 63 BPM

## 2022-01-04 PROCEDURE — 93005 ELECTROCARDIOGRAM TRACING: CPT

## 2022-01-04 PROCEDURE — 93010 ELECTROCARDIOGRAM REPORT: CPT | Performed by: INTERNAL MEDICINE

## 2022-01-10 NOTE — PRE-PROCEDURE INSTRUCTIONS
My Surgical Experience    The following information was developed to assist you to prepare for your operation  What do I need to do before coming to the hospital?   Arrange for a responsible person to drive you to and from the hospital    Arrange care for your children at home  Children are not allowed in the recovery areas of the hospital   Plan to wear clothing that is easy to put on and take off  If you are having shoulder surgery, wear a shirt that buttons or zippers in the front  Bathing  o Shower the evening before and the morning of your surgery with an antibacterial soap  Please refer to the Pre Op Showering Instructions for Surgery Patients Sheet   o Remove nail polish and all body piercing jewelry  o Do not shave any body part for at least 24 hours before surgery-this includes face, arms, legs and upper body  Food  o Nothing to eat or drink after midnight the night before your surgery  This includes candy and chewing gum  o Exception: If your surgery is after 12:00pm (noon), you may have clear liquids such as 7-Up®, ginger ale, apple or cranberry juice, Jell-O®, water, or clear broth until 8:00 am  o Do not drink milk or juice with pulp on the morning before surgery  o Do not drink alcohol 24 hours before surgery  Medicine  o Follow instructions you received from your surgeon about which medicines you may take on the day of surgery  o If instructed to take medicine on the morning of surgery, take pills with just a small sip of water  Call your prescribing doctor for specific infroamtion on what to do if you take insulin    What should I bring to the hospital?    Bring:  Patience Josh or a walker, if you have them, for foot or knee surgery   A list of the daily medicines, vitamins, minerals, herbals and nutritional supplements you take   Include the dosages of medicines and the time you take them each day   Glasses, dentures or hearing aids   Minimal clothing; you will be wearing hospital sleepwear   Photo ID; required to verify your identity   If you have a Living Will or Power of , bring a copy of the documents   If you have an ostomy, bring an extra pouch and any supplies you use    Do not bring   Medicines or inhalers   Money, valuables or jewelry    What other information should I know about the day of surgery?  Notify your surgeons if you develop a cold, sore throat, cough, fever, rash or any other illness   Report to the Ambulatory Surgical/Same Day Surgery Unit   You will be instructed to stop at Registration only if you have not been pre-registered   Inform your  fi they do not stay that they will be asked by the staff to leave a phone number where they can be reached   Be available to be reached before surgery  In the event the operating room schedule changes, you may be asked to come in earlier or later than expected    *It is important to tell your doctor and others involved in your health care if you are taking or have been taking any non-prescription drugs, vitamins, minerals, herbals or other nutritional supplements  Any of these may interact with some food or medicines and cause a reaction      Pre-Surgery Instructions:   Medication Instructions    amLODIPine (NORVASC) 2 5 mg tablet Instructed patient per Anesthesia Guidelines   Ascorbic Acid (VITAMIN C PO) Instructed patient per Anesthesia Guidelines   B Complex Vitamins (B-COMPLEX/B-12 PO) Instructed patient per Anesthesia Guidelines   Calcium 150 MG TABS Instructed patient per Anesthesia Guidelines   Cholecalciferol (VITAMIN D) 2000 units CAPS Instructed patient per Anesthesia Guidelines   dutasteride (AVODART) 0 5 mg capsule Instructed patient per Anesthesia Guidelines   EQ Aspirin Adult Low Dose 81 MG EC tablet Instructed patient per Anesthesia Guidelines   polyethylene glycol (MIRALAX) 17 g packet Instructed patient per Anesthesia Guidelines      rosuvastatin (CRESTOR) 20 MG tablet Instructed patient per Anesthesia Guidelines   tamsulosin (FLOMAX) 0 4 mg Instructed patient per Anesthesia Guidelines  To take amlodipine and dutasteride a m  of surgery

## 2022-01-12 ENCOUNTER — ANESTHESIA EVENT (OUTPATIENT)
Dept: PERIOP | Facility: HOSPITAL | Age: 82
End: 2022-01-12
Payer: MEDICARE

## 2022-01-12 NOTE — H&P
H&P Exam - Urology       Patient: Ct De La Fuente   : 1940 Sex: male   MRN: [id number]     CSN: [id number]      History of Present Illness   HPI:  Ct De La Fuente is a 79 y o  male who presents with polypoid lesion in the prostatic urethra status post TURP in the past possible malignancy unable to biopsy in the office to undergo cysto redo TURP bilateral retrograde pyelograms for evaluation exam under anesthesia        Review of Systems:   Constitutional:  Negative for activity change, fever, chills and diaphoresis  HENT: Negative for hearing loss and trouble swallowing  Eyes: Negative for itching and visual disturbance  Respiratory: Negative for chest tightness and shortness of breath  Cardiovascular: Negative for chest pain, edema  Gastrointestinal: Negative for abdominal distention, na abdominal pain, constipation, diarrhea, Nausea and vomiting  Genitourinary: Negative for decreased urine volume, difficulty urinating, dysuria, enuresis, frequency, hematuria and urgency  Musculoskeletal: Negative for gait problem and myalgias  Neurological: Negative for dizziness and headaches  Hematological: Does not bruise/bleed easily  Historical Information   Past Medical History:   Diagnosis Date    Arthritis     CAD (coronary artery disease)     Cataract     Colon polyp     GERD (gastroesophageal reflux disease)     Heart attack ([facility] [address] )     Herpes zoster     Hx of colonic polyps     Hyperlipidemia     Hypertension      Past Surgical History:   Procedure Laterality Date    BACK SURGERY      disc removed 1985 R         CATARACT EXTRACTION      COLONOSCOPY      [place]ONARY ARTERY BYPASS GRAFT  [place]    16   & CABG X3 with LIMA to LAD <SVG to OM-2, SVG to PDA  [place] Lesvia Garsia / Brenda Oneal   14       EYE SURGERY      cataract /  R   Aretha Mário [address]      Groin / R   [facility] /  [address]     right hip    KS CYSTOURETHROSCOPY W/IRRIG & EVAC CLOTS N/A 2021    Procedure: CYSTOSCOPY EVACUATION OF CLOTS WITH  FULGURATION OF PROSTATE;  Surgeon: Colin Armendariz MD;  Location: [address] [place] [address];  Service: Urology    KS TRANSURETHRAL ELEC-SURG PROSTATECTOM N/A 2019    Procedure: CYSTOSCOPY, TRANSURETHRAL RESECTION OF PROSTATE (TURP); Surgeon: Colin Armendariz MD;  Location: [facility];  Service: Urology     Social History   Social History     Substance and Sexual Activity   Alcohol Use Not Currently     Social History     Substance and Sexual Activity   Drug Use No     Social History     Tobacco Use   Smoking Status Former Smoker    Quit date: 5    Years since quittin 0   Smokeless Tobacco Never Used     Family History:   Family History   Problem Relation Age of Onset    Coronary artery disease Father     Hyperlipidemia Brother     Sudden death Brother         cardiac    Arthritis Family     Mental illness Neg Hx        Meds/Allergies   No medications prior to admission  No Known Allergies    Objective   Vitals: There were no vitals taken for this visit  Physical Exam:  General Alert awake   Normocephalic atraumatic PERRLA  Lungs clear bilaterally  Cardiac normal S1 normal S2  Abdomen soft, flank pain  Extremities no edema    No intake/output data recorded      Invasive Devices  Report    Drain            Continuous Bladder Irrigation Three-way 239 days    Urethral Catheter Three way 20 Fr  239 days                    Lab Results: CBC:   Lab Results   Component Value Date    WBC 4 85 10/22/2021    HGB 12 7 10/22/2021    HCT 38 9 10/22/2021     (H) 10/22/2021     (L) 10/22/2021    MCH 34 5 (H) 10/22/2021    MCHC 32 6 10/22/2021    RDW 12 8 10/22/2021    MPV 10 3 10/22/2021    NRBC 0 2021     CMP:   Lab Results   Component Value Date     2015     10/22/2021     2015    CO2 28 10/22/2021 CO2 28 12/30/2015    ANIONGAP 12 8 12/30/2015    BUN 20 10/22/2021    BUN 16 12/30/2015    CREATININE 1 00 10/22/2021    CREATININE 0 9 12/30/2015    GLUCOSE 104 12/30/2015    CALCIUM 8 8 10/22/2021    CALCIUM 8 9 12/30/2015    AST 16 10/22/2021    AST 24 12/30/2015    ALT 32 10/22/2021    ALT 40 12/30/2015    ALKPHOS 71 10/22/2021    ALKPHOS 61 12/30/2015    PROT 7 5 12/30/2015    BILITOT 0 6 12/30/2015    EGFR 70 10/22/2021     Urinalysis:   Lab Results   Component Value Date    COLORU Yellow 05/19/2021    COLORU Yellow 11/12/2014    CLARITYU Cloudy 05/19/2021    CLARITYU Clear 11/12/2014    SPECGRAV >=1 030 05/19/2021    SPECGRAV >1 045 (H) 11/12/2014    PHUR 5 5 05/19/2021    PHUR 6 0 11/12/2014    LEUKOCYTESUR Moderate (A) 05/19/2021    LEUKOCYTESUR Negative 11/12/2014    NITRITE Negative 05/19/2021    NITRITE Negative 11/12/2014    PROTEINUA Negative 11/12/2014    GLUCOSEU Negative 05/19/2021    GLUCOSEU Negative 11/12/2014    KETONESU Trace (A) 05/19/2021    KETONESU Negative 11/12/2014    BILIRUBINUR Interference- unable to analyze (A) 05/19/2021    BILIRUBINUR Negative 11/12/2014    BLOODU Large (A) 05/19/2021    BLOODU Negative 11/12/2014     Urine Culture:   Lab Results   Component Value Date    URINECX >100,000 cfu/ml Escherichia coli (A) 05/31/2019    URINECX 50,000-59,000 cfu/ml Pseudomonas aeruginosa (A) 05/31/2019     PSA:   Lab Results   Component Value Date    PSA 2 3 04/29/2019    PSA 1 6 10/17/2018    PSA 1 6 03/12/2018           Assessment/ Plan:  Cysto/re turp polypoid lesion bilateral retrogrades urine cytology      Jeff Villarreal MD

## 2022-01-13 ENCOUNTER — HOSPITAL ENCOUNTER (OUTPATIENT)
Facility: HOSPITAL | Age: 82
Setting detail: OUTPATIENT SURGERY
Discharge: HOME | End: 2022-01-13
Attending: SPECIALIST | Admitting: SPECIALIST
Payer: MEDICARE

## 2022-01-13 ENCOUNTER — ANESTHESIA (OUTPATIENT)
Dept: PERIOP | Facility: HOSPITAL | Age: 82
End: 2022-01-13
Payer: MEDICARE

## 2022-01-13 ENCOUNTER — APPOINTMENT (OUTPATIENT)
Dept: RADIOLOGY | Facility: HOSPITAL | Age: 82
End: 2022-01-13
Payer: MEDICARE

## 2022-01-13 VITALS
WEIGHT: 156.4 LBS | BODY MASS INDEX: 22.44 KG/M2 | OXYGEN SATURATION: 99 % | HEART RATE: 60 BPM | RESPIRATION RATE: 18 BRPM | DIASTOLIC BLOOD PRESSURE: 72 MMHG | SYSTOLIC BLOOD PRESSURE: 178 MMHG | TEMPERATURE: 97.6 F

## 2022-01-13 DIAGNOSIS — C67.9 BLADDER CANCER (HCC): ICD-10-CM

## 2022-01-13 DIAGNOSIS — C67.9 MALIGNANT NEOPLASM OF BLADDER, UNSPECIFIED (HCC): ICD-10-CM

## 2022-01-13 PROCEDURE — 88112 CYTOPATH CELL ENHANCE TECH: CPT | Performed by: PATHOLOGY

## 2022-01-13 PROCEDURE — 88313 SPECIAL STAINS GROUP 2: CPT | Performed by: PATHOLOGY

## 2022-01-13 PROCEDURE — 88342 IMHCHEM/IMCYTCHM 1ST ANTB: CPT | Performed by: PATHOLOGY

## 2022-01-13 PROCEDURE — 74420 UROGRAPHY RTRGR +-KUB: CPT

## 2022-01-13 PROCEDURE — 88305 TISSUE EXAM BY PATHOLOGIST: CPT | Performed by: PATHOLOGY

## 2022-01-13 PROCEDURE — C1769 GUIDE WIRE: HCPCS | Performed by: SPECIALIST

## 2022-01-13 RX ORDER — SODIUM CHLORIDE, SODIUM LACTATE, POTASSIUM CHLORIDE, CALCIUM CHLORIDE 600; 310; 30; 20 MG/100ML; MG/100ML; MG/100ML; MG/100ML
125 INJECTION, SOLUTION INTRAVENOUS CONTINUOUS
Status: DISCONTINUED | OUTPATIENT
Start: 2022-01-13 | End: 2022-01-13 | Stop reason: HOSPADM

## 2022-01-13 RX ORDER — SODIUM CHLORIDE, SODIUM LACTATE, POTASSIUM CHLORIDE, CALCIUM CHLORIDE 600; 310; 30; 20 MG/100ML; MG/100ML; MG/100ML; MG/100ML
50 INJECTION, SOLUTION INTRAVENOUS CONTINUOUS
Status: CANCELLED | OUTPATIENT
Start: 2022-01-13

## 2022-01-13 RX ORDER — PROPOFOL 10 MG/ML
INJECTION, EMULSION INTRAVENOUS CONTINUOUS PRN
Status: DISCONTINUED | OUTPATIENT
Start: 2022-01-13 | End: 2022-01-13

## 2022-01-13 RX ORDER — CEFAZOLIN SODIUM 2 G/50ML
SOLUTION INTRAVENOUS AS NEEDED
Status: DISCONTINUED | OUTPATIENT
Start: 2022-01-13 | End: 2022-01-13

## 2022-01-13 RX ORDER — PROPOFOL 10 MG/ML
INJECTION, EMULSION INTRAVENOUS AS NEEDED
Status: DISCONTINUED | OUTPATIENT
Start: 2022-01-13 | End: 2022-01-13

## 2022-01-13 RX ORDER — LIDOCAINE HYDROCHLORIDE 10 MG/ML
INJECTION, SOLUTION EPIDURAL; INFILTRATION; INTRACAUDAL; PERINEURAL AS NEEDED
Status: DISCONTINUED | OUTPATIENT
Start: 2022-01-13 | End: 2022-01-13

## 2022-01-13 RX ORDER — FENTANYL CITRATE 50 UG/ML
INJECTION, SOLUTION INTRAMUSCULAR; INTRAVENOUS AS NEEDED
Status: DISCONTINUED | OUTPATIENT
Start: 2022-01-13 | End: 2022-01-13

## 2022-01-13 RX ORDER — GLYCINE 1.5 G/100ML
SOLUTION IRRIGATION AS NEEDED
Status: DISCONTINUED | OUTPATIENT
Start: 2022-01-13 | End: 2022-01-13 | Stop reason: HOSPADM

## 2022-01-13 RX ORDER — ONDANSETRON 2 MG/ML
4 INJECTION INTRAMUSCULAR; INTRAVENOUS ONCE AS NEEDED
Status: DISCONTINUED | OUTPATIENT
Start: 2022-01-13 | End: 2022-01-13 | Stop reason: HOSPADM

## 2022-01-13 RX ORDER — CEFAZOLIN SODIUM 2 G/50ML
2000 SOLUTION INTRAVENOUS ONCE
Status: DISCONTINUED | OUTPATIENT
Start: 2022-01-13 | End: 2022-01-13 | Stop reason: HOSPADM

## 2022-01-13 RX ORDER — FENTANYL CITRATE/PF 50 MCG/ML
25 SYRINGE (ML) INJECTION
Status: DISCONTINUED | OUTPATIENT
Start: 2022-01-13 | End: 2022-01-13 | Stop reason: HOSPADM

## 2022-01-13 RX ADMIN — SODIUM CHLORIDE, SODIUM LACTATE, POTASSIUM CHLORIDE, AND CALCIUM CHLORIDE: .6; .31; .03; .02 INJECTION, SOLUTION INTRAVENOUS at 10:53

## 2022-01-13 RX ADMIN — PROPOFOL 100 MCG/KG/MIN: 10 INJECTION, EMULSION INTRAVENOUS at 10:59

## 2022-01-13 RX ADMIN — FENTANYL CITRATE 50 MCG: 50 INJECTION, SOLUTION INTRAMUSCULAR; INTRAVENOUS at 10:59

## 2022-01-13 RX ADMIN — CEFAZOLIN SODIUM 2000 MG: 2 SOLUTION INTRAVENOUS at 10:54

## 2022-01-13 RX ADMIN — LIDOCAINE HYDROCHLORIDE 50 MG: 10 INJECTION, SOLUTION EPIDURAL; INFILTRATION; INTRACAUDAL; PERINEURAL at 10:59

## 2022-01-13 RX ADMIN — PROPOFOL 40 MG: 10 INJECTION, EMULSION INTRAVENOUS at 10:59

## 2022-01-13 RX ADMIN — FENTANYL CITRATE 25 MCG: 50 INJECTION, SOLUTION INTRAMUSCULAR; INTRAVENOUS at 11:02

## 2022-01-13 RX ADMIN — FENTANYL CITRATE 25 MCG: 50 INJECTION, SOLUTION INTRAMUSCULAR; INTRAVENOUS at 11:05

## 2022-01-13 NOTE — ANESTHESIA POSTPROCEDURE EVALUATION
Post-Op Assessment Note    CV Status:  Stable  Pain Score: 0    Pain management: adequate     Mental Status:  Alert and awake   Hydration Status:  Stable   PONV Controlled:  None      Post Op Vitals Reviewed: Yes      Staff: Anesthesiologist, CRNA         No complications documented      BP      Temp      Pulse     Resp      SpO2

## 2022-01-13 NOTE — PROGRESS NOTES
Progress Note - Urology      Patient: Bethany Gomez   : 1940 Sex: male   MRN: [id number]     CSN: [id number]  Unit/Bed#: OR POOL     SUBJECTIVE:   Patient surgical preop unit  No change to history and physical  Being discharged home with Townsend      Objective   Vitals: /72   Pulse 61   Temp 97 7 °F (36 5 °C) (Temporal)   Resp 18   Wt 70 9 kg (156 lb 6 4 oz)   SpO2 99%   BMI 22 44 kg/m²     No intake/output data recorded        Physical Exam:   General Alert awake   Normocephalic atraumatic PERRLA  Lungs clear bilaterally  Cardiac normal S1 normal S2  Abdomen soft, flank pain  Extremities no edema      Lab Results: CBC:   Lab Results   Component Value Date    WBC 4 85 10/22/2021    HGB 12 7 10/22/2021    HCT 38 9 10/22/2021     (H) 10/22/2021     (L) 10/22/2021    MCH 34 5 (H) 10/22/2021    MCHC 32 6 10/22/2021    RDW 12 8 10/22/2021    MPV 10 3 10/22/2021    NRBC 0 2021     CMP:   Lab Results   Component Value Date     2015     10/22/2021     2015    CO2 28 10/22/2021    CO2 28 2015    ANIONGAP 12 8 2015    BUN 20 10/22/2021    BUN 16 2015    CREATININE 1 00 10/22/2021    CREATININE 0 9 2015    GLUCOSE 104 2015    CALCIUM 8 8 10/22/2021    CALCIUM 8 9 2015    AST 16 10/22/2021    AST 24 2015    ALT 32 10/22/2021    ALT 40 2015    ALKPHOS 71 10/22/2021    ALKPHOS 61 2015    PROT 7 5 2015    BILITOT 0 6 2015    EGFR 70 10/22/2021     Urinalysis:   Lab Results   Component Value Date    COLORU Yellow 2021    COLORU Yellow 2014    CLARITYU Cloudy 2021    CLARITYU Clear 2014    SPECGRAV >=1 030 2021    SPECGRAV >1 045 (H) 2014    PHUR 5 5 2021    PHUR 6 0 2014    LEUKOCYTESUR Moderate (A) 2021    LEUKOCYTESUR Negative 2014    NITRITE Negative 2021    NITRITE Negative 2014    PROTEINUA Negative 2014    GLUCOSEU Negative 05/19/2021    GLUCOSEU Negative 11/12/2014    KETONESU Trace (A) 05/19/2021    KETONESU Negative 11/12/2014    BILIRUBINUR Interference- unable to analyze (A) 05/19/2021    BILIRUBINUR Negative 11/12/2014    BLOODU Large (A) 05/19/2021    BLOODU Negative 11/12/2014     Urine Culture:   Lab Results   Component Value Date    URINECX >100,000 cfu/ml Escherichia coli (A) 05/31/2019    URINECX 50,000-59,000 cfu/ml Pseudomonas aeruginosa (A) 05/31/2019     PSA:   Lab Results   Component Value Date    PSA 2 3 04/29/2019    PSA 1 6 10/17/2018    PSA 1 6 03/12/2018         Assessment/ Plan:  Cysto Re TUR P bilateral retrogrades          Ana Finley MD

## 2022-01-13 NOTE — DISCHARGE INSTRUCTIONS
#1 no heavy straining or lifting above 10 pounds for 2 weeks    #2 call office fevers, chills, or worsening blood in the urine  #3 Patient present office tomorrow LAYNE Vasquez 10:00 a m  To remove Townsend  Patient has follow-up Dr Varghese Chowdhury Friday February 4th 2:30 p m  Discuss path      Mervin ALLEN  [address] [facility] office  [address] [place] [address]  [phone]  8:30 AM to 4:30 PM  Monday through Friday    [place] [address] office  [phone]  [place] [address], [address] [place] [address]  [phone]  1:00 to 5:00 PM  Wednesday

## 2022-01-13 NOTE — PERIOPERATIVE NURSING NOTE
Received patient from PACU via stretcher now awake and alert  VSS  Townsend patent and draining clear, light yellow urine  Denies c/o pain or discomfort  Tolerating oral fluids well  IV infusing well  Family at bedside

## 2022-01-13 NOTE — OP NOTE
PERATIVE REPORT  PATIENT NAME: Colonel Ledesma    :  1940  MRN: [id number]  Pt Location: [facility] OR ROOM 04    SURGERY DATE: 2022    Surgeon(s) and Role:     * Kim Quarles MD - Primary    Preop Diagnosis:  Malignant neoplasm of bladder, unspecified ([address] ) [C67 9] prostatic mass slow stream    Post-Op Diagnosis Codes:     * Malignant neoplasm of bladder, unspecified ([address] ) [C67 9] prostatic mass slow stream    Procedure  Cysto bilateral retrograde pyelograms Re TURP    Specimen(s):  ID Type Source Tests Collected by Time Destination   1 : Cystoscopic urine for cytology Urine Urine, Cystoscopic CYTOLOGY, URINE Kim Quarles MD 2022 1113    2 : Lesion Right Prostate lobe  Tissue Lesion TISSUE EXAM Kim Quarles MD 2022 1129    3 : Prostate tissue TURP  Tissue Prostate TISSUE EXAM Kim Quarles MD 2022 1132        Estimated Blood Loss:   Minimal    Drains:  Urethral Catheter Three way 22 Fr   (Active)   Reasons to continue Urinary Catheter  Post-operative urological requirements 22 1150   Goal for Removal Remove POD#1 22 1150   Site Assessment Clean;Skin intact 22 1150   Townsend Care Done 22 1150   Collection Container Standard drainage bag 22 1150   Securement Method Tape 22 1150   Number of days: 0       Anesthesia Type:   General    Operative Indications:  Malignant neoplasm of bladder, unspecified ([facility] [address] ) [C67 9]  This 79-year-old male transferred to my service from another urologist undergoing cysto TURP for obstructive symptoms some 8 months ago with little to no relief in symptoms started on tamsulosin 0 4 mg noting a slow stream with hesitancy intermittency and straining patient underwent office cysto confirming 2 polypoid solid lesions in the right mid resected prostate lobe and apex area just proximal to the verumontanum as well as residual prostate tissue and mild to moderate bladder neck contracture attempts at biopsy of this lesion in the office unsuccessful patient now to undergo cysto [place] [employer] TURP to remove obstruction and mass bilateral retrograde pyelograms in case lesion is malignancy    Operative Findings:  Mild moderate bladder neck contracture residual prostate tissue polypoid lesion 9:00 a m  Mid resected lobe apex sent separately    Complications:   None    Procedure and Technique:  After patient identified in the holding area consent signed placed op suite  After anesthesia induced he was placed in lithotomy position draped prep standard fashion  Time-out performed  A 22 Macedonian cystoscope passed through through  Anterior through malaise  Posterior urethra confirmed suspicious polypoid lesion just proximal to the verumontanum as well as a polypoid lesion in the right mid resected lobe with apical tissue causing obstruction bilaterally with mild to moderate bladder neck contracture the scope was inserted into the bladder lumen the bladder itself confirmed moderate trabeculation no obvious abnormalities  A 5 [place] open-ended catheter placed into the right orifice right retrograde pyelogram confirmed normal filling and drainage left retrograde pyelogram confirmed normal filling and drainage the scope was removed and a 24 Macedonian resectoscope was placed at the verumontanum the lesions described above in the right lobe at 9:00 as well as tissue in the verumontanum at 6  This was sent separately  Resection of the remaining prostate was then started with the left lobe from the bladder neck contracture to the verumontanum between 1 and 5:00 a m  Followed by 11 and 7:00 a m     Minimal tissue taken from the roof between 11 and 1 and finally the floor between 5 and 7 on view from the verumontanum the bladder neck contracture was completely opened no obstruction noted on view from the bladder neck no harm to the trigone was noted postop procedure well 22 Macedonian 3 Townsend catheter inserted with 40 cc in the balloon to mild traction confirm clear urine postop patient was awakened taken recovery room stable lesion   I was present for the entire procedure    Patient Disposition:  PACU       SIGNATURE: David Acevdeo MD  DATE: January 13, 2022  TIME: 12:09 PM

## 2022-01-13 NOTE — ANESTHESIA PREPROCEDURE EVALUATION
Procedure:  CYSTOSCOPY, RE-TRANSURETHRAL RESECTION OF PROSTATE (TURP), RETROGRADE PYELOGRAM (Bilateral Abdomen)    Relevant Problems   ANESTHESIA   (-) History of anesthesia complications      CARDIO   (+) Benign essential hypertension   (+) Coronary artery disease involving native coronary artery of native heart without angina pectoris   (+) Hx of CABG   (+) Mixed hyperlipidemia      GI/HEPATIC   (+) GERD (gastroesophageal reflux disease)      /RENAL   (+) BPH (benign prostatic hyperplasia)      HEMATOLOGY   (+) Anemia   (+) Thrombocytopenia (HCC)      Other   (+) MTHFR mutation   (+) Urinary retention        Physical Exam    Airway    Mallampati score: II  TM Distance: >3 FB  Neck ROM: full     Dental   Comment: Partial upper bridge,     Cardiovascular      Pulmonary      Other Findings    Cormack I with Mac 3 on 5/18/21    Anesthesia Plan  ASA Score- 3     Anesthesia Type- IV sedation with anesthesia with ASA Monitors  Additional Monitors:   Airway Plan:     Comment: I discussed the risks and benefits of IV sedation anesthesia including the possibility of the need to convert to general anesthesia and the potential risk of awareness  Plan Factors-Exercise tolerance (METS): >4 METS  Exercise comment: Able to climb two flights of stairs without cardiopulmonary limitation  Chart reviewed  EKG reviewed  Existing labs reviewed  Patient summary reviewed  Patient is not a current smoker  Patient did not smoke on day of surgery  Induction- intravenous  Postoperative Plan- Plan for postoperative opioid use  Planned trial extubation    Informed Consent- Anesthetic plan and risks discussed with patient

## 2022-01-13 NOTE — PERIOPERATIVE NURSING NOTE
Mayorga remains patent and draining clear, light yellow urine  Patient and wife given instructions on mayorga catheter care and emptying mayorga bag  AVS summary reviewed with patient and wife  Both verbalize understanding of all instructions  Denies c/o pain or discomfort

## 2022-01-19 DIAGNOSIS — N39.44 NOCTURNAL ENURESIS: ICD-10-CM

## 2022-01-19 RX ORDER — TAMSULOSIN HYDROCHLORIDE 0.4 MG/1
CAPSULE ORAL
Qty: 90 CAPSULE | Refills: 0 | Status: SHIPPED | OUTPATIENT
Start: 2022-01-19 | End: 2022-04-23

## 2022-03-22 DIAGNOSIS — I10 ESSENTIAL HYPERTENSION: ICD-10-CM

## 2022-03-22 RX ORDER — AMLODIPINE BESYLATE 2.5 MG/1
2.5 TABLET ORAL DAILY
Qty: 90 TABLET | Refills: 1 | Status: SHIPPED | OUTPATIENT
Start: 2022-03-22

## 2022-04-11 DIAGNOSIS — N40.0 ENLARGED PROSTATE ON RECTAL EXAMINATION: ICD-10-CM

## 2022-04-11 RX ORDER — DUTASTERIDE 0.5 MG/1
0.5 CAPSULE, LIQUID FILLED ORAL DAILY
Qty: 90 CAPSULE | Refills: 0 | Status: SHIPPED | OUTPATIENT
Start: 2022-04-11 | End: 2022-07-14 | Stop reason: SDUPTHER

## 2022-04-22 ENCOUNTER — OFFICE VISIT (OUTPATIENT)
Dept: FAMILY MEDICINE CLINIC | Facility: CLINIC | Age: 82
End: 2022-04-22
Payer: MEDICARE

## 2022-04-22 VITALS
BODY MASS INDEX: 23.05 KG/M2 | HEART RATE: 72 BPM | OXYGEN SATURATION: 99 % | SYSTOLIC BLOOD PRESSURE: 154 MMHG | DIASTOLIC BLOOD PRESSURE: 64 MMHG | WEIGHT: 161 LBS | RESPIRATION RATE: 16 BRPM | HEIGHT: 70 IN | TEMPERATURE: 98.1 F

## 2022-04-22 DIAGNOSIS — Z20.822 EXPOSURE TO COVID-19 VIRUS: ICD-10-CM

## 2022-04-22 DIAGNOSIS — J01.00 ACUTE NON-RECURRENT MAXILLARY SINUSITIS: Primary | ICD-10-CM

## 2022-04-22 DIAGNOSIS — E72.11 HOMOCYSTINURIA (HCC): ICD-10-CM

## 2022-04-22 DIAGNOSIS — D69.6 THROMBOCYTOPENIA (HCC): ICD-10-CM

## 2022-04-22 LAB — S PYO AG THROAT QL: NEGATIVE

## 2022-04-22 PROCEDURE — 99213 OFFICE O/P EST LOW 20 MIN: CPT | Performed by: FAMILY MEDICINE

## 2022-04-22 PROCEDURE — 87636 SARSCOV2 & INF A&B AMP PRB: CPT | Performed by: FAMILY MEDICINE

## 2022-04-22 PROCEDURE — 87880 STREP A ASSAY W/OPTIC: CPT | Performed by: FAMILY MEDICINE

## 2022-04-22 RX ORDER — AMOXICILLIN AND CLAVULANATE POTASSIUM 875; 125 MG/1; MG/1
1 TABLET, FILM COATED ORAL EVERY 12 HOURS SCHEDULED
Qty: 20 TABLET | Refills: 0 | Status: SHIPPED | OUTPATIENT
Start: 2022-04-22 | End: 2022-05-02

## 2022-04-22 NOTE — PROGRESS NOTES
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[facility])  Assessment & Plan: Will follow      4  Thrombocytopenia ([address] )  Assessment & Plan: Will follow next cbc              There are no Patient Instructions on file for this visit  Return for Recheck  Subjective:      Patient ID: Lainey Negro is a 79 y o  male  Chief Complaint   Patient presents with    Cold Like Symptoms     Past two days mz cma    Cough    Generalized Body Aches    Sore Throat       Pt states he has a cold  Runny nose  Scratchy throat  Tired  Feels elida  No Fever no chills  Taste and smell is ok  Pt is vaccinated against covid  Day 1 was two days ago - 2/20/22        The following portions of the patient's history were reviewed and updated as appropriate: allergies, current medications, past family history, past medical history, past social history, past surgical history and problem list     Review of Systems   HENT: Positive for congestion, sinus pressure and sore throat  Respiratory: Positive for cough  Musculoskeletal: Positive for myalgias           Current Outpatient Medications   Medication Sig Dispense Refill    amLODIPine (NORVASC) 2 5 mg tablet Take 1 tablet (2 5 mg total) by mouth daily 90 tablet 1    Ascorbic Acid (VITAMIN C PO) Take 500 mg by mouth daily       B Complex Vitamins (B-COMPLEX/B-12 PO) Take 1 tablet by mouth daily      Calcium 150 MG TABS Take by mouth daily        Cholecalciferol (VITAMIN D) 2000 units CAPS Take 1 capsule by mouth daily      dutasteride (AVODART) 0 5 mg capsule Take 1 capsule (0 5 mg total) by mouth daily 90 capsule 0    EQ Aspirin Adult Low Dose 81 MG EC tablet Last dose 1/9/22       polyethylene glycol (MIRALAX) 17 g packet Take 17 g by mouth daily      rosuvastatin (CRESTOR) 20 MG tablet Take 1 tablet (20 mg total) by mouth daily at bedtime 90 tablet 3    tamsulosin (FLOMAX) 0 4 mg TAKE 1 CAPSULE BY MOUTH ONCE DAILY WITH SUPPER 90 capsule 0    amoxicillin-clavulanate (Augmentin) 875-125 mg per tablet Take 1 tablet by mouth every 12 (twelve) hours for 10 days 20 tablet 0    hydrocortisone (ANUSOL-HC) 25 mg suppository Insert 1 suppository (25 mg total) into the rectum 2 (two) times a day (Patient not taking: Reported on 4/22/2022 ) 12 suppository 0     No current facility-administered medications for this visit  Objective:    /64   Pulse 72   Temp 98 1 °F (36 7 °C)   Resp 16   Ht 5' 10" (1 778 m)   Wt 73 kg (161 lb)   SpO2 99%   BMI 23 10 kg/m²        Physical Exam  HENT:      Nose: Congestion and rhinorrhea present                  Lincoln Grain, DO

## 2022-04-23 ENCOUNTER — TELEPHONE (OUTPATIENT)
Dept: OTHER | Facility: OTHER | Age: 82
End: 2022-04-23

## 2022-04-23 DIAGNOSIS — N39.44 NOCTURNAL ENURESIS: ICD-10-CM

## 2022-04-23 LAB
FLUAV RNA RESP QL NAA+PROBE: NEGATIVE
FLUBV RNA RESP QL NAA+PROBE: NEGATIVE
SARS-COV-2 RNA RESP QL NAA+PROBE: POSITIVE

## 2022-04-23 RX ORDER — TAMSULOSIN HYDROCHLORIDE 0.4 MG/1
CAPSULE ORAL
Qty: 90 CAPSULE | Refills: 0 | Status: SHIPPED | OUTPATIENT
Start: 2022-04-23 | End: 2022-08-02

## 2022-04-23 NOTE — TELEPHONE ENCOUNTER
Your test for the novel coronavirus, also known as COVID-19, was positive  The sample showed that the virus was present  Positive COVID-19 test results are reportable to the [place] Department of Health  You may receive a call from trained public health staff to conduct an interview  It is important to answer their call  They will ask you to verify who you are  During the call they will ask you about what symptoms you have, what you did before you got sick, and who you were close to while sick  The health department does this to make sure everyone stays healthy and to reduce the spread of the virus  If you would like to verify if the caller does in fact work in contact tracing, call the [address] [place] [address] at Ask The Doctor ([phone])  For additional information, please visit the [facility]  website: www health [place] gov     If you have any additional questions, we can schedule a virtual visit for you with a provider or call the [facility] hotline [phone], option 7, for care advice    For additional information, please visit the Coronavirus FAQ on the [facility] home page ([facility]  org)

## 2022-04-23 NOTE — TELEPHONE ENCOUNTER
Pt's wife called in stating the dr called them to give them covid results, but they missed the call and the pt is at [facility]  She is requesting a call back

## 2022-04-25 ENCOUNTER — TELEPHONE (OUTPATIENT)
Dept: FAMILY MEDICINE CLINIC | Facility: CLINIC | Age: 82
End: 2022-04-25

## 2022-04-25 NOTE — TELEPHONE ENCOUNTER
I copied/pasted this message which was attached to the med refill   I saw that Mrs Milan Tinsley called and Health [facility] gave her the results of the positive Covid on 4/23/22, and Elver Soto routed you that message Daren Banerjee LPN

## 2022-04-25 NOTE — TELEPHONE ENCOUNTER
Nora Ha DO         4/23/22 10:58 AM  Note  Called pt to discuss results of positive covid test   Left message for pt to call back                   4/23/22 10:58 AM  Nora Ha DO routed this conversation to [address] [place] [address]

## 2022-04-25 NOTE — TELEPHONE ENCOUNTER
Called pt to discuss results of the covid test it is positive, states he is feeling quite well not interested in Oral anti covid meds or infusion  Advised on quaridristine

## 2022-05-02 ENCOUNTER — APPOINTMENT (OUTPATIENT)
Dept: LAB | Facility: HOSPITAL | Age: 82
End: 2022-05-02
Payer: MEDICARE

## 2022-05-02 DIAGNOSIS — I10 BENIGN ESSENTIAL HYPERTENSION: ICD-10-CM

## 2022-05-02 DIAGNOSIS — D69.6 THROMBOCYTOPENIA (HCC): ICD-10-CM

## 2022-05-02 DIAGNOSIS — E78.2 MIXED HYPERLIPIDEMIA: ICD-10-CM

## 2022-05-02 DIAGNOSIS — D64.9 ANEMIA, UNSPECIFIED TYPE: ICD-10-CM

## 2022-05-02 LAB
ALBUMIN SERPL BCP-MCNC: 3.9 G/DL (ref 3.5–5)
ALP SERPL-CCNC: 58 U/L (ref 46–116)
ALT SERPL W P-5'-P-CCNC: 28 U/L (ref 12–78)
ANION GAP SERPL CALCULATED.3IONS-SCNC: 9 MMOL/L (ref 4–13)
AST SERPL W P-5'-P-CCNC: 18 U/L (ref 5–45)
BILIRUB SERPL-MCNC: 0.43 MG/DL (ref 0.2–1)
BUN SERPL-MCNC: 21 MG/DL (ref 5–25)
CALCIUM SERPL-MCNC: 8.8 MG/DL (ref 8.3–10.1)
CHLORIDE SERPL-SCNC: 107 MMOL/L (ref 100–108)
CHOLEST SERPL-MCNC: 83 MG/DL
CO2 SERPL-SCNC: 26 MMOL/L (ref 21–32)
CREAT SERPL-MCNC: 0.99 MG/DL (ref 0.6–1.3)
ERYTHROCYTE [DISTWIDTH] IN BLOOD BY AUTOMATED COUNT: 12.7 % (ref 11.6–15.1)
GFR SERPL CREATININE-BSD FRML MDRD: 70 ML/MIN/1.73SQ M
GLUCOSE P FAST SERPL-MCNC: 107 MG/DL (ref 65–99)
HCT VFR BLD AUTO: 35.2 % (ref 36.5–49.3)
HDLC SERPL-MCNC: 36 MG/DL
HGB BLD-MCNC: 11.7 G/DL (ref 12–17)
LDLC SERPL CALC-MCNC: 38 MG/DL (ref 0–100)
MCH RBC QN AUTO: 34 PG (ref 26.8–34.3)
MCHC RBC AUTO-ENTMCNC: 33.2 G/DL (ref 31.4–37.4)
MCV RBC AUTO: 102 FL (ref 82–98)
PLATELET # BLD AUTO: 174 THOUSANDS/UL (ref 149–390)
PMV BLD AUTO: 10 FL (ref 8.9–12.7)
POTASSIUM SERPL-SCNC: 4 MMOL/L (ref 3.5–5.3)
PROT SERPL-MCNC: 7.2 G/DL (ref 6.4–8.2)
RBC # BLD AUTO: 3.44 MILLION/UL (ref 3.88–5.62)
SODIUM SERPL-SCNC: 142 MMOL/L (ref 136–145)
TRIGL SERPL-MCNC: 44 MG/DL
WBC # BLD AUTO: 5.51 THOUSAND/UL (ref 4.31–10.16)

## 2022-05-02 PROCEDURE — 85027 COMPLETE CBC AUTOMATED: CPT

## 2022-05-02 PROCEDURE — 80053 COMPREHEN METABOLIC PANEL: CPT

## 2022-05-02 PROCEDURE — 36415 COLL VENOUS BLD VENIPUNCTURE: CPT

## 2022-05-02 PROCEDURE — 80061 LIPID PANEL: CPT

## 2022-05-03 ENCOUNTER — RA CDI HCC (OUTPATIENT)
Dept: OTHER | Facility: HOSPITAL | Age: 82
End: 2022-05-03

## 2022-05-03 NOTE — PROGRESS NOTES
[address]  coding opportunities       Chart reviewed, no opportunity found: CHART REVIEWED, NO OPPORTUNITY FOUND        Patients Insurance     Medicare Insurance: Medicare

## 2022-05-09 ENCOUNTER — OFFICE VISIT (OUTPATIENT)
Dept: FAMILY MEDICINE CLINIC | Facility: CLINIC | Age: 82
End: 2022-05-09
Payer: MEDICARE

## 2022-05-09 VITALS
RESPIRATION RATE: 18 BRPM | OXYGEN SATURATION: 99 % | BODY MASS INDEX: 22.62 KG/M2 | HEIGHT: 70 IN | DIASTOLIC BLOOD PRESSURE: 66 MMHG | HEART RATE: 62 BPM | SYSTOLIC BLOOD PRESSURE: 120 MMHG | WEIGHT: 158 LBS | TEMPERATURE: 97.6 F

## 2022-05-09 DIAGNOSIS — D72.829 LEUKOCYTOSIS, UNSPECIFIED TYPE: ICD-10-CM

## 2022-05-09 DIAGNOSIS — K21.9 GASTROESOPHAGEAL REFLUX DISEASE WITHOUT ESOPHAGITIS: Primary | ICD-10-CM

## 2022-05-09 DIAGNOSIS — I10 BENIGN ESSENTIAL HYPERTENSION: ICD-10-CM

## 2022-05-09 DIAGNOSIS — R73.09 ABNORMAL GLUCOSE: ICD-10-CM

## 2022-05-09 DIAGNOSIS — D64.9 ANEMIA, UNSPECIFIED TYPE: ICD-10-CM

## 2022-05-09 DIAGNOSIS — E78.2 MIXED HYPERLIPIDEMIA: ICD-10-CM

## 2022-05-09 DIAGNOSIS — I25.10 CORONARY ARTERY DISEASE INVOLVING NATIVE CORONARY ARTERY OF NATIVE HEART WITHOUT ANGINA PECTORIS: ICD-10-CM

## 2022-05-09 PROCEDURE — 99214 OFFICE O/P EST MOD 30 MIN: CPT | Performed by: FAMILY MEDICINE

## 2022-05-09 NOTE — PROGRESS NOTES
Assessment/Plan:    1  Gastroesophageal reflux disease without esophagitis    2  Coronary artery disease involving native coronary artery of native heart without angina pectoris    3  Benign essential hypertension    4  Mixed hyperlipidemia  -     Lipid Panel with Direct LDL reflex; Future; Expected date: 10/21/2022    5  Anemia, unspecified type  -     CBC; Future; Expected date: 10/21/2022    6  Leukocytosis, unspecified type  -     CBC; Future; Expected date: 10/21/2022    7  Abnormal glucose  -     Comprehensive metabolic panel; Future; Expected date: 10/21/2022  -     Hemoglobin A1C; Future; Expected date: 10/21/2022            There are no Patient Instructions on file for this visit  Return in about 6 months (around 11/9/2022) for Recheck  Subjective:      Patient ID: Natalya Hwang is a 79 y o  male  Chief Complaint   Patient presents with    Follow-up     6 months  [id number]       Pt is here for a 6 month follow up to go over labs    Pt had covid recently - took time to recover but he is feeling well now      The following portions of the patient's history were reviewed and updated as appropriate: allergies, current medications, past family history, past medical history, past social history, past surgical history and problem list     Review of Systems   Constitutional: Negative for activity change, appetite change, chills, diaphoresis, fatigue, fever and unexpected weight change  HENT: Negative for congestion, dental problem, ear pain, mouth sores, sinus pressure, sinus pain, sore throat and trouble swallowing  Eyes: Negative for photophobia, discharge and itching  Respiratory: Negative for apnea, chest tightness and shortness of breath  Cardiovascular: Negative for chest pain, palpitations and leg swelling  Gastrointestinal: Negative for abdominal distention, abdominal pain, blood in stool, nausea and vomiting     Endocrine: Negative for cold intolerance, heat intolerance, polydipsia, polyphagia and polyuria  Genitourinary: Negative for difficulty urinating  Musculoskeletal: Negative for arthralgias  Skin: Negative for color change and wound  Neurological: Negative for dizziness, syncope, speech difficulty and headaches  Hematological: Negative for adenopathy  Psychiatric/Behavioral: Negative for agitation and behavioral problems  Current Outpatient Medications   Medication Sig Dispense Refill    amLODIPine (NORVASC) 2 5 mg tablet Take 1 tablet (2 5 mg total) by mouth daily 90 tablet 1    Ascorbic Acid (VITAMIN C PO) Take 500 mg by mouth daily       B Complex Vitamins (B-COMPLEX/B-12 PO) Take 1 tablet by mouth daily      Calcium 150 MG TABS Take by mouth daily        Cholecalciferol (VITAMIN D) 2000 units CAPS Take 1 capsule by mouth daily      dutasteride (AVODART) 0 5 mg capsule Take 1 capsule (0 5 mg total) by mouth daily 90 capsule 0    EQ Aspirin Adult Low Dose 81 MG EC tablet Last dose 1/9/22       polyethylene glycol (MIRALAX) 17 g packet Take 17 g by mouth daily      rosuvastatin (CRESTOR) 20 MG tablet Take 1 tablet (20 mg total) by mouth daily at bedtime 90 tablet 3    tamsulosin (FLOMAX) 0 4 mg TAKE 1 CAPSULE BY MOUTH ONCE DAILY WITH SUPPER 90 capsule 0     No current facility-administered medications for this visit  Objective:    /66   Pulse 62   Temp 97 6 °F (36 4 °C)   Resp 18   Ht 5' 10" (1 778 m)   Wt 71 7 kg (158 lb)   SpO2 99%   BMI 22 67 kg/m²        Physical Exam  Vitals and nursing note reviewed  Constitutional:       General: He is not in acute distress  Appearance: He is well-developed  He is not diaphoretic  HENT:      Head: Normocephalic and atraumatic  Right Ear: External ear normal       Left Ear: External ear normal       Nose: Nose normal       Mouth/Throat:      Pharynx: No oropharyngeal exudate  Eyes:      General: No scleral icterus  Right eye: No discharge  Left eye: No discharge  Pupils: Pupils are equal, round, and reactive to light  Neck:      Thyroid: No thyromegaly  Cardiovascular:      Rate and Rhythm: Normal rate  Heart sounds: Normal heart sounds  No murmur heard  Pulmonary:      Effort: Pulmonary effort is normal  No respiratory distress  Breath sounds: Normal breath sounds  No wheezing  Abdominal:      General: Bowel sounds are normal  There is no distension  Palpations: Abdomen is soft  There is no mass  Tenderness: There is no abdominal tenderness  There is no guarding or rebound  Musculoskeletal:         General: Normal range of motion  Skin:     General: Skin is warm and dry  Findings: No erythema or rash  Neurological:      Mental Status: He is alert        Coordination: Coordination normal       Deep Tendon Reflexes: Reflexes normal    Psychiatric:         Behavior: Behavior normal            Recent Results (from the past 672 hour(s))   POCT rapid strepA    Collection Time: 04/22/22 11:11 AM   Result Value Ref Range     RAPID STREP A Negative Negative   Covid/Flu- Office Collect    Collection Time: 04/22/22 11:16 AM    Specimen: Nose; Nares   Result Value Ref Range    SARS-CoV-2 Positive (A) Negative    INFLUENZA A PCR Negative Negative    INFLUENZA B PCR Negative Negative   Comprehensive metabolic panel    Collection Time: 05/02/22  7:40 AM   Result Value Ref Range    Sodium 142 136 - 145 mmol/L    Potassium 4 0 3 5 - 5 3 mmol/L    Chloride 107 100 - 108 mmol/L    CO2 26 21 - 32 mmol/L    ANION GAP 9 4 - 13 mmol/L    BUN 21 5 - 25 mg/dL    Creatinine 0 99 0 60 - 1 30 mg/dL    Glucose, Fasting 107 (H) 65 - 99 mg/dL    Calcium 8 8 8 3 - 10 1 mg/dL    AST 18 5 - 45 U/L    ALT 28 12 - 78 U/L    Alkaline Phosphatase 58 46 - 116 U/L    Total Protein 7 2 6 4 - 8 2 g/dL    Albumin 3 9 3 5 - 5 0 g/dL    Total Bilirubin 0 43 0 20 - 1 00 mg/dL    eGFR 70 ml/min/1 73sq m   Lipid Panel with Direct LDL reflex    Collection Time: 05/02/22  7:40 AM Result Value Ref Range    Cholesterol 83 See Comment mg/dL    Triglycerides 44 See Comment mg/dL    HDL, Direct 36 (L) >=40 mg/dL    LDL Calculated 38 0 - 100 mg/dL   CBC    Collection Time: 05/02/22  7:40 AM   Result Value Ref Range    WBC 5 51 4 31 - 10 16 Thousand/uL    RBC 3 44 (L) 3 88 - 5 62 Million/uL    Hemoglobin 11 7 (L) 12 0 - 17 0 g/dL    Hematocrit 35 2 (L) 36 5 - 49 3 %     (H) 82 - 98 fL    MCH 34 0 26 8 - 34 3 pg    MCHC 33 2 31 4 - 37 4 g/dL    RDW 12 7 11 6 - 15 1 %    Platelets 459 631 - 710 Thousands/uL    MPV 10 0 8 9 - 12 7 fL          Kody Best DO

## 2022-06-16 ENCOUNTER — OFFICE VISIT (OUTPATIENT)
Dept: PODIATRY | Facility: CLINIC | Age: 82
End: 2022-06-16
Payer: MEDICARE

## 2022-06-16 VITALS
DIASTOLIC BLOOD PRESSURE: 66 MMHG | WEIGHT: 158 LBS | RESPIRATION RATE: 16 BRPM | HEIGHT: 70 IN | BODY MASS INDEX: 22.62 KG/M2 | SYSTOLIC BLOOD PRESSURE: 120 MMHG

## 2022-06-16 DIAGNOSIS — L03.032 PARONYCHIA OF TOENAIL OF LEFT FOOT: ICD-10-CM

## 2022-06-16 DIAGNOSIS — S90.212A CONTUSION OF LEFT GREAT TOE WITH DAMAGE TO NAIL, INITIAL ENCOUNTER: Primary | ICD-10-CM

## 2022-06-16 DIAGNOSIS — M79.672 LEFT FOOT PAIN: ICD-10-CM

## 2022-06-16 PROCEDURE — 99203 OFFICE O/P NEW LOW 30 MIN: CPT | Performed by: PODIATRIST

## 2022-06-16 NOTE — PROGRESS NOTES
Assessment/Plan: contusion left hallux  Dried subungual hematoma  Paronychia left hallux  Pain  Plan  Foot exam performed  Patient educated on condition  Total nail avulsion done today  Gentian violet dry sterile dressing applied  Patient advised on aftercare  He will watch for signs of infection  Diagnoses and all orders for this visit:    Contusion of left great toe with damage to nail, initial encounter    Left foot pain    Paronychia of toenail of left foot          Subjective:   Patient is concerned about his left big toe  He has pain left big toe  No history of occult trauma  He noticed blood coming out from underneath the nail      No Known Allergies      Current Outpatient Medications:     amLODIPine (NORVASC) 2 5 mg tablet, Take 1 tablet (2 5 mg total) by mouth daily, Disp: 90 tablet, Rfl: 1    Ascorbic Acid (VITAMIN C PO), Take 500 mg by mouth daily , Disp: , Rfl:     B Complex Vitamins (B-COMPLEX/B-12 PO), Take 1 tablet by mouth daily, Disp: , Rfl:     Calcium 150 MG TABS, Take by mouth daily  , Disp: , Rfl:     Cholecalciferol (VITAMIN D) 2000 units CAPS, Take 1 capsule by mouth daily, Disp: , Rfl:     dutasteride (AVODART) 0 5 mg capsule, Take 1 capsule (0 5 mg total) by mouth daily, Disp: 90 capsule, Rfl: 0    EQ Aspirin Adult Low Dose 81 MG EC tablet, Last dose 1/9/22 , Disp: , Rfl:     polyethylene glycol (MIRALAX) 17 g packet, Take 17 g by mouth daily, Disp: , Rfl:     rosuvastatin (CRESTOR) 20 MG tablet, Take 1 tablet (20 mg total) by mouth daily at bedtime, Disp: 90 tablet, Rfl: 3    tamsulosin (FLOMAX) 0 4 mg, TAKE 1 CAPSULE BY MOUTH ONCE DAILY WITH SUPPER, Disp: 90 capsule, Rfl: 0    Patient Active Problem List   Diagnosis    Hx of CABG    Coronary artery disease involving native coronary artery of native heart without angina pectoris    Elevated homocysteine    MTHFR mutation    NSTEMI (non-ST elevated myocardial infarction) (HCC)    Vitamin D deficiency  Nocturnal enuresis    Benign prostatic hyperplasia with nocturia    Benign essential hypertension    Mixed hyperlipidemia    GERD (gastroesophageal reflux disease)    Urinary retention    S/P TURP    Sensorineural hearing loss (SNHL)    Change in bowel movement    Osteopenia of multiple sites    External hemorrhoids    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HCC)    Gross hematuria    Leukocytosis    Anemia    Thrombocytopenia (HCC)    BPH (benign prostatic hyperplasia)          Patient ID: Lani Cheung is a 79 y o  male  HPI    The following portions of the patient's history were reviewed and updated as appropriate:     family history includes Arthritis in his family; Coronary artery disease in his father; Hyperlipidemia in his brother; Sudden death in his brother  reports that he quit smoking about 43 years ago  He has never used smokeless tobacco  He reports previous alcohol use  He reports that he does not use drugs  Vitals:    06/16/22 1046   BP: 120/66   Resp: 16       Review of Systems      Objective:  Patient's shoes and socks removed     Foot Exam    General  General Appearance: appears stated age and healthy   Orientation: alert and oriented to person, place, and time   Affect: appropriate       Right Foot/Ankle     Inspection and Palpation  Swelling: dorsum   Arch: pes planus  Hammertoes: fifth toe  Skin Exam: dry skin;     Neurovascular  Dorsalis pedis: 2+  Posterior tibial: 3+  Saphenous nerve sensation: normal  Tibial nerve sensation: normal  Superficial peroneal nerve sensation: normal  Deep peroneal nerve sensation: normal  Sural nerve sensation: normal      Left Foot/Ankle      Inspection and Palpation  Ecchymosis: first toe  Swelling: dorsum   Arch: pes planus  Hammertoes: fifth toe  Skin Exam: dry skin;     Neurovascular  Dorsalis pedis: 2+  Posterior tibial: 3+  Saphenous nerve sensation: normal  Tibial nerve sensation: normal  Superficial peroneal nerve sensation: normal  Deep peroneal nerve sensation: normal  Sural nerve sensation: normal        Physical Exam  Vitals and nursing note reviewed  Constitutional:       Appearance: Normal appearance  Cardiovascular:      Rate and Rhythm: Normal rate and regular rhythm  Pulses:           Dorsalis pedis pulses are 2+ on the right side and 2+ on the left side  Posterior tibial pulses are 3+ on the right side and 3+ on the left side  Feet:      Right foot:      Skin integrity: Dry skin present  Left foot:      Skin integrity: Dry skin present  Skin:     Capillary Refill: Capillary refill takes less than 2 seconds  Comments:   Left hallux demonstrates thickened toenail  It is mycotic  It has dried subungual hematoma  Nail is partially lysed at the proximal aspect  Toenail removed in toto  Small ulceration distal aspect left hallux nail bed  Negative cellulitis  Mild paronychia  Neurological:      Mental Status: He is alert  Psychiatric:         Mood and Affect: Mood normal          Behavior: Behavior normal          Thought Content:  Thought content normal          Judgment: Judgment normal

## 2022-06-21 ENCOUNTER — OFFICE VISIT (OUTPATIENT)
Dept: CARDIOLOGY CLINIC | Facility: CLINIC | Age: 82
End: 2022-06-21
Payer: MEDICARE

## 2022-06-21 VITALS
DIASTOLIC BLOOD PRESSURE: 58 MMHG | HEART RATE: 70 BPM | TEMPERATURE: 98.4 F | OXYGEN SATURATION: 98 % | WEIGHT: 155 LBS | BODY MASS INDEX: 22.19 KG/M2 | HEIGHT: 70 IN | SYSTOLIC BLOOD PRESSURE: 124 MMHG

## 2022-06-21 DIAGNOSIS — I10 ESSENTIAL HYPERTENSION: ICD-10-CM

## 2022-06-21 DIAGNOSIS — R06.00 EXERTIONAL DYSPNEA: ICD-10-CM

## 2022-06-21 DIAGNOSIS — Z95.1 HX OF CABG: Primary | ICD-10-CM

## 2022-06-21 PROCEDURE — 99214 OFFICE O/P EST MOD 30 MIN: CPT | Performed by: INTERNAL MEDICINE

## 2022-06-21 NOTE — PROGRESS NOTES
Cardiology Follow Up  Meena Lamb  1940  [id number]  [address] [facility] [address] [address]  [phone]    1  Hx of CABG  NM myocardial perfusion spect (rx stress and/or rest)   2  Exertional dyspnea  NM myocardial perfusion spect (rx stress and/or rest)   3  Essential hypertension  NM myocardial perfusion spect (rx stress and/or rest)      Discussion/Plan:  Exertional dyspnea/CABG/3V CAD-- ekg unchanged  Significant 3 vessel cad   well-controlled  - nuclear stress test to evaluate  - aspirin 81mg  -   rosuvastatin  -- amlodpine  - daily exercise     Htn- amlodipine  controlled    First-degree heart block/incomplete right bundle branch block- unchanged  Continue to monitor-    Mild carotid artery disease bilateral- rosuvastatin+ aspirin    Dark stools-cologuard negative    Health screening- psa- one year    rtc- 1 year     Interval History:  initial visit: 76 yo gentleman with prior NSTEMI s/p 3V-CABG (LIMA to LAD, SVG to OM2, and SVG to PDA) s/p cardiac rehab walking one mile daily  I have reviewed all of his physical therapy notes and am appreciative of the excellent care  Compliant with his medications denying any significant symptoms  Denying any bleeding or rash  He denies having any chest pain or shortness of breath      /3: No exercise limitations  Denies having any chest tightness  Denies any any muscle aches  Denies any bleeding or bruising  Has been very active  Does daily walks without any symptoms  12, 2017: He reports exercising without any limitations  He denies any recurrence of chest discomfort  He denies having any dizziness or lightheadedness  He denies having any medication intolerance  He denies any significant bleeding or bruising  09/17/2018: He denies having any chest tightness  He denies having any exertional shortness of breath    He has been compliant with his medications  He denies having any falls  He denies having any easy bleeding or bruising  Reviewed through his records  03/04/2019:  He denies having any exertional chest pain  He denies having exertional shortness of breath  He is compliant with his medications  We reviewed through his recent lab work which shows his cholesterol is well controlled  09/10/2018:  He denies having exertional chest pain or shortness of breath  He is compliant with his medications  Reports having colon cancer screening  Which was negative  He denies having any bright red blood per rectum  He is compliant with his aspirin therapy  His EKG remains unchanged  03/03/2020:  He is walking his dog without any significant shortness of breath or chest pain  His cholesterol remains controlled  His blood pressure is controlled  His EKG remains unchanged  He is compliant with his medications  09/15/2020:  He denies having significant chest tightness or shortness of breath  No major changes in his EKG  He is compliant with his medications  Denies having significant bleeding  05/12/2021:  He denies having any major chest tightness or shortness of breath  He is compliant with medications  No major change in EKG      Patient Active Problem List   Diagnosis    Hx of CABG    Coronary artery disease involving native coronary artery of native heart without angina pectoris    Elevated homocysteine    MTHFR mutation    NSTEMI (non-ST elevated myocardial infarction) ([facility] [address] )    Vitamin D deficiency    Nocturnal enuresis    Benign prostatic hyperplasia with nocturia    Benign essential hypertension    Mixed hyperlipidemia    GERD (gastroesophageal reflux disease)    Urinary retention    S/P TURP    Sensorineural hearing loss (SNHL)    Change in bowel movement    Osteopenia of multiple sites    External hemorrhoids    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[facility] [address] )    Gross hematuria    Leukocytosis    Anemia    Thrombocytopenia ([address] )    BPH (benign prostatic hyperplasia)     Past Medical History:   Diagnosis Date    Arthritis     CAD (coronary artery disease)     Cataract     Colon polyp     COVID-19     GERD (gastroesophageal reflux disease)     Heart attack (HCC)     Herpes zoster     Hx of colonic polyps     Hyperlipidemia     Hypertension      Social History     Socioeconomic History    Marital status: /Civil Union     Spouse name: Not on file    Number of children: Not on file    Years of education: Not on file    Highest education level: Not on file   Occupational History    Not on file   Tobacco Use    Smoking status: Former Smoker     Quit date:      Years since quittin 4    Smokeless tobacco: Never Used   Vaping Use    Vaping Use: Never used   Substance and Sexual Activity    Alcohol use: Not Currently    Drug use: No    Sexual activity: Not on file   Other Topics Concern    Not on file   Social History Narrative    Exercise cycling / walking    Exercise daily     Good sleep hygiene    Sleeps 8-10 hrs a day      Social Determinants of Health     Financial Resource Strain: Not on file   Food Insecurity: Not on file   Transportation Needs: Not on file   Physical Activity: Not on file   Stress: Not on file   Social Connections: Not on file   Intimate Partner Violence: Not on file   Housing Stability: Not on file      Family History   Problem Relation Age of Onset    Coronary artery disease Father     Hyperlipidemia Brother     Sudden death Brother         cardiac    Arthritis Family     Mental illness Neg Hx      Past Surgical History:   Procedure Laterality Date    BACK SURGERY      disc removed 1985 R         CATARACT EXTRACTION      COLONOSCOPY      [place]ONARY ARTERY BYPASS GRAFT  [place]    16   & CABG X3 with LIMA to LAD <SVG to OM-2, SVG to PDA  [place] [facility] / [facility] [place]   13       EYE SURGERY      cataract /  R     FL RETROGRADE PYELOGRAM  1/13/2022    HERNIA REPAIR      Groin / R   [address] /  Pauline Will   [address]    [address]     [address]     right hip    VT CYSTOURETHROSCOPY W/IRRIG & EVAC CLOTS N/A 5/18/2021    Procedure: CYSTOSCOPY EVACUATION OF CLOTS WITH  FULGURATION OF PROSTATE;  Surgeon: Kyler Fine MD;  Location: [address] [place] [address];  Service: Urology    VT TRANSURETHRAL ELEC-SURG PROSTATECTOM N/A 6/4/2019    Procedure: CYSTOSCOPY, TRANSURETHRAL RESECTION OF PROSTATE (TURP); Surgeon: Kyler Fine MD;  Location: [address] [place] [address];  Service: Urology    TRANSURETHRAL RESECTION OF PROSTATE Bilateral 1/13/2022    Procedure: CYSTOSCOPY, TRANSURETHRAL RESECTION OF PROSTATE (TURP), RETROGRADE, PYELOGRAM;  Surgeon: June Clemente MD;  Location[facility];  Service: Urology       Current Outpatient Medications:     amLODIPine (NORVASC) 2 5 mg tablet, Take 1 tablet (2 5 mg total) by mouth daily, Disp: 90 tablet, Rfl: 1    Ascorbic Acid (VITAMIN C PO), Take 500 mg by mouth daily , Disp: , Rfl:     B Complex Vitamins (B-COMPLEX/B-12 PO), Take 1 tablet by mouth daily, Disp: , Rfl:     Calcium 150 MG TABS, Take by mouth daily  , Disp: , Rfl:     Cholecalciferol (VITAMIN D) 2000 units CAPS, Take 1 capsule by mouth daily, Disp: , Rfl:     dutasteride (AVODART) 0 5 mg capsule, Take 1 capsule (0 5 mg total) by mouth daily, Disp: 90 capsule, Rfl: 0    EQ Aspirin Adult Low Dose 81 MG EC tablet, Last dose 1/9/22 , Disp: , Rfl:     polyethylene glycol (MIRALAX) 17 g packet, Take 17 g by mouth daily, Disp: , Rfl:     rosuvastatin (CRESTOR) 20 MG tablet, Take 1 tablet (20 mg total) by mouth daily at bedtime, Disp: 90 tablet, Rfl: 3    tamsulosin (FLOMAX) 0 4 mg, TAKE 1 CAPSULE BY MOUTH ONCE DAILY WITH SUPPER, Disp: 90 capsule, Rfl: 0  No Known Allergies    Review of Systems:  Review of Systems   Constitutional: Negative  HENT: Negative  Eyes: Negative  Respiratory: Negative  Cardiovascular: Negative  Gastrointestinal: Negative  Endocrine: Negative  Genitourinary: Negative  Musculoskeletal: Negative  Skin: Negative  Allergic/Immunologic: Negative  Neurological: Negative  Hematological: Negative  Psychiatric/Behavioral: Negative  Vitals:    06/21/22 1305   BP: 124/58   BP Location: Right arm   Patient Position: Sitting   Cuff Size: Standard   Pulse: 70   Temp: 98 4 °F (36 9 °C)   SpO2: 98%   Weight: 70 3 kg (155 lb)   Height: 5' 10" (1 778 m)     Physical Exam:  Physical Exam  Constitutional:       General: He is not in acute distress  Appearance: He is well-developed  He is not diaphoretic  HENT:      Head: Normocephalic and atraumatic  Right Ear: External ear normal       Left Ear: External ear normal    Eyes:      General: No scleral icterus  Right eye: No discharge  Left eye: No discharge  Conjunctiva/sclera: Conjunctivae normal       Pupils: Pupils are equal, round, and reactive to light  Neck:      Thyroid: No thyromegaly  Vascular: No JVD  Trachea: No tracheal deviation  Cardiovascular:      Rate and Rhythm: Normal rate and regular rhythm  Heart sounds: No murmur heard  No friction rub  Gallop present  Pulmonary:      Effort: Pulmonary effort is normal  No respiratory distress  Breath sounds: Normal breath sounds  No stridor  No wheezing or rales  Chest:      Chest wall: No tenderness  Abdominal:      General: Bowel sounds are normal  There is no distension  Palpations: Abdomen is soft  There is no mass  Tenderness: There is no abdominal tenderness  There is no guarding or rebound  Musculoskeletal:         General: No tenderness or deformity  Normal range of motion  Cervical back: Normal range of motion and neck supple  Skin:     General: Skin is warm and dry  Coloration: Skin is not pale  Findings: No erythema or rash  Neurological:      Mental Status: He is alert and oriented to person, place, and time  Cranial Nerves: No cranial nerve deficit  Motor: No abnormal muscle tone  Coordination: Coordination normal       Deep Tendon Reflexes: Reflexes are normal and symmetric  Psychiatric:         Behavior: Behavior normal          Thought Content: Thought content normal          Judgment: Judgment normal          Labs:     Lab Results   Component Value Date    WBC 5 51 05/02/2022    HGB 11 7 (L) 05/02/2022    HCT 35 2 (L) 05/02/2022     (H) 05/02/2022     05/02/2022     Lab Results   Component Value Date     12/30/2015    K 4 0 05/02/2022     05/02/2022    CO2 26 05/02/2022    ANIONGAP 12 8 12/30/2015    BUN 21 05/02/2022    CREATININE 0 99 05/02/2022    GLUCOSE 104 12/30/2015    GLUF 107 (H) 05/02/2022    CALCIUM 8 8 05/02/2022    CORRECTEDCA 9 0 05/19/2021    AST 18 05/02/2022    ALT 28 05/02/2022    ALKPHOS 58 05/02/2022    PROT 7 5 12/30/2015    BILITOT 0 6 12/30/2015    EGFR 70 05/02/2022     Lab Results   Component Value Date    CHOL 95 (L) 12/30/2015    CHOL 80 (L) 06/24/2015    CHOL 105 11/13/2014     Lab Results   Component Value Date    HDL 36 (L) 05/02/2022    HDL 51 10/22/2021    HDL 43 11/02/2020     Lab Results   Component Value Date    LDLCALC 38 05/02/2022    LDLCALC 42 10/22/2021    LDLCALC 43 11/02/2020     Lab Results   Component Value Date    TRIG 44 05/02/2022    TRIG 22 10/22/2021    TRIG 42 11/02/2020     Lab Results   Component Value Date    HGBA1C 5 5 12/09/2020       Imaging & Testing   I have personally reviewed pertinent reports  EKG: Personally reviewed      Sinus bradycardia incomplete RBBB  09/10/2019 normal sinus rhythm incomplete right bundle branch block no acute ST T wave changes from prior EKG  Sinus rhythm with first-degree AV block incomplete right bundle branch block    Cardiac testing:   CORONARY CIRCULATION:   Proximal LAD: There was a 90 % stenosis  1st diagonal: There was a 100 % stenosis  This lesion is a chronic total   occlusion  Mid circumflex: There was a 80 % stenosis  1st obtuse marginal: There was a 100 % stenosis  This lesion is a chronic    total   occlusion  Distal RCA: There was a 100 % stenosis  This lesion is a chronic   total occlusion  CARDIAC STRUCTURES:   Global left ventricular function was normal  EF calculated by contrast   ventriculography was 65 %  Daksha Yeboah  Please call with any questions or suggestions    A description of the counseling:   Goals and Barriers:  Patient's ability to self care:  Medication side effect reviewed with patient in detail and all their questions answered  "This note has been constructed using a voice recognition system  Therefore there may be syntax, spelling, and/or grammatical errors   Please call if you have any questions  "

## 2022-06-30 ENCOUNTER — OFFICE VISIT (OUTPATIENT)
Dept: PODIATRY | Facility: CLINIC | Age: 82
End: 2022-06-30
Payer: MEDICARE

## 2022-06-30 VITALS
BODY MASS INDEX: 22.19 KG/M2 | RESPIRATION RATE: 17 BRPM | HEIGHT: 70 IN | WEIGHT: 155 LBS | DIASTOLIC BLOOD PRESSURE: 58 MMHG | SYSTOLIC BLOOD PRESSURE: 124 MMHG

## 2022-06-30 DIAGNOSIS — L03.032 PARONYCHIA OF TOENAIL OF LEFT FOOT: ICD-10-CM

## 2022-06-30 DIAGNOSIS — S91.109A OPEN WOUND OF TOE OF LEFT FOOT: ICD-10-CM

## 2022-06-30 DIAGNOSIS — S90.212D CONTUSION OF LEFT GREAT TOE WITH DAMAGE TO NAIL, SUBSEQUENT ENCOUNTER: Primary | ICD-10-CM

## 2022-06-30 DIAGNOSIS — M79.672 LEFT FOOT PAIN: ICD-10-CM

## 2022-06-30 PROCEDURE — 99213 OFFICE O/P EST LOW 20 MIN: CPT | Performed by: PODIATRIST

## 2022-06-30 NOTE — PROGRESS NOTES
Assessment/Plan:  History of contusion left hallux  Open wound of hallux nail bed  Resolved paronychia  Decreasing pain  Plan  Foot exam performed  Patient educated on condition  Nail bed debrided  In addition, gentian violet dry sterile dressing applied  Patient advised on aftercare  Diagnoses and all orders for this visit:    Contusion of left great toe with damage to nail, subsequent encounter    Left foot pain    Paronychia of toenail of left foot    Open wound of toe of left foot          Subjective:  Patient has history of injury left big toe  He is getting better      No Known Allergies      Current Outpatient Medications:     amLODIPine (NORVASC) 2 5 mg tablet, Take 1 tablet (2 5 mg total) by mouth daily, Disp: 90 tablet, Rfl: 1    Ascorbic Acid (VITAMIN C PO), Take 500 mg by mouth daily , Disp: , Rfl:     B Complex Vitamins (B-COMPLEX/B-12 PO), Take 1 tablet by mouth daily, Disp: , Rfl:     Calcium 150 MG TABS, Take by mouth daily  , Disp: , Rfl:     Cholecalciferol (VITAMIN D) 2000 units CAPS, Take 1 capsule by mouth daily, Disp: , Rfl:     dutasteride (AVODART) 0 5 mg capsule, Take 1 capsule (0 5 mg total) by mouth daily, Disp: 90 capsule, Rfl: 0    EQ Aspirin Adult Low Dose 81 MG EC tablet, Last dose 1/9/22 , Disp: , Rfl:     polyethylene glycol (MIRALAX) 17 g packet, Take 17 g by mouth daily, Disp: , Rfl:     rosuvastatin (CRESTOR) 20 MG tablet, Take 1 tablet (20 mg total) by mouth daily at bedtime, Disp: 90 tablet, Rfl: 3    tamsulosin (FLOMAX) 0 4 mg, TAKE 1 CAPSULE BY MOUTH ONCE DAILY WITH SUPPER, Disp: 90 capsule, Rfl: 0    Patient Active Problem List   Diagnosis    Hx of CABG    Coronary artery disease involving native coronary artery of native heart without angina pectoris    Elevated homocysteine    MTHFR mutation    [facility]STEMI [facility]non-ST elevated [facility] infarction) [facility]    Vitamin D deficiency    Nocturnal enuresis    Benign prostatic hyperplasia with nocturia  Benign essential hypertension    Mixed hyperlipidemia    GERD (gastroesophageal reflux disease)    Urinary retention    S/P TURP    Sensorineural hearing loss (SNHL)    Change in bowel movement    Osteopenia of multiple sites    External hemorrhoids    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HCC)    Gross hematuria    Leukocytosis    Anemia    Thrombocytopenia (HCC)    BPH (benign prostatic hyperplasia)          Patient ID: Tim Barraza is a 79 y o  male  HPI    The following portions of the patient's history were reviewed and updated as appropriate:     family history includes Arthritis in his family; Coronary artery disease in his father; Hyperlipidemia in his brother; Sudden death in his brother  reports that he quit smoking about 43 years ago  He has never used smokeless tobacco  He reports previous alcohol use  He reports that he does not use drugs  Vitals:    06/30/22 1111   BP: 124/58   Resp: 17       Review of Systems      Objective:  Patient's shoes and socks removed     Foot ExamPhysical Exam        General  General Appearance: appears stated age and healthy   Orientation: alert and oriented to person, place, and time   Affect: appropriate         Right Foot/Ankle      Inspection and Palpation  Swelling: dorsum   Arch: pes planus  Hammertoes: fifth toe  Skin Exam: dry skin;      Neurovascular  Dorsalis pedis: 2+  Posterior tibial: 3+  Saphenous nerve sensation: normal  Tibial nerve sensation: normal  Superficial peroneal nerve sensation: normal  Deep peroneal nerve sensation: normal  Sural nerve sensation: normal        Left Foot/Ankle       Inspection and Palpation  Ecchymosis: first toe  Swelling: dorsum   Arch: pes planus  Hammertoes: fifth toe  Skin Exam: dry skin;      Neurovascular  Dorsalis pedis: 2+  Posterior tibial: 3+  Saphenous nerve sensation: normal  Tibial nerve sensation: normal  Superficial peroneal nerve sensation: normal  Deep peroneal nerve sensation: normal  Sural nerve sensation: normal           Physical Exam  Vitals and nursing note reviewed  Constitutional:       Appearance: Normal appearance  Cardiovascular:      Rate and Rhythm: Normal rate and regular rhythm  Pulses:           Dorsalis pedis pulses are 2+ on the right side and 2+ on the left side  Posterior tibial pulses are 3+ on the right side and 3+ on the left side  Feet:      Right foot:      Skin integrity: Dry skin present  Left foot:      Skin integrity: Dry skin present  Skin:     Capillary Refill: Capillary refill takes less than 2 seconds  Comments:   Left hallux demonstrates dried ulcerated nail bed distal aspect  Resolved paronychia  Nail proximal nail groove noted to be growing  Negative paronychia or pus  Neurological:      Mental Status: He is alert  Psychiatric:         Mood and Affect: Mood normal          Behavior: Behavior normal          Thought Content:  Thought content normal          Judgment: Judgment normal

## 2022-07-01 ENCOUNTER — HOSPITAL ENCOUNTER (OUTPATIENT)
Dept: RADIOLOGY | Facility: HOSPITAL | Age: 82
Discharge: HOME | End: 2022-07-01
Attending: INTERNAL MEDICINE
Payer: MEDICARE

## 2022-07-01 ENCOUNTER — HOSPITAL ENCOUNTER (OUTPATIENT)
Dept: NON INVASIVE DIAGNOSTICS | Facility: HOSPITAL | Age: 82
Discharge: HOME | End: 2022-07-01
Attending: INTERNAL MEDICINE
Payer: MEDICARE

## 2022-07-01 DIAGNOSIS — I10 ESSENTIAL HYPERTENSION: ICD-10-CM

## 2022-07-01 DIAGNOSIS — R06.09 EXERTIONAL DYSPNEA: ICD-10-CM

## 2022-07-01 DIAGNOSIS — Z95.1 HX OF CABG: ICD-10-CM

## 2022-07-01 LAB
ARRHY DURING EX: NORMAL
BASELINE ST DEPRESSION: 0 MM
CHEST PAIN STATEMENT: NORMAL
MAX DIASTOLIC BP: 40 MMHG
MAX HEART RATE: 133 BPM
MAX HR PERCENT: 97 %
MAX HR: 133 BPM
MAX PREDICTED HEART RATE: 137 BPM
MAX. SYSTOLIC BP: 170 MMHG
PROTOCOL NAME: NORMAL
RATE PRESSURE PRODUCT: NORMAL
REASON FOR TERMINATION: NORMAL
SL CV REST NUCLEAR ISOTOPE DOSE: 11 MCI
SL CV STRESS NUCLEAR ISOTOPE DOSE: 31.9 MCI
SL CV STRESS RECOVERY BP: NORMAL MMHG
SL CV STRESS RECOVERY HR: 93 BPM
SL CV STRESS RECOVERY O2 SAT: 98 %
SL CV STRESS STAGE REACHED: 2
STRESS ANGINA INDEX: 0
STRESS BASELINE BP: NORMAL MMHG
STRESS BASELINE HR: 61 BPM
STRESS DUKE TREADMILL SCORE: 5
STRESS O2 SAT REST: 100 %
STRESS PEAK HR: 133 BPM
STRESS POST ESTIMATED WORKLOAD: 7 METS
STRESS POST EXERCISE DUR MIN: 4 MIN
STRESS POST EXERCISE DUR SEC: 30 SEC
STRESS POST O2 SAT PEAK: 98 %
STRESS POST PEAK BP: 168 MMHG
STRESS ST DEPRESSION: 0 MM
TARGET HR FORMULA: NORMAL
TEST INDICATION: NORMAL
TIME IN EXERCISE PHASE: NORMAL

## 2022-07-01 PROCEDURE — 78452 HT MUSCLE IMAGE SPECT MULT: CPT | Performed by: INTERNAL MEDICINE

## 2022-07-01 PROCEDURE — G1004 CDSM NDSC: HCPCS

## 2022-07-01 PROCEDURE — 93018 CV STRESS TEST I&R ONLY: CPT | Performed by: INTERNAL MEDICINE

## 2022-07-01 PROCEDURE — 93017 CV STRESS TEST TRACING ONLY: CPT

## 2022-07-01 PROCEDURE — 78452 HT MUSCLE IMAGE SPECT MULT: CPT

## 2022-07-01 PROCEDURE — 93016 CV STRESS TEST SUPVJ ONLY: CPT | Performed by: INTERNAL MEDICINE

## 2022-07-01 PROCEDURE — A9502 TC99M TETROFOSMIN: HCPCS

## 2022-07-07 ENCOUNTER — TELEPHONE (OUTPATIENT)
Dept: CARDIOLOGY CLINIC | Facility: CLINIC | Age: 82
End: 2022-07-07

## 2022-07-07 NOTE — TELEPHONE ENCOUNTER
----- Message from Saige Bethea MD sent at 7/7/2022  2:48 PM EDT -----  Stress test without any major changes  Will continue medical therapy  Please let me know if any worsened symptoms of chest pain or shortness of breath

## 2022-07-14 DIAGNOSIS — N40.0 ENLARGED PROSTATE ON RECTAL EXAMINATION: ICD-10-CM

## 2022-07-14 RX ORDER — DUTASTERIDE 0.5 MG/1
0.5 CAPSULE, LIQUID FILLED ORAL DAILY
Qty: 90 CAPSULE | Refills: 0 | Status: SHIPPED | OUTPATIENT
Start: 2022-07-14 | End: 2022-10-06 | Stop reason: SDUPTHER

## 2022-07-31 DIAGNOSIS — Z95.1 HX OF CABG: ICD-10-CM

## 2022-07-31 DIAGNOSIS — I25.10 CORONARY ARTERY DISEASE INVOLVING NATIVE CORONARY ARTERY OF NATIVE HEART WITHOUT ANGINA PECTORIS: ICD-10-CM

## 2022-07-31 DIAGNOSIS — I10 ESSENTIAL HYPERTENSION: ICD-10-CM

## 2022-07-31 DIAGNOSIS — I21.4 NSTEMI (NON-ST ELEVATED MYOCARDIAL INFARCTION) (HCC): ICD-10-CM

## 2022-08-01 DIAGNOSIS — N39.44 NOCTURNAL ENURESIS: ICD-10-CM

## 2022-08-01 RX ORDER — ROSUVASTATIN CALCIUM 20 MG/1
TABLET, COATED ORAL
Qty: 90 TABLET | Refills: 0 | Status: SHIPPED | OUTPATIENT
Start: 2022-08-01

## 2022-08-02 RX ORDER — TAMSULOSIN HYDROCHLORIDE 0.4 MG/1
CAPSULE ORAL
Qty: 90 CAPSULE | Refills: 0 | Status: SHIPPED | OUTPATIENT
Start: 2022-08-02

## 2022-10-05 DIAGNOSIS — I10 ESSENTIAL HYPERTENSION: ICD-10-CM

## 2022-10-05 RX ORDER — AMLODIPINE BESYLATE 2.5 MG/1
TABLET ORAL
Qty: 90 TABLET | Refills: 0 | Status: SHIPPED | OUTPATIENT
Start: 2022-10-05

## 2022-10-06 DIAGNOSIS — N40.0 ENLARGED PROSTATE ON RECTAL EXAMINATION: ICD-10-CM

## 2022-10-06 RX ORDER — DUTASTERIDE 0.5 MG/1
0.5 CAPSULE, LIQUID FILLED ORAL DAILY
Qty: 90 CAPSULE | Refills: 0 | Status: SHIPPED | OUTPATIENT
Start: 2022-10-06

## 2022-10-30 DIAGNOSIS — I10 ESSENTIAL HYPERTENSION: ICD-10-CM

## 2022-10-30 DIAGNOSIS — N39.44 NOCTURNAL ENURESIS: ICD-10-CM

## 2022-10-30 DIAGNOSIS — I25.10 CORONARY ARTERY DISEASE INVOLVING NATIVE CORONARY ARTERY OF NATIVE HEART WITHOUT ANGINA PECTORIS: ICD-10-CM

## 2022-10-30 DIAGNOSIS — I21.4 NSTEMI (NON-ST ELEVATED MYOCARDIAL INFARCTION) (HCC): ICD-10-CM

## 2022-10-30 DIAGNOSIS — Z95.1 HX OF CABG: ICD-10-CM

## 2022-10-31 RX ORDER — ROSUVASTATIN CALCIUM 20 MG/1
TABLET, COATED ORAL
Qty: 90 TABLET | Refills: 0 | Status: SHIPPED | OUTPATIENT
Start: 2022-10-31

## 2022-11-01 RX ORDER — TAMSULOSIN HYDROCHLORIDE 0.4 MG/1
CAPSULE ORAL
Qty: 90 CAPSULE | Refills: 0 | Status: SHIPPED | OUTPATIENT
Start: 2022-11-01

## 2022-11-14 ENCOUNTER — APPOINTMENT (OUTPATIENT)
Dept: LAB | Facility: HOSPITAL | Age: 82
End: 2022-11-14

## 2022-11-14 DIAGNOSIS — E78.2 MIXED HYPERLIPIDEMIA: ICD-10-CM

## 2022-11-14 DIAGNOSIS — R73.09 ABNORMAL GLUCOSE: ICD-10-CM

## 2022-11-14 DIAGNOSIS — D64.9 ANEMIA, UNSPECIFIED TYPE: ICD-10-CM

## 2022-11-14 DIAGNOSIS — D72.829 LEUKOCYTOSIS, UNSPECIFIED TYPE: ICD-10-CM

## 2022-11-14 LAB
ALBUMIN SERPL BCP-MCNC: 3.9 G/DL (ref 3.5–5)
ALP SERPL-CCNC: 59 U/L (ref 46–116)
ALT SERPL W P-5'-P-CCNC: 26 U/L (ref 12–78)
ANION GAP SERPL CALCULATED.3IONS-SCNC: 3 MMOL/L (ref 4–13)
AST SERPL W P-5'-P-CCNC: 19 U/L (ref 5–45)
BILIRUB SERPL-MCNC: 0.65 MG/DL (ref 0.2–1)
BUN SERPL-MCNC: 20 MG/DL (ref 5–25)
CALCIUM SERPL-MCNC: 9.1 MG/DL (ref 8.3–10.1)
CHLORIDE SERPL-SCNC: 110 MMOL/L (ref 96–108)
CHOLEST SERPL-MCNC: 93 MG/DL
CO2 SERPL-SCNC: 27 MMOL/L (ref 21–32)
CREAT SERPL-MCNC: 0.96 MG/DL (ref 0.6–1.3)
ERYTHROCYTE [DISTWIDTH] IN BLOOD BY AUTOMATED COUNT: 12.9 % (ref 11.6–15.1)
EST. AVERAGE GLUCOSE BLD GHB EST-MCNC: 103 MG/DL
GFR SERPL CREATININE-BSD FRML MDRD: 72 ML/MIN/1.73SQ M
GLUCOSE P FAST SERPL-MCNC: 107 MG/DL (ref 65–99)
HBA1C MFR BLD: 5.2 %
HCT VFR BLD AUTO: 37.6 % (ref 36.5–49.3)
HDLC SERPL-MCNC: 49 MG/DL
HGB BLD-MCNC: 12.3 G/DL (ref 12–17)
LDLC SERPL CALC-MCNC: 35 MG/DL (ref 0–100)
MCH RBC QN AUTO: 34.1 PG (ref 26.8–34.3)
MCHC RBC AUTO-ENTMCNC: 32.7 G/DL (ref 31.4–37.4)
MCV RBC AUTO: 104 FL (ref 82–98)
PLATELET # BLD AUTO: 145 THOUSANDS/UL (ref 149–390)
PMV BLD AUTO: 10.3 FL (ref 8.9–12.7)
POTASSIUM SERPL-SCNC: 4.4 MMOL/L (ref 3.5–5.3)
PROT SERPL-MCNC: 7.8 G/DL (ref 6.4–8.4)
RBC # BLD AUTO: 3.61 MILLION/UL (ref 3.88–5.62)
SODIUM SERPL-SCNC: 140 MMOL/L (ref 135–147)
TRIGL SERPL-MCNC: 44 MG/DL
WBC # BLD AUTO: 6.97 THOUSAND/UL (ref 4.31–10.16)

## 2022-11-21 ENCOUNTER — OFFICE VISIT (OUTPATIENT)
Dept: URGENT CARE | Facility: CLINIC | Age: 82
End: 2022-11-21

## 2022-11-21 VITALS
HEART RATE: 78 BPM | OXYGEN SATURATION: 98 % | BODY MASS INDEX: 22.96 KG/M2 | SYSTOLIC BLOOD PRESSURE: 119 MMHG | TEMPERATURE: 97 F | DIASTOLIC BLOOD PRESSURE: 58 MMHG | WEIGHT: 160 LBS | RESPIRATION RATE: 20 BRPM

## 2022-11-21 DIAGNOSIS — J34.89 RHINORRHEA: Primary | ICD-10-CM

## 2022-11-21 NOTE — PROGRESS NOTES
[facility]        NAME: Zachary Nunez is a 79 y o  male  : 1940    MRN: [id number]  DATE: 2022  TIME: 9:11 AM    Assessment and Plan   Rhinorrhea [J34 [id number]]  1  Rhinorrhea          Patient Instructions   Rhinorrhea  Recommend flonase nasal spray and warm salt water gargles  Rest, fluids and supportive care  May benefit from a cool mist humidifier on night stand  Tylenol/ibuprofen as needed for pain/fever    Follow up with PCP in 3-5 days  Proceed to  ER if symptoms worsen  Chief Complaint     Chief Complaint   Patient presents with   • Nasal Drainage   • Sore Throat     X 4-5 days , denies any other s/s         History of Present Illness       Janusz Browne is an 39-year-old male who presents to clinic complaining of rhinorrhea x4 days  He also notes a mild scratchy throat due the rhinorrhea  Denies any fever, chills, cough, ear pain, fatigue, myalgias, shortness of breath, nausea, vomiting, diarrhea, loss of taste or smell, recent travel, or exposure to anyone known COVID positive  He has been taking NyQuil over-the-counter with no relief  Review of Systems   Review of Systems   Constitutional: Negative for chills, fatigue and fever  HENT: Positive for rhinorrhea and sore throat  Negative for congestion, ear pain, sinus pressure and sinus pain  Respiratory: Negative for cough and shortness of breath  Gastrointestinal: Negative for diarrhea, nausea and vomiting  Neurological: Negative for headaches           Current Medications       Current Outpatient Medications:   •  amLODIPine (NORVASC) 2 5 mg tablet, Take 1 tablet by mouth once daily, Disp: 90 tablet, Rfl: 0  •  Ascorbic Acid (VITAMIN C PO), Take 500 mg by mouth daily , Disp: , Rfl:   •  B Complex Vitamins (B-COMPLEX/B-12 PO), Take 1 tablet by mouth daily, Disp: , Rfl:   •  Calcium 150 MG TABS, Take by mouth daily  , Disp: , Rfl:   •  Cholecalciferol (VITAMIN D) 2000 units CAPS, Take 1 capsule by mouth daily, Disp: , Rfl:   •  dutasteride (AVODART) 0 5 mg capsule, Take 1 capsule (0 5 mg total) by mouth daily, Disp: 90 capsule, Rfl: 0  •  EQ Aspirin Adult Low Dose 81 MG EC tablet, Last dose 1/9/22 , Disp: , Rfl:   •  polyethylene glycol (MIRALAX) 17 g packet, Take 17 g by mouth daily, Disp: , Rfl:   •  rosuvastatin (CRESTOR) 20 MG tablet, TAKE 1 TABLET BY MOUTH ONCE DAILY AT BEDTIME, Disp: 90 tablet, Rfl: 0  •  tamsulosin (FLOMAX) 0 4 mg, TAKE 1 CAPSULE BY MOUTH ONCE DAILY WITH SUPPER, Disp: 90 capsule, Rfl: 0    Current Allergies     Allergies as of 11/21/2022   • (No Known Allergies)            The following portions of the patient's history were reviewed and updated as appropriate: allergies, current medications, past family history, past medical history, past social history, past surgical history and problem list      Past Medical History:   Diagnosis Date   • Arthritis    • CAD (coronary artery disease)    • Cataract    • Colon polyp    • COVID-19    • GERD (gastroesophageal reflux disease)    • Heart attack ([facility] [address] )    • Herpes zoster    • Hx of colonic polyps    • Hyperlipidemia    • Hypertension        Past Surgical History:   Procedure Laterality Date   • BACK SURGERY      disc removed 1985 / [place]   1985     • CATARACT EXTRACTION     • COLONOSCOPY     • CORONARY ARTERY BYPASS GRAFT  11/13/2014    [place]    6/30/16   & CABG X3 with LIMA to LAD <SVG to OM-2, SVG to PDA  [place] Myesha Goff / Kevin Alvarado   12/19/14      • EYE SURGERY      cataract /  R   1980     • FL RETROGRADE PYELOGRAM  1/13/2022   • HERNIA REPAIR      Groin / R   [address] /  [place]   1984      • INGUINAL HERNIA REPAIR      R    2010     • JOINT REPLACEMENT  1985     right hip   • ID CYSTOURETHROSCOPY W/IRRIG & EVAC CLOTS N/A 5/18/2021    Procedure: CYSTOSCOPY EVACUATION OF CLOTS WITH  FULGURATION OF PROSTATE;  Surgeon: Vika Bhakta MD;  Location: [facility];  Service: Urology   • ID TRANSURETHRAL ELEC-SURG PROSTATECTOM N/A 6/4/2019    Procedure: CYSTOSCOPY, TRANSURETHRAL RESECTION OF PROSTATE (TURP); Surgeon: Lorin Jara MD;  Location: [facility];  Service: Urology   • TRANSURETHRAL RESECTION OF PROSTATE Bilateral 1/13/2022    Procedure: CYSTOSCOPY, TRANSURETHRAL RESECTION OF PROSTATE (TURP), RETROGRADE, PYELOGRAM;  Surgeon: Noble Espinosa MD;  Location: [facility];  Service: Urology       Family History   Problem Relation Age of Onset   • Coronary artery disease Father    • Hyperlipidemia Brother    • Sudden death Brother         cardiac   • Arthritis Family    • Mental illness Neg Hx          Medications have been verified  Objective   /58   Pulse 78   Temp (!) 97 °F (36 1 °C)   Resp 20   Wt 72 6 kg (160 lb)   SpO2 98%   BMI 22 96 kg/m²   No LMP for male patient  Physical Exam     Physical Exam  Vitals and nursing note reviewed  Constitutional:       General: He is not in acute distress  Appearance: Normal appearance  He is not ill-appearing  HENT:      Right Ear: Tympanic membrane, ear canal and external ear normal       Left Ear: Tympanic membrane, ear canal and external ear normal       Nose: Rhinorrhea present  Mouth/Throat:      Mouth: Mucous membranes are moist       Pharynx: No oropharyngeal exudate or posterior oropharyngeal erythema  Cardiovascular:      Rate and Rhythm: Normal rate and regular rhythm  Heart sounds: Normal heart sounds  Pulmonary:      Effort: Pulmonary effort is normal       Breath sounds: Normal breath sounds  Lymphadenopathy:      Cervical: No cervical adenopathy  Neurological:      Mental Status: He is alert and oriented to person, place, and time     Psychiatric:         Mood and Affect: Mood normal          Behavior: Behavior normal

## 2022-12-01 ENCOUNTER — OFFICE VISIT (OUTPATIENT)
Dept: FAMILY MEDICINE CLINIC | Facility: CLINIC | Age: 82
End: 2022-12-01

## 2022-12-01 VITALS
OXYGEN SATURATION: 98 % | BODY MASS INDEX: 22.96 KG/M2 | TEMPERATURE: 97.1 F | SYSTOLIC BLOOD PRESSURE: 122 MMHG | DIASTOLIC BLOOD PRESSURE: 50 MMHG | HEIGHT: 70 IN | RESPIRATION RATE: 16 BRPM | HEART RATE: 68 BPM

## 2022-12-01 DIAGNOSIS — Z12.5 SCREENING FOR PROSTATE CANCER: ICD-10-CM

## 2022-12-01 DIAGNOSIS — J01.00 ACUTE NON-RECURRENT MAXILLARY SINUSITIS: ICD-10-CM

## 2022-12-01 DIAGNOSIS — E78.2 MIXED HYPERLIPIDEMIA: ICD-10-CM

## 2022-12-01 DIAGNOSIS — D69.6 THROMBOCYTOPENIA (HCC): ICD-10-CM

## 2022-12-01 DIAGNOSIS — N40.1 BENIGN PROSTATIC HYPERPLASIA WITH NOCTURIA: ICD-10-CM

## 2022-12-01 DIAGNOSIS — I10 BENIGN ESSENTIAL HYPERTENSION: ICD-10-CM

## 2022-12-01 DIAGNOSIS — Z13.820 SCREENING FOR OSTEOPOROSIS: ICD-10-CM

## 2022-12-01 DIAGNOSIS — Z90.79 S/P TURP: ICD-10-CM

## 2022-12-01 DIAGNOSIS — Z00.00 MEDICARE ANNUAL WELLNESS VISIT, SUBSEQUENT: Primary | ICD-10-CM

## 2022-12-01 DIAGNOSIS — E55.9 VITAMIN D DEFICIENCY: ICD-10-CM

## 2022-12-01 DIAGNOSIS — M85.89 OSTEOPENIA OF MULTIPLE SITES: ICD-10-CM

## 2022-12-01 DIAGNOSIS — R35.1 BENIGN PROSTATIC HYPERPLASIA WITH NOCTURIA: ICD-10-CM

## 2022-12-01 RX ORDER — METHYLPREDNISOLONE 4 MG/1
TABLET ORAL
Qty: 21 EACH | Refills: 0 | Status: SHIPPED | OUTPATIENT
Start: 2022-12-01

## 2022-12-01 RX ORDER — AMOXICILLIN AND CLAVULANATE POTASSIUM 875; 125 MG/1; MG/1
1 TABLET, FILM COATED ORAL EVERY 12 HOURS SCHEDULED
Qty: 20 TABLET | Refills: 0 | Status: SHIPPED | OUTPATIENT
Start: 2022-12-01 | End: 2022-12-11

## 2022-12-01 NOTE — PROGRESS NOTES
Assessment and Plan:     Problem List Items Addressed This Visit        Cardiovascular and Mediastinum    Benign essential hypertension    Relevant Orders    DXA bone density spine hip and pelvis       Musculoskeletal and Integument    Osteopenia of multiple sites    Relevant Orders    DXA bone density spine hip and pelvis       Hematopoietic and Hemostatic    Thrombocytopenia (HCC)    Relevant Orders    CBC    DXA bone density spine hip and pelvis       Other    Vitamin D deficiency    Relevant Orders    DXA bone density spine hip and pelvis    Benign prostatic hyperplasia with nocturia     Was seeing Dr Latanya Hartman - he stopped seeing him on his own         Relevant Orders    PSA, Total Screen    DXA bone density spine hip and pelvis    Mixed hyperlipidemia    Relevant Orders    Comprehensive metabolic panel    Lipid Panel with Direct LDL reflex    DXA bone density spine hip and pelvis    S/P TURP    Relevant Orders    DXA bone density spine hip and pelvis   Other Visit Diagnoses     Medicare annual wellness visit, subsequent    -  Primary    Relevant Orders    DXA bone density spine hip and pelvis    Acute non-recurrent maxillary sinusitis        Relevant Medications    amoxicillin-clavulanate (Augmentin) 875-125 mg per tablet    methylPREDNISolone 4 MG tablet therapy pack    Other Relevant Orders    DXA bone density spine hip and pelvis    Screening for prostate cancer        Relevant Orders    PSA, Total Screen    DXA bone density spine hip and pelvis    Screening for osteoporosis        Relevant Orders    DXA bone density spine hip and pelvis        BMI Counseling: Body mass index is 22 96 kg/m²  The BMI is above normal  Nutrition recommendations include decreasing portion sizes  Exercise recommendations include moderate physical activity 150 minutes/week  No pharmacotherapy was ordered  Rationale for BMI follow-up plan is due to patient being overweight or obese       Depression Screening and Follow-up Plan: Patient was screened for depression during today's encounter  They screened negative with a PHQ-2 score of 0  Preventive health issues were discussed with patient, and age appropriate screening tests were ordered as noted in patient's After Visit Summary  Personalized health advice and appropriate referrals for health education or preventive services given if needed, as noted in patient's After Visit Summary       History of Present Illness:     Patient presents for a Medicare Wellness Visit    Pt is mimi for an AWV  Pt had labs done  Pt states he feels ok buty thinks he has a cold  Been sick over a week  Tired     Patient Care Team:  Juan Michelle DO as PCP - General (Family Medicine)  Andrea Barnes MD as Consulting Physician (Cardiothoracic Surgery)  Reva iWlson MD as Consulting Physician (Cardiology)  Merrill Caballero MD as Consulting Physician (Gastroenterology)  Cj Valentin as Consulting Physician (Optometry)     Review of Systems:     Review of Systems     Problem List:     Patient Active Problem List   Diagnosis   • Hx of CABG   • Coronary artery disease involving native coronary artery of native heart without angina pectoris   • Elevated homocysteine   • MTHFR mutation   • NSTEMI (non-ST elevated myocardial infarction) ([facility] )   • Vitamin D deficiency   • Nocturnal enuresis   • Benign prostatic hyperplasia with nocturia   • Benign essential hypertension   • Mixed hyperlipidemia   • GERD (gastroesophageal reflux disease)   • Urinary retention   • S/P TURP   • Sensorineural hearing loss (SNHL)   • Change in bowel movement   • Osteopenia of multiple sites   • External hemorrhoids   • 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[facility] )   • Gross hematuria   • Leukocytosis   • Anemia   • Thrombocytopenia (HCC)   • BPH (benign prostatic hyperplasia)      Past Medical and Surgical History:     Past Medical History:   Diagnosis Date   • Arthritis    • CAD (coronary artery disease)    • Cataract    • Colon polyp    • COVID-19    • GERD (gastroesophageal reflux disease)    • Heart attack ([facility] )    • Herpes zoster    • Hx of colonic polyps    • Hyperlipidemia    • Hypertension      Past Surgical History:   Procedure Laterality Date   • BACK SURGERY      disc removed 1985 / R   1985     • CATARACT EXTRACTION     • COLONOSCOPY     • CORONARY ARTERY BYPASS GRAFT  11/13/2014    [place]    6/30/16   & CABG X3 with LIMA to LAD <SVG to OM-2, SVG to PDA  [place] Bijan Ford / Sylvia Schafer   12/19/14      • EYE SURGERY      cataract /  R   1980     • [place] RETROGRADE PYELOGRAM  1/13/2022   • HERNIA REPAIR      Groin / R   [address] /  Sydell Stands   1984      • INGUINAL HERNIA REPAIR      R    2010     • JOINT REPLACEMENT  1985     right hip   • LA CYSTOURETHROSCOPY W/IRRIG & EVAC CLOTS N/A 5/18/2021    Procedure: CYSTOSCOPY EVACUATION OF CLOTS WITH  FULGURATION OF PROSTATE;  Surgeon: Luan Ortiz MD;  Location: [address] [place] [address];  Service: Urology   • LA TRANSURETHRAL ELEC-SURG PROSTATECTOM N/A 6/4/2019    Procedure: CYSTOSCOPY, TRANSURETHRAL RESECTION OF PROSTATE (TURP);   Surgeon: Luan Ortiz MD;  Location: [facility];  Service: Urology   • TRANSURETHRAL RESECTION OF PROSTATE Bilateral 1/13/2022    Procedure: CYSTOSCOPY, TRANSURETHRAL RESECTION OF PROSTATE (TURP), RETROGRADE, PYELOGRAM;  Surgeon: Penny Carver MD;  Location: [facility];  Service: Urology      Family History:     Family History   Problem Relation Age of Onset   • Coronary artery disease Father    • Hyperlipidemia Brother    • Sudden death Brother         cardiac   • Arthritis Family    • Mental illness Neg Hx       Social History:     Social History     Socioeconomic History   • Marital status: /Civil Union     Spouse name: None   • Number of children: None   • Years of education: None   • Highest education level: None   Occupational History   • None   Tobacco Use   • Smoking status: Former     Packs/day: 2 00     Years: 30 00     Pack years: 60 00     Types: Cigarettes     Start date: 45     Quit date:      Years since quittin 9   • Smokeless tobacco: Never   Vaping Use   • Vaping Use: Never used   Substance and Sexual Activity   • Alcohol use: Not Currently   • Drug use: No   • Sexual activity: None   Other Topics Concern   • None   Social History Narrative    Exercise cycling / walking    Exercise daily     Good sleep hygiene    Sleeps 8-10 hrs a day      Social Determinants of Health     Financial Resource Strain: Low Risk    • Difficulty of Paying Living Expenses: Not hard at all   Food Insecurity: Not on file   Transportation Needs: No Transportation Needs   • Lack of Transportation (Medical): No   • Lack of Transportation (Non-Medical):  No   Physical Activity: Not on file   Stress: Not on file   Social Connections: Not on file   Intimate Partner Violence: Not on file   Housing Stability: Not on file      Medications and Allergies:     Current Outpatient Medications   Medication Sig Dispense Refill   • amLODIPine (NORVASC) 2 5 mg tablet Take 1 tablet by mouth once daily 90 tablet 0   • amoxicillin-clavulanate (Augmentin) 875-125 mg per tablet Take 1 tablet by mouth every 12 (twelve) hours for 10 days 20 tablet 0   • Ascorbic Acid (VITAMIN C PO) Take 500 mg by mouth daily      • B Complex Vitamins (B-COMPLEX/B-12 PO) Take 1 tablet by mouth daily     • Calcium 150 MG TABS Take by mouth daily       • Cholecalciferol (VITAMIN D) 2000 units CAPS Take 1 capsule by mouth daily     • dutasteride (AVODART) 0 5 mg capsule Take 1 capsule (0 5 mg total) by mouth daily 90 capsule 0   • EQ Aspirin Adult Low Dose 81 MG EC tablet Last dose 22      • methylPREDNISolone 4 MG tablet therapy pack Use as directed on package 21 each 0   • polyethylene glycol (MIRALAX) 17 g packet Take 17 g by mouth daily     • rosuvastatin (CRESTOR) 20 MG tablet TAKE 1 TABLET BY MOUTH ONCE DAILY AT BEDTIME 90 tablet 0   • tamsulosin (FLOMAX) 0 4 mg TAKE 1 CAPSULE BY MOUTH ONCE DAILY WITH SUPPER 90 capsule 0     No current facility-administered medications for this visit  No Known Allergies   Immunizations:     Immunization History   Administered Date(s) Administered   • COVID-19 MODERNA VACC 0 5 ML IM 03/16/2021, 04/13/2021   • INFLUENZA 11/02/2010, 11/11/2011, 10/15/2012, 11/13/2013   • Influenza Split High Dose Preservative Free IM 11/02/2015, 10/28/2016, 10/10/2019   • Influenza, Seasonal Vaccine, Quadrivalent, Adjuvanted,  5e 11/07/2022   • Influenza, high dose seasonal 0 7 mL 09/12/2018, 11/09/2021   • Influenza, injectable, quadrivalent, preservative free 0 5 mL 09/16/2020   • Pneumococcal 01/01/2005, 12/04/2012   • Pneumococcal Conjugate 13-Valent 09/12/2018   • Pneumococcal Polysaccharide PPV23 03/20/2015   • Tdap 09/12/2018   • Tetanus Toxoid, Unspecified 11/11/2011      Health Maintenance: There are no preventive care reminders to display for this patient  Topic Date Due   • Hepatitis B Vaccine (1 of 3 - 3-dose series) Never done   • COVID-19 Vaccine (3 - Booster for Moderna series) 09/13/2021   • Influenza Vaccine (1) 09/01/2022      Medicare Screening Tests and Risk Assessments:     Denilson Pelaez is here for his Subsequent Wellness visit  Last Medicare Wellness visit information reviewed, patient interviewed, no change since last AWV  Health Risk Assessment:   Patient rates overall health as good  Patient feels that their physical health rating is same  Patient is satisfied with their life  Eyesight was rated as same  Hearing was rated as same  Patient feels that their emotional and mental health rating is same  Patients states they are sometimes angry  Patient states they are sometimes unusually tired/fatigued  Pain experienced in the last 7 days has been none  Patient states that he has experienced no weight loss or gain in last 6 months  Depression Screening:   PHQ-2 Score: 0      Fall Risk Screening:    In the past year, patient has experienced: history of falling in past year    Number of falls: 1  Injured during fall?: No      Home Safety:  Patient does not have trouble with stairs inside or outside of their home  Patient has working smoke alarms and has working carbon monoxide detector  Home safety hazards include: none  Nutrition:   Current diet is Regular  Medications:   Patient is currently taking over-the-counter supplements  OTC medications include: see medication list  Patient is able to manage medications  Activities of Daily Living (ADLs)/Instrumental Activities of Daily Living (IADLs):   Walk and transfer into and out of bed and chair?: Yes  Dress and groom yourself?: Yes    Bathe or shower yourself?: Yes    Feed yourself?  Yes  Do your laundry/housekeeping?: Yes  Manage your money, pay your bills and track your expenses?: Yes  Make your own meals?: Yes    Do your own shopping?: Yes    Previous Hospitalizations:   Any hospitalizations or ED visits within the last 12 months?: Yes    How many hospitalizations have you had in the last year?: 1-2    Hospitalization Comments: Prostate surgery    Advance Care Planning:   Living will: No      Cognitive Screening:   Provider or family/friend/caregiver concerned regarding cognition?: No    PREVENTIVE SCREENINGS      Cardiovascular Screening:    General: Screening Not Indicated, History Lipid Disorder and Risks and Benefits Discussed    Due for: Lipid Panel      Diabetes Screening:     General: Screening Current and Risks and Benefits Discussed    Due for: Blood Glucose      Colorectal Cancer Screening:     General: Risks and Benefits Discussed      Prostate Cancer Screening:    General: Screening Not Indicated and Risks and Benefits Discussed    Due for: PSA      Osteoporosis Screening:    General: Risks and Benefits Discussed    Due for: Bone Density Ultrasound      Abdominal Aortic Aneurysm (AAA) Screening:    Risk factors include: tobacco use        General: Screening Not Indicated      Lung Cancer Screening: General: Screening Not Indicated      Hepatitis C Screening:    General: Risks and Benefits Discussed and Patient Declines    Screening, Brief Intervention, and Referral to Treatment (SBIRT)    Screening      AUDIT-C Screenin) How often did you have a drink containing alcohol in the past year? never  2) How many drinks did you have on a typical day when you were drinking in the past year? 0  3) How often did you have 6 or more drinks on one occasion in the past year? never    AUDIT-C Score: 0  Interpretation: Score 0-3 (male): Negative screen for alcohol misuse    Single Item Drug Screening:  How often have you used an illegal drug (including marijuana) or a prescription medication for non-medical reasons in the past year? never    Single Item Drug Screen Score: 0  Interpretation: Negative screen for possible drug use disorder    Brief Intervention  Alcohol & drug use screenings were reviewed  No concerns regarding substance use disorder identified  No results found       Physical Exam:     /50   Pulse 68   Temp (!) 97 1 °F (36 2 °C)   Resp 16   Ht 5' 10" (1 778 m)   SpO2 98%   BMI 22 96 kg/m²     Physical Exam       Recent Results (from the past 672 hour(s))   CBC    Collection Time: 22  7:46 AM   Result Value Ref Range    WBC 6 97 4 31 - 10 16 Thousand/uL    RBC 3 61 (L) 3 88 - 5 62 Million/uL    Hemoglobin 12 3 12 0 - 17 0 g/dL    Hematocrit 37 6 36 5 - 49 3 %     (H) 82 - 98 fL    MCH 34 1 26 8 - 34 3 pg    MCHC 32 7 31 4 - 37 4 g/dL    RDW 12 9 11 6 - 15 1 %    Platelets 873 (L) 216 - 390 Thousands/uL    MPV 10 3 8 9 - 12 7 fL   Comprehensive metabolic panel    Collection Time: 22  7:46 AM   Result Value Ref Range    Sodium 140 135 - 147 mmol/L    Potassium 4 4 3 5 - 5 3 mmol/L    Chloride 110 (H) 96 - 108 mmol/L    CO2 27 21 - 32 mmol/L    ANION GAP 3 (L) 4 - 13 mmol/L    BUN 20 5 - 25 mg/dL    Creatinine 0 96 0 60 - 1 30 mg/dL    Glucose, Fasting 107 (H) 65 - 99 mg/dL Calcium 9 1 8 3 - 10 1 mg/dL    AST 19 5 - 45 U/L    ALT 26 12 - 78 U/L    Alkaline Phosphatase 59 46 - 116 U/L    Total Protein 7 8 6 4 - 8 4 g/dL    Albumin 3 9 3 5 - 5 0 g/dL    Total Bilirubin 0 65 0 20 - 1 00 mg/dL    eGFR 72 ml/min/1 73sq m   Hemoglobin A1C    Collection Time: 11/14/22  7:46 AM   Result Value Ref Range    Hemoglobin A1C 5 2 Normal 3 8-5 6%; PreDiabetic 5 7-6 4%;  Diabetic >=6 5%; Glycemic control for adults with diabetes <7 0% %     mg/dl   Lipid Panel with Direct LDL reflex    Collection Time: 11/14/22  7:46 AM   Result Value Ref Range    Cholesterol 93 See Comment mg/dL    Triglycerides 44 See Comment mg/dL    HDL, Direct 49 >=40 mg/dL    LDL Calculated 35 0 - 100 mg/dL         Norma Scott DO

## 2023-01-16 DIAGNOSIS — I10 ESSENTIAL HYPERTENSION: ICD-10-CM

## 2023-01-16 RX ORDER — AMLODIPINE BESYLATE 2.5 MG/1
2.5 TABLET ORAL DAILY
Qty: 90 TABLET | Refills: 0 | Status: SHIPPED | OUTPATIENT
Start: 2023-01-16

## 2023-01-17 DIAGNOSIS — N40.0 ENLARGED PROSTATE ON RECTAL EXAMINATION: ICD-10-CM

## 2023-01-17 RX ORDER — DUTASTERIDE 0.5 MG/1
0.5 CAPSULE, LIQUID FILLED ORAL DAILY
Qty: 90 CAPSULE | Refills: 0 | Status: SHIPPED | OUTPATIENT
Start: 2023-01-17

## 2023-02-18 DIAGNOSIS — N39.44 NOCTURNAL ENURESIS: ICD-10-CM

## 2023-02-20 RX ORDER — TAMSULOSIN HYDROCHLORIDE 0.4 MG/1
0.4 CAPSULE ORAL
Qty: 90 CAPSULE | Refills: 1 | Status: SHIPPED | OUTPATIENT
Start: 2023-02-20

## 2023-02-21 DIAGNOSIS — Z95.1 HX OF CABG: ICD-10-CM

## 2023-02-21 DIAGNOSIS — I21.4 NSTEMI (NON-ST ELEVATED MYOCARDIAL INFARCTION) (HCC): ICD-10-CM

## 2023-02-21 DIAGNOSIS — I10 ESSENTIAL HYPERTENSION: ICD-10-CM

## 2023-02-21 DIAGNOSIS — I25.10 CORONARY ARTERY DISEASE INVOLVING NATIVE CORONARY ARTERY OF NATIVE HEART WITHOUT ANGINA PECTORIS: ICD-10-CM

## 2023-02-21 RX ORDER — ROSUVASTATIN CALCIUM 20 MG/1
20 TABLET, COATED ORAL
Qty: 90 TABLET | Refills: 3 | Status: SHIPPED | OUTPATIENT
Start: 2023-02-21

## 2023-03-10 ENCOUNTER — RA CDI HCC (OUTPATIENT)
Dept: OTHER | Facility: HOSPITAL | Age: 83
End: 2023-03-10

## 2023-03-16 ENCOUNTER — OFFICE VISIT (OUTPATIENT)
Dept: FAMILY MEDICINE CLINIC | Facility: CLINIC | Age: 83
End: 2023-03-16

## 2023-03-16 VITALS
DIASTOLIC BLOOD PRESSURE: 70 MMHG | SYSTOLIC BLOOD PRESSURE: 138 MMHG | HEART RATE: 72 BPM | RESPIRATION RATE: 18 BRPM | HEIGHT: 70 IN | WEIGHT: 159 LBS | OXYGEN SATURATION: 98 % | TEMPERATURE: 98 F | BODY MASS INDEX: 22.76 KG/M2

## 2023-03-16 DIAGNOSIS — R19.5 HEME POSITIVE STOOL: ICD-10-CM

## 2023-03-16 DIAGNOSIS — E72.11 HOMOCYSTINURIA (HCC): ICD-10-CM

## 2023-03-16 DIAGNOSIS — D69.6 THROMBOCYTOPENIA (HCC): ICD-10-CM

## 2023-03-16 DIAGNOSIS — K59.00 CONSTIPATION, UNSPECIFIED CONSTIPATION TYPE: Primary | ICD-10-CM

## 2023-03-16 DIAGNOSIS — C67.9 MALIGNANT NEOPLASM OF URINARY BLADDER, UNSPECIFIED SITE (HCC): ICD-10-CM

## 2023-03-16 RX ORDER — CHLORAL HYDRATE 500 MG
1000 CAPSULE ORAL DAILY
COMMUNITY

## 2023-03-16 NOTE — PROGRESS NOTES
Assessment/Plan:    1  Constipation, unspecified constipation type  -     CT abdomen pelvis w contrast; Future; Expected date: 03/16/2023  -     Ambulatory referral to Gastroenterology; Future  -     CBC; Future  -     Comprehensive metabolic panel; Future  -     TSH, 3rd generation; Future    2  Heme positive stool  -     CT abdomen pelvis w contrast; Future; Expected date: 03/16/2023  -     Ambulatory referral to Gastroenterology; Future  -     CBC; Future  -     Comprehensive metabolic panel; Future  -     TSH, 3rd generation; Future    3  Malignant neoplasm of urinary bladder, unspecified site [facility])  -     CT abdomen pelvis w contrast; Future; Expected date: 03/16/2023    4  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[facility] )    5  Thrombocytopenia ([facility] )  -     CBC; Future  -     Comprehensive metabolic panel; Future  -     TSH, 3rd generation; Future  sugar free orange - metamucil - galss of water, water w metamucil and another glass of water  There are no Patient Instructions on file for this visit  Return for Next scheduled follow up  Subjective:      Patient ID: Cal Favre is a 79 y o  male  Chief Complaint   Patient presents with   • personal discussion      AUGUSTINE Marte/BLANKA       Pt states"his but end is not working"  States sometimes he will go and he will poop balls, sometimes he will not have a BM for a number of days  Thios ahs been three weeks  No pain in abdomen  No blood in stool  Pt has chnaged his diet he is eating a lot of [place] food  Last colon was many many years ago  The following portions of the patient's history were reviewed and updated as appropriate: allergies, current medications, past family history, past medical history, past social history, past surgical history and problem list     Review of Systems   Gastrointestinal: Positive for constipation  Negative for abdominal distention, abdominal pain, anal bleeding, blood in stool, nausea, rectal pain and vomiting          Constipation  Change in stool          Current Outpatient Medications   Medication Sig Dispense Refill   • amLODIPine (NORVASC) 2 5 mg tablet Take 1 tablet (2 5 mg total) by mouth daily 90 tablet 0   • Ascorbic Acid (VITAMIN C PO) Take 500 mg by mouth daily      • B Complex Vitamins (B-COMPLEX/B-12 PO) Take 1 tablet by mouth daily     • Calcium 150 MG TABS Take by mouth daily       • Cholecalciferol (VITAMIN D) 2000 units CAPS Take 1 capsule by mouth daily     • dutasteride (AVODART) 0 5 mg capsule Take 1 capsule (0 5 mg total) by mouth daily 90 capsule 0   • EQ Aspirin Adult Low Dose 81 MG EC tablet Last dose 1/9/22      • Omega-3 Fatty Acids (fish oil) 1,000 mg Take 1,000 mg by mouth daily     • polyethylene glycol (MIRALAX) 17 g packet Take 17 g by mouth daily     • rosuvastatin (CRESTOR) 20 MG tablet Take 1 tablet (20 mg total) by mouth daily at bedtime 90 tablet 3   • tamsulosin (FLOMAX) 0 4 mg Take 1 capsule (0 4 mg total) by mouth daily with dinner 90 capsule 1   • methylPREDNISolone 4 MG tablet therapy pack Use as directed on package (Patient not taking: Reported on 3/16/2023) 21 each 0     No current facility-administered medications for this visit  Objective:    /70   Pulse 72   Temp 98 °F (36 7 °C) (Temporal)   Resp 18   Ht 5' 10" (1 778 m)   Wt 72 1 kg (159 lb)   SpO2 98%   BMI 22 81 kg/m²        Physical Exam  Abdominal:      General: Abdomen is flat  There is no distension  Palpations: There is no mass  Tenderness: There is no abdominal tenderness  Comments: Hypoactive bowel sounds   Genitourinary:     Rectum: Guaiac result positive        Comments: Hard stool in vault  No masses  Small firm prostate  No nodules               Sherron Doing, DO

## 2023-03-17 ENCOUNTER — APPOINTMENT (OUTPATIENT)
Dept: LAB | Facility: HOSPITAL | Age: 83
End: 2023-03-17
Attending: FAMILY MEDICINE

## 2023-03-17 DIAGNOSIS — Z12.5 SCREENING FOR PROSTATE CANCER: ICD-10-CM

## 2023-03-17 DIAGNOSIS — K59.00 CONSTIPATION, UNSPECIFIED CONSTIPATION TYPE: ICD-10-CM

## 2023-03-17 DIAGNOSIS — R35.1 BENIGN PROSTATIC HYPERPLASIA WITH NOCTURIA: ICD-10-CM

## 2023-03-17 DIAGNOSIS — R19.5 HEME POSITIVE STOOL: ICD-10-CM

## 2023-03-17 DIAGNOSIS — D69.6 THROMBOCYTOPENIA (HCC): ICD-10-CM

## 2023-03-17 DIAGNOSIS — N40.1 BENIGN PROSTATIC HYPERPLASIA WITH NOCTURIA: ICD-10-CM

## 2023-03-17 LAB
ALBUMIN SERPL BCP-MCNC: 4.6 G/DL (ref 3.5–5)
ALP SERPL-CCNC: 54 U/L (ref 34–104)
ALT SERPL W P-5'-P-CCNC: 17 U/L (ref 7–52)
ANION GAP SERPL CALCULATED.3IONS-SCNC: 7 MMOL/L (ref 4–13)
AST SERPL W P-5'-P-CCNC: 19 U/L (ref 13–39)
BILIRUB SERPL-MCNC: 0.81 MG/DL (ref 0.2–1)
BUN SERPL-MCNC: 19 MG/DL (ref 5–25)
CALCIUM SERPL-MCNC: 8.7 MG/DL (ref 8.4–10.2)
CHLORIDE SERPL-SCNC: 108 MMOL/L (ref 96–108)
CO2 SERPL-SCNC: 27 MMOL/L (ref 21–32)
CREAT SERPL-MCNC: 0.98 MG/DL (ref 0.6–1.3)
ERYTHROCYTE [DISTWIDTH] IN BLOOD BY AUTOMATED COUNT: 12.5 % (ref 11.6–15.1)
GFR SERPL CREATININE-BSD FRML MDRD: 70 ML/MIN/1.73SQ M
GLUCOSE P FAST SERPL-MCNC: 100 MG/DL (ref 65–99)
HCT VFR BLD AUTO: 37.6 % (ref 36.5–49.3)
HGB BLD-MCNC: 12.5 G/DL (ref 12–17)
MCH RBC QN AUTO: 34.7 PG (ref 26.8–34.3)
MCHC RBC AUTO-ENTMCNC: 33.2 G/DL (ref 31.4–37.4)
MCV RBC AUTO: 104 FL (ref 82–98)
PLATELET # BLD AUTO: 145 THOUSANDS/UL (ref 149–390)
PMV BLD AUTO: 10.6 FL (ref 8.9–12.7)
POTASSIUM SERPL-SCNC: 3.8 MMOL/L (ref 3.5–5.3)
PROT SERPL-MCNC: 7.1 G/DL (ref 6.4–8.4)
RBC # BLD AUTO: 3.6 MILLION/UL (ref 3.88–5.62)
SODIUM SERPL-SCNC: 142 MMOL/L (ref 135–147)
TSH SERPL DL<=0.05 MIU/L-ACNC: 4.36 UIU/ML (ref 0.45–4.5)
WBC # BLD AUTO: 4.52 THOUSAND/UL (ref 4.31–10.16)

## 2023-03-20 ENCOUNTER — TELEPHONE (OUTPATIENT)
Dept: FAMILY MEDICINE CLINIC | Facility: CLINIC | Age: 83
End: 2023-03-20

## 2023-03-20 NOTE — TELEPHONE ENCOUNTER
Spoke to patient on and he stated that he will update his father and I said if he has any questions to call us back    Tato Jiang

## 2023-03-23 ENCOUNTER — HOSPITAL ENCOUNTER (OUTPATIENT)
Dept: RADIOLOGY | Facility: HOSPITAL | Age: 83
Discharge: HOME | End: 2023-03-23
Attending: FAMILY MEDICINE

## 2023-03-23 DIAGNOSIS — R19.5 HEME POSITIVE STOOL: ICD-10-CM

## 2023-03-23 DIAGNOSIS — K59.00 CONSTIPATION, UNSPECIFIED CONSTIPATION TYPE: ICD-10-CM

## 2023-03-23 DIAGNOSIS — C67.9 MALIGNANT NEOPLASM OF URINARY BLADDER, UNSPECIFIED SITE (HCC): ICD-10-CM

## 2023-03-23 RX ADMIN — IOHEXOL 100 ML: 350 INJECTION, SOLUTION INTRAVENOUS at 07:58

## 2023-03-30 ENCOUNTER — TELEPHONE (OUTPATIENT)
Dept: FAMILY MEDICINE CLINIC | Facility: CLINIC | Age: 83
End: 2023-03-30

## 2023-03-30 NOTE — TELEPHONE ENCOUNTER
[facility]    Patient is asking for test results  Patient had a cat scan and is looking for the results

## 2023-03-30 NOTE — TELEPHONE ENCOUNTER
Ct scan did not show any acute pathology, pt was seen two weeks ago pt was sent to GI did they make that appt yet?

## 2023-05-09 ENCOUNTER — OFFICE VISIT (OUTPATIENT)
Dept: FAMILY MEDICINE CLINIC | Facility: CLINIC | Age: 83
End: 2023-05-09

## 2023-05-09 ENCOUNTER — APPOINTMENT (OUTPATIENT)
Dept: RADIOLOGY | Facility: CLINIC | Age: 83
End: 2023-05-09

## 2023-05-09 VITALS
OXYGEN SATURATION: 98 % | TEMPERATURE: 97.3 F | RESPIRATION RATE: 16 BRPM | HEART RATE: 57 BPM | BODY MASS INDEX: 22.19 KG/M2 | SYSTOLIC BLOOD PRESSURE: 138 MMHG | HEIGHT: 70 IN | WEIGHT: 155 LBS | DIASTOLIC BLOOD PRESSURE: 70 MMHG

## 2023-05-09 DIAGNOSIS — M25.552 HIP PAIN, ACUTE, LEFT: ICD-10-CM

## 2023-05-09 DIAGNOSIS — M25.552 HIP PAIN, ACUTE, LEFT: Primary | ICD-10-CM

## 2023-05-09 DIAGNOSIS — I10 BENIGN ESSENTIAL HYPERTENSION: ICD-10-CM

## 2023-05-09 RX ORDER — MELOXICAM 15 MG/1
15 TABLET ORAL DAILY
Qty: 90 TABLET | Refills: 0 | Status: SHIPPED | OUTPATIENT
Start: 2023-05-09 | End: 2023-08-07

## 2023-05-09 NOTE — PROGRESS NOTES
Assessment/Plan:    1  Hip pain, acute, left  -     XR hip/pelv 2-3 vws left if performed; Future; Expected date: 05/09/2023  -     meloxicam (MOBIC) 15 mg tablet; Take 1 tablet (15 mg total) by mouth daily As needed    2  Benign essential hypertension  Assessment & Plan:  stable              There are no Patient Instructions on file for this visit  No follow-ups on file  Subjective:      Patient ID: Jh Blanco is a 79 y o  male  Chief Complaint   Patient presents with   • Hip Pain     Left hip  Radha Kiser MA         Pt is here for a same day appt for hip pain  Pt states when  He walks he gets a pain in his left hip  Rt one has been replaced  This started a few weeks ago - Pain is 8/10  NO pain if not walking  Pt states while he is here he states he had prostate surgery, states he was seen by DR Heather Westbrook, he is asking if he needs top go yearly      The following portions of the patient's history were reviewed and updated as appropriate: allergies, current medications, past family history, past medical history, past social history, past surgical history and problem list     Review of Systems   Musculoskeletal: Positive for arthralgias, gait problem and myalgias           Current Outpatient Medications   Medication Sig Dispense Refill   • amLODIPine (NORVASC) 2 5 mg tablet Take 1 tablet (2 5 mg total) by mouth daily 90 tablet 0   • Ascorbic Acid (VITAMIN C PO) Take 500 mg by mouth daily      • B Complex Vitamins (B-COMPLEX/B-12 PO) Take 1 tablet by mouth daily     • Calcium 150 MG TABS Take by mouth daily       • Cholecalciferol (VITAMIN D) 2000 units CAPS Take 1 capsule by mouth daily     • dutasteride (AVODART) 0 5 mg capsule Take 1 capsule (0 5 mg total) by mouth daily 90 capsule 0   • EQ Aspirin Adult Low Dose 81 MG EC tablet Last dose 1/9/22      • meloxicam (MOBIC) 15 mg tablet Take 1 tablet (15 mg total) by mouth daily As needed 90 tablet 0   • Omega-3 Fatty Acids (fish oil) 1,000 mg "Take 1,000 mg by mouth daily     • polyethylene glycol (MIRALAX) 17 g packet Take 17 g by mouth daily     • rosuvastatin (CRESTOR) 20 MG tablet Take 1 tablet (20 mg total) by mouth daily at bedtime 90 tablet 3   • tamsulosin (FLOMAX) 0 4 mg Take 1 capsule (0 4 mg total) by mouth daily with dinner 90 capsule 1     No current facility-administered medications for this visit         Objective:    /70   Pulse 57   Temp (!) 97 3 °F (36 3 °C)   Resp 16   Ht 5' 10\" (1 778 m)   Wt 70 3 kg (155 lb)   SpO2 98%   BMI 22 24 kg/m²        Physical Exam  Musculoskeletal:      Comments: creps with motion testing left hip                Donrodney Santanaund, DO  "

## 2023-05-10 ENCOUNTER — TELEPHONE (OUTPATIENT)
Dept: FAMILY MEDICINE CLINIC | Facility: CLINIC | Age: 83
End: 2023-05-10

## 2023-05-11 ENCOUNTER — TELEPHONE (OUTPATIENT)
Dept: FAMILY MEDICINE CLINIC | Facility: CLINIC | Age: 83
End: 2023-05-11

## 2023-05-11 DIAGNOSIS — M25.559 HIP PAIN, UNSPECIFIED LATERALITY: Primary | ICD-10-CM

## 2023-05-11 PROBLEM — M16.12 PRIMARY OSTEOARTHRITIS OF LEFT HIP: Status: ACTIVE | Noted: 2023-05-11

## 2023-05-11 NOTE — TELEPHONE ENCOUNTER
Please call pt looks like has osteoarthrtitis on the x ray  Should respond to the mobic prescribed  May also benefit from physical therapy which I can order  If all fails can send to ortho

## 2023-05-16 ENCOUNTER — TELEPHONE (OUTPATIENT)
Dept: FAMILY MEDICINE CLINIC | Facility: CLINIC | Age: 83
End: 2023-05-16

## 2023-05-16 NOTE — TELEPHONE ENCOUNTER
Patients wife calling to see if he can take Tylenol while also taking the Meloxicam that Dr Zeina Bonner prescribed for him

## 2023-05-17 NOTE — TELEPHONE ENCOUNTER
Spoke with wife; rosuvastatin was d/c'd and atorvastatin 80 mg sent to [facility] 4/3/18 - aware and understands; also spoke with [facility] to confirm the medication was on hold; [facility] will fill now 
No

## 2023-05-26 ENCOUNTER — OFFICE VISIT (OUTPATIENT)
Dept: GASTROENTEROLOGY | Facility: CLINIC | Age: 83
End: 2023-05-26

## 2023-05-26 VITALS
SYSTOLIC BLOOD PRESSURE: 136 MMHG | BODY MASS INDEX: 21.96 KG/M2 | DIASTOLIC BLOOD PRESSURE: 60 MMHG | HEART RATE: 58 BPM | WEIGHT: 153.4 LBS | HEIGHT: 70 IN

## 2023-05-26 DIAGNOSIS — R19.8 CHANGE IN BOWEL MOVEMENT: Primary | ICD-10-CM

## 2023-05-30 NOTE — PROGRESS NOTES
[facility]  Alfredo Molina 79 y o  male MRN: [id number]            Assessment & Plan:  Pleasant 53-year-old gentleman here for follow-up of constipation  Last colonoscopy 2021 with several small adenomatous polyps  1   Mild constipation:  -Mostly functional, had similar presentation several years ago, colonoscopy was relatively unremarkable  -Recommend that he take daily fiber supplement but additionally add MiraLAX to this regimen  -Follow-up in 6 months time, sooner if needed  Gabbie Ray was seen today for follow-up  Diagnoses and all orders for this visit:    Change in bowel movement            _____________________________________________________________        CC: Constipation    HPI:  Alfredo Molina is a 79 y o male who is here for constipation  53-year-old gentleman, had a colonoscopy in 2021 with several adenomatous polyps  Reports that he continues to have constipation  He has occasional cramping, occasional diarrheal episodes 1 to 2 weeks  He still strains with Metamucil daily  His weight has been stable  He is not taking any MiraLAX at this time  Patient denies any abdominal pain, nausea, vomiting, heartburn, dysphagia  Had a left hip replacement  ROS:  The remainder of the ROS was negative except for the pertinent positives mentioned in HPI  Allergies: Patient has no known allergies      Medications:   Current Outpatient Medications:   •  amLODIPine (NORVASC) 2 5 mg tablet, Take 1 tablet (2 5 mg total) by mouth daily, Disp: 90 tablet, Rfl: 0  •  Ascorbic Acid (VITAMIN C PO), Take 500 mg by mouth daily , Disp: , Rfl:   •  B Complex Vitamins (B-COMPLEX/B-12 PO), Take 1 tablet by mouth daily, Disp: , Rfl:   •  Calcium 150 MG TABS, Take by mouth daily  , Disp: , Rfl:   •  Cholecalciferol (VITAMIN D) 2000 units CAPS, Take 1 capsule by mouth daily, Disp: , Rfl:   •  dutasteride (AVODART) 0 5 mg capsule, Take 1 capsule (0 5 mg total) by mouth daily, Disp: 90 capsule, Rfl: 0  •  EQ Aspirin Adult Low Dose 81 MG EC tablet, Last dose 1/9/22 , Disp: , Rfl:   •  meloxicam (MOBIC) 15 mg tablet, Take 1 tablet (15 mg total) by mouth daily As needed, Disp: 90 tablet, Rfl: 0  •  Omega-3 Fatty Acids (fish oil) 1,000 mg, Take 1,000 mg by mouth daily, Disp: , Rfl:   •  polyethylene glycol (MIRALAX) 17 g packet, Take 17 g by mouth daily, Disp: , Rfl:   •  rosuvastatin (CRESTOR) 20 MG tablet, Take 1 tablet (20 mg total) by mouth daily at bedtime, Disp: 90 tablet, Rfl: 3  •  tamsulosin (FLOMAX) 0 4 mg, Take 1 capsule (0 4 mg total) by mouth daily with dinner, Disp: 90 capsule, Rfl: 1    Past Medical History:   Diagnosis Date   • Arthritis    • CAD (coronary artery disease)    • Cataract    • Colon polyp    • COVID-19    • GERD (gastroesophageal reflux disease)    • Heart attack ([facility] [address] )    • Herpes zoster    • Hx of colonic polyps    • Hyperlipidemia    • Hypertension        Past Surgical History:   Procedure Laterality Date   • BACK SURGERY      disc removed 1985 / [facility]   1985     • CATARACT EXTRACTION     • COLONOSCOPY     • CORONARY ARTERY BYPASS GRAFT  11/13/2014    [place]    6/30/16   & CABG X3 with LIMA to LAD <SVG to OM-2, SVG to PDA  [place] Christo [facility] / Noé Eamnuel   12/19/14      • EYE SURGERY      cataract /  R   1980     • FL RETROGRADE PYELOGRAM  1/13/2022   • HERNIA REPAIR      Groin / R   [address] /  Sonam Cornelius   1984      • INGUINAL HERNIA REPAIR      R    2010     • JOINT REPLACEMENT  1985     right hip   • IA CYSTO W/IRRIG & EVAC MULTPLE OBSTRUCTING CLOTS N/A 5/18/2021    Procedure: CYSTOSCOPY EVACUATION OF CLOTS WITH  FULGURATION OF PROSTATE;  Surgeon: Mino Lerner MD;  Location: [address] [place] [address];  Service: Urology   • IA TRURL ELECTROSURG RESCJ PROSTATE BLEED COMPLETE N/A 6/4/2019    Procedure: CYSTOSCOPY, TRANSURETHRAL RESECTION OF PROSTATE (TURP);   Surgeon: Mino Lerner MD;  Location: [address] [place] [address];  Service: Urology   • TRANSURETHRAL RESECTION "OF PROSTATE Bilateral 1/13/2022    Procedure: CYSTOSCOPY, TRANSURETHRAL RESECTION OF PROSTATE (TURP), RETROGRADE, PYELOGRAM;  Surgeon: Felipe Martinez MD;  Location: [facility];  Service: Urology       Family History   Problem Relation Age of Onset   • Coronary artery disease Father    • Hyperlipidemia Brother    • Sudden death Brother         cardiac   • Arthritis Family    • Mental illness Neg Hx         reports that he quit smoking about 44 years ago  His smoking use included cigarettes  He started smoking about 62 years ago  He has a 60 00 pack-year smoking history  He has never used smokeless tobacco  He reports that he does not currently use alcohol  He reports that he does not use drugs        Physical Exam:    /60 (BP Location: Left arm, Patient Position: Sitting, Cuff Size: Standard)   Pulse 58   Ht 5' 10\" (1 778 m)   Wt 69 6 kg (153 lb 6 4 oz)   BMI 22 01 kg/m²     Gen: wn/wd, NAD, pleasant elderly gentleman  HEENT: anicteric, MMM, no cervical LAD  CVS: RRR, no m/r/g  CHEST: CTA b/l  ABD: +BS, soft, NT,ND, no hepatosplenomegaly  EXT: no c/c/e  NEURO: aaox3  SKIN: NO rashes    "

## 2023-06-13 ENCOUNTER — TELEPHONE (OUTPATIENT)
Dept: PHYSICAL THERAPY | Facility: CLINIC | Age: 83
End: 2023-06-13

## 2023-06-13 NOTE — TELEPHONE ENCOUNTER
"Called pt who did not present for appt today  He states he forgot, but \"does want to try a little bit\" of therapy  We discussed he has an appt 10/15 which we can make his IE  I asked him to give us at least 24 hours notice if he cannot make it, and if he does not show for this appt, his future appts will be removed  Pt expressed understanding     "

## 2023-06-15 ENCOUNTER — EVALUATION (OUTPATIENT)
Dept: PHYSICAL THERAPY | Facility: CLINIC | Age: 83
End: 2023-06-15
Payer: COMMERCIAL

## 2023-06-15 DIAGNOSIS — M25.552 PAIN OF LEFT HIP JOINT: Primary | ICD-10-CM

## 2023-06-15 PROCEDURE — 97110 THERAPEUTIC EXERCISES: CPT | Performed by: PHYSICAL THERAPIST

## 2023-06-15 PROCEDURE — 97161 PT EVAL LOW COMPLEX 20 MIN: CPT | Performed by: PHYSICAL THERAPIST

## 2023-06-15 NOTE — PROGRESS NOTES
PT Evaluation     Today's date: 6/15/2023  Patient name: Anastasia Quinn  : 1940  MRN: [id number]  Referring provider: Fela Ewing DO  Dx:   Encounter Diagnosis     ICD-10-CM    1  Pain of left hip joint  M25 552                      Assessment  Assessment details: 6/15/2023: Anastasia Quinn is a 79 y o  male who presents with pain, decreased strength, decreased ROM, decreased joint mobility, ambulatory dysfunction, postural dysfunction and balance dysfunction  Due to these impairments, patient has difficulty performing ADL's, recreational activities, engaging in social activities, ambulation, stair negotiation, lifting/carrying, transfers  Patient's clinical presentation is consistent with their referring diagnosis of L hip pain  Pt demonstrates improved function w/ dec pain after both repeated lumbar extension and after hip stretching  He presents w/ significant dysfunction of lumbar spine and hip ROM, but may also have underlying lumbar derangement  Patient has been educated in home exercise program and importance of daily compliance, as well as plan of care  Patient would benefit from skilled physical therapy services to address their aforementioned functional limitations and progress towards prior level of function and independence with home exercise program      Impairments: abnormal gait, abnormal or restricted ROM, activity intolerance, impaired balance, impaired physical strength, lacks appropriate home exercise program, pain with function, weight-bearing intolerance, poor posture  and poor body mechanics  Other impairment: 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    Goals  Short Term Goals to be accomplished in 4 weeks: (7()  STG1: Pt will be I with HEP  STG2: Pt will demo 50% improvement in hip AROM  STG3: Pt will demo mod prince or better lumbar AROM all planes w/o pain  STG4: Pt will demo sit to stand transfer w/o UE assist and w/o pain       Long Term Goals to be accomplished in 12 "weeks: (2023)   LTG1: Pt will achieve full hip AROM to allow reciprocal stairs and ease w/ LE dressing  LTG2: Pt will demo hip strength WNL to allow SLS x 20\"  LTG3: Pt will score projected 67 or better on FOTO indicating ease w/ LE dressing, sit to stand and improved ok to standing/walking >/= 1 hour  LTG4: Pt to report L hip pain of 0-3/10  Plan  Plan details:       Patient would benefit from: PT eval and skilled physical therapy  Planned modality interventions: cryotherapy, thermotherapy: hydrocollator packs and unattended electrical stimulation  Planned therapy interventions: manual therapy, neuromuscular re-education, therapeutic activities, therapeutic exercise, home exercise program, stretching, patient education, postural training, balance/weight bearing training and functional ROM exercises  Frequency: 2x week  Plan of Care beginning date: 6/15/2023  Plan of Care expiration date: 2023  Treatment plan discussed with: patient        Subjective Evaluation    History of Present Illness  Mechanism of injury: Subjective: 6/15/2023 - Pt reports his left hip has been bothering him for a few months  (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)  He c/o pain and inability to put socks on L foot and bending over to  items, prolonged standing or walking >/= 20 minutes  His pain at ant/lat L hip, and sometimes L anterior shin  He reports his symptoms come and go and are a bit unpredictable, sometimes waking him during the night  He denies numbness or tingling  He does have intermittent LBP  He was prescribed Meloxicam, but rarely takes it \"I'm not a pill popper\"  Pain  At best pain ratin  At worst pain ratin  Quality: dull ache  Progression: no change    Social Support  Steps to enter house: yes (1 step)  Stairs in house: yes (recip stairs w/ rail to basement)       Diagnostic Tests  Abnormal x-ray: mild degen changes    Treatments  Previous treatment: medication  Current treatment: physical therapy  Patient " "Goals  Patient goals for therapy: independence with ADLs/IADLs and increased motion  Patient goal: improved standing ok and ability to put socks on        Objective  A/PROM:  R  L     6/15/2023 6/15/2023  Hip flex sup  90  90  Hip abd sup  25  20  Hip ER sit  25/37  20/37  Hip IR sit  20/30  20/20  Hams   60P  60P  Hip ext in SL  -5  -10  Prone knee flex 110  100    MMT:    R  L     6/15/2023 6/15/2023  Hip flex   5/5  5/5  Hip abd  4+/5  4+/5  Hip ER   5/5  5/5  Hip IR   4/5  4/5  Hip ext   4-/5  4-/5  Knee ext  4+/5  4/5  Knee flex  4/5  4-/5    Lumbar screen: Lumbar AROM limitation     6/15/2023   Flexion:  Mod hams tight   Extension:  elan   R Side glide:  elan   L Side glide: elan    Mechanical Assessment: pre-test symptoms include mod difficulty w/ KAYLEIGH posit R/ unable L; difficult/pain w/ STS  6/15/2023: LACIE 1x10 = dec/B   FAITH 30\"x2 = dec/B   Sup fig 4 stretch 20\"x3 R/L = dec/B    Balance:   6/15/2023  Tandem R post w/ EO: 30 sec +  Tandem L post w/ EO: 30 sec + weight bears mostly on R LE   SLS R w/ EO:  5 sec  SLS L w/ EO:  1 sec*    Function:   6/15/2023 - TUG 9 28 sec; mild antalgic gait    5xSTS 21 52 sec 8/10 pain; intermittently needs UE push off or needs mult attempts    Min/mod difficulty R KAYLEIGH position; unable L KAYLEIGH position w/ pain sitting for putting on socks    Gait:   6/15/2023 - antalgic w/ flexed L knee (L LE longer than R since DIA)    Palpation:   6/15/2023 - elan prince lumbar P to A mobs    mod prince L hip abd/exten/IR PROM w/ end range tightness noted    L LE longer supine and L sit    Flexibility:   6/15/2023 - elan prince B/L hip flexors/quads/hams and R hip IR             Precautions:   Past Medical History:   Diagnosis Date   • Arthritis    • CAD (coronary artery disease)    • Cataract    • Colon polyp    • COVID-19    • GERD (gastroesophageal reflux disease)    • Heart attack ([facility] [address] )    • Herpes zoster    • Hx of colonic polyps    • Hyperlipidemia    • Hypertension      Access Code: " [email or web address]  URL: [email or web address]/  Date: 06/15/2023  Prepared by:  Soheila Schultz    Exercises  - Supine Bridge  - 1 x daily - 2 sets - 10 reps  - Supine Hip External Rotation Stretch  - 1 x daily - 3 reps - 30 hold  - Prone on elbows with knee flexion  - 1 x daily - 3 sets - 10 reps  - Standing Lumbar Extension with Counter  - 3 x daily - 2 sets - 10 reps    SOC: 6/15/2023  FOTO: 6/15/2023  POC EXP: 9/7/2023 53/67  DAILY TREATMENT LOG:  Date 6/15/2023       Visit #/auth                                Neuro Re-Ed                LACIE 2x10       FAITH w/ B/L knee flexion 2x10       Supine sciatic nerve tensioner        Alt 2 cone taps                        Ther Ex                Sup fig 4 stretch 20\"x3 R/L       bridges 1x10       Stand lat lunge stretch at rail        Stand alt hip exten        Low mat STS                HEP Issued & reviewed       Ther Activity                        Gait Training                        Modalities                           "

## 2023-06-20 ENCOUNTER — OFFICE VISIT (OUTPATIENT)
Dept: PHYSICAL THERAPY | Facility: CLINIC | Age: 83
End: 2023-06-20
Payer: COMMERCIAL

## 2023-06-20 DIAGNOSIS — M25.552 PAIN OF LEFT HIP JOINT: Primary | ICD-10-CM

## 2023-06-20 PROCEDURE — 97110 THERAPEUTIC EXERCISES: CPT | Performed by: PHYSICAL THERAPIST

## 2023-06-20 PROCEDURE — 97112 NEUROMUSCULAR REEDUCATION: CPT | Performed by: PHYSICAL THERAPIST

## 2023-06-20 NOTE — PROGRESS NOTES
"Daily Note     Today's date: 2023  Patient name: Kim Ramirez  : 1940  MRN: [id number]  Referring provider: Manuel Barnard DO  Dx:   Encounter Diagnosis     ICD-10-CM    1  Pain of left hip joint  M25 552                      Subjective: Pt states he has no c/o this morning  He has been doing his HEP daily  Objective: See treatment diary below      Assessment: Tolerated treatment well  Patient needs review of HEP but has some familiarity w/ it due to doing it at home  Lumbar exten, hip IR/abd/exten very limited ROM but ok well      Plan: Continue per plan of care  Precautions:   Past Medical History:   Diagnosis Date   • Arthritis    • CAD (coronary artery disease)    • Cataract    • Colon polyp    • COVID-19    • GERD (gastroesophageal reflux disease)    • Heart attack ([address] )    • Herpes zoster    • Hx of colonic polyps    • Hyperlipidemia    • Hypertension      Access Code: [email or web address]  URL: [email or web address]  Date: 06/15/2023  Prepared by:  Herb Nearing    Exercises  - Supine Bridge  - 1 x daily - 2 sets - 10 reps  - Supine Hip External Rotation Stretch  - 1 x daily - 3 reps - 30 hold  - Prone on elbows with knee flexion  - 1 x daily - 3 sets - 10 reps  - Standing Lumbar Extension with Counter  - 3 x daily - 2 sets - 10 reps    SOC: 6/15/2023  FOTO: 6/15/2023  POC EXP: 2023 53/67  DAILY TREATMENT LOG:  Date 6/15/2023 2023      Visit #/auth                Manual  5'      Hip IR stretch prone   10\"x5 R/L      Neuro Re-Ed  15'              LACIE 2x10 2x10      FAITH w/ B/L knee flexion 2x10 2x10      Supine sciatic nerve tensioner  1x15 R/L w/ 2 ankle pumps each rep      Alt 2 cone taps                        Ther Ex  25'              Sup fig 4 stretch 20\"x3 R/L 20\"x4 R/L      bridges 1x10 3\" 2x10      Stand lat lunge stretch at rail  5\"x5 R/L      Stand alt hip exten  2x10 R/L      Low mat STS  2x10              HEP Issued & reviewed Updated & reviewed      Ther " Activity                        Gait Training                        Modalities                        Access Code: [email or web address]  URL: [email or web address]  Date: 06/20/2023  Prepared by:  Ivette Mahan    Exercises  - Supine Bridge  - 1 x daily - 2 sets - 10 reps  - Supine Hip External Rotation Stretch  - 1 x daily - 3 reps - 30 hold  - Bent Knee Fallouts  - 1 x daily - 10 reps - 10 hold  - Prone on elbows with knee flexion  - 1 x daily - 3 sets - 10 reps  - Standing Lumbar Extension with Counter  - 3 x daily - 2 sets - 10 reps  - Standing Hip Extension  - 1 x daily - 2 sets - 10 reps

## 2023-06-22 ENCOUNTER — OFFICE VISIT (OUTPATIENT)
Dept: PHYSICAL THERAPY | Facility: CLINIC | Age: 83
End: 2023-06-22
Payer: COMMERCIAL

## 2023-06-22 ENCOUNTER — OFFICE VISIT (OUTPATIENT)
Dept: CARDIOLOGY CLINIC | Facility: CLINIC | Age: 83
End: 2023-06-22
Payer: COMMERCIAL

## 2023-06-22 VITALS
OXYGEN SATURATION: 99 % | HEIGHT: 70 IN | SYSTOLIC BLOOD PRESSURE: 138 MMHG | WEIGHT: 152 LBS | BODY MASS INDEX: 21.76 KG/M2 | HEART RATE: 61 BPM | DIASTOLIC BLOOD PRESSURE: 60 MMHG

## 2023-06-22 DIAGNOSIS — E78.2 MIXED HYPERLIPIDEMIA: ICD-10-CM

## 2023-06-22 DIAGNOSIS — I10 ESSENTIAL HYPERTENSION: Primary | ICD-10-CM

## 2023-06-22 DIAGNOSIS — M25.552 PAIN OF LEFT HIP JOINT: Primary | ICD-10-CM

## 2023-06-22 DIAGNOSIS — I25.10 CORONARY ARTERY DISEASE INVOLVING NATIVE CORONARY ARTERY OF NATIVE HEART WITHOUT ANGINA PECTORIS: ICD-10-CM

## 2023-06-22 PROCEDURE — 93000 ELECTROCARDIOGRAM COMPLETE: CPT | Performed by: INTERNAL MEDICINE

## 2023-06-22 PROCEDURE — 99214 OFFICE O/P EST MOD 30 MIN: CPT | Performed by: INTERNAL MEDICINE

## 2023-06-22 PROCEDURE — 97110 THERAPEUTIC EXERCISES: CPT

## 2023-06-22 PROCEDURE — 97112 NEUROMUSCULAR REEDUCATION: CPT

## 2023-06-22 NOTE — PROGRESS NOTES
"Daily Note     Today's date: 2023  Patient name: Gail Puckett  : 1940  MRN: [id number]  Referring provider: Shaina Stout DO  Dx:   Encounter Diagnosis     ICD-10-CM    1  Pain of left hip joint  M25 552                      Subjective: pt reports things are feeling okay this morning, he cont to struggle with getting his L sock on due to his hip mobility  Objective: See treatment diary below      Assessment: Tolerated treatment well  Pt tolerated additional hip mobility exercises  Cont to progress L hip ROM and strengthening as tolererated for increased functional mobility for donning socks  Pt has no noted complaints of increased hip pain during or post session  Patient would benefit from continued PT      Plan: Continue per plan of care  Precautions:   Past Medical History:   Diagnosis Date   • Arthritis    • CAD (coronary artery disease)    • Cataract    • Colon polyp    • COVID-19    • GERD (gastroesophageal reflux disease)    • Heart attack ([facility] [address] )    • Herpes zoster    • Hx of colonic polyps    • Hyperlipidemia    • Hypertension      Access Code: [email or web address]  URL: [email or web address]  Date: 06/15/2023  Prepared by:  Kristin Banegas    Exercises  - Supine Bridge  - 1 x daily - 2 sets - 10 reps  - Supine Hip External Rotation Stretch  - 1 x daily - 3 reps - 30 hold  - Prone on elbows with knee flexion  - 1 x daily - 3 sets - 10 reps  - Standing Lumbar Extension with Counter  - 3 x daily - 2 sets - 10 reps    SOC: 6/15/2023  FOTO: 6/15/2023  POC EXP: 2023 53/67  DAILY TREATMENT LOG:  Date 6/15/2023 2023 2023     Visit #/auth  2/12 3/12             Manual  5' 5'      Hip IR stretch prone   10\"x5 R/L RR      Neuro Re-Ed  15' 10'              LACIE 2x10 2x10 2x10 to end      FAITH w/ B/L knee flexion 2x10 2x10 20x      Supine sciatic nerve tensioner  1x15 R/L w/ 2 ankle pumps each rep 1x15 R/L w/ 2 ankle pumps each rep      Alt 2 cone taps   1x10 R/L                    " "  Ther Ex  25' 23'             Sup fig 4 stretch 20\"x3 R/L 20\"x4 R/L 20\"x4 R/L      bridges 1x10 3\" 2x10 3\" 2x10      Stand lat lunge stretch at rail  5\"x5 R/L 5\"x5 R/L      Hip flex stretch on step    10\"x5 R/L      Standing hip abd    2x10 R/L      Stand alt hip exten  2x10 R/L 2x10 R/L      Low mat STS  2x10 2x10      Seated hip IR/ER w/ add    2x10 ea L      HEP Issued & reviewed Updated & reviewed      Ther Activity                        Gait Training                        Modalities                        Access Code: [email or web address]  URL: [email or web address]  Date: 06/20/2023  Prepared by:  Tyler Lamb    Exercises  - Supine Bridge  - 1 x daily - 2 sets - 10 reps  - Supine Hip External Rotation Stretch  - 1 x daily - 3 reps - 30 hold  - Bent Knee Fallouts  - 1 x daily - 10 reps - 10 hold  - Prone on elbows with knee flexion  - 1 x daily - 3 sets - 10 reps  - Standing Lumbar Extension with Counter  - 3 x daily - 2 sets - 10 reps  - Standing Hip Extension  - 1 x daily - 2 sets - 10 reps       "

## 2023-06-22 NOTE — PROGRESS NOTES
Cardiology Follow Up  Sawyer Perez  1940  [id number]  [facility] [address] [id number] [phone]    1  Essential hypertension        2  Coronary artery disease involving native coronary artery of native heart without angina pectoris  POCT ECG      3  Mixed hyperlipidemia  POCT ECG         Discussion/Plan:  Exertional dyspnea/CABG/3V CAD-- ekg unchanged  Significant 3 vessel cad   well-controlled  - nuclear stress test to evaluate  - aspirin 81mg  -   rosuvastatin  -- amlodpine  - daily exercise     Htn- amlodipine  controlled    First-degree heart block/incomplete right bundle branch block- unchanged  Continue to monitor-    Mild carotid artery disease bilateral- rosuvastatin+ aspirin    Dark stools-cologuard negative    Health screening- psa- one year    rtc- 1 year     Interval History:  initial visit: 76 yo gentleman with prior NSTEMI s/p 3V-CABG (LIMA to LAD, SVG to OM2, and SVG to PDA) s/p cardiac rehab walking one mile daily  I have reviewed all of his physical therapy notes and am appreciative of the excellent care  Compliant with his medications denying any significant symptoms  Denying any bleeding or rash  He denies having any chest pain or shortness of breath      /3: No exercise limitations  Denies having any chest tightness  Denies any any muscle aches  Denies any bleeding or bruising  Has been very active  Does daily walks without any symptoms  12, 2017: He reports exercising without any limitations  He denies any recurrence of chest discomfort  He denies having any dizziness or lightheadedness  He denies having any medication intolerance  He denies any significant bleeding or bruising  09/17/2018: He denies having any chest tightness  He denies having any exertional shortness of breath  He has been compliant with his medications    He denies having any falls  He denies having any easy bleeding or bruising  Reviewed through his records  03/04/2019:  He denies having any exertional chest pain  He denies having exertional shortness of breath  He is compliant with his medications  We reviewed through his recent lab work which shows his cholesterol is well controlled  09/10/2018:  He denies having exertional chest pain or shortness of breath  He is compliant with his medications  Reports having colon cancer screening  Which was negative  He denies having any bright red blood per rectum  He is compliant with his aspirin therapy  His EKG remains unchanged  03/03/2020:  He is walking his dog without any significant shortness of breath or chest pain  His cholesterol remains controlled  His blood pressure is controlled  His EKG remains unchanged  He is compliant with his medications  09/15/2020:  He denies having significant chest tightness or shortness of breath  No major changes in his EKG  He is compliant with his medications  Denies having significant bleeding  05/12/2021:  He denies having any major chest tightness or shortness of breath  He is compliant with medications  No major change in EKG     6/22/2023: He is currently undergoing physical therapy for his hip  He denies having chest heaviness  His blood pressures are controlled  No major EKG changes      Patient Active Problem List   Diagnosis   • Hx of CABG   • Coronary artery disease involving native coronary artery of native heart without angina pectoris   • Elevated homocysteine   • MTHFR mutation   • NSTEMI (non-ST elevated myocardial infarction) ([facility] )   • Vitamin D deficiency   • Nocturnal enuresis   • Benign prostatic hyperplasia with nocturia   • Benign essential hypertension   • Mixed hyperlipidemia   • GERD (gastroesophageal reflux disease)   • Urinary retention   • S/P TURP   • Sensorineural hearing loss (SNHL)   • Change in bowel movement   • Osteopenia of multiple sites   • External hemorrhoids   • 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HCC)   • Gross hematuria   • Leukocytosis   • Anemia   • Thrombocytopenia (HCC)   • BPH (benign prostatic hyperplasia)   • Malignant neoplasm of urinary bladder, unspecified site [facility])   • Primary osteoarthritis of left hip     Past Medical History:   Diagnosis Date   • Arthritis    • CAD (coronary artery disease)    • Cataract    • Colon polyp    • COVID-19    • GERD (gastroesophageal reflux disease)    • Heart attack (HCC)    • Herpes zoster    • Hx of colonic polyps    • Hyperlipidemia    • Hypertension      Social History     Socioeconomic History   • Marital status: /Civil Union     Spouse name: Not on file   • Number of children: Not on file   • Years of education: Not on file   • Highest education level: Not on file   Occupational History   • Not on file   Tobacco Use   • Smoking status: Former     Packs/day: 2 00     Years: 30 00     Total pack years: 60 00     Types: Cigarettes     Start date: 6/15/1960     Quit date: 1979     Years since quittin 1   • Smokeless tobacco: Never   Vaping Use   • Vaping Use: Never used   Substance and Sexual Activity   • Alcohol use: Not Currently   • Drug use: No   • Sexual activity: Not on file   Other Topics Concern   • Not on file   Social History Narrative    Exercise cycling / walking    Exercise daily     Good sleep hygiene    Sleeps 8-10 hrs a day      Social Determinants of Health     Financial Resource Strain: Low Risk  (2022)    Overall Financial Resource Strain (CARDIA)    • Difficulty of Paying Living Expenses: Not hard at all   Food Insecurity: Not on file   Transportation Needs: No Transportation Needs (2022)    PRAPARE - Transportation    • Lack of Transportation (Medical): No    • Lack of Transportation (Non-Medical):  No   Physical Activity: Not on file   Stress: Not on file   Social Connections: Not on file   Intimate Partner Violence: Not on file   Housing Stability: Not on file Family History   Problem Relation Age of Onset   • Coronary artery disease Father    • Hyperlipidemia Brother    • Sudden death Brother         cardiac   • Arthritis Family    • Mental illness Neg Hx      Past Surgical History:   Procedure Laterality Date   • BACK SURGERY      disc removed 1985 / R   1985     • CATARACT EXTRACTION     • COLONOSCOPY     • CORONARY ARTERY BYPASS GRAFT  11/13/2014    [place]    6/30/16   & CABG X3 with LIMA to LAD <SVG to OM-2, SVG to PDA  [place] Katharine Hill / Deyanira Massmack   12/19/14      • EYE SURGERY      cataract /  R   1980     • [place] RETROGRADE PYELOGRAM  1/13/2022   • HERNIA REPAIR      Groin / R   [address] /  Sangita Lango   1984      • INGUINAL HERNIA REPAIR      R    2010     • JOINT REPLACEMENT  1985     right hip   • VA CYSTO W/IRRIG & EVAC MULTPLE OBSTRUCTING CLOTS N/A 5/18/2021    Procedure: CYSTOSCOPY EVACUATION OF CLOTS WITH  FULGURATION OF PROSTATE;  Surgeon: Pablo Keyes MD;  Location: [address] [place] [address];  Service: Urology   • VA TRURL ELECTROSURG RESCJ PROSTATE BLEED COMPLETE N/A 6/4/2019    Procedure: CYSTOSCOPY, TRANSURETHRAL RESECTION OF PROSTATE (TURP);   Surgeon: Pablo Keyes MD;  Location: [facility];  Service: Urology   • TRANSURETHRAL RESECTION OF PROSTATE Bilateral 1/13/2022    Procedure: CYSTOSCOPY, TRANSURETHRAL RESECTION OF PROSTATE (TURP), RETROGRADE, PYELOGRAM;  Surgeon: Go Sesay MD;  Location: [facility];  Service: Urology       Current Outpatient Medications:   •  amLODIPine (NORVASC) 2 5 mg tablet, Take 1 tablet (2 5 mg total) by mouth daily, Disp: 90 tablet, Rfl: 0  •  Ascorbic Acid (VITAMIN C PO), Take 500 mg by mouth daily , Disp: , Rfl:   •  B Complex Vitamins (B-COMPLEX/B-12 PO), Take 1 tablet by mouth daily, Disp: , Rfl:   •  Calcium 150 MG TABS, Take by mouth daily  , Disp: , Rfl:   •  Cholecalciferol (VITAMIN D) 2000 units CAPS, Take 1 capsule by mouth daily, Disp: , Rfl:   •  dutasteride (AVODART) 0 5 mg capsule, "Take 1 capsule (0 5 mg total) by mouth daily, Disp: 90 capsule, Rfl: 0  •  EQ Aspirin Adult Low Dose 81 MG EC tablet, Last dose 1/9/22 , Disp: , Rfl:   •  meloxicam (MOBIC) 15 mg tablet, Take 1 tablet (15 mg total) by mouth daily As needed, Disp: 90 tablet, Rfl: 0  •  Omega-3 Fatty Acids (fish oil) 1,000 mg, Take 1,000 mg by mouth daily, Disp: , Rfl:   •  polyethylene glycol (MIRALAX) 17 g packet, Take 17 g by mouth daily, Disp: , Rfl:   •  rosuvastatin (CRESTOR) 20 MG tablet, Take 1 tablet (20 mg total) by mouth daily at bedtime, Disp: 90 tablet, Rfl: 3  •  tamsulosin (FLOMAX) 0 4 mg, Take 1 capsule (0 4 mg total) by mouth daily with dinner, Disp: 90 capsule, Rfl: 1  No Known Allergies    Review of Systems:  Review of Systems   Constitutional: Negative  HENT: Negative  Eyes: Negative  Respiratory: Negative  Cardiovascular: Negative  Gastrointestinal: Negative  Endocrine: Negative  Genitourinary: Negative  Musculoskeletal: Positive for gait problem  Skin: Negative  Allergic/Immunologic: Negative  Hematological: Negative  Psychiatric/Behavioral: Negative  Vitals:    06/22/23 1343   BP: 138/60   BP Location: Right arm   Patient Position: Sitting   Cuff Size: Standard   Pulse: 61   SpO2: 99%   Weight: 68 9 kg (152 lb)   Height: 5' 10\" (1 778 m)     Physical Exam:  Physical Exam  Constitutional:       General: He is not in acute distress  Appearance: He is well-developed  He is not diaphoretic  HENT:      Head: Normocephalic and atraumatic  Right Ear: External ear normal       Left Ear: External ear normal    Eyes:      General: No scleral icterus  Right eye: No discharge  Left eye: No discharge  Conjunctiva/sclera: Conjunctivae normal       Pupils: Pupils are equal, round, and reactive to light  Neck:      Thyroid: No thyromegaly  Vascular: No JVD  Trachea: No tracheal deviation     Cardiovascular:      Rate and Rhythm: Normal rate and " regular rhythm  Heart sounds: No murmur heard  No friction rub  Gallop present  Pulmonary:      Effort: Pulmonary effort is normal  No respiratory distress  Breath sounds: Normal breath sounds  No stridor  No wheezing or rales  Chest:      Chest wall: No tenderness  Abdominal:      General: Bowel sounds are normal  There is no distension  Palpations: Abdomen is soft  There is no mass  Tenderness: There is no abdominal tenderness  There is no guarding or rebound  Musculoskeletal:         General: No tenderness or deformity  Normal range of motion  Cervical back: Normal range of motion and neck supple  Skin:     General: Skin is warm and dry  Coloration: Skin is not pale  Findings: No erythema or rash  Neurological:      Mental Status: He is alert and oriented to person, place, and time  Cranial Nerves: No cranial nerve deficit  Motor: No abnormal muscle tone  Coordination: Coordination normal       Deep Tendon Reflexes: Reflexes are normal and symmetric  Psychiatric:         Behavior: Behavior normal          Thought Content:  Thought content normal          Judgment: Judgment normal          Labs:     Lab Results   Component Value Date    WBC 4 52 03/17/2023    HGB 12 5 03/17/2023    HCT 37 6 03/17/2023     (H) 03/17/2023     (L) 03/17/2023     Lab Results   Component Value Date     12/30/2015    K 3 8 03/17/2023     03/17/2023    CO2 27 03/17/2023    ANIONGAP 12 8 12/30/2015    BUN 19 03/17/2023    CREATININE 0 98 03/17/2023    GLUCOSE 104 12/30/2015    GLUF 100 (H) 03/17/2023    CALCIUM 8 7 03/17/2023    CORRECTEDCA 9 0 05/19/2021    AST 19 03/17/2023    ALT 17 03/17/2023    ALKPHOS 54 03/17/2023    PROT 7 5 12/30/2015    BILITOT 0 6 12/30/2015    EGFR 70 03/17/2023     Lab Results   Component Value Date    CHOL 95 (L) 12/30/2015    CHOL 80 (L) 06/24/2015    CHOL 105 11/13/2014     Lab Results   Component Value Date    HDL "49 11/14/2022    HDL 36 (L) 05/02/2022    HDL 51 10/22/2021     Lab Results   Component Value Date    LDLCALC 35 11/14/2022    LDLCALC 38 05/02/2022    LDLCALC 42 10/22/2021     Lab Results   Component Value Date    TRIG 44 11/14/2022    TRIG 44 05/02/2022    TRIG 22 10/22/2021     Lab Results   Component Value Date    HGBA1C 5 2 11/14/2022       Imaging & Testing   I have personally reviewed pertinent reports  EKG: Personally reviewed  Sinus bradycardia incomplete RBBB  09/10/2019 normal sinus rhythm incomplete right bundle branch block no acute ST T wave changes from prior EKG  Sinus rhythm with first-degree AV block incomplete right bundle branch block unchanged    Cardiac testing:   CORONARY CIRCULATION:   Proximal LAD: There was a 90 % stenosis  1st diagonal: There was a 100 % stenosis  This lesion is a chronic total   occlusion  Mid circumflex: There was a 80 % stenosis  1st obtuse marginal: There was a 100 % stenosis  This lesion is a chronic    total   occlusion  Distal RCA: There was a 100 % stenosis  This lesion is a chronic   total occlusion  CARDIAC STRUCTURES:   Global left ventricular function was normal  EF calculated by contrast   ventriculography was 65 %  Srinivas Yeboah  Please call with any questions or suggestions    A description of the counseling:   Goals and Barriers:  Patient's ability to self care:  Medication side effect reviewed with patient in detail and all their questions answered  \"This note has been constructed using a voice recognition system  Therefore there may be syntax, spelling, and/or grammatical errors  Please call if you have any questions   \"    "

## 2023-06-27 ENCOUNTER — OFFICE VISIT (OUTPATIENT)
Dept: PHYSICAL THERAPY | Facility: CLINIC | Age: 83
End: 2023-06-27
Payer: COMMERCIAL

## 2023-06-27 DIAGNOSIS — M25.552 PAIN OF LEFT HIP JOINT: Primary | ICD-10-CM

## 2023-06-27 PROCEDURE — 97110 THERAPEUTIC EXERCISES: CPT | Performed by: PHYSICAL THERAPIST

## 2023-06-27 PROCEDURE — 97112 NEUROMUSCULAR REEDUCATION: CPT | Performed by: PHYSICAL THERAPIST

## 2023-06-27 NOTE — PROGRESS NOTES
"Daily Note     Today's date: 2023  Patient name: Joanna Lowe  : 1940  MRN: [id number]  Referring provider: Brandi Kumar DO  Dx:   Encounter Diagnosis     ICD-10-CM    1  Pain of left hip joint  M25 552                      Subjective: Pt states thus far no real change in his pain  He has no pain at the moment, \"I took Tylenol this morning\"  He does report   6/10 pain in L anterior hip w/ STS w/ no UE assist     Objective: See treatment diary below  Rep hip ext knee in chair 2x10 = dec/B 4/10      Assessment: Tolerated treatment well and reports improvement in comfort w/ sit to stand w/ continued hip flexor stretching  His HEP was updated to focus on hip flexor more    Patient would benefit from continued PT and improvement of hip and lumbar dysfunction  Plan: Continue per plan of care  Precautions:   Past Medical History:   Diagnosis Date   • Arthritis    • CAD (coronary artery disease)    • Cataract    • Colon polyp    • COVID-19    • GERD (gastroesophageal reflux disease)    • Heart attack ([facility] )    • Herpes zoster    • Hx of colonic polyps    • Hyperlipidemia    • Hypertension      Access Code: [email or web address]  URL: [email or web address]  Date: 06/15/2023  Prepared by:  Ivette Mahan    Exercises  - Supine Bridge  - 1 x daily - 2 sets - 10 reps  - Supine Hip External Rotation Stretch  - 1 x daily - 3 reps - 30 hold  - Prone on elbows with knee flexion  - 1 x daily - 3 sets - 10 reps  - Standing Lumbar Extension with Counter  - 3 x daily - 2 sets - 10 reps    SOC: 6/15/2023  FOTO: 6/15/2023  POC EXP: 2023 53/67  DAILY TREATMENT LOG:  Date 6/15/2023 2023 2023 6/27/49929    Visit #/auth  2/12 3/12 4/12            Manual  5' 5'  5'    Hip IR stretch prone   10\"x5 R/L RR  TT in R SL 10\"x5; 2x    Neuro Re-Ed  15' 10'  10'            LACIE 2x10 2x10 2x10 to end      FAITH    30\"x2 w/ man hip IR stetch    FAITH w/ B/L knee flexion 2x10 2x10 20x  FAITH 2x10 needs cues for position  " "  Supine sciatic nerve tensioner  1x15 R/L w/ 2 ankle pumps each rep 1x15 R/L w/ 2 ankle pumps each rep      Alt 2 cone taps   1x10 R/L                      Ther Ex  25' 23' 30'    Rep L hip ext loaded (knee on chair)    2x10    Sup fig 4 stretch 20\"x3 R/L 20\"x4 R/L 20\"x4 R/L      bridges 1x10 3\" 2x10 3\" 2x10      Stand lat lunge stretch at rail  5\"x5 R/L 5\"x5 R/L      Hip flex stretch on step    10\"x5 R/L  Supine quad stretch off edge 20\"x5 r/L    Standing hip abd    2x10 R/L      Stand alt hip exten  2x10 R/L 2x10 R/L      Low mat STS  2x10 2x10  4x5 as test for hip pain    Open books     5\"x10 R/L    Seated hip IR/ER w/ add    2x10 ea L  Rev clamshell L LE in SL 3x10    HEP Issued & reviewed Updated & reviewed  Update & review    Ther Activity                        Gait Training                        Modalities                        HEP:    Access Code: [email or web address]  URL: [email or web address]  Date: 06/27/2023  Prepared by:  Mat Hernandez    Exercises  - Standing Lumbar Extension with Counter  - 2 x daily - 2 sets - 10 reps  - Standing Hip Extension  - 2 x daily - 2 sets - 10 reps  - Standing Hip Flexor Stretch on Chair  - 2 x daily - 2 sets - 10 reps  - Supine Bridge  - 1 x daily - 2 sets - 10 reps  - Supine Hip External Rotation Stretch  - 1 x daily - 3 reps - 30 hold  - Supine Quadriceps Stretch with Strap on Table  - 1 x daily - 5 reps - 20 hold  - Sidelying Reverse Clamshell  - 1 x daily - 3 sets - 10 reps  - Sidelying Thoracic Rotation with Open Book  - 1 x daily - 2 sets - 10 reps - 5 hold         "

## 2023-06-29 ENCOUNTER — OFFICE VISIT (OUTPATIENT)
Dept: PHYSICAL THERAPY | Facility: CLINIC | Age: 83
End: 2023-06-29
Payer: COMMERCIAL

## 2023-06-29 DIAGNOSIS — M25.552 PAIN OF LEFT HIP JOINT: Primary | ICD-10-CM

## 2023-06-29 PROCEDURE — 97140 MANUAL THERAPY 1/> REGIONS: CPT

## 2023-06-29 PROCEDURE — 97110 THERAPEUTIC EXERCISES: CPT

## 2023-06-29 NOTE — PROGRESS NOTES
"Daily Note     Today's date: 2023  Patient name: Nahid Hardin  : 1940  MRN: [id number]  Referring provider: Ru Wyatt DO  Dx:   Encounter Diagnosis     ICD-10-CM    1  Pain of left hip joint  M25 552                      Subjective: Pt reports that things are feeling pretty good this morning, he thinks the new exercises helped some  He did not have pain with STS on arrival        Objective: See treatment diary below      Assessment: Tolerated treatment well  Pt able to perform STS from low mat today with no pain during which is an improvement from last session where this was painful and used to assess response to additional TE  Pt edu to cont with new exercises for L hip pain  Cont to progress global L hip mobility as tolerated  Patient would benefit from continued PT      Plan: Continue per plan of care  Precautions:   Past Medical History:   Diagnosis Date   • Arthritis    • CAD (coronary artery disease)    • Cataract    • Colon polyp    • COVID-19    • GERD (gastroesophageal reflux disease)    • Heart attack ([facility] )    • Herpes zoster    • Hx of colonic polyps    • Hyperlipidemia    • Hypertension      Access Code: [email or web address]  URL: [email or web address]  Date: 06/15/2023  Prepared by:  Tyler Strasburg    Exercises  - Supine Bridge  - 1 x daily - 2 sets - 10 reps  - Supine Hip External Rotation Stretch  - 1 x daily - 3 reps - 30 hold  - Prone on elbows with knee flexion  - 1 x daily - 3 sets - 10 reps  - Standing Lumbar Extension with Counter  - 3 x daily - 2 sets - 10 reps    SOC: 6/15/2023  FOTO: 6/15/2023  POC EXP: 2023 53/67  DAILY TREATMENT LOG:  Date 6/15/2023 2023 2023 6/27/23710 2023   Visit #/auth  2/12 3/12 4/12 5/12           Manual  5' 5'  5' 5'    Hip IR stretch prone   10\"x5 R/L RR  TT in R SL 10\"x5; 2x SL hip flex stretching RR    Neuro Re-Ed  15' 10'  10' 5'            LACIE 2x10 2x10 2x10 to end      FAITH    30\"x2 w/ man hip IR stetch W/ hip IR " "stretching    FAITH w/ B/L knee flexion 2x10 2x10 20x  FAITH 2x10 needs cues for position 15x    Supine sciatic nerve tensioner  1x15 R/L w/ 2 ankle pumps each rep 1x15 R/L w/ 2 ankle pumps each rep      Alt 2 cone taps   1x10 R/L                      Ther Ex  25' 23' 30' 30'    Rep L hip ext loaded (knee on chair)    2x10 2x10    Sup fig 4 stretch 20\"x3 R/L 20\"x4 R/L 20\"x4 R/L      bridges 1x10 3\" 2x10 3\" 2x10   3\" 2x10    Stand lat lunge stretch at rail  5\"x5 R/L 5\"x5 R/L      Hip flex stretch on step    10\"x5 R/L  Supine quad stretch off edge 20\"x5 r/L Supine off edge lumbar roll under L thigh 20\"x4 R/L    Standing hip abd    2x10 R/L      Stand alt hip exten  2x10 R/L 2x10 R/L      Low mat STS  2x10 2x10  4x5 as test for hip pain 1x10 no pain during    Open books     5\"x10 R/L 5\"x10 R/L    Seated hip IR/ER w/ add    2x10 ea L  Rev clamshell L LE in SL 3x10 Rev clamshell in R SL 2x10   HEP Issued & reviewed Updated & reviewed  Update & review    Ther Activity                        Gait Training                        Modalities                        HEP:    Access Code: [email or web address]  URL: [email or web address]  Date: 06/27/2023  Prepared by:  Taco Swift    Exercises  - Standing Lumbar Extension with Counter  - 2 x daily - 2 sets - 10 reps  - Standing Hip Extension  - 2 x daily - 2 sets - 10 reps  - Standing Hip Flexor Stretch on Chair  - 2 x daily - 2 sets - 10 reps  - Supine Bridge  - 1 x daily - 2 sets - 10 reps  - Supine Hip External Rotation Stretch  - 1 x daily - 3 reps - 30 hold  - Supine Quadriceps Stretch with Strap on Table  - 1 x daily - 5 reps - 20 hold  - Sidelying Reverse Clamshell  - 1 x daily - 3 sets - 10 reps  - Sidelying Thoracic Rotation with Open Book  - 1 x daily - 2 sets - 10 reps - 5 hold           "

## 2023-07-03 ENCOUNTER — OFFICE VISIT (OUTPATIENT)
Dept: PHYSICAL THERAPY | Facility: CLINIC | Age: 83
End: 2023-07-03
Payer: COMMERCIAL

## 2023-07-03 DIAGNOSIS — M25.552 PAIN OF LEFT HIP JOINT: Primary | ICD-10-CM

## 2023-07-03 PROCEDURE — 97110 THERAPEUTIC EXERCISES: CPT

## 2023-07-03 NOTE — PROGRESS NOTES
Daily Note     Today's date: 7/3/2023  Patient name: Jeanne Breen  : 1940  MRN: [id number]  Referring provider: Brad Clarke DO  Dx:   Encounter Diagnosis     ICD-10-CM    1. Pain of left hip joint  M25.552                      Subjective: pt has no new complaints, overall cont with intermittent pain in his L hip but feels it is better. Objective: See treatment diary below      Assessment: Tolerated treatment well. Pt remains pain free in L hip with STS which was painful in previous sessions. Cont to progress global L hip ROM and proximal hip strengthening as able. Patient would benefit from continued PT      Plan: Continue per plan of care. Precautions:   Past Medical History:   Diagnosis Date   • Arthritis    • CAD (coronary artery disease)    • Cataract    • Colon polyp    • COVID-19    • GERD (gastroesophageal reflux disease)    • Heart attack ([address])    • Herpes zoster    • Hx of colonic polyps    • Hyperlipidemia    • Hypertension      Access Code: [email or web address]  URL: [email or web address]  Date: 06/15/2023  Prepared by:  Tom Rosas    Exercises  - Supine Bridge  - 1 x daily - 2 sets - 10 reps  - Supine Hip External Rotation Stretch  - 1 x daily - 3 reps - 30 hold  - Prone on elbows with knee flexion  - 1 x daily - 3 sets - 10 reps  - Standing Lumbar Extension with Counter  - 3 x daily - 2 sets - 10 reps    SOC: 6/15/2023  FOTO: 6/15/2023  POC EXP: 2023 53/67  DAILY TREATMENT LOG:  Date 7/3/2023       Visit #/auth                Manual 5'        Hip IR stretch prone         L hip flex stretch in R SL RR        Neuro Re-Ed                LACIE        FAITH        FAITH w/ B/L knee flexion        Supine sciatic nerve tensioner        Alt 2 cone taps                        Ther Ex 40'        Rep L hip ext loaded (knee on chair) 1x10        Sup fig 4 stretch 20"x4 L        bridges 5" 2x10        Supine SLR flex  1x10 R/L        Stand lat lunge stretch at rail 5"x10 R/L        Hip flex stretch on step  Supine off edge lumbar roll under L thigh 30"x3 R/L        Standing hip abd  YTB 1x10 R/L        Stand alt hip exten YTB 1x10 R/L        Low mat STS 2x10        Open books  5"x10 R/L        Clamshells  2x10 R/L        Rev clamshells  2x10 R/L        Supine leg press  B/L 40# 2x10        HEP        Ther Activity                        Gait Training                        Modalities                        HEP:    Access Code: [email or web address]  URL: [email or web address]  Date: 06/27/2023  Prepared by:  Tamika Juarez    Exercises  - Standing Lumbar Extension with Counter  - 2 x daily - 2 sets - 10 reps  - Standing Hip Extension  - 2 x daily - 2 sets - 10 reps  - Standing Hip Flexor Stretch on Chair  - 2 x daily - 2 sets - 10 reps  - Supine Bridge  - 1 x daily - 2 sets - 10 reps  - Supine Hip External Rotation Stretch  - 1 x daily - 3 reps - 30 hold  - Supine Quadriceps Stretch with Strap on Table  - 1 x daily - 5 reps - 20 hold  - Sidelying Reverse Clamshell  - 1 x daily - 3 sets - 10 reps  - Sidelying Thoracic Rotation with Open Book  - 1 x daily - 2 sets - 10 reps - 5 hold

## 2023-07-06 ENCOUNTER — OFFICE VISIT (OUTPATIENT)
Dept: PHYSICAL THERAPY | Facility: CLINIC | Age: 83
End: 2023-07-06
Payer: COMMERCIAL

## 2023-07-06 DIAGNOSIS — M25.552 PAIN OF LEFT HIP JOINT: Primary | ICD-10-CM

## 2023-07-06 PROCEDURE — 97110 THERAPEUTIC EXERCISES: CPT | Performed by: PHYSICAL THERAPIST

## 2023-07-06 NOTE — PROGRESS NOTES
Daily Note     Today's date: 2023  Patient name: Capri Kelly  : 1940  MRN: [id number]  Referring provider: Charo Russ DO  Dx:   Encounter Diagnosis     ICD-10-CM    1. Pain of left hip joint  M25.552                      Subjective: Pt reports his hip hurt on Tuesday but didn't really try his HEP ("it was the  so. .. "). Pt reports 3/10 L groin pain upon arrival.    Objective: See treatment diary below; encouraged pt to be consistent w/ full HEP AND to deliberately use HEP if/when he has pain, that's what it is for. Rep L hip ext loaded 5"x10 = abolish  When asked about HEP consistency, pt states "Not every day, as much as I can". Again explained to pt his ROM and flexibility is very much limited and will not improve without consistency daily w/ HEP. Assessment: Tolerated treatment well and does need VC's for form for most exercises. . Patient reports relief w/ current program.  Improved compliance will improve his response/progress. Plan: Continue per plan of care.       Precautions:   Past Medical History:   Diagnosis Date   • Arthritis    • CAD (coronary artery disease)    • Cataract    • Colon polyp    • COVID-19    • GERD (gastroesophageal reflux disease)    • Heart attack ([address])    • Herpes zoster    • Hx of colonic polyps    • Hyperlipidemia    • Hypertension      SOC: 6/15/2023  FOTO: 6/15/2023  POC EXP: 2023 53/67  DAILY TREATMENT LOG:  Date 7/3/2023 2023      Visit #/auth                Manual 5'  5'      Hip IR stretch prone         L hip flex stretch in R SL RR  TT      Neuro Re-Ed                LACIE        FAITH        FAITH w/ B/L knee flexion        Supine sciatic nerve tensioner        Alt 2 cone taps                        Ther Ex 40'  40'      Rep L hip ext loaded (knee on chair) 1x10  5"x10 R/L; 2x ea      Sup fig 4 stretch 20"x4 L  20"x4 L      bridges 5" 2x10  W/ grn TB abd 3" 2x10      Supine SLR flex  1x10 R/L        Stand lat lunge stretch at rail 5"x10 R/L        Hip flex stretch on step  Supine off edge lumbar roll under L thigh 30"x3 R/L  No roll 1'x2 R/L      Standing hip abd  YTB 1x10 R/L  YTB @ knees 2x10 R/L      Stand alt hip exten YTB 1x10 R/L  YTB @ knees 2x10 R/L      Low mat STS 2x10        Open books  5"x10 R/L  LTR 10"x5 R/L      Clamshells  2x10 R/L  YTB 2x10 R/L      Rev clamshells  2x10 R/L  2x10 R/L      Supine leg press  B/L 40# 2x10        HEP        Ther Activity                        Gait Training                        Modalities                        HEP:    Access Code: [email or web address]  URL: [email or web address]  Date: 06/27/2023  Prepared by:  Sebastien Garcia    Exercises  - Standing Lumbar Extension with Counter  - 2 x daily - 2 sets - 10 reps  - Standing Hip Extension  - 2 x daily - 2 sets - 10 reps  - Standing Hip Flexor Stretch on Chair  - 2 x daily - 2 sets - 10 reps  - Supine Bridge  - 1 x daily - 2 sets - 10 reps  - Supine Hip External Rotation Stretch  - 1 x daily - 3 reps - 30 hold  - Supine Quadriceps Stretch with Strap on Table  - 1 x daily - 5 reps - 20 hold  - Sidelying Reverse Clamshell  - 1 x daily - 3 sets - 10 reps  - Sidelying Thoracic Rotation with Open Book  - 1 x daily - 2 sets - 10 reps - 5 hold

## 2023-07-07 DIAGNOSIS — I10 ESSENTIAL HYPERTENSION: ICD-10-CM

## 2023-07-07 DIAGNOSIS — N40.0 ENLARGED PROSTATE ON RECTAL EXAMINATION: ICD-10-CM

## 2023-07-10 RX ORDER — DUTASTERIDE 0.5 MG/1
CAPSULE, LIQUID FILLED ORAL
Qty: 90 CAPSULE | Refills: 0 | Status: SHIPPED | OUTPATIENT
Start: 2023-07-10

## 2023-07-10 RX ORDER — AMLODIPINE BESYLATE 2.5 MG/1
TABLET ORAL
Qty: 90 TABLET | Refills: 0 | Status: SHIPPED | OUTPATIENT
Start: 2023-07-10

## 2023-07-18 ENCOUNTER — TELEPHONE (OUTPATIENT)
Dept: PHYSICAL THERAPY | Facility: CLINIC | Age: 83
End: 2023-07-18

## 2023-07-18 ENCOUNTER — APPOINTMENT (OUTPATIENT)
Dept: PHYSICAL THERAPY | Facility: CLINIC | Age: 83
End: 2023-07-18
Payer: COMMERCIAL

## 2023-07-18 NOTE — TELEPHONE ENCOUNTER
Pt stops in asking about a D/C summary to provide his new PT. I explained I did not do a re-assessment at his last visit as I had expected he would be continuing. I asked pt his plans. He states he would prefer to do PT at our [facility] location with a therapist he saw previously there, stating no negative issues here, he just liked it better there. I explained to him I will reach out to that therapist to inform him of the potential transfer, and all of the encounter notes are available to this therapist to view in Epic, so no summary will be needed to hand to him.

## 2023-07-20 ENCOUNTER — APPOINTMENT (OUTPATIENT)
Dept: PHYSICAL THERAPY | Facility: CLINIC | Age: 83
End: 2023-07-20
Payer: COMMERCIAL

## 2023-07-25 ENCOUNTER — APPOINTMENT (OUTPATIENT)
Dept: PHYSICAL THERAPY | Facility: CLINIC | Age: 83
End: 2023-07-25
Payer: COMMERCIAL

## 2023-07-27 ENCOUNTER — APPOINTMENT (OUTPATIENT)
Dept: PHYSICAL THERAPY | Facility: CLINIC | Age: 83
End: 2023-07-27
Payer: COMMERCIAL

## 2023-08-04 ENCOUNTER — EVALUATION (OUTPATIENT)
Dept: PHYSICAL THERAPY | Facility: CLINIC | Age: 83
End: 2023-08-04
Payer: COMMERCIAL

## 2023-08-04 DIAGNOSIS — M25.559 HIP PAIN, UNSPECIFIED LATERALITY: Primary | ICD-10-CM

## 2023-08-04 PROCEDURE — 97161 PT EVAL LOW COMPLEX 20 MIN: CPT

## 2023-08-04 PROCEDURE — 97112 NEUROMUSCULAR REEDUCATION: CPT

## 2023-08-04 NOTE — PROGRESS NOTES
PT Evaluation     Today's date: 2023  Patient name: Pj Bello  : 1940  MRN: [id number]  Referring provider: David Tristan DO  Dx:   Encounter Diagnosis     ICD-10-CM    1. Hip pain, unspecified laterality  M25.559 Ambulatory Referral to Physical Therapy                     Assessment  Assessment details: Pt is a pleasant 79 y.o. male who presents to [facility] with L hip pain, worse in the last few months, no specific JULIANN. Today, he presents with decreased and painful L hip ROM and joint mobility, decreased B LE and core strength, high self reports of pain, and decreased tolerance to actiivty. Functionally, he is limited in his ability to stand and ambulate, put on shoes and socks/LE dress, perform age appropriate recreation and housework, and perform normal self care. He is motivated to improve. Pt will benefit from skilled PT to address the aforementioned deficits and limitations in an effort to maximize pain free functional mobility and overall quality of life. Progress as able with these goals in mind. Impairments: abnormal gait, abnormal muscle firing, abnormal muscle tone, abnormal or restricted ROM, abnormal movement, activity intolerance, impaired physical strength, lacks appropriate home exercise program and pain with function  Understanding of Dx/Px/POC: good   Prognosis: good    Goals  Short term goals (3 weeks):  1) Pt will improve L hip AROM to by 25% pain free. 2) Pt will improve B LE and core strength deficits by 1/3 grade MMT. 3) Pt will reports pain at worse <4/10. 4) Pt will initiate and progress HEP w/ special emphasis on functional hip/core strength and L hip ROM. Long term goals (6 weeks)  1) Pt will improve FOTO to at least 78.  2) Pt will perform two full weeks of self care w/o deficit related to L hip. 3) Pt will stand and ambulate for community distances w/o deficit related to L hip.   4) Pt will be independent and compliant w/ HEP in order to maximize functional benefit of skilled PT following d/c.       Plan  Plan details: HEP to start: TrA BKFO, bridge w/ ad sq or hip abd, seated hip IR/ER, standing high march    Patient would benefit from: skilled PT  Planned modality interventions: cryotherapy and thermotherapy: hydrocollator packs  Planned therapy interventions: abdominal trunk stabilization, activity modification, joint mobilization, manual therapy, massage, motor coordination training, neuromuscular re-education, patient education, postural training, stretching, strengthening, therapeutic activities, therapeutic exercise, therapeutic training, transfer training, home exercise program, gait training, graded activity, functional ROM exercises, graded exercise, flexibility, body mechanics training, balance and balance/weight bearing training  Frequency: 2x week  Duration in visits: 12  Duration in weeks: 6  Treatment plan discussed with: patient        Subjective Evaluation    History of Present Illness  Mechanism of injury: Pt is here for evaluation of L hip pain. Has been seen in our clinic before, most recently seen at [facility]. 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 Pain in L hip is off and on, notes that it can hurt first thing in the morning, sometimes not until later in the day. Notes that motion of putting socks on is very painful and difficult. Sleeping is not difficult, only has some pain in morning. Prolonged walking and WB can be difficult. Main goal is pain relief. Functional status below:   Quality of life: good    Patient Goals  Patient goals for therapy: increased strength, increased motion, decreased pain and return to sport/leisure activities  Patient goal: be able to put my socks on without a problem!   Pain  Current pain ratin  At best pain ratin  At worst pain ratin  Location: L anterior hip, into L anterior shin occasionally   Quality: sharp, throbbing, dull ache and grinding  Relieving factors: rest and change in position (movement feels good)  Aggravating factors: walking, standing, stair climbing, running and lifting (putting socks on, LE dressing )    Social Support  Steps to enter house: yes  Stairs in house: no   Lives in: [place]  Lives with: spouse    Employment status: not working (retired,  )  Treatments  Previous treatment: physical therapy        Objective     Palpation     Additional Palpation Details  Generally TTP w/ deep palpation to L ant hip     Lumbar Screen  Lumbar range of motion within normal limits with the following exceptions:Stiff but grossly WNL     Neurological Testing     Sensation     Hip   Left Hip   Intact: light touch    Right Hip   Intact: light touch    Active Range of Motion   Left Hip   Flexion: 95 degrees with pain  Extension: 15 degrees   Abduction: 25 degrees   External rotation (90/90): 30 degrees   Internal rotation (90/90): 5 degrees with pain    Right Hip   Normal active range of motion    Passive Range of Motion     Right Hip   Normal passive range of motion    Additional Passive Range of Motion Details  L side limited into final 25-33% of all end range motions w/ stiffness and hard end feel    Strength/Myotome Testing     Additional Strength Details  LE Strength (R/L)    Hip  Flex 4/5 , 4-/5  Ext 4-/5 , 4-/5  Abd 4-/5 , 4-/5  Add 4-/5 , 4-/5    Knee   Flex 4-/5 , 4-/5  Ext 4-/5 , 4-/5    Core Strength: 1+/5 w/ cueing required to maintain proper form     *Indicates pain    Tests     Additional Tests Details  Pt fits arthritic pattern of motion loss     Ambulation     Ambulation: Level Surfaces     Additional Level Surfaces Ambulation Details  Forward flexed through low back and hips, hitch on L, decreased step length and WB on L LE, fatigues quickly, antalgic     General Comments:      Hip Comments   Functional Testing:   Sit<>stand: UE support on LE w/ R side WS and poor glute drive     BW squat: hip hinge > squat, R side WS, requires min momentum to complete     Stairs: TBD      SLS: unable on L s/t pain     TUG: TBD      Flowsheet Rows    Flowsheet Row Most Recent Value   PT/OT G-Codes    Current Score 66   Projected Score 78   FOTO information reviewed Yes              POC expires Auth Status Total   Visits  Start date  Expiration date PT/OT + Visit Limit?  Co-Insurance    Not req     No                                               Dummy Auth Tracking  1 2 3 4 5 6   7 8 9 10 11 12   13 14 15 16 17 18   19 20 21 22 23 24   25 26 27 28 29 30   31 32 33 34 35 36         Precautions: standard       Date (Visit #) IE 8/4 (1) (2) (3) (4) (5)   Manual              DTM and TPR                                                                        Exercise Diary         Ther Ex        Active w/u             PROM  x3-4 min L hip flex and IR/ER           HS/glute/piri stretch  2x30 sec B HS            QS, SLR, hip abd             Active mobility Seated IR/ER x20 total                                                Neuro Re Ed        Bridging  ad sq x10           TrA progressions  isos x10, yellow BKFO x10 B           Squat training intro           Hip Abd Progressions             Balance         Standing high march x20 B       HEP and POC review  x5 mins                                        Ther Act             stairs             gait             Sit<>stand                                                                                                                  Modalities             heat             Ice

## 2023-08-08 ENCOUNTER — OFFICE VISIT (OUTPATIENT)
Dept: PHYSICAL THERAPY | Facility: CLINIC | Age: 83
End: 2023-08-08
Payer: COMMERCIAL

## 2023-08-08 DIAGNOSIS — M25.559 HIP PAIN, UNSPECIFIED LATERALITY: Primary | ICD-10-CM

## 2023-08-08 DIAGNOSIS — N39.44 NOCTURNAL ENURESIS: ICD-10-CM

## 2023-08-08 PROCEDURE — 97110 THERAPEUTIC EXERCISES: CPT

## 2023-08-08 PROCEDURE — 97112 NEUROMUSCULAR REEDUCATION: CPT

## 2023-08-08 RX ORDER — TAMSULOSIN HYDROCHLORIDE 0.4 MG/1
CAPSULE ORAL
Qty: 90 CAPSULE | Refills: 0 | Status: SHIPPED | OUTPATIENT
Start: 2023-08-08

## 2023-08-08 NOTE — PROGRESS NOTES
Daily Note     Today's date: 2023  Patient name: Josh Zamora  : 1940  MRN: [id number]  Referring provider: Qing Lundberg DO  Dx:   Encounter Diagnosis     ICD-10-CM    1. Hip pain, unspecified laterality  M25.559                      Subjective: Pt reports no new complaints. Felt good after last week. Objective: MWM for L hip flex and IR improves pain free ROM, well maintained by end. Assessment: Tolerated treatment well. Patient demonstrated fatigue post treatment and would benefit from continued PT. Does well w/ exercise progressions today, good tolerance to WB strength work. Will benefit from more aggressive challenges as sx allow. Focus on techniques that can be replicated at home. Plan: Continue per plan of care. TRX work, single leg strength and balance, retro walking           POC expires Auth Status Total   Visits  Start date  Expiration date PT/OT + Visit Limit?  Co-Insurance    Not req     No                                               Dummy Auth Tracking  1 2 3 4 5 6   7 8 9 10 11 12   13 14 15 16 17 18   19 20 21 22 23 24   25 26 27 28 29 30   31 32 33 34 35 36         Precautions: standard       Date (Visit #) IE  (1)  (2) (3) (4) (5)   Manual              DTM and TPR                                                                        Exercise Diary         Ther Ex        Active w/u   Pre x 10 min lvl 3-4          PROM  x3-4 min L hip flex and IR/ER x3-4 mins same, MWM for motions x10-15 each         HS/glute/piri stretch  2x30 sec B HS  3x30 sec B HS          QS, SLR, hip abd             Active mobility Seated IR/ER x20 total  Rev       Leg press   125# 3x10                                      Neuro Re Ed        Bridging  ad sq x10  x10 reg, ad sq x10, yellow tband 2x10         TrA progressions  isos x10, yellow BKFO x10 B Isos, yellow BKFO 2x10         Squat training intro  at bar x 20         Hip Abd Progressions             Balance         Standing high march x20 B x20      HEP and POC review  x5 mins  x2-3 mins       6" ant step ups  x20                              Ther Act             stairs             gait             Sit<>stand                                                                                                                  Modalities             heat             Ice

## 2023-08-23 ENCOUNTER — OFFICE VISIT (OUTPATIENT)
Dept: PHYSICAL THERAPY | Facility: CLINIC | Age: 83
End: 2023-08-23
Payer: COMMERCIAL

## 2023-08-23 DIAGNOSIS — M25.559 HIP PAIN, UNSPECIFIED LATERALITY: Primary | ICD-10-CM

## 2023-08-23 PROCEDURE — 97110 THERAPEUTIC EXERCISES: CPT

## 2023-08-23 PROCEDURE — 97112 NEUROMUSCULAR REEDUCATION: CPT

## 2023-08-23 NOTE — PROGRESS NOTES
Daily Note     Today's date: 2023  Patient name: Pancho Julien  : 1940  MRN: [id number]  Referring provider: Massimo Felix DO  Dx:   Encounter Diagnosis     ICD-10-CM    1. Hip pain, unspecified laterality  M25.559                      Subjective: Pt reports no new complaints, reports that hip feels tight overall. No significant change. Objective: MWM for flex and IR continues to improve hip ROM within session. Maintained by end. Assessment: Tolerated treatment well. Patient demonstrated fatigue post treatment and would benefit from continued PT. Does well w/ exercise progressions today, fatigue through lateral hip stabilizers but no increase in pain. Will benefit from more aggressive strength challenges in loaded positions as sx allow. Plan: Continue per plan of care. AROM progressions in loaded positions, off step, lateral motions if able     POC expires Auth Status Total   Visits  Start date  Expiration date PT/OT + Visit Limit?  Co-Insurance    Not req     No                                               Dummy Auth Tracking  1 2 3 4 5 6   7 8 9 10 11 12   13 14 15 16 17 18   19 20 21 22 23 24   25 26 27 28 29 30   31 32 33 34 35 36         Precautions: standard       Date (Visit #) IE  (1)  (2)  (3) (4) (5)   Manual              DTM and TPR                                                                        Exercise Diary         Ther Ex        Active w/u   Pre x 10 min lvl 3-4   upright pre 10 min lvl 4-5        PROM  x3-4 min L hip flex and IR/ER x3-4 mins same, MWM for motions x10-15 each  x5-6 mins total    MWM for flex/IR 2x12 total       HS/glute/piri stretch  2x30 sec B HS  3x30 sec B HS  no       QS, SLR, hip abd             Active mobility Seated IR/ER x20 total  Rev  rev     Leg press   125# 3x10 125# 4x10                                     Neuro Re Ed        Bridging  ad sq x10  x10 reg, ad sq x10, yellow tband 2x10  reg 2x15        TrA progressions  isos x10, yellow BKFO x10 B Isos, yellow BKFO 2x10  rev       Squat training intro  at bar x 20  red tband 2x10        Hip Abd Progressions      red tband side step 10 paces x 10 B       Balance         Standing high march x20 B x20 rev     HEP and POC review  x5 mins  x2-3 mins  Rev x 2-3 mins      6" ant step ups  x20 rev                             Ther Act             stairs             gait             Sit<>stand                                                                                                                  Modalities             heat             Ice

## 2023-08-29 ENCOUNTER — OFFICE VISIT (OUTPATIENT)
Dept: PHYSICAL THERAPY | Facility: CLINIC | Age: 83
End: 2023-08-29
Payer: COMMERCIAL

## 2023-08-29 DIAGNOSIS — M25.559 HIP PAIN, UNSPECIFIED LATERALITY: Primary | ICD-10-CM

## 2023-08-29 PROCEDURE — 97110 THERAPEUTIC EXERCISES: CPT

## 2023-08-29 PROCEDURE — 97112 NEUROMUSCULAR REEDUCATION: CPT

## 2023-08-29 NOTE — PROGRESS NOTES
Daily Note     Today's date: 2023  Patient name: Ana Donnelly  : 1940  MRN: [id number]  Referring provider: Cesar Lima DO  Dx:   Encounter Diagnosis     ICD-10-CM    1. Hip pain, unspecified laterality  M25.559                      Subjective: Pt reports that L hip is particularly stiff today. Rolled over in night 2-3 days ago and has had significant soreness since. Points to lateral aspect of L hip as location of pain. Objective: MWM for flex and IR continues to improve mobility within session. Requires significant cueing for glute drive w/ squat work, mod correction. Assessment: Tolerated treatment well. Patient demonstrated fatigue post treatment and would benefit from continued PT. Fatigue but no increase in pain by end of session. Does well w/ strength progressions today despite soreness. Will benefit from continued challenges as sx allow, focusing on maximizing pain free use of functional ROM. Good understanding. Plan: Continue per plan of care. higher intensity strength work, single, balance, stair, hinge work     POC expires Auth Status Total   Visits  Start date  Expiration date PT/OT + Visit Limit?  Co-Insurance    Not req     No                                               Dummy Auth Tracking  1 2 3 4 5 6   7 8 9 10 11 12   13 14 15 16 17 18   19 20 21 22 23 24   25 26 27 28 29 30   31 32 33 34 35 36         Precautions: standard       Date (Visit #) IE  (1)  (2)  (3)  (4) (5)   Manual              DTM and TPR                                                                        Exercise Diary         Ther Ex        Active w/u   Pre x 10 min lvl 3-4   upright pre 10 min lvl 4-5   8 mins lvl 4-5      PROM  x3-4 min L hip flex and IR/ER x3-4 mins same, MWM for motions x10-15 each  x5-6 mins total    MWM for flex/IR 2x12 total  x6-7 mins total      2x10-15 for each      HS/glute/piri stretch  2x30 sec B HS  3x30 sec B HS  no  3x30 sec     QS, SLR, hip abd Active mobility Seated IR/ER x20 total  Rev  rev     Leg press   125# 3x10 125# 4x10 115# x20, 125# x20                                    Neuro Re Ed        Bridging  ad sq x10  x10 reg, ad sq x10, yellow tband 2x10  reg 2x15   x20 w/ RTB      TrA progressions  isos x10, yellow BKFO x10 B Isos, yellow BKFO 2x10  rev  isos x20, RTB BKFO x20 B      Squat training intro  at bar x 20  red tband 2x10   at bar x20      Hip Abd Progressions      red tband side step 10 paces x 10 B  rev     Balance         Standing high march x20 B x20 rev     HEP and POC review  x5 mins  x2-3 mins  Rev x 2-3 mins  Rev x 2-3 mins     6" ant step ups  x20 rev                             Ther Act             stairs             gait             Sit<>stand                                                                                                                  Modalities             heat             Ice

## 2023-08-30 ENCOUNTER — OFFICE VISIT (OUTPATIENT)
Dept: GASTROENTEROLOGY | Facility: CLINIC | Age: 83
End: 2023-08-30
Payer: COMMERCIAL

## 2023-08-30 VITALS
DIASTOLIC BLOOD PRESSURE: 57 MMHG | BODY MASS INDEX: 22.3 KG/M2 | HEIGHT: 70 IN | HEART RATE: 58 BPM | WEIGHT: 155.8 LBS | SYSTOLIC BLOOD PRESSURE: 136 MMHG

## 2023-08-30 DIAGNOSIS — K59.00 CONSTIPATION, UNSPECIFIED CONSTIPATION TYPE: Primary | ICD-10-CM

## 2023-08-30 PROCEDURE — 99213 OFFICE O/P EST LOW 20 MIN: CPT | Performed by: PHYSICIAN ASSISTANT

## 2023-08-30 NOTE — ASSESSMENT & PLAN NOTE
Patient reports improvement in his constipation with addition of as needed MiraLAX, and regular use of fiber. He does have history of adenomatous colon polyps including a fairly large polyp requiring clip placement at polypectomy site in 2021, for which follow-up in 3 years was recommended.   No alarm symptoms at this time    -Plan for colonoscopy in 3 years from last exam, he will be due at the end of 2024, I advised our office staff to ensure that a reminder is set    -I advised patient to let us know if he experiences any concerning change in his bowel habits or stool appearance, weight, appetite, digestive symptoms, etc., otherwise we will follow-up as needed in the meantime

## 2023-08-30 NOTE — PROGRESS NOTES
Follow-up Note -  [facility]  Lianna Cho 1940 79 y.o. male         Reason: Follow-up; constipation, history of colon polyps    HPI: 76-year-old male with history of hypertension and hyperlipidemia, coronary artery disease status post CABG x3 in 2014 who presents for follow-up. He was seen by Dr. Chani Winkler in the office about 3 months ago for symptoms of constipation, still straining with Metamucil daily and was not taking MiraLAX at the time. He had recently had colonoscopy in 2021 seen the removal of several adenomatous polyps, including 1 fairly large polyp requiring clip placement; he was recommended for surveillance colonoscopy in 3 years from that time. He was recommended to add MiraLAX to his fiber regimen and follow-up in 6 months or sooner if needed. At this time, the patient reports to be feeling well, he is using MiraLAX on an as-needed basis but he says that with fiber supplementation lately he has been moving his bowels at least once every 2 to 3 days without discomfort or having to strain substantially. He notes the MiraLAX has been helpful when he has added it, however. He denies any rectal bleeding or melena, denies any loss of appetite or weight, any blood or mucus in his stools. REVIEW OF SYSTEMS:      CONSTITUTIONAL: Denies any fever, chills, or rigors. Good appetite, and no recent weight loss. HEENT: No earache or tinnitus. Denies hearing loss or visual disturbances. CARDIOVASCULAR: No chest pain or palpitations. RESPIRATORY: Denies any cough, hemoptysis, shortness of breath or dyspnea on exertion. GASTROINTESTINAL: As noted in the History of Present Illness. GENITOURINARY: No problems with urination. Denies any hematuria or dysuria. NEUROLOGIC: No dizziness or vertigo, denies headaches. MUSCULOSKELETAL: Denies any muscle or joint pain. SKIN: Denies skin rashes or itching.    ENDOCRINE: Denies excessive thirst. Denies intolerance to heat or cold.  PSYCHOSOCIAL: Denies depression or anxiety. Denies any recent memory loss. Past Medical History:   Diagnosis Date   • Arthritis    • CAD (coronary artery disease)    • Cataract    • Colon polyp    • COVID-19    • GERD (gastroesophageal reflux disease)    • Heart attack ([address])    • Herpes zoster    • Hx of colonic polyps    • Hyperlipidemia    • Hypertension       Past Surgical History:   Procedure Laterality Date   • BACK SURGERY      disc removed 1985 / R. ...1985     • CATARACT EXTRACTION     • COLONOSCOPY     • CORONARY ARTERY BYPASS GRAFT  11/13/2014    LA. ... 6/30/16   & CABG X3 with LIMA to LAD <SVG to OM-2, SVG to PDA  [place] Brennen Arias / Zaida Olivo. ...12/19/14      • EYE SURGERY      cataract /  R. ...1980     • FL RETROGRADE PYELOGRAM  1/13/2022   • HERNIA REPAIR      Groin / R. ...[address] [facility] [address] /  [place] [place]. ...1984      • INGUINAL HERNIA REPAIR      R. ... 2010     • JOINT REPLACEMENT  1985     right hip   • NV CYSTO W/IRRIG & EVAC MULTPLE OBSTRUCTING CLOTS N/A 5/18/2021    Procedure: CYSTOSCOPY EVACUATION OF CLOTS WITH  FULGURATION OF PROSTATE;  Surgeon: Wes Mccann MD;  Location: [facility];  Service: Urology   • NV TRURL ELECTROSURG RESCJ PROSTATE BLEED COMPLETE N/A 6/4/2019    Procedure: CYSTOSCOPY, TRANSURETHRAL RESECTION OF PROSTATE (TURP);   Surgeon: Wes Mccann MD;  Location: [facility];  Service: Urology   • TRANSURETHRAL RESECTION OF PROSTATE Bilateral 1/13/2022    Procedure: CYSTOSCOPY, TRANSURETHRAL RESECTION OF PROSTATE (TURP), RETROGRADE, PYELOGRAM;  Surgeon: Mady Shaffer MD;  Location: [facility];  Service: Urology     Social History     Socioeconomic History   • Marital status: /Civil Union     Spouse name: Not on file   • Number of children: Not on file   • Years of education: Not on file   • Highest education level: Not on file   Occupational History   • Not on file   Tobacco Use   • Smoking status: Former     Packs/day: 2.00     Years: 30.00 Total pack years: 60.00     Types: Cigarettes     Start date: 6/15/1960     Quit date: 1979     Years since quittin.3   • Smokeless tobacco: Never   Vaping Use   • Vaping Use: Never used   Substance and Sexual Activity   • Alcohol use: Not Currently   • Drug use: No   • Sexual activity: Not on file   Other Topics Concern   • Not on file   Social History Narrative    Exercise cycling / walking    Exercise daily     Good sleep hygiene    Sleeps 8-10 hrs a day      Social Determinants of Health     Financial Resource Strain: Low Risk  (2022)    Overall Financial Resource Strain (CARDIA)    • Difficulty of Paying Living Expenses: Not hard at all   Food Insecurity: Not on file   Transportation Needs: No Transportation Needs (2022)    PRAPARE - Transportation    • Lack of Transportation (Medical): No    • Lack of Transportation (Non-Medical): No   Physical Activity: Not on file   Stress: Not on file   Social Connections: Not on file   Intimate Partner Violence: Not on file   Housing Stability: Not on file     Family History   Problem Relation Age of Onset   • Coronary artery disease Father    • Hyperlipidemia Brother    • Sudden death Brother         cardiac   • Arthritis Family    • Mental illness Neg Hx      Patient has no known allergies.   Current Outpatient Medications   Medication Sig Dispense Refill   • amLODIPine (NORVASC) 2.5 mg tablet take 1 tablet by mouth once daily 90 tablet 0   • Ascorbic Acid (VITAMIN C PO) Take 500 mg by mouth daily      • B Complex Vitamins (B-COMPLEX/B-12 PO) Take 1 tablet by mouth daily     • Calcium 150 MG TABS Take by mouth daily       • Cholecalciferol (VITAMIN D) 2000 units CAPS Take 1 capsule by mouth daily     • dutasteride (AVODART) 0.5 mg capsule take 1 capsule by mouth once daily 90 capsule 0   • EQ Aspirin Adult Low Dose 81 MG EC tablet Last dose 22      • Omega-3 Fatty Acids (fish oil) 1,000 mg Take 1,000 mg by mouth daily     • polyethylene glycol (MIRALAX) 17 g packet Take 17 g by mouth daily     • rosuvastatin (CRESTOR) 20 MG tablet Take 1 tablet (20 mg total) by mouth daily at bedtime 90 tablet 3   • tamsulosin (FLOMAX) 0.4 mg take 1 capsule by mouth once daily with dinner 90 capsule 0   • meloxicam (MOBIC) 15 mg tablet Take 1 tablet (15 mg total) by mouth daily As needed 90 tablet 0     No current facility-administered medications for this visit. Blood pressure 136/57, pulse 58, height 5' 10" (1.778 m), weight 70.7 kg (155 lb 12.8 oz). PHYSICAL EXAM:      General Appearance:   Alert, cooperative, no distress, appears stated age    HEENT:   Normocephalic, atraumatic, anicteric.     Neck:  Supple, symmetrical, trachea midline, no adenopathy;    thyroid: no enlargement/tenderness/nodules; no carotid  bruit or JVD    Lungs:   Clear to auscultation bilaterally; no rales, rhonchi or wheezing; respirations unlabored    Heart[de-identified]   S1 and S2 normal; regular rate and rhythm; no murmur, rub, or gallop. Abdomen:   Soft, non-tender, non-distended; normal bowel sounds; no masses, no organomegaly    Extremities: No edema, erythema, wounds, rashes   Rectal:   Deferred                      Lab Results   Component Value Date    WBC 4.52 03/17/2023    HGB 12.5 03/17/2023    HCT 37.6 03/17/2023     (H) 03/17/2023     (L) 03/17/2023     Lab Results   Component Value Date    GLUCOSE 104 12/30/2015    CALCIUM 8.7 03/17/2023     12/30/2015    K 3.8 03/17/2023    CO2 27 03/17/2023     03/17/2023    BUN 19 03/17/2023    CREATININE 0.98 03/17/2023     Lab Results   Component Value Date    ALT 17 03/17/2023    AST 19 03/17/2023    ALKPHOS 54 03/17/2023    BILITOT 0.6 12/30/2015     Lab Results   Component Value Date    INR 1.19 05/18/2021    INR 1.17 (H) 11/12/2014    PROTIME 15.0 (H) 05/18/2021    PROTIME 14.7 (H) 11/12/2014       CT abdomen pelvis w contrast    Result Date: 3/30/2023  Impression: No acute intra-abdominal abnormality. Cholelithiasis. Workstation performed: [id number]       ASSESSMENT & PLAN:    Constipation  Patient reports improvement in his constipation with addition of as needed MiraLAX, and regular use of fiber. He does have history of adenomatous colon polyps including a fairly large polyp requiring clip placement at polypectomy site in 2021, for which follow-up in 3 years was recommended.   No alarm symptoms at this time    -Plan for colonoscopy in 3 years from last exam, he will be due at the end of 2024, I advised our office staff to ensure that a reminder is set    -I advised patient to let us know if he experiences any concerning change in his bowel habits or stool appearance, weight, appetite, digestive symptoms, etc., otherwise we will follow-up as needed in the meantime

## 2023-09-05 ENCOUNTER — TELEPHONE (OUTPATIENT)
Dept: FAMILY MEDICINE CLINIC | Facility: CLINIC | Age: 83
End: 2023-09-05

## 2023-09-05 NOTE — TELEPHONE ENCOUNTER
Patients wife left a voicemail this morning at 8:07am cancelling the patients appointment for today.     Isi Reyes LPN

## 2023-09-06 ENCOUNTER — OFFICE VISIT (OUTPATIENT)
Dept: PHYSICAL THERAPY | Facility: CLINIC | Age: 83
End: 2023-09-06
Payer: COMMERCIAL

## 2023-09-06 DIAGNOSIS — M25.559 HIP PAIN, UNSPECIFIED LATERALITY: Primary | ICD-10-CM

## 2023-09-06 PROCEDURE — 97110 THERAPEUTIC EXERCISES: CPT

## 2023-09-06 NOTE — PROGRESS NOTES
Daily Note     Today's date: 2023  Patient name: Eduar Correa  : 1940  MRN: [id number]  Referring provider: Edgar Thakkar DO  Dx:   Encounter Diagnosis     ICD-10-CM    1. Hip pain, unspecified laterality  M25.559                      Subjective: Pt reports no new complaints. Moved wallet out of front L hip pocket and notes that pain is much better. Feels he is moving better, has much less pain today as compared to last week. Objective: MWM for L hip flex and IR improve ROM, shows increased range to start vs previous sessions. Motion gained is well maintained by end. Assessment: Tolerated treatment well. Patient demonstrated fatigue post treatment and would benefit from continued PT. Does well w/ gentle manual work combined w/ strength work. Will be seen at least once more. Will call if sx change over the next wee. Should he continue to improve, will possibly d/c at next visit. Pt in agreement w/ plan. Asked to gently increase intensity of daily exercise program over the next week barring setback. Plan: Continue per plan of care. possible d/c. Re-check motion. POC expires Auth Status Total   Visits  Start date  Expiration date PT/OT + Visit Limit?  Co-Insurance    Not req     No                                               Dummy Auth Tracking  1 2 3 4 5 6   7 8 9 10 11 12   13 14 15 16 17 18   19 20 21 22 23 24   25 26 27 28 29 30   31 32 33 34 35 36         Precautions: standard       Date (Visit #) IE  (1)  (2)  (3)  (4)  (5)   Manual              DTM and TPR                                                                        Exercise Diary         Ther Ex        Active w/u   Pre x 10 min lvl 3-4   upright pre 10 min lvl 4-5   8 mins lvl 4-5  5 min recumbent pre lvl 4-5    PROM  x3-4 min L hip flex and IR/ER x3-4 mins same, MWM for motions x10-15 each  x5-6 mins total    MWM for flex/IR 2x12 total  x6-7 mins total      2x10-15 for each  x7 mins        2x15 each   HS/glute/piri stretch  2x30 sec B HS  3x30 sec B HS  no  3x30 sec  3x30 sec    QS, SLR, hip abd          2x10-12 total   Active mobility Seated IR/ER x20 total  Rev  rev     Leg press   125# 3x10 125# 4x10 115# x20, 125# x20 rev        Bridge reg x 20, ad sq x20, blue belt abd x20                           Neuro Re Ed        Bridging  ad sq x10  x10 reg, ad sq x10, yellow tband 2x10  reg 2x15   x20 w/ RTB      TrA progressions  isos x10, yellow BKFO x10 B Isos, yellow BKFO 2x10  rev  isos x20, RTB BKFO x20 B      Squat training intro  at bar x 20  red tband 2x10   at bar x20      Hip Abd Progressions      red tband side step 10 paces x 10 B  rev     Balance         Standing high march x20 B x20 rev     HEP and POC review  x5 mins  x2-3 mins  Rev x 2-3 mins  Rev x 2-3 mins  Rev x 2-3 mins   6" ant step ups  x20 rev                             Ther Act             stairs             gait             Sit<>stand                                                                                                                  Modalities             heat             Ice

## 2023-09-13 ENCOUNTER — OFFICE VISIT (OUTPATIENT)
Dept: PHYSICAL THERAPY | Facility: CLINIC | Age: 83
End: 2023-09-13
Payer: COMMERCIAL

## 2023-09-13 DIAGNOSIS — M25.559 HIP PAIN, UNSPECIFIED LATERALITY: Primary | ICD-10-CM

## 2023-09-13 PROCEDURE — 97110 THERAPEUTIC EXERCISES: CPT

## 2023-09-13 NOTE — PROGRESS NOTES
D/C Note     Today's date: 2023  Patient name: hPam Bird  : 1940  MRN: [id number]  Referring provider: Isis Orourke DO  Dx:   Encounter Diagnosis     ICD-10-CM    1. Hip pain, unspecified laterality  M25.559                      Subjective: Pt reports no new complaints. Has not had pain in the last week and wants to make today his last visit. Encouraged by progress and feels he can manage remaining sx on his own. Functional status below:     Goals  Short term goals (3 weeks):  1) Pt will improve L hip AROM to by 25% pain free. 2) Pt will improve B LE and core strength deficits by 1/3 grade MMT. 3) Pt will reports pain at worse <4/10. 4) Pt will initiate and progress HEP w/ special emphasis on functional hip/core strength and L hip ROM. ALL ACHIEVED     Long term goals (6 weeks)  1) Pt will improve FOTO to at least 78. - progressed   2) Pt will perform two full weeks of self care w/o deficit related to L hip. 3) Pt will stand and ambulate for community distances w/o deficit related to L hip. 4) Pt will be independent and compliant w/ HEP in order to maximize functional benefit of skilled PT following d/c. ALL ACHIEVED or PROGRESSED AT TIME OF D/C     Objective: Hip ROM at least 80% of normal limits, B LE MMT WNL, no pain reported today. Assessment: Tolerated treatment well. Patient demonstrated fatigue post treatment and would benefit from continued PT. Pt has made excellent progress and is appropriate for d/c to HEP. Home program reviewed and well understood, he opted not to go through exercises today. Will call or email w/ any future issues, pt has been a pleasure to work with and is welcome back anytime. Plan: D/C to HEP     POC expires Auth Status Total   Visits  Start date  Expiration date PT/OT + Visit Limit?  Co-Insurance    Not req     No                                               Dummy Auth Tracking  1 2 3 4 5 6   7 8 9 10 11 12   13 14 15 16 17 18   19 20 21 22 23 24   25 26 27 28 29 30   31 32 33 34 35 36         Precautions: standard       Date (Visit #) IE 8/4 (1) 8/8 (2) 8/23 (3) 8/29 (4) 9/6 (5) 9/13 (6)   Manual               DTM and TPR                                                                             Exercise Diary          Ther Ex         Active w/u   Pre x 10 min lvl 3-4   upright pre 10 min lvl 4-5   8 mins lvl 4-5  5 min recumbent pre lvl 4-5  NU step pre 8 min lvl 4-5   PROM  x3-4 min L hip flex and IR/ER x3-4 mins same, MWM for motions x10-15 each  x5-6 mins total    MWM for flex/IR 2x12 total  x6-7 mins total      2x10-15 for each  x7 mins        2x15 each x10 mins   HS/glute/piri stretch  2x30 sec B HS  3x30 sec B HS  no  3x30 sec  3x30 sec  x3-4 mins   QS, SLR, hip abd          2x10-12 total    Active mobility Seated IR/ER x20 total  Rev  rev      Leg press   125# 3x10 125# 4x10 115# x20, 125# x20 rev         Bridge reg x 20, ad sq x20, blue belt abd x20 x20 reg                               Neuro Re Ed         Bridging  ad sq x10  x10 reg, ad sq x10, yellow tband 2x10  reg 2x15   x20 w/ RTB       TrA progressions  isos x10, yellow BKFO x10 B Isos, yellow BKFO 2x10  rev  isos x20, RTB BKFO x20 B       Squat training intro  at bar x 20  red tband 2x10   at bar x20       Hip Abd Progressions      red tband side step 10 paces x 10 B  rev      Balance          Standing high march x20 B x20 rev      HEP and POC review  x5 mins  x2-3 mins  Rev x 2-3 mins  Rev x 2-3 mins  Rev x 2-3 mins x2-3 mins    6" ant step ups  x20 rev                                 Ther Act              stairs              gait              Sit<>stand                                                                                                                          Modalities             heat             Ice

## 2023-10-01 DIAGNOSIS — I10 ESSENTIAL HYPERTENSION: ICD-10-CM

## 2023-10-01 DIAGNOSIS — N40.0 ENLARGED PROSTATE ON RECTAL EXAMINATION: ICD-10-CM

## 2023-10-02 RX ORDER — AMLODIPINE BESYLATE 2.5 MG/1
TABLET ORAL
Qty: 90 TABLET | Refills: 0 | Status: SHIPPED | OUTPATIENT
Start: 2023-10-02

## 2023-10-02 RX ORDER — DUTASTERIDE 0.5 MG/1
CAPSULE, LIQUID FILLED ORAL
Qty: 90 CAPSULE | Refills: 0 | Status: SHIPPED | OUTPATIENT
Start: 2023-10-02

## 2023-10-27 NOTE — TELEPHONE ENCOUNTER
Metoprolol 25mg 90 day supply  Dr Goran Covington AG 26, possibly i/s/o alcohol use vs starvation ketoacidosis   Lactate 1.3   Denies concurrent substance use, denies salicylates Likely from contraction, has been vomiting for the last 3 days along with poor PO intake   pH 7.49, HCO3 36   - c/w IVF   - pH improved to 7.40 bicarb 27 10/27

## 2023-11-03 DIAGNOSIS — N39.44 NOCTURNAL ENURESIS: ICD-10-CM

## 2023-11-03 RX ORDER — TAMSULOSIN HYDROCHLORIDE 0.4 MG/1
CAPSULE ORAL
Qty: 90 CAPSULE | Refills: 0 | Status: SHIPPED | OUTPATIENT
Start: 2023-11-03

## 2023-11-13 ENCOUNTER — OFFICE VISIT (OUTPATIENT)
Dept: FAMILY MEDICINE CLINIC | Facility: CLINIC | Age: 83
End: 2023-11-13
Payer: COMMERCIAL

## 2023-11-13 VITALS
HEART RATE: 65 BPM | DIASTOLIC BLOOD PRESSURE: 68 MMHG | BODY MASS INDEX: 21.9 KG/M2 | WEIGHT: 153 LBS | RESPIRATION RATE: 16 BRPM | TEMPERATURE: 97.2 F | SYSTOLIC BLOOD PRESSURE: 136 MMHG | HEIGHT: 70 IN

## 2023-11-13 DIAGNOSIS — J06.9 UPPER RESPIRATORY TRACT INFECTION, UNSPECIFIED TYPE: Primary | ICD-10-CM

## 2023-11-13 PROCEDURE — 99213 OFFICE O/P EST LOW 20 MIN: CPT | Performed by: FAMILY MEDICINE

## 2023-11-13 RX ORDER — BENZONATATE 200 MG/1
200 CAPSULE ORAL 3 TIMES DAILY PRN
Qty: 30 CAPSULE | Refills: 0 | Status: SHIPPED | OUTPATIENT
Start: 2023-11-13

## 2023-11-13 RX ORDER — AMOXICILLIN AND CLAVULANATE POTASSIUM 875; 125 MG/1; MG/1
1 TABLET, FILM COATED ORAL EVERY 12 HOURS SCHEDULED
Qty: 20 TABLET | Refills: 0 | Status: SHIPPED | OUTPATIENT
Start: 2023-11-13 | End: 2023-11-23

## 2023-11-13 NOTE — PROGRESS NOTES
Assessment/Plan:    1. Upper respiratory tract infection, unspecified type  -     amoxicillin-clavulanate (AUGMENTIN) 875-125 mg per tablet; Take 1 tablet by mouth every 12 (twelve) hours for 10 days  -     benzonatate (TESSALON) 200 MG capsule; Take 1 capsule (200 mg total) by mouth 3 (three) times a day as needed for cough          There are no Patient Instructions on file for this visit. No follow-ups on file. Subjective:      Patient ID: Angie Haile is a 79 y.o. male. Chief Complaint   Patient presents with   • Cough     Cough/sneezing lw cma       Pt is here with wife  They are both sick  Pt has cough and phlem        The following portions of the patient's history were reviewed and updated as appropriate: allergies, current medications, past family history, past medical history, past social history, past surgical history and problem list.    Review of Systems   HENT:  Positive for congestion. Respiratory:  Positive for cough.           Current Outpatient Medications   Medication Sig Dispense Refill   • amLODIPine (NORVASC) 2.5 mg tablet take 1 tablet by mouth once daily 90 tablet 0   • amoxicillin-clavulanate (AUGMENTIN) 875-125 mg per tablet Take 1 tablet by mouth every 12 (twelve) hours for 10 days 20 tablet 0   • Ascorbic Acid (VITAMIN C PO) Take 500 mg by mouth daily      • B Complex Vitamins (B-COMPLEX/B-12 PO) Take 1 tablet by mouth daily     • benzonatate (TESSALON) 200 MG capsule Take 1 capsule (200 mg total) by mouth 3 (three) times a day as needed for cough 30 capsule 0   • Calcium 150 MG TABS Take by mouth daily       • Cholecalciferol (VITAMIN D) 2000 units CAPS Take 1 capsule by mouth daily     • dutasteride (AVODART) 0.5 mg capsule take 1 capsule by mouth once daily 90 capsule 0   • EQ Aspirin Adult Low Dose 81 MG EC tablet Last dose 1/9/22      • meloxicam (MOBIC) 15 mg tablet Take 1 tablet (15 mg total) by mouth daily As needed 90 tablet 0   • Omega-3 Fatty Acids (fish oil) 1,000 mg Take 1,000 mg by mouth daily     • polyethylene glycol (MIRALAX) 17 g packet Take 17 g by mouth daily     • rosuvastatin (CRESTOR) 20 MG tablet Take 1 tablet (20 mg total) by mouth daily at bedtime 90 tablet 3   • tamsulosin (FLOMAX) 0.4 mg take 1 capsule by mouth once daily with dinner 90 capsule 0     No current facility-administered medications for this visit. Objective:    /68   Pulse 65   Temp (!) 97.2 °F (36.2 °C) (Temporal)   Resp 16   Ht 5' 10" (1.778 m)   Wt 69.4 kg (153 lb)   BMI 21.95 kg/m²        Physical Exam  HENT:      Nose: Congestion and rhinorrhea present.                 Evita Alvarez, DO

## 2023-12-26 ENCOUNTER — APPOINTMENT (OUTPATIENT)
Dept: RADIOLOGY | Facility: CLINIC | Age: 83
End: 2023-12-26
Payer: COMMERCIAL

## 2023-12-26 ENCOUNTER — OFFICE VISIT (OUTPATIENT)
Dept: FAMILY MEDICINE CLINIC | Facility: CLINIC | Age: 83
End: 2023-12-26
Payer: COMMERCIAL

## 2023-12-26 VITALS
RESPIRATION RATE: 18 BRPM | BODY MASS INDEX: 21.95 KG/M2 | DIASTOLIC BLOOD PRESSURE: 60 MMHG | SYSTOLIC BLOOD PRESSURE: 130 MMHG | HEART RATE: 84 BPM | TEMPERATURE: 101 F | WEIGHT: 153 LBS

## 2023-12-26 DIAGNOSIS — R05.9 COUGH, UNSPECIFIED TYPE: ICD-10-CM

## 2023-12-26 DIAGNOSIS — E78.2 MIXED HYPERLIPIDEMIA: ICD-10-CM

## 2023-12-26 DIAGNOSIS — I10 BENIGN ESSENTIAL HYPERTENSION: ICD-10-CM

## 2023-12-26 DIAGNOSIS — J06.9 ACUTE URI: ICD-10-CM

## 2023-12-26 DIAGNOSIS — J20.9 ACUTE BRONCHITIS, UNSPECIFIED ORGANISM: Primary | ICD-10-CM

## 2023-12-26 DIAGNOSIS — J06.9 ACUTE URI: Primary | ICD-10-CM

## 2023-12-26 LAB
SARS-COV-2 AG UPPER RESP QL IA: NEGATIVE
SL AMB POCT RAPID FLU A: NORMAL
SL AMB POCT RAPID FLU B: NORMAL
VALID CONTROL: NORMAL

## 2023-12-26 PROCEDURE — 71046 X-RAY EXAM CHEST 2 VIEWS: CPT

## 2023-12-26 PROCEDURE — 87811 SARS-COV-2 COVID19 W/OPTIC: CPT | Performed by: NURSE PRACTITIONER

## 2023-12-26 PROCEDURE — 87804 INFLUENZA ASSAY W/OPTIC: CPT | Performed by: NURSE PRACTITIONER

## 2023-12-26 PROCEDURE — 99214 OFFICE O/P EST MOD 30 MIN: CPT | Performed by: NURSE PRACTITIONER

## 2023-12-26 RX ORDER — AZITHROMYCIN 250 MG/1
TABLET, FILM COATED ORAL
Qty: 6 TABLET | Refills: 0 | Status: SHIPPED | OUTPATIENT
Start: 2023-12-26 | End: 2023-12-31

## 2023-12-26 NOTE — PROGRESS NOTES
Assessment/Plan:    1. Acute URI  -     XR chest pa & lateral; Future; Expected date: 12/26/2023  -     POCT Rapid Covid Ag  -     POCT rapid flu A and B    2. Cough, unspecified type  -     XR chest pa & lateral; Future; Expected date: 12/26/2023  -     POCT Rapid Covid Ag  -     POCT rapid flu A and B    3. Benign essential hypertension  Assessment & Plan:  Stable with current regimen      4. Mixed hyperlipidemia  Assessment & Plan:  Complaint with statin and tolerating it well        Recent Results (from the past 24 hour(s))   POCT Rapid Covid Ag    Collection Time: 12/26/23 11:54 AM   Result Value Ref Range    POCT SARS-CoV-2 Ag Negative Negative    VALID CONTROL Valid    POCT rapid flu A and B    Collection Time: 12/26/23 11:57 AM   Result Value Ref Range    RAPID FLU A neg     RAPID FLU B neg          Patient Instructions:  Increase fluid intake, saline nasal rinses, and hot tea with honey and lemon.   Cool air humidification can be helpful as well.   May take Tylenol as needed for pain or fevers.    Mucinex D for sinus congestion or Coricidin HBP if you have high blood pressure or a heart condition.    Mucinex or Robitussin DM are effective for cough and chest congestion.            Return if symptoms worsen or fail to improve.      No future appointments.          Subjective:      Patient ID: Liam Hall is a 83 y.o. male.    Chief Complaint   Patient presents with   • Nasal Congestion   • Cough     SX weeks            sas/cma         Vitals:  /60   Pulse 84   Temp (!) 101 °F (38.3 °C)   Resp 18   Wt 69.4 kg (153 lb)   BMI 21.95 kg/m²      Patient stated that having cough and congestion from 3 weeks. Now progressed to fever. Denies chest pain and sob.   Complaint with medications and tolerating it well    Cough  Pertinent negatives include no chills, ear pain, fever, headaches, postnasal drip, rhinorrhea, sore throat, shortness of breath or wheezing.       The following portions of the  patient's history were reviewed and updated as appropriate: allergies, current medications, past family history, past medical history, past social history, past surgical history and problem list.      Review of Systems   Constitutional:  Negative for chills, diaphoresis, fatigue, fever and unexpected weight change.   HENT:  Positive for congestion. Negative for dental problem, drooling, ear discharge, ear pain, facial swelling, hearing loss, mouth sores, nosebleeds, postnasal drip, rhinorrhea, sinus pressure, sinus pain, sneezing, sore throat, tinnitus, trouble swallowing and voice change.    Respiratory:  Positive for cough. Negative for chest tightness, shortness of breath and wheezing.    Cardiovascular: Negative.    Gastrointestinal:  Negative for abdominal pain, constipation, diarrhea, nausea and vomiting.   Musculoskeletal: Negative.    Skin: Negative.    Neurological:  Negative for dizziness, light-headedness and headaches.         Objective:    Social History     Tobacco Use   Smoking Status Former   • Current packs/day: 0.00   • Average packs/day: 2.0 packs/day for 30.0 years (60.0 ttl pk-yrs)   • Types: Cigarettes   • Start date: 6/15/1960   • Quit date: 1979   • Years since quittin.6   Smokeless Tobacco Never       Allergies: No Known Allergies      Current Outpatient Medications   Medication Sig Dispense Refill   • amLODIPine (NORVASC) 2.5 mg tablet take 1 tablet by mouth once daily 90 tablet 0   • Ascorbic Acid (VITAMIN C PO) Take 500 mg by mouth daily      • B Complex Vitamins (B-COMPLEX/B-12 PO) Take 1 tablet by mouth daily     • Calcium 150 MG TABS Take by mouth daily       • Cholecalciferol (VITAMIN D) 2000 units CAPS Take 1 capsule by mouth daily     • dutasteride (AVODART) 0.5 mg capsule take 1 capsule by mouth once daily 90 capsule 0   • EQ Aspirin Adult Low Dose 81 MG EC tablet Last dose 22      • Omega-3 Fatty Acids (fish oil) 1,000 mg Take 1,000 mg by mouth daily     •  polyethylene glycol (MIRALAX) 17 g packet Take 17 g by mouth daily     • rosuvastatin (CRESTOR) 20 MG tablet Take 1 tablet (20 mg total) by mouth daily at bedtime 90 tablet 3   • tamsulosin (FLOMAX) 0.4 mg take 1 capsule by mouth once daily with dinner 90 capsule 0   • meloxicam (MOBIC) 15 mg tablet Take 1 tablet (15 mg total) by mouth daily As needed 90 tablet 0     No current facility-administered medications for this visit.          Physical Exam  Vitals reviewed.   Constitutional:       Appearance: Normal appearance. He is well-developed.   HENT:      Head: Normocephalic.      Right Ear: Tympanic membrane, ear canal and external ear normal.      Left Ear: Tympanic membrane, ear canal and external ear normal.      Nose: Nose normal.      Right Sinus: No maxillary sinus tenderness or frontal sinus tenderness.      Left Sinus: No maxillary sinus tenderness or frontal sinus tenderness.      Mouth/Throat:      Mouth: No oral lesions.      Pharynx: No oropharyngeal exudate or posterior oropharyngeal erythema.   Cardiovascular:      Rate and Rhythm: Normal rate and regular rhythm.      Heart sounds: Normal heart sounds.   Pulmonary:      Effort: Pulmonary effort is normal.      Breath sounds: Normal breath sounds.   Musculoskeletal:         General: Normal range of motion.      Cervical back: Neck supple.   Lymphadenopathy:      Cervical:      Right cervical: No superficial or posterior cervical adenopathy.     Left cervical: No superficial or posterior cervical adenopathy.   Skin:     General: Skin is warm and dry.   Neurological:      Mental Status: He is alert and oriented to person, place, and time.   Psychiatric:         Behavior: Behavior normal.         Thought Content: Thought content normal.         Judgment: Judgment normal.                     NAEL Tavarez

## 2023-12-27 DIAGNOSIS — N40.0 ENLARGED PROSTATE ON RECTAL EXAMINATION: ICD-10-CM

## 2023-12-27 DIAGNOSIS — I10 ESSENTIAL HYPERTENSION: ICD-10-CM

## 2023-12-27 RX ORDER — DUTASTERIDE 0.5 MG/1
CAPSULE, LIQUID FILLED ORAL
Qty: 90 CAPSULE | Refills: 1 | Status: SHIPPED | OUTPATIENT
Start: 2023-12-27

## 2023-12-27 RX ORDER — AMLODIPINE BESYLATE 2.5 MG/1
TABLET ORAL
Qty: 90 TABLET | Refills: 1 | Status: SHIPPED | OUTPATIENT
Start: 2023-12-27

## 2024-01-26 ENCOUNTER — HOSPITAL ENCOUNTER (EMERGENCY)
Facility: HOSPITAL | Age: 84
Discharge: OTHER INSTITUTION | End: 2024-01-26
Attending: EMERGENCY MEDICINE
Payer: COMMERCIAL

## 2024-01-26 ENCOUNTER — APPOINTMENT (INPATIENT)
Dept: RADIOLOGY | Facility: HOSPITAL | Age: 84
DRG: 183 | End: 2024-01-26
Payer: COMMERCIAL

## 2024-01-26 ENCOUNTER — APPOINTMENT (EMERGENCY)
Dept: RADIOLOGY | Facility: HOSPITAL | Age: 84
End: 2024-01-26
Payer: COMMERCIAL

## 2024-01-26 ENCOUNTER — HOSPITAL ENCOUNTER (INPATIENT)
Facility: HOSPITAL | Age: 84
LOS: 2 days | Discharge: HOME | DRG: 183 | End: 2024-01-28
Attending: STUDENT IN AN ORGANIZED HEALTH CARE EDUCATION/TRAINING PROGRAM | Admitting: STUDENT IN AN ORGANIZED HEALTH CARE EDUCATION/TRAINING PROGRAM
Payer: COMMERCIAL

## 2024-01-26 ENCOUNTER — NURSE TRIAGE (OUTPATIENT)
Age: 84
End: 2024-01-26

## 2024-01-26 VITALS
RESPIRATION RATE: 20 BRPM | OXYGEN SATURATION: 96 % | DIASTOLIC BLOOD PRESSURE: 83 MMHG | HEART RATE: 78 BPM | SYSTOLIC BLOOD PRESSURE: 168 MMHG

## 2024-01-26 DIAGNOSIS — S22.49XA MULTIPLE RIB FRACTURES: Primary | ICD-10-CM

## 2024-01-26 DIAGNOSIS — S27.321A CONTUSION OF RIGHT LUNG, INITIAL ENCOUNTER: ICD-10-CM
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
## 2024-01-26 DIAGNOSIS — S27.0XXA CLOSED TRAUMATIC FRACTURE OF RIBS OF RIGHT SIDE WITH PNEUMOTHORAX: ICD-10-CM

## 2024-01-26 DIAGNOSIS — J94.2 HEMOPNEUMOTHORAX ON RIGHT: ICD-10-CM

## 2024-01-26 DIAGNOSIS — S22.41XA CLOSED TRAUMATIC FRACTURE OF RIBS OF RIGHT SIDE WITH PNEUMOTHORAX: ICD-10-CM

## 2024-01-26 DIAGNOSIS — S22.5XXA FLAIL CHEST: ICD-10-CM
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
## 2024-01-26 LAB
ALBUMIN SERPL BCP-MCNC: 4.3 G/DL (ref 3.5–5)
ALP SERPL-CCNC: 52 U/L (ref 34–104)
ALT SERPL W P-5'-P-CCNC: 14 U/L (ref 7–52)
ANION GAP SERPL CALCULATED.3IONS-SCNC: 4 MMOL/L
AST SERPL W P-5'-P-CCNC: 16 U/L (ref 13–39)
BASOPHILS # BLD MANUAL: 0 THOUSAND/UL (ref 0–0.1)
BASOPHILS NFR MAR MANUAL: 0 % (ref 0–1)
BILIRUB SERPL-MCNC: 0.75 MG/DL (ref 0.2–1)
BUN SERPL-MCNC: 19 MG/DL (ref 5–25)
CALCIUM SERPL-MCNC: 9.1 MG/DL (ref 8.4–10.2)
CHLORIDE SERPL-SCNC: 104 MMOL/L (ref 96–108)
CO2 SERPL-SCNC: 26 MMOL/L (ref 21–32)
CREAT SERPL-MCNC: 0.82 MG/DL (ref 0.6–1.3)
EOSINOPHIL # BLD MANUAL: 0 THOUSAND/UL (ref 0–0.4)
EOSINOPHIL NFR BLD MANUAL: 0 % (ref 0–6)
ERYTHROCYTE [DISTWIDTH] IN BLOOD BY AUTOMATED COUNT: 13.8 % (ref 11.6–15.1)
GFR SERPL CREATININE-BSD FRML MDRD: 81 ML/MIN/1.73SQ M
GLUCOSE SERPL-MCNC: 171 MG/DL (ref 65–140)
HCT VFR BLD AUTO: 31.4 % (ref 36.5–49.3)
HGB BLD-MCNC: 10.9 G/DL (ref 12–17)
LYMPHOCYTES # BLD AUTO: 0.67 THOUSAND/UL (ref 0.6–4.47)
LYMPHOCYTES # BLD AUTO: 10 % (ref 14–44)
MCH RBC QN AUTO: 36.6 PG (ref 26.8–34.3)
MCHC RBC AUTO-ENTMCNC: 34.7 G/DL (ref 31.4–37.4)
MCV RBC AUTO: 105 FL (ref 82–98)
MONOCYTES # BLD AUTO: 0.47 THOUSAND/UL (ref 0–1.22)
MONOCYTES NFR BLD: 7 % (ref 4–12)
MYELOCYTES NFR BLD MANUAL: 2 % (ref 0–1)
NEUTROPHILS # BLD MANUAL: 5.42 THOUSAND/UL (ref 1.85–7.62)
NEUTS BAND NFR BLD MANUAL: 7 % (ref 0–8)
NEUTS SEG NFR BLD AUTO: 74 % (ref 43–75)
OVALOCYTES BLD QL SMEAR: PRESENT
PLATELET # BLD AUTO: 137 THOUSANDS/UL (ref 149–390)
PLATELET BLD QL SMEAR: ABNORMAL
PMV BLD AUTO: 9.8 FL (ref 8.9–12.7)
POTASSIUM SERPL-SCNC: 3.9 MMOL/L (ref 3.5–5.3)
PROT SERPL-MCNC: 6.8 G/DL (ref 6.4–8.4)
RBC # BLD AUTO: 2.98 MILLION/UL (ref 3.88–5.62)
RBC MORPH BLD: PRESENT
SODIUM SERPL-SCNC: 134 MMOL/L (ref 135–147)
WBC # BLD AUTO: 6.69 THOUSAND/UL (ref 4.31–10.16)

## 2024-01-26 PROCEDURE — 71045 X-RAY EXAM CHEST 1 VIEW: CPT

## 2024-01-26 PROCEDURE — 71260 CT THORAX DX C+: CPT

## 2024-01-26 PROCEDURE — 71046 X-RAY EXAM CHEST 2 VIEWS: CPT

## 2024-01-26 PROCEDURE — 85007 BL SMEAR W/DIFF WBC COUNT: CPT | Performed by: EMERGENCY MEDICINE

## 2024-01-26 PROCEDURE — 74177 CT ABD & PELVIS W/CONTRAST: CPT

## 2024-01-26 PROCEDURE — G1004 CDSM NDSC: HCPCS

## 2024-01-26 PROCEDURE — 80053 COMPREHEN METABOLIC PANEL: CPT | Performed by: EMERGENCY MEDICINE

## 2024-01-26 PROCEDURE — 93005 ELECTROCARDIOGRAM TRACING: CPT

## 2024-01-26 PROCEDURE — 99285 EMERGENCY DEPT VISIT HI MDM: CPT

## 2024-01-26 PROCEDURE — 99291 CRITICAL CARE FIRST HOUR: CPT | Performed by: STUDENT IN AN ORGANIZED HEALTH CARE EDUCATION/TRAINING PROGRAM

## 2024-01-26 PROCEDURE — 99223 1ST HOSP IP/OBS HIGH 75: CPT | Performed by: INTERNAL MEDICINE

## 2024-01-26 PROCEDURE — 85027 COMPLETE CBC AUTOMATED: CPT | Performed by: EMERGENCY MEDICINE

## 2024-01-26 PROCEDURE — 36415 COLL VENOUS BLD VENIPUNCTURE: CPT | Performed by: EMERGENCY MEDICINE

## 2024-01-26 PROCEDURE — 99291 CRITICAL CARE FIRST HOUR: CPT | Performed by: SURGERY

## 2024-01-26 PROCEDURE — 99285 EMERGENCY DEPT VISIT HI MDM: CPT | Performed by: EMERGENCY MEDICINE

## 2024-01-26 RX ORDER — LIDOCAINE 50 MG/G
1 PATCH TOPICAL DAILY
Status: DISCONTINUED | OUTPATIENT
Start: 2024-01-26 | End: 2024-01-28 | Stop reason: HOSPADM

## 2024-01-26 RX ORDER — ACETAMINOPHEN 325 MG/1
975 TABLET ORAL EVERY 8 HOURS SCHEDULED
Status: DISCONTINUED | OUTPATIENT
Start: 2024-01-26 | End: 2024-01-28 | Stop reason: HOSPADM

## 2024-01-26 RX ORDER — OXYCODONE HYDROCHLORIDE 5 MG/1
5 TABLET ORAL EVERY 4 HOURS PRN
Status: DISCONTINUED | OUTPATIENT
Start: 2024-01-26 | End: 2024-01-28 | Stop reason: HOSPADM

## 2024-01-26 RX ORDER — ATORVASTATIN CALCIUM 40 MG/1
40 TABLET, FILM COATED ORAL
Status: DISCONTINUED | OUTPATIENT
Start: 2024-01-26 | End: 2024-01-28 | Stop reason: HOSPADM

## 2024-01-26 RX ORDER — HYDROMORPHONE HCL IN WATER/PF 6 MG/30 ML
0.2 PATIENT CONTROLLED ANALGESIA SYRINGE INTRAVENOUS EVERY 2 HOUR PRN
Status: DISCONTINUED | OUTPATIENT
Start: 2024-01-26 | End: 2024-01-28 | Stop reason: HOSPADM

## 2024-01-26 RX ORDER — CHLORAL HYDRATE 500 MG
1000 CAPSULE ORAL DAILY
Status: DISCONTINUED | OUTPATIENT
Start: 2024-01-27 | End: 2024-01-28 | Stop reason: HOSPADM

## 2024-01-26 RX ORDER — POLYETHYLENE GLYCOL 3350 17 G/17G
17 POWDER, FOR SOLUTION ORAL DAILY
Status: DISCONTINUED | OUTPATIENT
Start: 2024-01-26 | End: 2024-01-28 | Stop reason: HOSPADM

## 2024-01-26 RX ORDER — POLYETHYLENE GLYCOL 3350 17 G/17G
17 POWDER, FOR SOLUTION ORAL DAILY
Status: DISCONTINUED | OUTPATIENT
Start: 2024-01-26 | End: 2024-01-26 | Stop reason: SDUPTHER

## 2024-01-26 RX ORDER — ENOXAPARIN SODIUM 100 MG/ML
30 INJECTION SUBCUTANEOUS EVERY 12 HOURS
Status: DISCONTINUED | OUTPATIENT
Start: 2024-01-26 | End: 2024-01-26

## 2024-01-26 RX ORDER — CHLORHEXIDINE GLUCONATE ORAL RINSE 1.2 MG/ML
15 SOLUTION DENTAL EVERY 12 HOURS SCHEDULED
Status: DISCONTINUED | OUTPATIENT
Start: 2024-01-26 | End: 2024-01-28 | Stop reason: HOSPADM

## 2024-01-26 RX ORDER — ONDANSETRON 2 MG/ML
4 INJECTION INTRAMUSCULAR; INTRAVENOUS EVERY 6 HOURS PRN
Status: DISCONTINUED | OUTPATIENT
Start: 2024-01-26 | End: 2024-01-28 | Stop reason: HOSPADM

## 2024-01-26 RX ORDER — AMLODIPINE BESYLATE 2.5 MG/1
2.5 TABLET ORAL DAILY
Status: DISCONTINUED | OUTPATIENT
Start: 2024-01-26 | End: 2024-01-28 | Stop reason: HOSPADM

## 2024-01-26 RX ORDER — FINASTERIDE 5 MG/1
5 TABLET, FILM COATED ORAL DAILY
Status: DISCONTINUED | OUTPATIENT
Start: 2024-01-27 | End: 2024-01-28 | Stop reason: HOSPADM

## 2024-01-26 RX ORDER — MELATONIN
1000 DAILY
Status: DISCONTINUED | OUTPATIENT
Start: 2024-01-26 | End: 2024-01-28 | Stop reason: HOSPADM

## 2024-01-26 RX ORDER — AMOXICILLIN 250 MG
1 CAPSULE ORAL
Status: DISCONTINUED | OUTPATIENT
Start: 2024-01-26 | End: 2024-01-28 | Stop reason: HOSPADM

## 2024-01-26 RX ORDER — TAMSULOSIN HYDROCHLORIDE 0.4 MG/1
0.4 CAPSULE ORAL
Status: DISCONTINUED | OUTPATIENT
Start: 2024-01-26 | End: 2024-01-28 | Stop reason: HOSPADM

## 2024-01-26 RX ADMIN — Medication 1000 UNITS: at 18:22

## 2024-01-26 RX ADMIN — ASPIRIN 81 MG: 81 TABLET, COATED ORAL at 18:22

## 2024-01-26 RX ADMIN — TAMSULOSIN HYDROCHLORIDE 0.4 MG: 0.4 CAPSULE ORAL at 18:25

## 2024-01-26 RX ADMIN — CHLORHEXIDINE GLUCONATE 15 ML: 1.2 SOLUTION ORAL at 21:06

## 2024-01-26 RX ADMIN — LIDOCAINE 5% 1 PATCH: 700 PATCH TOPICAL at 14:59

## 2024-01-26 RX ADMIN — POLYETHYLENE GLYCOL 3350 17 G: 17 POWDER, FOR SOLUTION ORAL at 14:59

## 2024-01-26 RX ADMIN — ATORVASTATIN CALCIUM 40 MG: 40 TABLET, FILM COATED ORAL at 18:22

## 2024-01-26 RX ADMIN — IOHEXOL 100 ML: 350 INJECTION, SOLUTION INTRAVENOUS at 11:07

## 2024-01-26 RX ADMIN — ACETAMINOPHEN 975 MG: 325 TABLET, FILM COATED ORAL at 21:04

## 2024-01-26 RX ADMIN — ACETAMINOPHEN 975 MG: 325 TABLET, FILM COATED ORAL at 14:58

## 2024-01-26 RX ADMIN — AMLODIPINE BESYLATE 2.5 MG: 2.5 TABLET ORAL at 18:22

## 2024-01-26 RX ADMIN — Medication 2.5 MG: at 14:58

## 2024-01-26 NOTE — EMTALA/ACUTE CARE TRANSFER
[facility] EMERGENCY DEPARTMENT  [address] [place] [id number]  Dept: [phone]      EMTALA TRANSFER CONSENT    NAME Liam Hall                                         1940                              MRN [id number]    I have been informed of my rights regarding examination, treatment, and transfer   by Dr. Epi Boyd MD    Benefits: Specialized equipment and/or services available at the receiving facility (Include comment)________________________    Risks: Potential for delay in receiving treatment      Transfer Request   I acknowledge that my medical condition has been evaluated and explained to me by the emergency department physician or other qualified medical person and/or my attending physician who has recommended and offered to me further medical examination and treatment. I understand the Hospital's obligation with respect to the treatment and stabilization of my emergency medical condition. I nevertheless request to be transferred. I release the Hospital, the doctor, and any other persons caring for me from all responsibility or liability for any injury or ill effects that may result from my transfer and agree to accept all responsibility for the consequences of my choice to transfer, rather than receive stabilizing treatment at the Hospital. I understand that because the transfer is my request, my insurance may not provide reimbursement for the services.  The Hospital will assist and direct me and my family in how to make arrangements for transfer, but the hospital is not liable for any fees charged by the transport service.  In spite of this understanding, I refuse to consent to further medical examination and treatment which has been offered to me, and request transfer to Accepting Facility Name, City & State : [facility]. I authorize the performance of emergency medical procedures and treatments upon me in both transit and upon arrival at the  receiving facility.  Additionally, I authorize the release of any and all medical records to the receiving facility and request they be transported with me, if possible.    I authorize the performance of emergency medical procedures and treatments upon me in both transit and upon arrival at the receiving facility.  Additionally, I authorize the release of any and all medical records to the receiving facility and request they be transported with me, if possible.  I understand that the safest mode of transportation during a medical emergency is an ambulance and that the Hospital advocates the use of this mode of transport. Risks of traveling to the receiving facility by car, including absence of medical control, life sustaining equipment, such as oxygen, and medical personnel has been explained to me and I fully understand them.    (ESTHER CORRECT BOX BELOW)  [  ]  I consent to the stated transfer and to be transported by ambulance/helicopter.  [  ]  I consent to the stated transfer, but refuse transportation by ambulance and accept full responsibility for my transportation by car.  I understand the risks of non-ambulance transfers and I exonerate the Hospital and its staff from any deterioration in my condition that results from this refusal.    X___________________________________________    DATE  24  TIME________  Signature of patient or legally responsible individual signing on patient behalf           RELATIONSHIP TO PATIENT_________________________          Provider Certification    NAME Liam Hall                                         1940                              MRN [id number]    A medical screening exam was performed on the above named patient.  Based on the examination:    Condition Necessitating Transfer The primary encounter diagnosis was Multiple rib fractures. Diagnoses of Flail chest, Hemopneumothorax on right, and Contusion of right lung, initial encounter were also pertinent to  this visit.    Patient Condition: The patient has been stabilized such that within reasonable medical probability, no material deterioration of the patient condition or the condition of the unborn child(idris) is likely to result from the transfer    Reason for Transfer: Level of Care needed not available at this facility    Transfer Requirements: Facility [facility]   Space available and qualified personnel available for treatment as acknowledged by    Agreed to accept transfer and to provide appropriate medical treatment as acknowledged by       Dr. Hurst  Appropriate medical records of the examination and treatment of the patient are provided at the time of transfer   STAFF INITIAL WHEN COMPLETED _______  Transfer will be performed by qualified personnel from    and appropriate transfer equipment as required, including the use of necessary and appropriate life support measures.    Provider Certification: I have examined the patient and explained the following risks and benefits of being transferred/refusing transfer to the patient/family:  General risk, such as traffic hazards, adverse weather conditions, rough terrain or turbulence, possible failure of equipment (including vehicle or aircraft), or consequences of actions of persons outside the control of the transport personnel      Based on these reasonable risks and benefits to the patient and/or the unborn child(idris), and based upon the information available at the time of the patient’s examination, I certify that the medical benefits reasonably to be expected from the provision of appropriate medical treatments at another medical facility outweigh the increasing risks, if any, to the individual’s medical condition, and in the case of labor to the unborn child, from effecting the transfer.    X____________________________________________ DATE 01/26/24        TIME_______      ORIGINAL - SEND TO MEDICAL RECORDS   COPY - SEND WITH PATIENT DURING  TRANSFER

## 2024-01-26 NOTE — H&P
H&P - Trauma   Liam Hall 83 y.o. male MRN: [id number]  Unit/Bed#: ED 21 Encounter: [id number]    Trauma Alert: Other transfer    Model of Arrival: Transfer from [facility]     Trauma Team: Attending Eve and Residents Marck  Consultants:     None     Assessment/Plan   Active Problems / Assessment:   7-12th rib fx on right   CT CAP shows small R hemopneumothroax       Plan:   Rib fx protocol  Serial cxr to monitor hemopneumothorax  Admit to iccu  IS  Multimodal pain control  Geriatrics consult  Case management consult  Pt/Ot      History of Present Illness     Chief Complaint: right sided back pain s/p fall  Mechanism:Fall     HPI:    Liam Hall is a 83 y.o. male who presents with pain to his right flank s/p fall sustained on 1/25. 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 He slipped over the couch dressing and had a ground level fall on to his right side. No head strike or loss of consciousness. He was initially able to get up but had progressive pain over the next day localized to his right chest. He went to the ed in [place] and was found to have fractures of 7th -12th ribs and small right sided hemopneumothorax on chest ct. He is transferred to [facility] for further management. Past hx of cabg, htn, gerd, urinary retention,     Review of Systems   All other systems reviewed and are negative.    12-point, complete review of systems was reviewed and negative except as stated above.     Historical Information     Past Medical History:   Diagnosis Date    Arthritis     CAD (coronary artery disease)     Cataract     Colon polyp     COVID-19     GERD (gastroesophageal reflux disease)     Heart attack (HCC)     Herpes zoster     Hx of colonic polyps     Hyperlipidemia     Hypertension      Past Surgical History:   Procedure Laterality Date    BACK SURGERY      disc removed 1985 / R....1985      CATARACT EXTRACTION      COLONOSCOPY      CORONARY ARTERY BYPASS GRAFT  11/13/2014     [place]....16   & CABG X3 with LIMA to LAD <SVG to OM-2, SVG to PDA  [place] Cortez Wheeler / la....14       EYE SURGERY      cataract /  R....1980      FL RETROGRADE PYELOGRAM  2022    HERNIA REPAIR      Groin / R....      HIP SURGERY      1984 /  R....       INGUINAL HERNIA REPAIR      R....      JOINT REPLACEMENT  1985     right hip    [place] CYSTO W/IRRIG & EVAC MULTPLE OBSTRUCTING CLOTS N/A 2021    Procedure: CYSTOSCOPY EVACUATION OF CLOTS WITH  FULGURATION OF PROSTATE;  Surgeon: Randy Driscoll MD;  Location: [facility];  Service: Urology    [place] TRURL ELECTROSURG RESCJ PROSTATE BLEED COMPLETE N/A 2019    Procedure: CYSTOSCOPY, TRANSURETHRAL RESECTION OF PROSTATE (TURP);  Surgeon: Randy Driscoll MD;  Location: [facility];  Service: Urology    TRANSURETHRAL RESECTION OF PROSTATE Bilateral 2022    Procedure: CYSTOSCOPY, TRANSURETHRAL RESECTION OF PROSTATE (TURP), RETROGRADE, PYELOGRAM;  Surgeon: Epi Small MD;  Location: [facility];  Service: Urology        Social History     Tobacco Use    Smoking status: Former     Current packs/day: 0.00     Average packs/day: 2.0 packs/day for 30.0 years (60.0 ttl pk-yrs)     Types: Cigarettes     Start date: 6/15/1960     Quit date: 1979     Years since quittin.7    Smokeless tobacco: Never   Vaping Use    Vaping status: Never Used   Substance Use Topics    Alcohol use: Not Currently    Drug use: No     Immunization History   Administered Date(s) Administered    COVID-19 MODERNA VACC 0.5 ML IM 2021, 2021    INFLUENZA 2010, 2011, 10/15/2012, 2013    Influenza Split High Dose Preservative Free IM 2015, 10/28/2016, 10/10/2019    Influenza, Seasonal Vaccine, Quadrivalent, Adjuvanted, .5e 2022    Influenza, high dose seasonal 0.7 mL 2018, 2021    Influenza, injectable, quadrivalent, preservative free 0.5 mL 2020    Pneumococcal 2005, 2012     Pneumococcal Conjugate 13-Valent 09/12/2018    Pneumococcal Polysaccharide PPV23 03/20/2015    Tdap 09/12/2018    Tetanus Toxoid, Unspecified 11/11/2011     Last Tetanus: unknown  Family History: Non-contributory    1. Before the illness or injury that brought you to the Emergency, did you need someone to help you on a regular basis? 0=No   2. Since the illness or injury that brought you to the Emergency, have you needed more help than usual to take care of yourself? 0=No   3. Have you been hospitalized for one or more nights during the past 6 months (excluding a stay in the Emergency Department)? 0=No   4. In general, do you see well? 0=Yes   5. In general, do you have serious problems with your memory? 0=No   6. Do you take more than three different medications everyday? 1=Yes   TOTAL   1     Did you order a geriatric consult if the score was 2 or greater?: yes     Meds/Allergies   all current active meds have been reviewed   No Known Allergies    Objective   Initial Vitals:   Temperature: 97.9 °F (36.6 °C) (01/26/24 1345)  Pulse: 78 (01/26/24 1345)  Respirations: 18 (01/26/24 1345)  Blood Pressure: (!) 191/82 (01/26/24 1345)    Primary Survey:   Airway:        Status: patent;        Pre-hospital Interventions: none        Hospital Interventions: none  Breathing:        Pre-hospital Interventions: none       Effort: normal       Right breath sounds: normal       Left breath sounds: normal  Circulation:        Rhythm: regular       Rate: regular   Right Pulses Left Pulses    R radial: 2+    R pedal: 2+     L radial: 2+    L pedal: 2+       Disability:        GCS: Eye: 4; Verbal: 5 Motor: 6 Total: 15       Right Pupil: round;  reactive         Left Pupil:  round;  reactive      R Motor Strength L Motor Strength             Sensory:  No sensory deficit  Exposure:       Completed: Yes      Secondary Survey:  Physical Exam  Vitals reviewed.   Constitutional:       Appearance: Normal appearance.   HENT:      Head:  Normocephalic and atraumatic.      Nose: Nose normal.      Mouth/Throat:      Mouth: Mucous membranes are moist.   Eyes:      Extraocular Movements: Extraocular movements intact.   Cardiovascular:      Rate and Rhythm: Normal rate and regular rhythm.   Pulmonary:      Effort: Pulmonary effort is normal.      Breath sounds: Normal breath sounds.      Comments: Able to pull 1.3L on IS   Abdominal:      General: Abdomen is flat.      Palpations: Abdomen is soft.   Musculoskeletal:         General: Normal range of motion.      Cervical back: Normal range of motion.   Skin:     General: Skin is warm.      Capillary Refill: Capillary refill takes less than 2 seconds.   Neurological:      General: No focal deficit present.      Mental Status: He is alert and oriented to person, place, and time. Mental status is at baseline.         Invasive Devices       Peripheral Intravenous Line  Duration             Peripheral IV 01/26/24 Right Antecubital <1 day              Drain  Duration             Urethral Catheter Three way 22 Fr. 743 days                  Lab Results: I have personally reviewed all pertinent laboratory/test results 01/26/24 and in the preceding 24 hours.  Recent Labs     01/26/24  1030   WBC 6.69   HGB 10.9*   HCT 31.4*   *   BANDSPCT 7   SODIUM 134*   K 3.9      CO2 26   BUN 19   CREATININE 0.82   GLUC 171*   AST 16   ALT 14   ALB 4.3   TBILI 0.75   ALKPHOS 52       Imaging Results: I have personally reviewed pertinent images saved in PACS. CT scan findings (and other pertinent positive findings on images) were discussed with radiology. My interpretation of the images/reports are as follows:  Chest Xray(s): positive for acute findings: hemopneumothorax on right, 7-12th rib fx on right   FAST exam(s): N/A   CT Scan(s): positive for acute findings: same as cxr   Additional Xray(s): Monitoring via serial cxr     Other Studies: n/a    Code Status: Level 1 - Full Code  Advance Directive and Living  Will:      Power of :    POLST:    I have spent 30 minutes with Patient  today in which greater than 50% of this time was spent in counseling/coordination of care regarding Documenting in the medical record, Reviewing / ordering tests, medicine, procedures  , and Obtaining or reviewing history  .

## 2024-01-26 NOTE — TELEPHONE ENCOUNTER
"Regarding: Fell / Soreness  ----- Message from Anali Lacy sent at 1/26/2024  8:45 AM EST -----  \"My  fell last night and now he's sore. He has an appt today.\"    "

## 2024-01-26 NOTE — CONSULTS
Consultation - Geriatric Medicine   Liam NOLVIA Hall 83 y.o. male MRN: [id number]  Unit/Bed#: ED 21 Encounter: [id number]      Assessment/Plan     Ambulatory dysfunction with fall  -Reportedly mechanical fall at home 1/25/24  -(-) head strike (-) loss of consciousness  -In setting of chronic daily aspirin  -injuries as outlined below  -Does not require use of assist device for ambulation at baseline  -no prior hx recurrent falls  -Increased risk future falls due to age, deconditioning/debility, impaired vision and unfamiliar environment   -encourage good body mechanics and assist with all transfers  -keep personal items and call bell close to prevent reaching  -maintain environment free of fall hazards  -encourage appropriate footwear and adequate lighting at all times when out of bed  -Recommend home fall risk assessment and personal fall alert system on returning home  -PT and OT pending    Multiple right-sided rib fractures (7-12)  -S/p fall as outlined above  -CT chest abdomen pelvis on admission reports fractures of right 7-12th ribs with displacement of ninth and angulation of 10th with small right hemopneumothorax  -Currently saturating well on room air, monitor respiratory status closely  -Continue acute multimodal pain control  -Encourage aggressive pulmonary toilet and ISS    Right hemopneumothorax  -Noted on CT chest and pelvis on admission  -Admitted to trauma  -Follow-up chest x-ray pending    Acute pain due to trauma  -Recommend pain control per Geriatric pain protocol:  Tylenol 975mg Q8H scheduled  Roxicodone 2.5mg Q4H PRN moderate pain  Roxicodone 5mg Q4H PRN severe pain  Dilaudid 0.2mg Q4H PRN  -Consider adjuncts such as lidocaine patch topically  -encourage addition of non-pharmacologic pain treatment including ice and frequent repositioning  -recommend  bowel regimen to prevent and treat constipation due to increased risk with acute pain and opiate pain medications    Hypertensive urgency  -BP  elevated at 181/82 on admission  -Likely multifactorial including acute pain  -Continue optimization of hemodynamics    BPH with nocturia  -S/p TURP  -Maintained on tamsulosin and dutasteride chronically outpatient  -Recommend urinary retention protocol  -Continue close outpatient follow-up with Urology    CAD s/p CABG  -Maintained on aspirin and statin daily chronically outpatient  -Continue to encourage healthy lifestyle choices/modifications  -Continue close outpatient follow-up with PCP/Cardiology    Cognitive screening   -Alert and oriented, reportedly independent with ADLs and IADLs at baseline  -Denies memory or cognitive concerns  -No prior cognitive testing on record for review  -No CTH or MRI brain on record for review  -At risk age-related and cardiovascular related cognitive decline, continue secondary risk modifications including good control of blood pressure and lipids  -TSH WNL 4.36, no recent B12, could consider checking with routine labs  -Encourage patient remain physically, socially, cognitively active and engaged to maintain cognitive acuity    Impaired Vision  -recommend use of corrective lenses at all appropriate times  -encourage adequate lighting and encourage use of assistance with ambulation  -keep personal belongings close to person to avoid reaching  -encourage appropriate footwear at all times  -Consider large font for printed materials provided to patient    Impaired mastication  -Requires use of upper dentures, use at all appropriate times  -ensure meal consistency appropriate for abilities  -continue aspiration precautions    Deconditioning/debility/frailty  -Clinical frailty scale stage V, mildly frail  -Multifactoral including age, CAD s/p CABG and multiple additional chronic medical morbidities now with fall and acute traumatic injuries superimposed in elderly individual with limited physiologic and metabolic reserves  -Albumin 4.3, encourage well-balanced nutrition and continue  healthy lifestyle modifications  -Continue optimization of chronic medical conditions and address acute metabolic derangements as arise  -Monitor for and treat any underlying anxiety/mood/depression symptoms as may impact patient response to therapies as well as overall sense of wellbeing and quality of life  -Ensure that treatments and interventions continue to align with patient's wishes and goals of care    Delirium precautions  -Patient is high risk of delirium due to age, fall, traumatic injuries, acute pain, hospitalization  -Initiate delirium precautions  -maintain normal sleep/wake cycle  -ensure that pain is well controlled - see above  -monitor for fecal and urinary retention which may precipitate delirium  -encourage early mobilization and ambulation with assistance once cleared to safely do so  -provide frequent reorientation and redirection as indicated and appropriate  -avoid medications which may precipitate or worsen delirium such as tramadol, benzodiazepine, anticholinergics, and benadryl as possible  -redirect unwanted behaviors as first line    Home medication review    Amlodipine 2.5 Mg daily  Dutasteride 0.5 Mg daily  Rosuvastatin 20 Mg daily  Tamsulosin 0.4 Mg daily  Aspirin 81 Mg daily    Care coordination: rounded with nursing in ED      History of Present Illness   Physician Requesting Consult: Marcelo Prado DO  Reason for Consult / Principal Problem: Fall  Hx and PE limited by: N/A  Additional history obtained from: Chart review and patient evaluation    HPI: Liam Hall is a 83 y.o. year old male with hypertension, BPH with nocturia, CAD s/p CABG, GERD, homocystinuria, malignant neoplasm of urinary bladder, MTHFR mutation, hyperlipidemia, impaired hearing, vitamin D deficiency, and thrombocytopenia who is admitted to trauma service with ambulatory dysfunction and fall found to have multiple right-sided rib fractures, he is being seen in consultation by geriatrics for high risk  developing delirium during hospitalization.  He is seen examined at bedside in the ED where he sitting resting comfortably, he explains that when he was hanging blinds yesterday he got tripped up on something hanging on the floor causing him to fall striking his right rib cage.  He denied head strike or loss of consciousness but did requires use of others to get up after which she did not feel he had any significant injuries and thus did not seek immediate medical attention however overnight started developing increasing pain in his right chest wall prompting presentation to the [facility] where multiple right-sided rib fractures and was transferred to [place] for evaluation by Trauma.  He reports since admission his pain has been well-controlled and he feels otherwise in his usual state of health.    Liam reports that prior to admission he was residing home with his wife and was fully independent with ADLs and IADLs and denies memory or cognitive concerns.  He does not utilize any assistive device for ambulation at baseline and denies history of recurrent falls.  He requires use of glasses primarily for reading and wears upper denture.    Inpatient consult to Gerontology  Consult performed by: Amber Adame DO  Consult ordered by: Mariposa Zavala PA-C        Review of Systems   Constitutional: Negative.  Negative for chills and fever.   HENT:  Positive for dental problem (Upper denture).    Eyes:  Positive for visual disturbance (Glasses for reading).   Respiratory: Negative.  Negative for shortness of breath.    Cardiovascular: Negative.    Gastrointestinal: Negative.    Genitourinary: Negative.    Musculoskeletal:  Negative for gait problem.        Right-sided rib pain   Skin: Negative.    Neurological: Negative.    Hematological: Negative.    Psychiatric/Behavioral: Negative.     All other systems reviewed and are negative.    Historical Information   Past Medical History:   Diagnosis Date     Arthritis     CAD (coronary artery disease)     Cataract     Colon polyp     COVID-19     GERD (gastroesophageal reflux disease)     Heart attack (HCC)     Herpes zoster     Hx of colonic polyps     Hyperlipidemia     Hypertension      Past Surgical History:   Procedure Laterality Date    BACK SURGERY      disc removed  / R....1985      CATARACT EXTRACTION      COLONOSCOPY      [place]ONARY ARTERY BYPASS GRAFT  [place]....16   & CABG X3 with LIMA to LAD <SVG to OM-2, SVG to PDA  [place] Cortez Wheeler / la....13       EYE SURGERY      cataract /  R....      FL RETROGRADE PYELOGRAM  2022    HERNIA REPAIR      Groin / R....      HIP SURGERY      1984 /  R....       INGUINAL HERNIA REPAIR      R....      JOINT REPLACEMENT  1985     right hip    VT CYSTO W/IRRIG & EVAC MULTPLE OBSTRUCTING CLOTS N/A 2021    Procedure: CYSTOSCOPY EVACUATION OF CLOTS WITH  FULGURATION OF PROSTATE;  Surgeon: Randy Driscoll MD;  Location: [facility];  Service: Urology    VT TRURL ELECTROSURG RESCJ PROSTATE BLEED COMPLETE N/A 2019    Procedure: CYSTOSCOPY, TRANSURETHRAL RESECTION OF PROSTATE (TURP);  Surgeon: Randy Driscoll MD;  Location: [facility];  Service: Urology    TRANSURETHRAL RESECTION OF PROSTATE Bilateral 2022    Procedure: CYSTOSCOPY, TRANSURETHRAL RESECTION OF PROSTATE (TURP), RETROGRADE, PYELOGRAM;  Surgeon: Epi Small MD;  Location: [facility];  Service: Urology     Social History   Social History     Substance and Sexual Activity   Alcohol Use Not Currently     Social History     Substance and Sexual Activity   Drug Use No     Social History     Tobacco Use   Smoking Status Former    Current packs/day: 0.00    Average packs/day: 2.0 packs/day for 30.0 years (60.0 ttl pk-yrs)    Types: Cigarettes    Start date: 6/15/1960    Quit date: 1979    Years since quittin.7   Smokeless Tobacco Never     Family History:   Family History   Problem  Relation Age of Onset    Coronary artery disease Father     Hyperlipidemia Brother     Sudden death Brother         cardiac    Arthritis Family     Mental illness Neg Hx      Meds/Allergies   all current active meds have been reviewed    No Known Allergies    Objective   No intake or output data in the 24 hours ending 01/26/24 1433  Invasive Devices       Peripheral Intravenous Line  Duration             Peripheral IV 01/26/24 Right Antecubital <1 day              Drain  Duration             Urethral Catheter Three way 22 Fr. 743 days                  Physical Exam  Vitals and nursing note reviewed.   Constitutional:       General: He is not in acute distress.     Appearance: Normal appearance. He is not toxic-appearing.   HENT:      Head: Normocephalic and atraumatic.      Nose: Nose normal.      Mouth/Throat:      Mouth: Mucous membranes are moist.      Comments: Upper denture in place  Eyes:      General: No scleral icterus.        Right eye: No discharge.         Left eye: No discharge.      Conjunctiva/sclera: Conjunctivae normal.      Comments: Pupils equal and round, not wearing glasses at time of eval   Neck:      Comments: Trachea midline, phonation normal  Cardiovascular:      Rate and Rhythm: Normal rate and regular rhythm.   Pulmonary:      Effort: Pulmonary effort is normal. No respiratory distress.      Breath sounds: No wheezing.      Comments: Saturating well on room air  Abdominal:      General: Bowel sounds are normal. There is no distension.      Palpations: Abdomen is soft.      Tenderness: There is no abdominal tenderness.   Musculoskeletal:      Cervical back: Neck supple.      Right lower leg: No edema.      Left lower leg: No edema.      Comments: Mildly reduced overall muscle mass   Skin:     General: Skin is warm and dry.      Comments: Well-healed midline sternal surgical scar   Neurological:      Mental Status: He is alert.      Comments: Awake and alert, oriented, answers questions  appropriately, speech clear and fluent   Psychiatric:         Mood and Affect: Mood normal.         Behavior: Behavior normal.      Comments: Polite pleasant cooperative       Lab Results:     I have personally reviewed pertinent lab results including the following:    Results from last 7 days   Lab Units 01/26/24  1030   WBC Thousand/uL 6.69   HEMOGLOBIN g/dL 10.9*   HEMATOCRIT % 31.4*   PLATELETS Thousands/uL 137*   MONO PCT % 7   EOS PCT % 0     Results from last 7 days   Lab Units 01/26/24  1030   POTASSIUM mmol/L 3.9   CHLORIDE mmol/L 104   CO2 mmol/L 26   BUN mg/dL 19   CREATININE mg/dL 0.82   CALCIUM mg/dL 9.1   ALK PHOS U/L 52   ALT U/L 14   AST U/L 16     I have personally reviewed the following imaging study reports in PACS:    1/26/24-CT chest abd pelvis with contrast    Therapies:   PT: pending   OT: pending     VTE Prophylaxis: Enoxaparin (Lovenox)    Code Status: Level 1 - Full Code  Advance Directive and Living Will:      Power of :    POLST:      Family and Social Support: wife    Goals of Care: acute pain control

## 2024-01-26 NOTE — CONSULTS
[facility]  Consult: Critical Care  Name: Liam Hall 83 y.o. male I MRN: [id number]  Unit/Bed#: ED 21 I Date of Admission: 1/26/2024   Date of Service: 1/26/2024 I Hospital Day: 0      Consults  Assessment/Plan   Neuro:  - GCS Score: 15, Baseline Patient is A+Ox4, pain is currently well controlled for rib fractures  - Multimodal Pain Medication Regimen for right sided rib fractures: Lidoderm patch, scheduled tylenol, oxycodone 2.5/5mg q 4hr PRN, dilaudid 0.2mg for breakthrough pain  - history of sensorineural hearing loss, stable  - ICU delirium precautions: will reorient patient as needed, provide cognitively stimulating activities for the patient, maintain sleep-wake cycle, early mobilization, make sure patient has their aids for vision/hearing impairment, minimize unnecessary noise/stimuli    CV:  - Currently remains hemodynamically stable  - PMH including: CAD s/p CABG, NSTEMI, HTN, mixed hyperlipidemia  - Hemodynamic Monitoring Goal: MAP goal >65,  - Current Medications: ASA 81mg daily, Lipitor 40mg with dinner, Norvasc 2.5mg daily with holding parameters    Lung:  - Admitted with right rib fractures 7th-12th with displacement of the 9th rib and angulation of the 10th rib, and small right hemopneumothorax. Lung contusion.  - rib fracture protocol  - repeat CXR at 5PM and again in AM  - discussed with patient that if his examination/imaging worsens, may consider CT placement  - encourage incentive spirometer use, goal minimum 10x/hour  - multimodal pain control, currently pain well controlled  - Current Status: saturating well on room air  - Goal SpO2 >92%     GI:  - PMH: GERD, constipation  - patient does not take PPI at home  - bowel regimen    FEN:  - Strict I+Os, goal euvolemia  - hyponatremia 134, will monitor  - Diet: regular  - Monitor and replace electrolytes as needed to maintain K >4, Phos >3, Mag >2, Calcium > 7    :  - PMH: BPH and history of bladder  cancer s/p TURP, nocturnal enuresis  - Baseline Creatinine 0.9, currently 0.82  - monitor strict I+Os  - Goal UOP >0.5cc/kg/hr  - resuming home medications    ID:  - No active issues  - maintain normothermia  - trend WBC and temperatures    Heme:  - anemia, hemoglobin 10.9 from 12.5 prior to admission  - known hemoptx, will monitor and transfuse as needed   - PMH: homocysteinuria, thrombocytopenia, anemia      Endo:  - Goal Blood Glucose <180    MSK/Skin:   - ambulatory dysfunction s/p fall, PTOT ordered  - history of osteopenia and vitamin D deficiency  - resume  home medictaions  - Baseline patient is able to ambulate  - PT/OT ordered, appreciate their assistance in caring for this patient  - Encourage OOB, ambulation, walking is encouraged during waking hours. Will require assistance until cleared by PTOT    Disposition: SD1    History of Present Illness     HPI: Liam Hall is a 83 y.o. who comes in as a trauma transfer from [facility] for multiple right sided rib fractures after falling on 1/25. Yesterday, he was fixing his Venetian blinds. When he turned around, he tripped by his rocking chair and hit his right chest. Denies head strike or loss of consciousness. He also hit his right forearm causing a superficial abrasion. Denies headache, nausea, vomiting, fever, chills, shortness of breath, chest pain, abdominal pain, back pain, lightheadedness, dizziness, new onset numbness tingling or acute muscle weakness.     History obtained from the patient.  Review of Systems   All other systems reviewed and are negative.    Historical Information   Past Medical History:  No date: Arthritis  No date: CAD (coronary artery disease)  No date: Cataract  No date: Colon polyp  No date: COVID-19  No date: GERD (gastroesophageal reflux disease)  No date: Heart attack (HCC)  No date: Herpes zoster  No date: Hx of colonic polyps  No date: Hyperlipidemia  No date: Hypertension Past Surgical History:  No date: BACK  SURGERY      Comment:  disc removed 1985 / R....1985    No date: CATARACT EXTRACTION  No date: COLONOSCOPY  11/13/2014: CORONARY ARTERY BYPASS GRAFT      Comment:  LA....6/30/16   & CABG X3 with LIMA to LAD <SVG to OM-2,               SVG to PDA  [place] Cortez Wheeler /                la....12/19/14     No date: EYE SURGERY      Comment:  cataract /  R....1980 1/13/2022: FL RETROGRADE PYELOGRAM  No date: HERNIA REPAIR      Comment:  Groin / R....1988    No date: HIP SURGERY      Comment:  1984 /  R....1984     No date: INGUINAL HERNIA REPAIR      Comment:  R....2010 1985: JOINT REPLACEMENT      Comment:   right hip  5/18/2021: RI CYSTO W/IRRIG & EVAC MULTPLE OBSTRUCTING CLOTS; N/A      Comment:  Procedure: CYSTOSCOPY EVACUATION OF CLOTS WITH                 FULGURATION OF PROSTATE;  Surgeon: Randy Driscoll MD;               Location: [facility];  Service: Urology  6/4/2019: RI TRURL ELECTROSURG RESCJ PROSTATE BLEED COMPLETE; N/A      Comment:  Procedure: CYSTOSCOPY, TRANSURETHRAL RESECTION OF                PROSTATE (TURP);  Surgeon: Randy Driscoll MD;                 Location: [facility] OR;  Service: Urology  1/13/2022: TRANSURETHRAL RESECTION OF PROSTATE; Bilateral      Comment:  Procedure: CYSTOSCOPY, TRANSURETHRAL RESECTION OF                PROSTATE (TURP), RETROGRADE, PYELOGRAM;  Surgeon: Epi Small MD;  Location: [facility];  Service: Urology   Current Outpatient Medications   Medication Instructions    amLODIPine (NORVASC) 2.5 mg tablet take 1 tablet by mouth once daily    Ascorbic Acid (VITAMIN C PO) 500 mg, Oral, Daily    B Complex Vitamins (B-COMPLEX/B-12 PO) 1 tablet, Oral, Daily    Calcium 150 MG TABS Oral, Daily    Cholecalciferol (VITAMIN D) 2000 units CAPS 1 capsule, Oral, Daily    dutasteride (AVODART) 0.5 mg capsule take 1 capsule by mouth once daily    EQ Aspirin Adult Low Dose 81 MG EC tablet Last dose 1/9/22    fish oil 1,000 mg, Oral, Daily     meloxicam (MOBIC) 15 mg, Oral, Daily, As needed    polyethylene glycol (MIRALAX) 17 g, Oral, Daily    rosuvastatin (CRESTOR) 20 mg, Oral, Daily at bedtime    tamsulosin (FLOMAX) 0.4 mg take 1 capsule by mouth once daily with dinner    No Known Allergies   Social History     Tobacco Use    Smoking status: Former     Current packs/day: 0.00     Average packs/day: 2.0 packs/day for 30.0 years (60.0 ttl pk-yrs)     Types: Cigarettes     Start date: 6/15/1960     Quit date: 1979     Years since quittin.7    Smokeless tobacco: Never   Vaping Use    Vaping status: Never Used   Substance Use Topics    Alcohol use: Not Currently    Drug use: No    Family History   Problem Relation Age of Onset    Coronary artery disease Father     Hyperlipidemia Brother     Sudden death Brother         cardiac    Arthritis Family     Mental illness Neg Hx           Objective                            Vitals I/O      Most Recent Min/Max in 24hrs   Temp 97.9 °F (36.6 °C) Temp  Min: 97.9 °F (36.6 °C)  Max: 97.9 °F (36.6 °C)   Pulse 78 Pulse  Min: 78  Max: 78   Resp 18 Resp  Min: 18  Max: 20   BP (!) 191/82 BP  Min: 168/83  Max: 191/82   O2 Sat 96 % SpO2  Min: 96 %  Max: 96 %    No intake or output data in the 24 hours ending 24 1617    Diet Regular; Regular House    Invasive Monitoring   Arterial Line  Elaine BP    No data recorded   MAP    No data recorded           Physical Exam   Physical Exam  Eyes:      General: No foreign body in eyeNo scleral icterus.     Extraocular Movements: Extraocular movements intact.      Conjunctiva/sclera: Conjunctivae normal.      Pupils: Pupils are equal, round, and reactive to light.   Skin:     General: Skin is warm.      Capillary Refill: Capillary refill takes less than 2 seconds.      Comments: View MSK   HENT:      Head: Normocephalic and atraumatic.      Right Ear: No drainage. No hemotympanum.      Left Ear: No drainage. No hemotympanum.      Nose: No nasal tenderness, congestion,  rhinorrhea or epistaxis.      Mouth/Throat:      Mouth: Mucous membranes are moist. No injury or angioedema.      Pharynx: No oropharyngeal exudate.   Neck:   no midline tenderness Cardiovascular:      Rate and Rhythm: Normal rate and regular rhythm.      Pulses: Normal pulses.   Musculoskeletal:         General: Normal range of motion.      Comments: Bruising on skin abrasion on right forearm   Abdominal:      Palpations: Abdomen is soft.      Tenderness: There is no abdominal tenderness. There is no guarding.   Constitutional:       General: He is not in acute distress.     Appearance: He is well-developed and well-nourished. He is not ill-appearing or toxic-appearing.      Interventions: He is not sedated, not intubated and not restrained.  Pulmonary:      Effort: Pulmonary effort is normal. No respiratory distress. He is not intubated.      Breath sounds: No wheezing.      Comments: Diminished breath sounds right lower lobe  Chest:      Chest wall: No tenderness.   Neurological:      General: No focal deficit present.      Mental Status: He is alert and oriented to person, place and time.      Motor: Strength full and intact in all extremities. No motor deficit.            Diagnostic Studies      EKG: normal sinus rhythm  Imaging: CTCAP: Fractures of the right 7th-12th ribs including displacement of the 9th rib and angulation of the 10th rib, and small right hemopneumothorax. No mediastinal shift. Assessment for flail chest is advised. Groundglass in the periphery of the partially   atelectatic right middle and lower lobe is due to a combination of contusion and atelectasis. No acute intra-abdominal injury      Medications:  Scheduled PRN   acetaminophen, 975 mg, Q8H ROBBIN  amLODIPine, 2.5 mg, Daily  aspirin, 81 mg, Once  atorvastatin, 40 mg, Daily With Dinner  chlorhexidine, 15 mL, Q12H ROBBIN  cholecalciferol, 1,000 Units, Daily  [START ON 1/27/2024] finasteride, 5 mg, Daily  [START ON 1/27/2024] fish oil, 1,000  mg, Daily  lidocaine, 1 patch, Daily  polyethylene glycol, 17 g, Daily  senna-docusate sodium, 1 tablet, HS  tamsulosin, 0.4 mg, Daily With Dinner      HYDROmorphone, 0.2 mg, Q2H PRN  ondansetron, 4 mg, Q6H PRN  oxyCODONE, 2.5 mg, Q4H PRN   Or  oxyCODONE, 5 mg, Q4H PRN       Continuous          Labs:    CBC    Recent Labs     01/26/24  1030   WBC 6.69   HGB 10.9*   HCT 31.4*   *   BANDSPCT 7     BMP    Recent Labs     01/26/24  1030   SODIUM 134*   K 3.9      CO2 26   AGAP 4   BUN 19   CREATININE 0.82   CALCIUM 9.1       Coags    No recent results     Additional Electrolytes  No recent results       Blood Gas    No recent results  No recent results LFTs  Recent Labs     01/26/24  1030   ALT 14   AST 16   ALKPHOS 52   ALB 4.3   TBILI 0.75       Infectious  No recent results  Glucose  Recent Labs     01/26/24  1030   GLUC 171*              Critical Care Time Statement: Upon my evaluation, this patient had a high probability of imminent or life-threatening deterioration due to multiple rib fractures, which required my direct attention, intervention, and personal management.  I spent a total of 45 minutes directly providing critical care services, including interpretation of complex medical databases, evaluating for the presence of life-threatening injuries or illnesses, and interpretation of hemodynamic data. This time is exclusive of procedures, teaching, family meetings, and any prior time recorded by providers other than myself.      Robyn Silver,

## 2024-01-26 NOTE — TELEPHONE ENCOUNTER
Reason for Disposition  • Patient already left for the hospital/clinic    Protocols used: No Contact or Duplicate Contact Call-ADULT-[place]

## 2024-01-26 NOTE — ED PROVIDER NOTES
History  Chief Complaint   Patient presents with    Fall     Pt states he tripped and fell last night, landing on right side.  Denies hitting his head, denies LOC, no thinners.  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  When he turned to the side family states his foot got caught and he fell striking his right side.  Patient states it was a mechanical fall.  He was not dizzy or lightheaded.  There was no loss of consciousness.  Patient had to be helped up by family members.  Did not seek medical attention at the time although his wife states he was unable to sleep last night due to the pain and could not lay on his right side.  Patient arrives awake and alert complaining of pain in the right flank.  He is afraid to take a deep breath or cough due to the pain.  He states he urinated today did not see any blood        Prior to Admission Medications   Prescriptions Last Dose Informant Patient Reported? Taking?   Ascorbic Acid (VITAMIN C PO)  Self Yes No   Sig: Take 500 mg by mouth daily    B Complex Vitamins (B-COMPLEX/B-12 PO)  Self Yes No   Sig: Take 1 tablet by mouth daily   Calcium 150 MG TABS  Self Yes No   Sig: Take by mouth daily     Cholecalciferol (VITAMIN D) 2000 units CAPS  Self Yes No   Sig: Take 1 capsule by mouth daily   EQ Aspirin Adult Low Dose 81 MG EC tablet  Self Yes No   Sig: Last dose 1/9/22    Omega-3 Fatty Acids (fish oil) 1,000 mg  Self Yes No   Sig: Take 1,000 mg by mouth daily   amLODIPine (NORVASC) 2.5 mg tablet   No No   Sig: take 1 tablet by mouth once daily   dutasteride (AVODART) 0.5 mg capsule   No No   Sig: take 1 capsule by mouth once daily   meloxicam (MOBIC) 15 mg tablet  Self No No   Sig: Take 1 tablet (15 mg total) by mouth daily As needed   polyethylene glycol (MIRALAX) 17 g packet  Self Yes No   Sig: Take 17 g by mouth daily   rosuvastatin (CRESTOR) 20 MG tablet  Self No No   Sig: Take 1 tablet (20 mg total) by mouth  daily at bedtime   tamsulosin (FLOMAX) 0.4 mg   No No   Sig: take 1 capsule by mouth once daily with dinner      Facility-Administered Medications: None       Past Medical History:   Diagnosis Date    Arthritis     CAD (coronary artery disease)     Cataract     Colon polyp     COVID-19     GERD (gastroesophageal reflux disease)     Heart attack (HCC)     Herpes zoster     Hx of colonic polyps     Hyperlipidemia     Hypertension        Past Surgical History:   Procedure Laterality Date    BACK SURGERY      disc removed 1985 / R....1985      CATARACT EXTRACTION      COLONOSCOPY      CORONARY ARTERY BYPASS GRAFT  11/13/2014    LA....6/30/16   & CABG X3 with LIMA to LAD <SVG to OM-2, SVG to PDA  [place] Cortez Wheeler / la....12/19/14       EYE SURGERY      cataract /  R....1980      FL RETROGRADE PYELOGRAM  1/13/2022    HERNIA REPAIR      Groin / R....1988      HIP SURGERY      1984 /  R....1984       INGUINAL HERNIA REPAIR      R....2010      JOINT REPLACEMENT  1985     right hip    ME CYSTO W/IRRIG & EVAC MULTPLE OBSTRUCTING CLOTS N/A 5/18/2021    Procedure: CYSTOSCOPY EVACUATION OF CLOTS WITH  FULGURATION OF PROSTATE;  Surgeon: Randy Driscoll MD;  Location: [facility] OR;  Service: Urology    ME TRURL ELECTROSURG RESCJ PROSTATE BLEED COMPLETE N/A 6/4/2019    Procedure: CYSTOSCOPY, TRANSURETHRAL RESECTION OF PROSTATE (TURP);  Surgeon: Randy Driscoll MD;  Location: [facility] OR;  Service: Urology    TRANSURETHRAL RESECTION OF PROSTATE Bilateral 1/13/2022    Procedure: CYSTOSCOPY, TRANSURETHRAL RESECTION OF PROSTATE (TURP), RETROGRADE, PYELOGRAM;  Surgeon: Epi Small MD;  Location: [facility];  Service: Urology       Family History   Problem Relation Age of Onset    Coronary artery disease Father     Hyperlipidemia Brother     Sudden death Brother         cardiac    Arthritis Family     Mental illness Neg Hx      I have reviewed and agree with the history as documented.    E-Cigarette/Vaping     E-Cigarette Use Never User      E-Cigarette/Vaping Substances    Nicotine No     THC No     CBD No     Flavoring No     Other No     Unknown No      Social History     Tobacco Use    Smoking status: Former     Current packs/day: 0.00     Average packs/day: 2.0 packs/day for 30.0 years (60.0 ttl pk-yrs)     Types: Cigarettes     Start date: 6/15/1960     Quit date: 1979     Years since quittin.7    Smokeless tobacco: Never   Vaping Use    Vaping status: Never Used   Substance Use Topics    Alcohol use: Not Currently    Drug use: No       Review of Systems   Constitutional:  Negative for chills and fever.   HENT:  Negative for congestion and sore throat.    Eyes:  Negative for visual disturbance.   Respiratory:  Negative for cough and shortness of breath.    Cardiovascular:  Positive for chest pain.   Gastrointestinal:  Negative for abdominal pain, nausea and vomiting.   Genitourinary:  Positive for flank pain. Negative for hematuria.   Musculoskeletal:  Positive for arthralgias and back pain. Negative for neck pain.   Skin:  Negative for color change and rash.   Neurological:  Negative for dizziness, syncope, light-headedness and headaches.   Hematological:  Bruises/bleeds easily.   Psychiatric/Behavioral:  Negative for confusion.    All other systems reviewed and are negative.      Physical Exam  Physical Exam  Vitals and nursing note reviewed.   Constitutional:       Appearance: Normal appearance.   HENT:      Head: Normocephalic and atraumatic.      Right Ear: External ear normal.      Left Ear: External ear normal.      Nose: Nose normal.      Mouth/Throat:      Mouth: Mucous membranes are moist.   Eyes:      Extraocular Movements: Extraocular movements intact.      Conjunctiva/sclera: Conjunctivae normal.   Cardiovascular:      Rate and Rhythm: Normal rate and regular rhythm.      Pulses: Normal pulses.   Pulmonary:      Effort: Pulmonary effort is normal.      Breath sounds: No wheezing.   Chest:       Chest wall: Tenderness present.   Abdominal:      Palpations: Abdomen is soft.      Tenderness: There is abdominal tenderness.   Musculoskeletal:         General: Normal range of motion.      Cervical back: Normal range of motion and neck supple. No tenderness.      Right lower leg: No edema.      Left lower leg: No edema.   Skin:     General: Skin is warm and dry.      Capillary Refill: Capillary refill takes less than 2 seconds.      Comments: Small skin tears on right forearm have been dressed   Neurological:      General: No focal deficit present.      Mental Status: He is alert and oriented to person, place, and time.   Psychiatric:         Mood and Affect: Mood normal.         Behavior: Behavior normal.         Vital Signs  ED Triage Vitals   Temp Pulse Respirations Blood Pressure SpO2   -- 01/26/24 1017 01/26/24 1017 01/26/24 1015 01/26/24 1017    78 20 168/83 96 %      Temp src Heart Rate Source Patient Position - Orthostatic VS BP Location FiO2 (%)   -- 01/26/24 1017 01/26/24 1017 01/26/24 1017 --    Monitor Lying Right arm       Pain Score       --                  Vitals:    01/26/24 1015 01/26/24 1017   BP: 168/83 168/83   Pulse:  78   Patient Position - Orthostatic VS:  Lying         Visual Acuity      ED Medications  Medications   iohexol (OMNIPAQUE) 350 MG/ML injection (MULTI-DOSE) 100 mL (100 mL Intravenous Given 1/26/24 1107)       Diagnostic Studies  Results Reviewed       Procedure Component Value Units Date/Time    RBC Morphology Reflex Test [[id number]] Collected: 01/26/24 1030    Lab Status: Final result Specimen: Blood from Arm, Right Updated: 01/26/24 1201    CBC and differential [[id number]]  (Abnormal) Collected: 01/26/24 1030    Lab Status: Final result Specimen: Blood from Arm, Right Updated: 01/26/24 1144     WBC 6.69 Thousand/uL      RBC 2.98 Million/uL      Hemoglobin 10.9 g/dL      Hematocrit 31.4 %       fL      MCH 36.6 pg      MCHC 34.7 g/dL      RDW 13.8 %      MPV 9.8 fL       Platelets 137 Thousands/uL     Narrative:      This is an appended report.  These results have been appended to a previously verified report.    Manual Differential(PHLEBS Do Not Order) [[id number]]  (Abnormal) Collected: 01/26/24 1030    Lab Status: Final result Specimen: Blood from Arm, Right Updated: 01/26/24 1144     Segmented % 74 %      Bands % 7 %      Lymphocytes % 10 %      Monocytes % 7 %      Eosinophils, % 0 %      Basophils % 0 %      Myelocytes % 2 %      Absolute Neutrophils 5.42 Thousand/uL      Lymphocytes Absolute 0.67 Thousand/uL      Monocytes Absolute 0.47 Thousand/uL      Eosinophils Absolute 0.00 Thousand/uL      Basophils Absolute 0.00 Thousand/uL      Total Counted --     RBC Morphology Present     Platelet Estimate Borderline     Ovalocytes Present    Comprehensive metabolic panel [[id number]]  (Abnormal) Collected: 01/26/24 1030    Lab Status: Final result Specimen: Blood from Arm, Right Updated: 01/26/24 1056     Sodium 134 mmol/L      Potassium 3.9 mmol/L      Chloride 104 mmol/L      CO2 26 mmol/L      ANION GAP 4 mmol/L      BUN 19 mg/dL      Creatinine 0.82 mg/dL      Glucose 171 mg/dL      Calcium 9.1 mg/dL      AST 16 U/L      ALT 14 U/L      Alkaline Phosphatase 52 U/L      Total Protein 6.8 g/dL      Albumin 4.3 g/dL      Total Bilirubin 0.75 mg/dL      eGFR 81 ml/min/1.73sq m     Narrative:      National Kidney Disease Foundation guidelines for Chronic Kidney Disease (CKD):     Stage 1 with normal or high GFR (GFR > 90 mL/min/1.73 square meters)    Stage 2 Mild CKD (GFR = 60-89 mL/min/1.73 square meters)    Stage 3A Moderate CKD (GFR = 45-59 mL/min/1.73 square meters)    Stage 3B Moderate CKD (GFR = 30-44 mL/min/1.73 square meters)    Stage 4 Severe CKD (GFR = 15-29 mL/min/1.73 square meters)    Stage 5 End Stage CKD (GFR <15 mL/min/1.73 square meters)  Note: GFR calculation is accurate only with a steady state creatinine    UA (URINE) with reflex to Scope [[id number]]     Lab  Status: No result Specimen: Urine                    CT chest abdomen pelvis w contrast   Final Result by Ozzy Ortez MD (01/26 1200)      Fractures of the right 7th-12th ribs including displacement of the 9th rib and angulation of the 10th rib, and small right hemopneumothorax. No mediastinal shift. Assessment for flail chest is advised. Groundglass in the periphery of the partially    atelectatic right middle and lower lobe is due to a combination of contusion and atelectasis. No acute intra-abdominal injury.         I personally discussed this study with HENRY BOYD on 1/26/2024 11:56 AM.                  Workstation performed: [id number]                    Procedures  ECG 12 Lead Documentation Only    Date/Time: 1/26/2024 10:37 AM    Performed by: Henry Boyd MD  Authorized by: Henry Boyd MD    Indications / Diagnosis:  Chest trauma  ECG reviewed by me, the ED Provider: yes    Patient location:  ED  Interpretation:     Interpretation: normal    Rate:     ECG rate:  68    ECG rate assessment: normal    Rhythm:     Rhythm: sinus rhythm    Ectopy:     Ectopy: none    QRS:     QRS axis:  Normal    QRS intervals:  Normal  Conduction:     Conduction: normal    ST segments:     ST segments:  Normal  T waves:     T waves: normal             ED Course                                             Medical Decision Making  Mechanical fall with thoracicoabdominal blunt injury.  Will CT scan chest and abdomen.    Traumatic findings discussed with radiologist as well as traumatologist.  Patient is accepted to transfer to the trauma center    Amount and/or Complexity of Data Reviewed  Labs: ordered.  Radiology: ordered.    Risk  Prescription drug management.             Disposition  Final diagnoses:   Multiple rib fractures   Flail chest   Hemopneumothorax on right   Contusion of right lung, initial encounter     Time reflects when diagnosis was documented in both MDM as applicable and the Disposition within  this note       Time User Action Codes Description Comment    1/26/2024 12:21 PM BoydEpi lazo Add [S22.49XA] Multiple rib fractures     1/26/2024 12:21 PM BoydEpi lazo Add [S22.5XXA] Flail chest     1/26/2024 12:21 PM BoydEpi lazo Add [J94.2] Hemopneumothorax on right     1/26/2024 12:22 PM DebEpi Add [S27.321A] Contusion of right lung, initial encounter           ED Disposition       ED Disposition   Transfer to Another Facility-In Network    Condition   --    Date/Time   Fri Jan 26, 2024 12:21 PM    Comment   Liam Hall should be transferred out to [facility].               MD Documentation      Flowsheet Row Most Recent Value   Patient Condition The patient has been stabilized such that within reasonable medical probability, no material deterioration of the patient condition or the condition of the unborn child(idris) is likely to result from the transfer   Reason for Transfer Level of Care needed not available at this facility   Benefits of Transfer Specialized equipment and/or services available at the receiving facility (Include comment)________________________   Risks of Transfer Potential for delay in receiving treatment   Accepting Physician Dr. Hurst   Accepting Facility Name, [facility]   Sending MD Dr. Boyd   Provider Certification General risk, such as traffic hazards, adverse weather conditions, rough terrain or turbulence, possible failure of equipment (including vehicle or aircraft), or consequences of actions of persons outside the control of the transport personnel          RN Documentation      Flowsheet Row Most Recent Value   Accepting Facility Name, [facility]          Follow-up Information    None         Patient's Medications   Discharge Prescriptions    No medications on file       No discharge procedures on file.    PDMP Review       None            ED Provider  Electronically Signed by             Epi LAZO  MD Deb  01/26/24 5752

## 2024-01-27 ENCOUNTER — APPOINTMENT (INPATIENT)
Dept: RADIOLOGY | Facility: HOSPITAL | Age: 84
DRG: 183 | End: 2024-01-27
Payer: COMMERCIAL

## 2024-01-27 PROBLEM — R41.0 DELIRIUM: Status: ACTIVE | Noted: 2024-01-27

## 2024-01-27 PROBLEM — S22.41XA CLOSED FRACTURE OF MULTIPLE RIBS OF RIGHT SIDE: Status: ACTIVE | Noted: 2024-01-27

## 2024-01-27 PROBLEM — D69.59 DRUG-INDUCED PLATELET DISORDER: Status: ACTIVE | Noted: 2024-01-27

## 2024-01-27 PROBLEM — T50.905A DRUG-INDUCED PLATELET DISORDER: Status: ACTIVE | Noted: 2024-01-27

## 2024-01-27 PROBLEM — S27.0XXA CLOSED TRAUMATIC FRACTURE OF RIBS OF RIGHT SIDE WITH PNEUMOTHORAX: Status: ACTIVE | Noted: 2024-01-27

## 2024-01-27 PROBLEM — G89.11 ACUTE PAIN DUE TO TRAUMA: Status: ACTIVE | Noted: 2024-01-27
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
## 2024-01-27 LAB
ANION GAP SERPL CALCULATED.3IONS-SCNC: 8 MMOL/L
BUN SERPL-MCNC: 15 MG/DL (ref 5–25)
CALCIUM SERPL-MCNC: 9.1 MG/DL (ref 8.4–10.2)
CHLORIDE SERPL-SCNC: 105 MMOL/L (ref 96–108)
CO2 SERPL-SCNC: 25 MMOL/L (ref 21–32)
CREAT SERPL-MCNC: 0.81 MG/DL (ref 0.6–1.3)
ERYTHROCYTE [DISTWIDTH] IN BLOOD BY AUTOMATED COUNT: 13.9 % (ref 11.6–15.1)
GFR SERPL CREATININE-BSD FRML MDRD: 81 ML/MIN/1.73SQ M
GLUCOSE SERPL-MCNC: 112 MG/DL (ref 65–140)
HCT VFR BLD AUTO: 30.5 % (ref 36.5–49.3)
HGB BLD-MCNC: 10.5 G/DL (ref 12–17)
MAGNESIUM SERPL-MCNC: 2.3 MG/DL (ref 1.9–2.7)
MCH RBC QN AUTO: 35.8 PG (ref 26.8–34.3)
MCHC RBC AUTO-ENTMCNC: 34.4 G/DL (ref 31.4–37.4)
MCV RBC AUTO: 104 FL (ref 82–98)
PHOSPHATE SERPL-MCNC: 3.3 MG/DL (ref 2.3–4.1)
PLATELET # BLD AUTO: 138 THOUSANDS/UL (ref 149–390)
PLATELET # BLD AUTO: 138 THOUSANDS/UL (ref 149–390)
PMV BLD AUTO: 10.5 FL (ref 8.9–12.7)
PMV BLD AUTO: 10.8 FL (ref 8.9–12.7)
POTASSIUM SERPL-SCNC: 3.9 MMOL/L (ref 3.5–5.3)
RBC # BLD AUTO: 2.93 MILLION/UL (ref 3.88–5.62)
SODIUM SERPL-SCNC: 138 MMOL/L (ref 135–147)
WBC # BLD AUTO: 5.43 THOUSAND/UL (ref 4.31–10.16)

## 2024-01-27 PROCEDURE — 85027 COMPLETE CBC AUTOMATED: CPT | Performed by: PHYSICIAN ASSISTANT

## 2024-01-27 PROCEDURE — 84100 ASSAY OF PHOSPHORUS: CPT

## 2024-01-27 PROCEDURE — 83735 ASSAY OF MAGNESIUM: CPT

## 2024-01-27 PROCEDURE — NC001 PR NO CHARGE: Performed by: SURGERY

## 2024-01-27 PROCEDURE — 85049 AUTOMATED PLATELET COUNT: CPT | Performed by: PHYSICIAN ASSISTANT

## 2024-01-27 PROCEDURE — 71045 X-RAY EXAM CHEST 1 VIEW: CPT

## 2024-01-27 PROCEDURE — 99232 SBSQ HOSP IP/OBS MODERATE 35: CPT | Performed by: SURGERY

## 2024-01-27 PROCEDURE — 80048 BASIC METABOLIC PNL TOTAL CA: CPT | Performed by: PHYSICIAN ASSISTANT

## 2024-01-27 RX ORDER — LANOLIN ALCOHOL/MO/W.PET/CERES
6 CREAM (GRAM) TOPICAL
Status: DISCONTINUED | OUTPATIENT
Start: 2024-01-27 | End: 2024-01-28 | Stop reason: HOSPADM

## 2024-01-27 RX ORDER — ENOXAPARIN SODIUM 100 MG/ML
30 INJECTION SUBCUTANEOUS EVERY 12 HOURS SCHEDULED
Status: DISCONTINUED | OUTPATIENT
Start: 2024-01-27 | End: 2024-01-28 | Stop reason: HOSPADM

## 2024-01-27 RX ORDER — ASPIRIN 81 MG/1
81 TABLET, CHEWABLE ORAL DAILY
Status: DISCONTINUED | OUTPATIENT
Start: 2024-01-28 | End: 2024-01-28 | Stop reason: HOSPADM

## 2024-01-27 RX ADMIN — AMLODIPINE BESYLATE 2.5 MG: 2.5 TABLET ORAL at 08:58

## 2024-01-27 RX ADMIN — LIDOCAINE 5% 1 PATCH: 700 PATCH TOPICAL at 08:57

## 2024-01-27 RX ADMIN — ENOXAPARIN SODIUM 30 MG: 30 INJECTION SUBCUTANEOUS at 08:58

## 2024-01-27 RX ADMIN — MELATONIN 6 MG: at 21:00

## 2024-01-27 RX ADMIN — ACETAMINOPHEN 975 MG: 325 TABLET, FILM COATED ORAL at 17:23

## 2024-01-27 RX ADMIN — FINASTERIDE 5 MG: 5 TABLET, FILM COATED ORAL at 08:58

## 2024-01-27 RX ADMIN — ENOXAPARIN SODIUM 30 MG: 30 INJECTION SUBCUTANEOUS at 20:59

## 2024-01-27 RX ADMIN — ACETAMINOPHEN 975 MG: 325 TABLET, FILM COATED ORAL at 22:52

## 2024-01-27 RX ADMIN — POLYETHYLENE GLYCOL 3350 17 G: 17 POWDER, FOR SOLUTION ORAL at 08:57

## 2024-01-27 RX ADMIN — CHLORHEXIDINE GLUCONATE 15 ML: 1.2 SOLUTION ORAL at 08:57

## 2024-01-27 RX ADMIN — OMEGA-3 FATTY ACIDS CAP 1000 MG 1000 MG: 1000 CAP at 08:58

## 2024-01-27 RX ADMIN — ATORVASTATIN CALCIUM 40 MG: 40 TABLET, FILM COATED ORAL at 17:23

## 2024-01-27 RX ADMIN — Medication 1000 UNITS: at 08:58

## 2024-01-27 RX ADMIN — Medication 2.5 MG: at 11:03

## 2024-01-27 RX ADMIN — ACETAMINOPHEN 975 MG: 325 TABLET, FILM COATED ORAL at 05:02

## 2024-01-27 RX ADMIN — Medication 2.5 MG: at 21:03

## 2024-01-27 RX ADMIN — CHLORHEXIDINE GLUCONATE 15 ML: 1.2 SOLUTION ORAL at 21:02

## 2024-01-27 RX ADMIN — TAMSULOSIN HYDROCHLORIDE 0.4 MG: 0.4 CAPSULE ORAL at 17:23

## 2024-01-27 NOTE — PROGRESS NOTES
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  Consider APS consult  SpO2:   SPO2 RA Rest      Flowsheet Row ED to Hosp-Admission (Current) from 1/26/2024 in [facility] Intermediate Critical Care Unit   SpO2 95 %   SpO2 Activity At Rest   O2 Device None (Room air)   O2 Flow Rate --                                                                            Incentive Spirometer: Incentive Spirometry Achieved (mL): 1500 mL     Delirium  Assessment & Plan  Did not sleep well overnight, this AM not aware of events that brought him into hospital and unclear what year;  Strict environmental controls:   OOB for all meals   Lights on and blinds up during day.   Frequent reorientation   Lights and TV off and blinds down at night   Minimize interuptions at night as clincially indicated   Melatonin 6 mg QHS ordered    Acute pain due to trauma  Assessment & Plan  Scheduled:  APAP 975 mg Q8H  Lidoderm patch   PRN  Oxycodone 2.5 mg Q4 PRN moderate pain doses: 1  Oxycodone 5 mg Q4 PRN severe pain doses: 0  Dilaudid 0.2 Q2 PRN breakthrough pain: 0 doses    Ground-level " fall  Assessment & Plan  PT/OT  Dispo depending on consultation    Drug-induced platelet disorder  Assessment & Plan  Hold aspirin for now given small hemothorax     BPH (benign prostatic hyperplasia)  Assessment & Plan  Continue proscar and flomax    Mixed hyperlipidemia  Assessment & Plan  Continue Atorvastatin     Benign essential hypertension  Assessment & Plan  Norvasc 2.5 mg PO    Coronary artery disease involving native coronary artery of native heart without angina pectoris  Assessment & Plan  Hold aspirin as above              TRAUMA TRANSFER FROM ICU AND TERTIARY SURVEY NOTE    VTE Prophylaxis:Reason for no pharmacologic prophylaxis hemo/pneumothorax; Needs DVT PPx today      Disposition: MedSurg    Code status:  Level 1 - Full Code    Consultants: IP CONSULT TO GERONTOLOGY  IP CONSULT TO CASE MANAGEMENT  IP CONSULT TO CASE MANAGEMENT    Subjective   Mechanism of Injury:Fall     HPI  83-year-old male with ground-level fall day prior to presentation found to have right seventh through 12th rib fractures with small hemo-pneumothorax    Summary of ICU clinical course:   Patient with interval chest x-ray x 2 without significant change.  No acute overnight events    Recent or scheduled procedures: None    Outstanding/pending diagnostics: None       Objective   Vitals:   Temp:  [97.9 °F (36.6 °C)-98.4 °F (36.9 °C)] 98 °F (36.7 °C)  HR:  [64-78] 64  Resp:  [17-20] 17  BP: (154-191)/(66-83) 154/66    I/O         01/25 0701  01/26 0700 01/26 0701  01/27 0700    Urine (mL/kg/hr)  1050    Total Output  1050    Net  -1050                   Physical Exam:   Physical Exam  Vitals and nursing note reviewed.   Eyes:      Conjunctiva/sclera: Conjunctivae normal.   Skin:     General: Skin is warm and dry.      Capillary Refill: Capillary refill takes less than 2 seconds.   HENT:      Head: Normocephalic and atraumatic.      Mouth/Throat:      Mouth: Mucous membranes are moist.   Cardiovascular:      Rate and Rhythm: Normal  rate and regular rhythm.      Pulses: Normal pulses.   Musculoskeletal:         General: Normal range of motion.   Abdominal: General: There is no distension.      Palpations: Abdomen is soft.      Tenderness: There is no abdominal tenderness.   Constitutional:       General: He is not in acute distress.     Appearance: He is well-developed and well-nourished.   Pulmonary:      Effort: Tachypnea present.      Breath sounds: No wheezing, rhonchi or rales.   Neurological:      Mental Status: He is alert. He is CAM ICU positive .      Motor: Strength full and intact in all extremities.      Comments: Oriented only to self but oriented x3 after reorientation   Genitourinary/Anorectal:     Comments: Deferred              Rationale for remaining devices: None    1. Before the illness or injury that brought you to the Emergency, did you need someone to help you on a regular basis? 1=Yes   2. Since the illness or injury that brought you to the Emergency, have you needed more help than usual to take care of yourself? 0=No   3. Have you been hospitalized for one or more nights during the past 6 months (excluding a stay in the Emergency Department)? 0=No   4. In general, do you see well? 1=No   5. In general, do you have serious problems with your memory? 0=No   6. Do you take more than three different medications everyday? 1=Yes   TOTAL   3     Did you order a geriatric consult if the score was 2 or greater?: yes       Lab Results: Results: I have personally reviewed all pertinent laboratory/tests results, BMP/CMP:   Lab Results   Component Value Date    SODIUM 134 (L) 01/26/2024    K 3.9 01/26/2024     01/26/2024    CO2 26 01/26/2024    BUN 19 01/26/2024    CREATININE 0.82 01/26/2024    CALCIUM 9.1 01/26/2024    AST 16 01/26/2024    ALT 14 01/26/2024    ALKPHOS 52 01/26/2024    EGFR 81 01/26/2024   , CBC:   Lab Results   Component Value Date    WBC 5.43 01/27/2024    HGB 10.5 (L) 01/27/2024    HCT 30.5 (L) 01/27/2024  "    (H) 01/27/2024     (L) 01/27/2024     (L) 01/27/2024    RBC 2.93 (L) 01/27/2024    MCH 35.8 (H) 01/27/2024    MCHC 34.4 01/27/2024    RDW 13.9 01/27/2024    MPV 10.5 01/27/2024    MPV 10.8 01/27/2024   , Coagulation: No results found for: \"PT\", \"INR\", \"APTT\", Lactate: No results found for: \"LACTATE\", Amylase: No results found for: \"AMYLASE\", Lipase: No results found for: \"LIPASE\", AST:   Lab Results   Component Value Date    AST 16 01/26/2024   , ALT:   Lab Results   Component Value Date    ALT 14 01/26/2024   , Urinalysis: No results found for: \"COLORU\", \"CLARITYU\", \"SPECGRAV\", \"PHUR\", \"LEUKOCYTESUR\", \"NITRITE\", \"PROTEINUA\", \"GLUCOSEU\", \"KETONESU\", \"BILIRUBINUR\", \"BLOODU\", CK: No results found for: \"CKTOTAL\", Troponin: No results found for: \"TROPONINI\", EtOH: No results found for: \"ETOH\", UDS: No components found for: \"RAPIDDRUGSCREEN\", Pregnancy: No results found for: \"PREGTESTUR\", and ABG: No results found for: \"PHART\", \"RFG1SKP\", \"PO2ART\", \"SMC3EZK\", \"K9MUQLJY\", \"BEART\", \"SOURCE\"    Imaging Results: I have personally reviewed pertinent films in PACS  XR chest pa & lateral   Final Result by Elijah Townsend MD (01/26 1616)      Overall, probably no significant change in the volume of the right-sided hemopneumothorax when correlated with the recent CT.      The known right rib fractures are not particularly evident.                  Workstation performed: [id number]         XR chest portable    (Results Pending)   XR chest portable    (Results Pending)      Other Studies: N/A    Code Status: Level 1 - Full Code       Patient seen and evaluated by Critical Care today and deemed to be appropriate for transfer to Med Surg.       "

## 2024-01-27 NOTE — PROGRESS NOTES
TRAUMA TRANSFER FROM ICU AND TERTIARY SURVEY NOTE    VTE Prophylaxis:Sequential compression device (Venodyne)  and Enoxaparin (Lovenox)     Disposition: medsurg    Code status:  Level 1 - Full Code    Consultants: IP CONSULT TO GERONTOLOGY  IP CONSULT TO CASE MANAGEMENT  IP CONSULT TO CASE MANAGEMENT    Subjective   Mechanism of Injury:Fall     HPI/Last 24 hour events:  HPI: Liam Hall is a 83 y.o. who comes in as a trauma transfer from [facility] for multiple right sided rib fractures after falling on 1/25. Yesterday, he was fixing his Venetian blinds. When he turned around, he tripped by his rocking chair and hit his right chest. Denies head strike or loss of consciousness. He also hit his right forearm causing a superficial abrasion. Denies headache, nausea, vomiting, fever, chills, shortness of breath, chest pain, abdominal pain, back pain, lightheadedness, dizziness, new onset numbness tingling or acute muscle weakness.    Last 24hour: saturating well overnight on room air. Pain well controlled.     Reason for ICU admission: multiple rib fractures with hemo-pneumothorax    Summary of ICU clinical course: Monitoring in ICCU, able to maintain adequate oxygenation on room air. Pain well controlled    Recent or scheduled procedures: none    Outstanding/pending diagnostics:   CXR: Similar appearance of right-sided hydropneumothorax. Persistent airspace opacity at the right lung base       Objective   Vitals:   Temp:  [97.5 °F (36.4 °C)-98.4 °F (36.9 °C)] 97.5 °F (36.4 °C)  HR:  [64-78] 72  Resp:  [17-18] 17  BP: (154-191)/(62-82) 164/65    I/O         01/25 0701  01/26 0700 01/26 0701  01/27 0700 01/27 0701  01/28 0700    P.O.   300    Total Intake(mL/kg)   300 (4.5)    Urine (mL/kg/hr)  1450     Total Output  1450     Net  -1450 +300                    Physical Exam:   GENERAL APPEARANCE: well-developed, well nourished  NEURO: non-focal  HEENT:: atraumatic, normocephalic, baseline difficulty with  hearing  CV: reg rate and rhythm  LUNGS: no acute respiratory distress, saturating well on room air  GI: soft, nondistended, nontender, normoactive bowel sounds  : no suprapubic tenderness  MSK: AROM of all 4 extremities, minimal tenderness on right chest  SKIN: right forearm with bruising and skin tear    Invasive Devices       Peripheral Intravenous Line  Duration             Peripheral IV 01/26/24 Right Antecubital 1 day              Drain  Duration             Urethral Catheter Three way 22 Fr. 744 days                   Rationale for remaining devices: remove mayorga      Lab Results: BMP/CMP:   Lab Results   Component Value Date    SODIUM 138 01/27/2024    K 3.9 01/27/2024     01/27/2024    CO2 25 01/27/2024    BUN 15 01/27/2024    CREATININE 0.81 01/27/2024    CALCIUM 9.1 01/27/2024    EGFR 81 01/27/2024    and CBC:   Lab Results   Component Value Date    WBC 5.43 01/27/2024    HGB 10.5 (L) 01/27/2024    HCT 30.5 (L) 01/27/2024     (H) 01/27/2024     (L) 01/27/2024     (L) 01/27/2024    RBC 2.93 (L) 01/27/2024    MCH 35.8 (H) 01/27/2024    MCHC 34.4 01/27/2024    RDW 13.9 01/27/2024    MPV 10.5 01/27/2024    MPV 10.8 01/27/2024       Imaging Results: I have personally reviewed pertinent reports.    Chest Xray(s): As stated above   FAST exam(s): N/A   CT Scan(s): Fractures of the right 7th-12th ribs including displacement of the 9th rib and angulation of the 10th rib, and small right hemopneumothorax and ground glass in periphery   Additional Xray(s): As above       Code Status: Level 1 - Full Code       Patient seen and evaluated by Critical Care today and deemed to be appropriate for transfer to Med Surg. Spoke to Catalina Barton NP from Trauma service regarding transfer. Critical Care can be contacted via [employer] with any questions or concerns.

## 2024-01-27 NOTE — ASSESSMENT & PLAN NOTE
"Patient presented after a fall while fixing a blind  He had a CT CAP showing:  \"Fractures of the right 7th-12th ribs including displacement of the 9th rib and angulation of the 10th rib, and small right hemopneumothorax...\" \"...Groundglass in the periphery of the partially   atelectatic right middle and lower lobe is due to a combination of contusion and atelectasis.\"  He had follow up CXR after 6 and 10 hours showing no change     Plan:  Continue multimodal pain control as below  CXR stable 1/27  "

## 2024-01-27 NOTE — ASSESSMENT & PLAN NOTE
Scheduled:  APAP 975 mg Q8H  Lidoderm patch   PRN  Oxycodone 2.5 mg Q4 PRN moderate pain doses: 1  Oxycodone 5 mg Q4 PRN severe pain doses: 0  Dilaudid 0.2 Q2 PRN breakthrough pain: 0 doses

## 2024-01-27 NOTE — ASSESSMENT & PLAN NOTE
"Patient presented after a fall while fixing a blind  He had a CT CAP showing:  \"Fractures of the right 7th-12th ribs including displacement of the 9th rib and angulation of the 10th rib, and small right hemopneumothorax...\" \"...Groundglass in the periphery of the partially   atelectatic right middle and lower lobe is due to a combination of contusion and atelectasis.\"  He had follow up CXR after 6 and 10 hours showing no change     Plan:  Continue multimodal pain control as below  Follow up AM CXR  IS/Pulm Toilet Encouragement of deep breathing and coughing.  Consider APS consult  SpO2:   SPO2 RA Rest      Flowsheet Row ED to Hosp-Admission (Current) from 1/26/2024 in [facility]   SpO2 95 %   SpO2 Activity At Rest   O2 Device None (Room air)   O2 Flow Rate --                                                                            Incentive Spirometer: Incentive Spirometry Achieved (mL): 1500 mL   "

## 2024-01-27 NOTE — ASSESSMENT & PLAN NOTE
Did not sleep well overnight, this AM not aware of events that brought him into hospital and unclear what year;  Strict environmental controls:   OOB for all meals   Lights on and blinds up during day.   Frequent reorientation   Lights and TV off and blinds down at night   Minimize interuptions at night as clincially indicated   Melatonin 6 mg QHS ordered

## 2024-01-27 NOTE — CASE MANAGEMENT
Case Management Assessment & Discharge Planning Note    Patient name Liam Hall  [facility]- MRN [id number]  : 1940 Date 2024       Current Admission Date: 2024  Current Admission Diagnosis:Closed traumatic fracture of ribs of right side with hemo/pneumothorax   Patient Active Problem List    Diagnosis Date Noted    Ground-level fall 2024    Closed traumatic fracture of ribs of right side with hemo/pneumothorax 2024    Acute pain due to trauma 2024    Drug-induced platelet disorder 2024    Delirium 2024    Primary osteoarthritis of left hip 2023    Malignant neoplasm of urinary bladder, unspecified site ([facility]) 2023    Gross hematuria 2021    Leukocytosis 2021    Anemia 2021    Thrombocytopenia ([facility]) 2021    BPH (benign prostatic hyperplasia) 2021    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    External hemorrhoids 2020    Osteopenia of multiple sites 2020    Sensorineural hearing loss ([facility]) 2020    Constipation 2020    S/P TURP 2019    Urinary retention 2019    Benign essential hypertension     Mixed hyperlipidemia     Nocturnal enuresis 2019    Benign prostatic hyperplasia with nocturia 2019    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/22/2015    NSTEMI (non-ST elevated myocardial infarction) ([facility]) 10/22/2015    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    MTHFR mutation 03/10/2015    Vitamin D deficiency 03/10/2015    Coronary artery disease involving native coronary artery of native heart without angina pectoris 2014      LOS (days): 1  Geometric Mean LOS (GMLOS) (days): 3.1  Days to GMLOS:2.3     OBJECTIVE:    Risk of Unplanned Readmission Score: 11.74         Current admission status: Inpatient       Preferred Pharmacy:   [id number]  [place] - [address]  [address] [place] [address]  Phone: [phone] Fax: [phone]    Primary Care Provider:  Frank Lombardi, DO    Primary Insurance: [facility]  Secondary Insurance:     ASSESSMENT:  Active Health Care Proxies    There are no active Health Care Proxies on file.                      Patient Information  Admitted from:: Home  Mental Status: Alert  During Assessment patient was accompanied by: Not accompanied during assessment  Assessment information provided by:: Spouse  Support Systems: Self, Spouse/significant other, Family members  What city do you live in?: [place]  Home entry access options. Select all that apply.: Stairs  Do the steps have railings?: Yes  Type of Current Residence: 2 story home  Living Arrangements: Lives w/ Spouse/significant other    Activities of Daily Living Prior to Admission  Functional Status: Independent  Completes ADLs independently?: Yes  Ambulates independently?: Yes  Does patient use assisted devices?: No  Does patient currently own DME?: No  Does patient have a history of Outpatient Therapy (PT/OT)?: No  Does the patient have a history of Short-Term Rehab?: No  Does patient have a history of HHC?: No  Does patient currently have HHC?: No         Patient Information Continued  Does patient have prescription coverage?: Yes  Does patient receive dialysis treatments?: No         Means of Transportation  Means of Transport to Appts:: Drives Self      Housing Stability: Low Risk  (1/27/2024)    Housing Stability Vital Sign     Unable to Pay for Housing in the Last Year: No     Number of Places Lived in the Last Year: 1     Unstable Housing in the Last Year: No   Food Insecurity: No Food Insecurity (1/27/2024)    Hunger Vital Sign     Worried About Running Out of Food in the Last Year: Never true     Ran Out of Food in the Last Year: Never true   Transportation Needs: No Transportation Needs (1/27/2024)    PRAPARE - Transportation     Lack of Transportation (Medical): No     Lack of Transportation (Non-Medical): No   Utilities: Not At Risk (1/27/2024)    [facility] Utilities      Threatened with loss of utilities: No       DISCHARGE DETAILS:    Discharge planning discussed with:: Pt spouse, Corina  Freedom of Choice: Yes     CM contacted family/caregiver?: Yes             Contacts  Patient Contacts: Corina Hall (wife)  Relationship to Patient:: Family  Contact Method: Phone  Phone Number: [phone]  Reason/Outcome: Emergency Contact                                                                     Additional Comments: Initial CM assessment completed w pt spouse Corina. No additional questions for CM at this time.

## 2024-01-27 NOTE — UTILIZATION REVIEW
Initial Clinical Review    Admission: Date/Time/Statement:   Admission Orders (From admission, onward)       Ordered        01/26/24 1429  Inpatient Admission  Once                          Orders Placed This Encounter   Procedures    Inpatient Admission     Standing Status:   Standing     Number of Occurrences:   1     Order Specific Question:   Level of Care     Answer:   Med Surg [16]     Order Specific Question:   Bed Type     Answer:   Trauma [7]     Order Specific Question:   Estimated length of stay     Answer:   More than 2 Midnights     Order Specific Question:   Certification     Answer:   I certify that inpatient services are medically necessary for this patient for a duration of greater than two midnights. See H&P and MD Progress Notes for additional information about the patient's course of treatment.     ED Arrival Information       Expected   1/26/2024 12:09    Arrival   1/26/2024 13:38    Acuity   Emergent              Means of arrival   Ambulance    Escorted by   [facility]    Service   Trauma    Admission type   Emergency              Arrival complaint   fall, rib fx             Chief Complaint   Patient presents with    Trauma     Pt tripped yesterday while walking and fell onto r side, - headstrike, - LOC, on aspirin        Initial Presentation: 83 y.o. male transferred from [facility] ED to [facility] as Inpatient ICCU admission due to 7-12th Rib FX, R hemopneumothorax, acute pain due to Trauma, ambulatory dysfunction     s/p fall. Reports a mechanical fall slipped over couch dressing falling on R flank/side having pain over site for 1 day localizing to R chest. @ referring ED chest CT reveals hemopneumothorax.   GCS 15  PMH CABG, HTN, GERD, urinary retention  Hourly incentive spirometry while awake, pulmonary toilet; repeat AM CXR w supplemental O2 for Pneumothorax. multimodal pain regimen but maximize no opioid agents; consider APS if pain control inadequate. I/O. Lovenox.  PT/OT  Date: 1/27/2024   Day 2: ICCU  No change in CXR; pulling 1500 ML on IS. cont multimodal pain regiment; IS/Pulmonary toilet. Holding hoem ASA, cont home BP meds.   On scheduled pain regimen w Lido patch prn plan. consider APS consult  PM Update TRAUMA transfer form ICU & tertiary note  saturating well overnight on room air. Pain well controlled.   Date: 1/28/2024    Day 3: Has surpassed a 2nd midnight with active treatments and services, which include transfer to [facility] level of care, ongoing assessments, labs, vitals.  ED Triage Vitals   Temperature Pulse Respirations Blood Pressure SpO2   01/26/24 1345 01/26/24 1345 01/26/24 1345 01/26/24 1345 01/26/24 1345   97.9 °F (36.6 °C) 78 18 (!) 191/82 96 %      Temp Source Heart Rate Source Patient Position - Orthostatic VS BP Location FiO2 (%)   01/26/24 1345 01/26/24 1345 01/26/24 1345 01/26/24 1745 --   Oral Monitor Sitting Right arm       Pain Score       01/26/24 1500       7          Wt Readings from Last 1 Encounters:   01/28/24 66.8 kg (147 lb 3.2 oz)     Additional Vital Signs:   Date/Time Temp Pulse Resp BP MAP (mmHg) SpO2 O2 Device Patient Position - Orthostatic VS   01/28/24 1000 97.8 °F (36.6 °C) 60 -- 158/62 92 97 % -- --   01/28/24 0715 98.1 °F (36.7 °C) 56 -- 135/68 97 98 % -- --   01/27/24 2240 98 °F (36.7 °C) 62 18 128/59 91 94 % None (Room air) Lying   01/27/24 1905 98.1 °F (36.7 °C) 76 17 155/73 120 95 % None (Room air) Lying     Date/Time Temp Pulse Resp BP MAP (mmHg) SpO2 O2 Device Patient Position - Orthostatic VS   01/27/24 1520 97.8 °F (36.6 °C) 68 16 148/63 107 97 % -- --   01/27/24 1055 97.5 °F (36.4 °C) 72 -- 164/65 115 98 % -- --   01/27/24 0725 97.7 °F (36.5 °C) 64 -- 155/62 101 97 % -- --   01/27/24 0255 98 °F (36.7 °C) 64 17 154/66 98 95 % None (Room air) Lying   01/26/24 2300 98.3 °F (36.8 °C) 74 -- 161/69 98 95 % -- --   01/26/24 1951 98.4 °F (36.9 °C) 64 18 170/66 101 -- -- Lying   01/26/24 1822 -- -- -- 172/68 Abnormal  -- -- --  --   01/26/24 1745 -- 64 17 172/68 Abnormal  119 96 % None (Room air) Sitting   01/26/24 1345 97.9 °F (36.6 °C) 78 18 191/82 Abnormal  -- 96 % -- Sitting     Weights (last 14 days)    Date/Time Weight Weight Method Height   01/27/24 0600 66.7 kg (147 lb) Bed scale --   01/26/24 1900 67.9 kg (149 lb 11.2 oz) Bed scale 6' (1.829 m)     Pertinent Labs/Diagnostic Test Results:   XR chest portable   Final Result by Sofia Jones MD (01/27 1046)      Similar appearance of right-sided hydropneumothorax.      Persistent airspace opacity at the right lung base.      XR chest portable   Final Result by Sofia Jones MD (01/27 1036)      Similar appearance of right-sided hydropneumothorax.      Persistent airspace opacity at the right lung base.      XR chest pa & lateral   Final Result by Elijah Townsend MD (01/26 1616)      Overall, probably no significant change in the volume of the right-sided hemopneumothorax when correlated with the recent CT.      The known right rib fractures are not particularly evident.      XR chest portable ICU    (Results Pending)         Results from last 7 days   Lab Units 01/28/24 0428 01/27/24  0440 01/26/24  1030   WBC Thousand/uL 4.94 5.43 6.69   HEMOGLOBIN g/dL 10.0* 10.5* 10.9*   HEMATOCRIT % 30.3* 30.5* 31.4*   PLATELETS Thousands/uL 146* 138*  138* 137*   BANDS PCT %  --   --  7         Results from last 7 days   Lab Units 01/28/24 0428 01/27/24  0440 01/26/24  1030   SODIUM mmol/L 139 138 134*   POTASSIUM mmol/L 3.5 3.9 3.9   CHLORIDE mmol/L 105 105 104   CO2 mmol/L 25 25 26   ANION GAP mmol/L 9 8 4   BUN mg/dL 16 15 19   CREATININE mg/dL 0.74 0.81 0.82   EGFR ml/min/1.73sq m 84 81 81   CALCIUM mg/dL 8.8 9.1 9.1   MAGNESIUM mg/dL 2.2 2.3  --    PHOSPHORUS mg/dL  --  3.3  --      Results from last 7 days   Lab Units 01/26/24  1030   AST U/L 16   ALT U/L 14   ALK PHOS U/L 52   TOTAL PROTEIN g/dL 6.8   ALBUMIN g/dL 4.3   TOTAL BILIRUBIN mg/dL 0.75         Results from  "last 7 days   Lab Units 01/28/24  0428 01/27/24  0440 01/26/24  1030   GLUCOSE RANDOM mg/dL 120 112 171*             No results found for: \"BETA-HYDROXYBUTYRATE\"                   ED Treatment:   Medication Administration from 01/26/2024 1209 to 01/26/2024 1950         Date/Time Order Dose Route Action     01/26/2024 1458 EST acetaminophen (TYLENOL) tablet 975 mg 975 mg Oral Given     01/26/2024 1458 EST oxyCODONE (ROXICODONE) split tablet 2.5 mg 2.5 mg Oral Given     01/26/2024 1458 EST oxyCODONE (ROXICODONE) IR tablet 5 mg -- Oral See Alternative     01/26/2024 1459 EST lidocaine (LIDODERM) 5 % patch 1 patch 1 patch Topical Medication Applied     01/26/2024 1459 EST polyethylene glycol (MIRALAX) packet 17 g 17 g Oral Given     01/26/2024 1822 EST amLODIPine (NORVASC) tablet 2.5 mg 2.5 mg Oral Given     01/26/2024 1822 EST cholecalciferol (VITAMIN D3) tablet 1,000 Units 1,000 Units Oral Given     01/26/2024 1822 EST aspirin (ECOTRIN LOW STRENGTH) EC tablet 81 mg 81 mg Oral Given     01/26/2024 1825 EST polyethylene glycol (MIRALAX) packet 17 g 17 g Oral Not Given     01/26/2024 1822 EST atorvastatin (LIPITOR) tablet 40 mg 40 mg Oral Given     01/26/2024 1825 EST tamsulosin (FLOMAX) capsule 0.4 mg 0.4 mg Oral Given          Past Medical History:   Diagnosis Date    Arthritis     CAD (coronary artery disease)     Cataract     Colon polyp     COVID-19     GERD (gastroesophageal reflux disease)     Heart attack (HCC)     Herpes zoster     Hx of colonic polyps     Hyperlipidemia     Hypertension      Present on Admission:   Benign essential hypertension   BPH (benign prostatic hyperplasia)   Coronary artery disease involving native coronary artery of native heart without angina pectoris   (Resolved) GERD (gastroesophageal reflux disease)   Mixed hyperlipidemia      Admitting Diagnosis: Fall [W19.XXXA]  Multiple rib fractures [S22.49XA]  Age/Sex: 83 y.o. male  Admission Orders:  Continuous cardiopulmonary & pulse " oximetry  Contact isolaton  Neuro checks  Fall precautions  OOB  Hourly incentive spirometry while awake    Scheduled Medications:  acetaminophen, 975 mg, Oral, Q8H ROBBIN  amLODIPine, 2.5 mg, Oral, Daily  [START ON 1/28/2024] aspirin, 81 mg, Oral, Daily  atorvastatin, 40 mg, Oral, Daily With Dinner  chlorhexidine, 15 mL, Mouth/Throat, Q12H ROBBIN  cholecalciferol, 1,000 Units, Oral, Daily  enoxaparin, 30 mg, Subcutaneous, Q12H ROBBIN  finasteride, 5 mg, Oral, Daily  fish oil, 1,000 mg, Oral, Daily  lidocaine, 1 patch, Topical, Daily  melatonin, 6 mg, Oral, HS  polyethylene glycol, 17 g, Oral, Daily  senna-docusate sodium, 1 tablet, Oral, HS  tamsulosin, 0.4 mg, Oral, Daily With Dinner    Continuous IV Infusions:     PRN Meds:  HYDROmorphone, 0.2 mg, Intravenous, Q2H PRN  ondansetron, 4 mg, Intravenous, Q6H PRN  oxyCODONE, 2.5 mg, Oral, Q4H PRN   Or  oxyCODONE, 5 mg, Oral, Q4H PRN        IP CONSULT TO GERONTOLOGY  IP CONSULT TO CASE MANAGEMENT  IP CONSULT TO CASE MANAGEMENT    Network Utilization Review Department  ATTENTION: Please call with any questions or concerns to [phone] and carefully listen to the prompts so that you are directed to the right person. All voicemails are confidential.   For Discharge needs, contact Care Management DC Support Team at [phone] opt. [phone]  Send all requests for admission clinical reviews, approved or denied determinations and any other requests to dedicated fax number below belonging to the [place] where the patient is receiving treatment. List of dedicated fax numbers for the Facilities:  FACILITY NAME UR FAX NUMBER   ADMISSION DENIALS (Administrative/Medical Necessity) [phone]   DISCHARGE SUPPORT TEAM (NETWORK) [phone]   PARENT CHILD HEALTH (Maternity/NICU/Pediatrics) [phone]   [facility] [phone]   [facility] [phone]   [facility] [phone]   [facility]  [place] [phone]   [facility] [phone]   [facility] [phone]   [facility] [phone]   [facility] [phone]   [facility] [phone]   [facility] [phone]   [facility] [phone]   [facility] [phone]

## 2024-01-28 ENCOUNTER — APPOINTMENT (INPATIENT)
Dept: RADIOLOGY | Facility: HOSPITAL | Age: 84
DRG: 183 | End: 2024-01-28
Payer: COMMERCIAL

## 2024-01-28 VITALS
SYSTOLIC BLOOD PRESSURE: 158 MMHG | WEIGHT: 147.2 LBS | HEART RATE: 60 BPM | HEIGHT: 72 IN | BODY MASS INDEX: 19.94 KG/M2 | RESPIRATION RATE: 18 BRPM | OXYGEN SATURATION: 97 % | DIASTOLIC BLOOD PRESSURE: 62 MMHG | TEMPERATURE: 97.8 F

## 2024-01-28 PROBLEM — R41.0 DELIRIUM: Status: RESOLVED | Noted: 2024-01-27 | Resolved: 2024-01-28

## 2024-01-28 LAB
ANION GAP SERPL CALCULATED.3IONS-SCNC: 9 MMOL/L
ATRIAL RATE: 68 BPM
BUN SERPL-MCNC: 16 MG/DL (ref 5–25)
CALCIUM SERPL-MCNC: 8.8 MG/DL (ref 8.4–10.2)
CHLORIDE SERPL-SCNC: 105 MMOL/L (ref 96–108)
CO2 SERPL-SCNC: 25 MMOL/L (ref 21–32)
CREAT SERPL-MCNC: 0.74 MG/DL (ref 0.6–1.3)
ERYTHROCYTE [DISTWIDTH] IN BLOOD BY AUTOMATED COUNT: 13.8 % (ref 11.6–15.1)
GFR SERPL CREATININE-BSD FRML MDRD: 84 ML/MIN/1.73SQ M
GLUCOSE SERPL-MCNC: 120 MG/DL (ref 65–140)
HCT VFR BLD AUTO: 30.3 % (ref 36.5–49.3)
HGB BLD-MCNC: 10 G/DL (ref 12–17)
MAGNESIUM SERPL-MCNC: 2.2 MG/DL (ref 1.9–2.7)
MCH RBC QN AUTO: 35.2 PG (ref 26.8–34.3)
MCHC RBC AUTO-ENTMCNC: 33 G/DL (ref 31.4–37.4)
MCV RBC AUTO: 107 FL (ref 82–98)
PLATELET # BLD AUTO: 146 THOUSANDS/UL (ref 149–390)
PMV BLD AUTO: 10.8 FL (ref 8.9–12.7)
POTASSIUM SERPL-SCNC: 3.5 MMOL/L (ref 3.5–5.3)
PR INTERVAL: 292 MS
QRS AXIS: -4 DEGREES
QRSD INTERVAL: 90 MS
QT INTERVAL: 400 MS
QTC INTERVAL: 425 MS
RBC # BLD AUTO: 2.84 MILLION/UL (ref 3.88–5.62)
SODIUM SERPL-SCNC: 139 MMOL/L (ref 135–147)
T WAVE AXIS: 16 DEGREES
VENTRICULAR RATE: 68 BPM
WBC # BLD AUTO: 4.94 THOUSAND/UL (ref 4.31–10.16)

## 2024-01-28 PROCEDURE — 80048 BASIC METABOLIC PNL TOTAL CA: CPT | Performed by: PHYSICIAN ASSISTANT

## 2024-01-28 PROCEDURE — 83735 ASSAY OF MAGNESIUM: CPT | Performed by: PHYSICIAN ASSISTANT

## 2024-01-28 PROCEDURE — 85027 COMPLETE CBC AUTOMATED: CPT | Performed by: PHYSICIAN ASSISTANT

## 2024-01-28 PROCEDURE — 97163 PT EVAL HIGH COMPLEX 45 MIN: CPT

## 2024-01-28 PROCEDURE — 99238 HOSP IP/OBS DSCHRG MGMT 30/<: CPT | Performed by: PHYSICIAN ASSISTANT

## 2024-01-28 PROCEDURE — NC001 PR NO CHARGE: Performed by: PHYSICIAN ASSISTANT

## 2024-01-28 PROCEDURE — 71045 X-RAY EXAM CHEST 1 VIEW: CPT

## 2024-01-28 PROCEDURE — 97166 OT EVAL MOD COMPLEX 45 MIN: CPT

## 2024-01-28 RX ORDER — OXYCODONE HYDROCHLORIDE 5 MG/1
2.5 TABLET ORAL EVERY 6 HOURS PRN
Qty: 20 TABLET | Refills: 0 | Status: SHIPPED | OUTPATIENT
Start: 2024-01-28 | End: 2024-01-30

## 2024-01-28 RX ORDER — SENNA AND DOCUSATE SODIUM 50; 8.6 MG/1; MG/1
1 TABLET, FILM COATED ORAL DAILY
Qty: 7 TABLET | Refills: 0 | Status: SHIPPED | OUTPATIENT
Start: 2024-01-28 | End: 2024-02-04

## 2024-01-28 RX ORDER — LIDOCAINE 50 MG/G
1 PATCH TOPICAL DAILY
Qty: 15 PATCH | Refills: 0 | Status: SHIPPED | OUTPATIENT
Start: 2024-01-29 | End: 2024-01-30 | Stop reason: SDUPTHER

## 2024-01-28 RX ORDER — OXYCODONE HYDROCHLORIDE 5 MG/1
5 TABLET ORAL EVERY 6 HOURS PRN
Qty: 20 TABLET | Refills: 0 | Status: SHIPPED | OUTPATIENT
Start: 2024-01-28 | End: 2024-01-30

## 2024-01-28 RX ORDER — POTASSIUM CHLORIDE 20 MEQ/1
40 TABLET, EXTENDED RELEASE ORAL ONCE
Status: COMPLETED | OUTPATIENT
Start: 2024-01-28 | End: 2024-01-28

## 2024-01-28 RX ORDER — AMOXICILLIN 250 MG
1 CAPSULE ORAL
Qty: 10 TABLET | Refills: 0 | Status: SHIPPED | OUTPATIENT
Start: 2024-01-28 | End: 2024-02-07

## 2024-01-28 RX ORDER — POLYETHYLENE GLYCOL 3350 17 G/17G
17 POWDER, FOR SOLUTION ORAL DAILY
Qty: 10 EACH | Refills: 0 | Status: SHIPPED | OUTPATIENT
Start: 2024-01-28

## 2024-01-28 RX ADMIN — Medication 1000 UNITS: at 09:14

## 2024-01-28 RX ADMIN — LIDOCAINE 5% 1 PATCH: 700 PATCH TOPICAL at 09:14

## 2024-01-28 RX ADMIN — ASPIRIN 81 MG CHEWABLE TABLET 81 MG: 81 TABLET CHEWABLE at 09:14

## 2024-01-28 RX ADMIN — POTASSIUM CHLORIDE 40 MEQ: 1500 TABLET, EXTENDED RELEASE ORAL at 09:19

## 2024-01-28 RX ADMIN — FINASTERIDE 5 MG: 5 TABLET, FILM COATED ORAL at 09:14

## 2024-01-28 RX ADMIN — CHLORHEXIDINE GLUCONATE 15 ML: 1.2 SOLUTION ORAL at 09:20

## 2024-01-28 RX ADMIN — OMEGA-3 FATTY ACIDS CAP 1000 MG 1000 MG: 1000 CAP at 09:15

## 2024-01-28 RX ADMIN — ACETAMINOPHEN 975 MG: 325 TABLET, FILM COATED ORAL at 06:31

## 2024-01-28 RX ADMIN — Medication 2.5 MG: at 03:26

## 2024-01-28 RX ADMIN — AMLODIPINE BESYLATE 2.5 MG: 2.5 TABLET ORAL at 09:14

## 2024-01-28 RX ADMIN — ENOXAPARIN SODIUM 30 MG: 30 INJECTION SUBCUTANEOUS at 09:14

## 2024-01-28 RX ADMIN — POLYETHYLENE GLYCOL 3350 17 G: 17 POWDER, FOR SOLUTION ORAL at 09:14

## 2024-01-28 NOTE — DISCHARGE SUMMARY
"[facility]  Discharge- Liam Hall 1940, 83 y.o. male MRN: [id number]  [facility] Encounter: [id number]  Primary Care Provider: Frank Lombardi, DO   Date and time admitted to hospital: 1/26/2024  1:39 PM    * Closed traumatic fracture of ribs of right side with hemo/pneumothorax  Assessment & Plan  Patient presented after a fall while fixing a blind  He had a CT CAP showing:  \"Fractures of the right 7th-12th ribs including displacement of the 9th rib and angulation of the 10th rib, and small right hemopneumothorax...\" \"...Groundglass in the periphery of the partially   atelectatic right middle and lower lobe is due to a combination of contusion and atelectasis.\"  He had follow up CXR after 6 and 10 hours showing no change     Plan:  Continue pain control  IS/Pulm Toilet Encouragement of deep breathing and coughing                                     Incentive Spirometer: Incentive Spirometry Achieved (mL): 2500 mL   CXR and trauma follow up      Acute pain due to trauma  Assessment & Plan  Inpatient Regimen :  APAP 975 mg Q8H  Lidoderm patch   Oxycodone 2.5 mg Q4 PRN moderate pain doses: 3  Oxycodone 5 mg Q4 PRN severe pain doses: 0  Dilaudid 0.2 Q2 PRN breakthrough pain: 0 doses  Plan to D/C on short course of oxycodone, recommend tylenol and Lidoderm.    Ground-level fall  Assessment & Plan  PT/OT- no needs identified   Likely discharge home today    Drug-induced platelet disorder  Assessment & Plan  Continue aspirin     BPH (benign prostatic hyperplasia)  Assessment & Plan  Continue home proscar and flomax    Mixed hyperlipidemia  Assessment & Plan  Continue Home Statin     Benign essential hypertension  Assessment & Plan  Home Norvasc 2.5 mg PO    Coronary artery disease involving native coronary artery of native heart without angina pectoris  Assessment & Plan  Home  ASA              Medical Problems       Resolved Problems  Date Reviewed: 1/28/2024      " "      Resolved    GERD (gastroesophageal reflux disease) 1/27/2024     Resolved by  Marcelino Briggs PA-C    Delirium 1/28/2024     Resolved by  Henrique Hou PA-C          Admission Date:   Admission Orders (From admission, onward)       Ordered        01/26/24 1429  Inpatient Admission  Once                            Admitting Diagnosis: Fall [W19.XXXA]  Multiple rib fractures [S22.49XA]    HPI: Per Dr Acharya \" Liam Hall is a 83 y.o. male who presents with pain to his right flank s/p fall sustained on 1/25. 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 He slipped over the couch dressing and had a ground level fall on to his right side. No head strike or loss of consciousness. He was initially able to get up but had progressive pain over the next day localized to his right chest. He went to the ed in [place] and was found to have fractures of 7th -12th ribs and small right sided hemopneumothorax on chest ct. He is transferred to [facility] for further management.      Procedures Performed: No orders of the defined types were placed in this encounter.      Summary of Hospital Course: Patient was admitted to the intermediate critical care unit for further observation and monitoring.  Patient's pain was well-controlled on oral regimen.  Patient remained hemodynamically stable on room air.  Patient was seen by physical therapy and Occupational Therapy with no needs.  Patient to be discharged on oral analgesics with follow-up to the trauma service.    Condition at Discharge: good         Discharge instructions/Information to patient and family:   See after visit summary for information provided to patient and family.      Provisions for Follow-Up Care:  See after visit summary for information related to follow-up care and any pertinent home health orders.      PCP: Frank Lombardi, DO    Disposition: See After Visit Summary for discharge disposition information.    Planned Readmission: No    Discharge " Statement   I spent 30 minutes discharging the patient. This time was spent on the day of discharge. I had direct contact with the patient on the day of discharge. Additional documentation is required if more than 30 minutes were spent on discharge.     Discharge Medications:  See after visit summary for reconciled discharge medications provided to patient and family.

## 2024-01-28 NOTE — ASSESSMENT & PLAN NOTE
Did not sleep well overnight, this AM not aware of events that brought him into hospital and unclear what year;  Strict environmental controls:   OOB for all meals   Lights on and blinds up during day.   Frequent reorientation   Lights and TV off and blinds down at night   Minimize interuptions at night as clincially indicated   Melatonin 6 mg QHS ordered  Likely will improve once back in his routine at home;

## 2024-01-28 NOTE — PROGRESS NOTES
"[facility]  Progress Note  Name: Liam Hall I  MRN: [id number]  Unit/Bed#: [facility] 201-01 I Date of Admission: 1/26/2024   Date of Service: 1/28/2024 I Hospital Day: 2    Assessment/Plan   * Closed traumatic fracture of ribs of right side with hemo/pneumothorax  Assessment & Plan  Patient presented after a fall while fixing a blind  He had a CT CAP showing:  \"Fractures of the right 7th-12th ribs including displacement of the 9th rib and angulation of the 10th rib, and small right hemopneumothorax...\" \"...Groundglass in the periphery of the partially   atelectatic right middle and lower lobe is due to a combination of contusion and atelectasis.\"  He had follow up CXR after 6 and 10 hours showing no change     Plan:  Continue multimodal pain control as below  Follow up AM CXR  IS/Pulm Toilet Encouragement of deep breathing and coughing.    SPO2 RA Rest      Flowsheet Row ED to Hosp-Admission (Current) from 1/26/2024 in [facility] Intermediate Critical Care Unit   SpO2 94 %   SpO2 Activity At Rest   O2 Device None (Room air)   O2 Flow Rate --                                                                    Incentive Spirometer: Incentive Spirometry Achieved (mL): 1500 mL   Will need CXR in 1 week and trauma follow up      Delirium  Assessment & Plan  Did not sleep well overnight, this AM not aware of events that brought him into hospital and unclear what year;  Strict environmental controls:   OOB for all meals   Lights on and blinds up during day.   Frequent reorientation   Lights and TV off and blinds down at night   Minimize interuptions at night as clincially indicated   Melatonin 6 mg QHS ordered  Likely will improve once back in his routine at home;     Acute pain due to trauma  Assessment & Plan  Scheduled:  APAP 975 mg Q8H  Lidoderm patch   PRN  Oxycodone 2.5 mg Q4 PRN moderate pain doses: 3  Oxycodone 5 mg Q4 PRN severe pain " "doses: 0  Dilaudid 0.2 Q2 PRN breakthrough pain: 0 doses  Plan to D/C on short course of oxycodone    Ground-level fall  Assessment & Plan  PT/OT  Likely discharge home today    Drug-induced platelet disorder  Assessment & Plan  Continue aspirin     BPH (benign prostatic hyperplasia)  Assessment & Plan  Continue proscar and flomax    Mixed hyperlipidemia  Assessment & Plan  Continue Atorvastatin     Benign essential hypertension  Assessment & Plan  Norvasc 2.5 mg PO    Coronary artery disease involving native coronary artery of native heart without angina pectoris  Assessment & Plan  Hold aspirin as above              Bowel Regimen: Senokot  VTE Prophylaxis:Enoxaparin (Lovenox)     Disposition: Discharge home if cleared by PT/OT    Subjective   Chief Complaint: \"I slept well\"    Subjective:   Patient feels well. No complaints, pain well controlled     Objective   Vitals:   Temp:  [97.5 °F (36.4 °C)-98.1 °F (36.7 °C)] 98 °F (36.7 °C)  HR:  [62-76] 62  Resp:  [16-18] 18  BP: (128-164)/(59-73) 128/59    I/O         01/26 0701  01/27 0700 01/27 0701  01/28 0700    P.O.  300    Total Intake(mL/kg)  300 (4.5)    Urine (mL/kg/hr) 1450 1575 (1)    Total Output 1450 1575    Net -1450 -1275                   Physical Exam:   Physical Exam  Vitals and nursing note reviewed.   Eyes:      Conjunctiva/sclera: Conjunctivae normal.      Pupils: Pupils are equal, round, and reactive to light.   Skin:     General: Skin is warm and dry.   HENT:      Head: Normocephalic and atraumatic.      Mouth/Throat:      Mouth: Mucous membranes are moist.   Cardiovascular:      Rate and Rhythm: Normal rate and regular rhythm.   Musculoskeletal:         General: Normal range of motion.   Abdominal: General: There is no distension.      Palpations: Abdomen is soft.      Tenderness: There is no abdominal tenderness.   Constitutional:       Appearance: He is well-developed and well-nourished.   Pulmonary:      Effort: Pulmonary effort is normal.      " "Breath sounds: Normal breath sounds. No wheezing, rhonchi or rales.   Neurological:      General: No focal deficit present.      Mental Status: He is alert. Mental status is at baseline.      Motor: Strength full and intact in all extremities.      Comments: Oriented to person an place but not year and needed to be reminded why he was in hospital          Invasive Devices       Peripheral Intravenous Line  Duration             Peripheral IV 01/27/24 Left;Ventral (anterior) Forearm <1 day                      Lab Results: BMP/CMP:   Lab Results   Component Value Date    SODIUM 138 01/27/2024    K 3.9 01/27/2024     01/27/2024    CO2 25 01/27/2024    BUN 15 01/27/2024    CREATININE 0.81 01/27/2024    CALCIUM 9.1 01/27/2024    EGFR 81 01/27/2024   , CBC:   Lab Results   Component Value Date    WBC 5.43 01/27/2024    HGB 10.5 (L) 01/27/2024    HCT 30.5 (L) 01/27/2024     (H) 01/27/2024     (L) 01/27/2024     (L) 01/27/2024    RBC 2.93 (L) 01/27/2024    MCH 35.8 (H) 01/27/2024    MCHC 34.4 01/27/2024    RDW 13.9 01/27/2024    MPV 10.5 01/27/2024    MPV 10.8 01/27/2024   , AST: No results found for: \"AST\", and ALT: No results found for: \"ALT\"  Imaging: I have personally reviewed pertinent films in PACS   Other Studies: N/A       "

## 2024-01-28 NOTE — DISCHARGE INSTR - AVS FIRST PAGE
Traumatic Rib Fracture Discharge Instructions:    Your rib fractures will take time to heal. Rib fractures typically take at least 6-8 weeks to heal and may take longer.    Activity:  - Continue PT and OT evaluation and treatment as indicated.  - Walking and normal light activities are encouraged. Normal daily activities including climbing steps are okay.  - Avoid lifting greater than 10 pounds, any strenuous activities and/or exercise, and contact sports until cleared by the trauma service.  - Continue using the incentive spirometer at least 10 times every hour while awake.    Medications:    - You should continue your current medication regimen after discharge unless otherwise instructed. Please refer to your discharge medication list for further details.  - Please take the pain medications as directed.  - You are encouraged to use non-narcotic pain medications first and whenever possible. Reserve the use of narcotic pain medication for moderate to severe pain not controlled by non-narcotic medications.  - No driving while taking narcotic pain medications.  - You may become constipated, especially if taking pain medications. You may take any over the counter stool softeners or laxatives as needed. Examples: Milk of Magnesia, Colace, Senna.    Additional Instructions:  - Please have repeat chest x-ray in 7 to 10 days for reevaluation of your rib fractures and hemopneumothorax.  Results to the trauma clinic at phone: [phone] and fax: [phone].  - If you have any questions or concerns after discharge please call the office.  - Call office or return to ER if fever greater than 101, chills, worsening/uncontrollable pain, develop productive cough, increasing shortness of breath, and/or difficulty breathing.

## 2024-01-28 NOTE — OCCUPATIONAL THERAPY NOTE
Occupational Therapy Evaluation     Patient Name: Liam Hall  Today's Date: 1/28/2024  Problem List  Principal Problem:    Closed traumatic fracture of ribs of right side with hemo/pneumothorax  Active Problems:    Coronary artery disease involving native coronary artery of native heart without angina pectoris    Benign essential hypertension    Mixed hyperlipidemia    BPH (benign prostatic hyperplasia)    Ground-level fall    Acute pain due to trauma    Drug-induced platelet disorder    Past Medical History  Past Medical History:   Diagnosis Date    Arthritis     CAD (coronary artery disease)     Cataract     Colon polyp     COVID-19     GERD (gastroesophageal reflux disease)     Heart attack (HCC)     Herpes zoster     Hx of colonic polyps     Hyperlipidemia     Hypertension      Past Surgical History  Past Surgical History:   Procedure Laterality Date    BACK SURGERY      disc removed 1985 / R....1985      CATARACT EXTRACTION      COLONOSCOPY      CORONARY ARTERY BYPASS GRAFT  11/13/2014    LA....6/30/16   & CABG X3 with LIMA to LAD <SVG to OM-2, SVG to PDA  [place] Cortez Wheeler / la....12/19/14       EYE SURGERY      cataract /  R....1980      FL RETROGRADE PYELOGRAM  1/13/2022    HERNIA REPAIR      Groin / R....1988      HIP SURGERY      1984 /  R....1984       INGUINAL HERNIA REPAIR      R....2010      JOINT REPLACEMENT  1985     right hip    MN CYSTO W/IRRIG & EVAC MULTPLE OBSTRUCTING CLOTS N/A 5/18/2021    Procedure: CYSTOSCOPY EVACUATION OF CLOTS WITH  FULGURATION OF PROSTATE;  Surgeon: Randy Driscoll MD;  [facility];  Service: Urology    MN TRURL ELECTROSURG RESCJ PROSTATE BLEED COMPLETE N/A 6/4/2019    Procedure: CYSTOSCOPY, TRANSURETHRAL RESECTION OF PROSTATE (TURP);  Surgeon: Randy Driscoll MD;  Location: [facility] OR;  Service: Urology    TRANSURETHRAL RESECTION OF PROSTATE Bilateral 1/13/2022    Procedure: CYSTOSCOPY, TRANSURETHRAL RESECTION OF PROSTATE (TURP),  RETROGRADE, PYELOGRAM;  Surgeon: Epi Small MD;  Location: [facility];  Service: Urology      01/28/24 1005   OT Last Visit   OT Visit Date 01/28/24   Pain Assessment   Pain Assessment Tool 0-10   Restrictions/Precautions   Weight Bearing Precautions Per Order No   Other Precautions Cognitive;Chair Alarm;Bed Alarm;Fall Risk;Telemetry   Home Living   Type of Home House   Home Layout Two level;Stairs to enter with rails   Bathroom Shower/Tub Tub/shower unit   Bathroom Toilet Standard   Bathroom Accessibility Accessible   Prior Function   Level of [place] Independent with ADLs;Independent with IADLS   Lives With Spouse;Family;Son   Receives Help From Family   IADLs Independent with driving;Independent with meal prep;Independent with medication management   Falls in the last 6 months 0   Vocational Retired   Lifestyle   Autonomy I with ADL's/IALd's, no AD with functional mobility +drives   Reciprocal Relationships spouse, son   Service to Others retired   Intrinsic Gratification walking   General   Family/Caregiver Present Yes  (spouse, son, granddaughter)   ADL   Eating Assistance 7  Independent   Grooming Assistance 7  Independent   UB Bathing Assistance 5  Supervision/Setup   LB Bathing Assistance 5  Supervision/Setup   UB Dressing Assistance 5  Supervision/Setup   LB Dressing Assistance 4  Minimal Assistance   Toileting Assistance  5  Supervision/Setup   Bed Mobility   Supine to Sit Unable to assess   Sit to Supine Unable to assess   Transfers   Sit to Stand 5  Supervision   Stand to Sit 5  Supervision   Additional Comments no AD with functional transfers   Functional Mobility   Functional Mobility 5  Supervision   Additional Comments S with household distance functional mobility no overt LOB.   Balance   Static Sitting Fair +   Dynamic Sitting Fair   Static Standing Fair -   Dynamic Standing Fair -   Ambulatory Poor -   Activity Tolerance   Activity Tolerance Patient tolerated treatment well   Medical  Staff Made Aware PT [place] due to the patient's co-morbidities, clinically unstable presentation, and present impairments which are a regression from the patient's baseline.    RUE Assessment   RUE Assessment WFL   Hand Function   Gross Motor Coordination Functional   Fine Motor Coordination Functional   Cognition   Arousal/Participation Alert;Cooperative   Attention Within functional limits   Orientation Level Oriented to person   Memory Decreased recall of precautions;Decreased recall of recent events   Following Commands Follows one step commands with increased time or repetition   Comments pt pleasant and cooperative, pt has 24/7 at baseline with family assistance.   Assessment   Assessment Pt is a 83 y.o. male who was admitted to [facility] on 1/26/2024 with fall and now here with  Closed traumatic fracture of ribs of right side with pneumothorax . Patient  At baseline pt was completing S with ADL's bathing, S with functional mobility RW . Pt lives spouse, son in a SH. Currently pt requires  for overall ADLS and  for functional mobility/transfers. Pt currently presents with impairments in the following categories -difficulty performing ADLS and difficulty performing IADLS  activity tolerance and endurance. These impairments, as well as pt's fatigue  limit pt's ability to safely engage in all baseline areas of occupation, includingbathing, dressing, functional mobility/transfers, and community mobility From OT standpoint, recommend home with increased assistance, family reports can assist PRN , spouse retired and can assist PRN upon D/C. The patient's raw score on the AM-PAC Daily Activity Inpatient Short Form is 20. A raw score of less than 19 suggests the patient may benefit from discharge to home. Please refer to the recommendation of the Occupational Therapist for safe discharge planning.No further acute OT needs indicated at this time - Anticipate d/c home with family support when medically cleared  - d/c from caseload     Discharge Recommendation   Rehab Resource Intensity Level, OT No post-acute rehabilitation needs   AM-PAC Daily Activity Inpatient   Lower Body Dressing 3   Bathing 3   Toileting 3   Upper Body Dressing 3   Grooming 4   Eating 4   Daily Activity Raw Score 20   Daily Activity Standardized Score (Calc for Raw Score >=11) 42.03   AM-PAC Applied Cognition Inpatient   Following a Speech/Presentation 3   Understanding Ordinary Conversation 4   Taking Medications 2   Remembering Where Things Are Placed or Put Away 3   Remembering List of 4-5 Errands 2   Taking Care of Complicated Tasks 2   Applied Cognition Raw Score 16   Applied Cognition Standardized Score 35.03   End of Consult   Education Provided Yes;Family or social support of family present for education by provider  (safe shower transfers, home safety and use of DME, pt's son receptive.)   Patient Position at End of Consult Bed/Chair alarm activated;All needs within reach;Bedside chair;Other (comment)  (spouse, son, nirali)   Nurse Communication Nurse aware of consult      Courtney MARTINEZ, OTR/L

## 2024-01-28 NOTE — ASSESSMENT & PLAN NOTE
Inpatient Regimen :  APAP 975 mg Q8H  Lidoderm patch   Oxycodone 2.5 mg Q4 PRN moderate pain doses: 3  Oxycodone 5 mg Q4 PRN severe pain doses: 0  Dilaudid 0.2 Q2 PRN breakthrough pain: 0 doses  Plan to D/C on short course of oxycodone, recommend tylenol and Lidoderm.

## 2024-01-28 NOTE — CASE MANAGEMENT
Case Management Discharge Planning Note    Patient name Liam Hall  Location [facility]- MRN [id number]  : 1940 Date 2024       Current Admission Date: 2024  Current Admission Diagnosis:Closed traumatic fracture of ribs of right side with hemo/pneumothorax   Patient Active Problem List    Diagnosis Date Noted    Ground-level fall 2024    Closed traumatic fracture of ribs of right side with hemo/pneumothorax 2024    Acute pain due to trauma 2024    Drug-induced platelet disorder 2024    Delirium 2024    Primary osteoarthritis of left hip 2023    Malignant neoplasm of urinary bladder, unspecified site ([facility]) 2023    Gross hematuria 2021    Leukocytosis 2021    Anemia 2021    Thrombocytopenia ([facility]) 2021    BPH (benign prostatic hyperplasia) 2021    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    External hemorrhoids 2020    Osteopenia of multiple sites 2020    Sensorineural hearing loss (SNHL) 2020    Constipation 2020    S/P TURP 2019    Urinary retention 2019    Benign essential hypertension     Mixed hyperlipidemia     Nocturnal enuresis 2019    Benign prostatic hyperplasia with nocturia 2019    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/22/2015    NSTEMI (non-ST elevated myocardial infarction) ([facility]) 10/22/2015    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    MTHFR mutation 03/10/2015    Vitamin D deficiency 03/10/2015    Coronary artery disease involving native coronary artery of native heart without angina pectoris 2014      LOS (days): 2  Geometric Mean LOS (GMLOS) (days): 3.1  Days to GMLOS:1.2     OBJECTIVE:  Risk of Unplanned Readmission Score: 11.92         Current admission status: Inpatient   Preferred Pharmacy:   [id number] - [place] - [address]  [address] [address]  Phone: [phone] Fax: [phone]    Primary Care Provider: Frank Lombardi,  DO    Primary Insurance: [facility]  Secondary Insurance:     DISCHARGE DETAILS:    Pt was evaluated by OT/PT and recommended for a home d/c with no needs.   CM will continue to follow for other d/c needs.

## 2024-01-28 NOTE — ASSESSMENT & PLAN NOTE
"Patient presented after a fall while fixing a blind  He had a CT CAP showing:  \"Fractures of the right 7th-12th ribs including displacement of the 9th rib and angulation of the 10th rib, and small right hemopneumothorax...\" \"...Groundglass in the periphery of the partially   atelectatic right middle and lower lobe is due to a combination of contusion and atelectasis.\"  He had follow up CXR after 6 and 10 hours showing no change     Plan:  Continue pain control  IS/Pulm Toilet Encouragement of deep breathing and coughing                                     Incentive Spirometer: Incentive Spirometry Achieved (mL): 2500 mL   CXR and trauma follow up    "

## 2024-01-28 NOTE — ASSESSMENT & PLAN NOTE
"Patient presented after a fall while fixing a blind  He had a CT CAP showing:  \"Fractures of the right 7th-12th ribs including displacement of the 9th rib and angulation of the 10th rib, and small right hemopneumothorax...\" \"...Groundglass in the periphery of the partially   atelectatic right middle and lower lobe is due to a combination of contusion and atelectasis.\"  He had follow up CXR after 6 and 10 hours showing no change     Plan:  Continue multimodal pain control as below  Follow up AM CXR  IS/Pulm Toilet Encouragement of deep breathing and coughing.    SPO2 RA Rest      Flowsheet Row ED to Hosp-Admission (Current) from 1/26/2024 in [facility] Intermediate Critical Care Unit   SpO2 94 %   SpO2 Activity At Rest   O2 Device None (Room air)   O2 Flow Rate --                                                                    Incentive Spirometer: Incentive Spirometry Achieved (mL): 1500 mL   Will need CXR in 1 week and trauma follow up    "

## 2024-01-28 NOTE — PHYSICAL THERAPY NOTE
Physical Therapy Evaluation    Patient's Name: Liam Hall    Admitting Diagnosis  Fall [W19.XXXA]  Multiple rib fractures [S22.49XA]    Problem List  Patient Active Problem List   Diagnosis    Hx of CABG    Coronary artery disease involving native coronary artery of native heart without angina pectoris    Elevated homocysteine    MTHFR mutation    NSTEMI (non-ST elevated myocardial infarction) (HCC)    Vitamin D deficiency    Nocturnal enuresis    Benign prostatic hyperplasia with nocturia    Benign essential hypertension    Mixed hyperlipidemia    Urinary retention    S/P TURP    Sensorineural hearing loss (SNHL)    Constipation    Osteopenia of multiple sites    External hemorrhoids    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HCC)    Gross hematuria    Leukocytosis    Anemia    Thrombocytopenia (HCC)    BPH (benign prostatic hyperplasia)    Malignant neoplasm of urinary bladder, unspecified site (HCC)    Primary osteoarthritis of left hip    Ground-level fall    Closed traumatic fracture of ribs of right side with hemo/pneumothorax    Acute pain due to trauma    Drug-induced platelet disorder       Past Medical History  Past Medical History:   Diagnosis Date    Arthritis     CAD (coronary artery disease)     Cataract     Colon polyp     COVID-19     GERD (gastroesophageal reflux disease)     Heart attack (HCC)     Herpes zoster     Hx of colonic polyps     Hyperlipidemia     Hypertension        Past Surgical History  Past Surgical History:   Procedure Laterality Date    BACK SURGERY      disc removed 1985 / R....1985      CATARACT EXTRACTION      COLONOSCOPY      CORONARY ARTERY BYPASS GRAFT  11/13/2014    LA....6/30/16   & CABG X3 with LIMA to LAD <SVG to OM-2, SVG to PDA  [place] Cortez Wheeler / la....12/19/14       EYE SURGERY      cataract /  R....1980      FL RETROGRADE PYELOGRAM  1/13/2022    HERNIA REPAIR      Groin / R....1988      HIP SURGERY      1984 /  R....1984       INGUINAL HERNIA REPAIR       R....2010      JOINT REPLACEMENT  1985     right hip    DC CYSTO W/IRRIG & EVAC MULTPLE OBSTRUCTING CLOTS N/A 5/18/2021    Procedure: CYSTOSCOPY EVACUATION OF CLOTS WITH  FULGURATION OF PROSTATE;  Surgeon: Randy Driscoll MD;  Location: [facility];  Service: Urology    DC TRURL ELECTROSURG RESCJ PROSTATE BLEED COMPLETE N/A 6/4/2019    Procedure: CYSTOSCOPY, TRANSURETHRAL RESECTION OF PROSTATE (TURP);  Surgeon: Randy Driscoll MD;  Location: [facility];  Service: Urology    TRANSURETHRAL RESECTION OF PROSTATE Bilateral 1/13/2022    Procedure: CYSTOSCOPY, TRANSURETHRAL RESECTION OF PROSTATE (TURP), RETROGRADE, PYELOGRAM;  Surgeon: Epi Small MD;  Location: [facility];  Service: Urology        01/28/24 1130   PT Last Visit   PT Visit Date 01/28/24   Note Type   Note type Evaluation   Pain Assessment   Pain Assessment Tool 0-10   Pain Score 5   Pain Location/Orientation Orientation: Right;Location: [facility] Pain Intervention(s) Ambulation/increased activity  (offered CP but pt declined)   Restrictions/Precautions   Weight Bearing Precautions Per Order No   Other Precautions Cognitive;Chair Alarm;Bed Alarm;Telemetry;Fall Risk;Pain  (rib fx protocol)   Home Living   Type of Home House   Home Layout One level  (1 TALYA)   Bathroom Shower/Tub Walk-in shower   Bathroom Toilet Standard   Prior Function   Level of [place] Independent with ADLs;Independent with functional mobility;Independent with IADLS  (I ambulation w/o AD)   Lives With Spouse;Son   Receives Help From Family   IADLs Independent with driving;Independent with medication management;Family/Friend/Other provides meals   Falls in the last 6 months 1 to 4  (1 leading to admission)   General   Family/Caregiver Present Yes   Cognition   Arousal/Participation Alert   Orientation Level Oriented to person;Oriented to place;Oriented to situation;Disoriented to time  (correct year w/ cues, incorrect month)   Comments pleasant + cooperative    Subjective   Subjective Agreeable to mobilize.   RLE Assessment   RLE Assessment   (grossly 4-/5)   LLE Assessment   LLE Assessment   (grossly 4-/5)   Bed Mobility   Supine to Sit 5  Supervision   Sit to Supine Unable to assess   Additional Comments Pt greeted in supine.   Transfers   Sit to Stand 5  Supervision   Stand to Sit 5  Supervision   Additional Comments no AD   Ambulation/Elevation   Gait pattern Antalgic;Excessively slow   Gait Assistance 5  Supervision   Assistive Device None   Distance 125'   Balance   Static Sitting Good   Dynamic Sitting Fair +   Static Standing Fair   Dynamic Standing Fair -   Ambulatory Fair -   Endurance Deficit   Endurance Deficit No   Activity Tolerance   Activity Tolerance Patient tolerated treatment well   Medical Staff Made Aware OT Jessica, PANCHO Booth, CM Will   Nurse Made Aware yes - cleared + updated   Assessment   Prognosis Excellent   Problem List Pain;Impaired balance;Decreased mobility;Decreased cognition   Assessment Pt is 83 y.o. male seen for a PT evaluation s/p admit to [facility] on 1/26/2024. Pt presenting s/p fall while attempting to manage the blinds in his house w/ right-sided rib fx. Please see above for other active problem list / PMH.     PT now consulted to assess functional mobility and needs for safe d/c planning. Prior to admission, pt was I w/ ambulation w/o AD, lives w/ spouse + son in a 1SH w/ 1 TALYA.     Currently pt is S for bed skills; S for functional transfers; S for ambulation w/o AD.     Explained rib fx protocol and to sit up for the majority of the day / ambulate q hour.     Pt's son verbalized that he could provide supervision when pt is mobilizing. PT is currently recommending d/c home w/ family assistance.   Barriers to Discharge None   Goals   Patient Goals go home   Plan   PT Frequency   (d/c PT)   Discharge Recommendation   Rehab Resource Intensity Level, PT No post-acute rehabilitation needs   AM-PAC Basic Mobility Inpatient    Turning in Flat Bed Without Bedrails 3   Lying on Back to Sitting on Edge of Flat Bed Without Bedrails 3   Moving Bed to Chair 3   Standing Up From Chair Using Arms 3   Walk in Room 3   Climb 3-5 Stairs With Railing 3   Basic Mobility Inpatient Raw Score 18   Basic Mobility Standardized Score 41.05   Highest Level Of Mobility   JH-HLM Goal 6: Walk 10 steps or more   JH-HLM Achieved 7: Walk 25 feet or more   End of Consult   Patient Position at End of Consult Bedside chair;Bed/Chair alarm activated;All needs within reach  (on waffle cushion, family at bedside, pt declined CP - educated to use prn at home if pain persists)     Mariana Murphy, PT, DPT

## 2024-01-28 NOTE — PLAN OF CARE
Problem: PAIN - ADULT  Goal: Verbalizes/displays adequate comfort level or baseline comfort level  Description: Interventions:  - Encourage patient to monitor pain and request assistance  - Assess pain using appropriate pain scale  - Administer analgesics based on type and severity of pain and evaluate response  - Implement non-pharmacological measures as appropriate and evaluate response  - Consider cultural and social influences on pain and pain management  - Notify physician/advanced practitioner if interventions unsuccessful or patient reports new pain  Outcome: Progressing     Problem: INFECTION - ADULT  Goal: Absence or prevention of progression during hospitalization  Description: INTERVENTIONS:  - Assess and monitor for signs and symptoms of infection  - Monitor lab/diagnostic results  - Monitor all insertion sites, i.e. indwelling lines, tubes, and drains  - Monitor endotracheal if appropriate and nasal secretions for changes in amount and color  - [place] appropriate cooling/warming therapies per order  - Administer medications as ordered  - Instruct and encourage patient and family to use good hand hygiene technique  - Identify and instruct in appropriate isolation precautions for identified infection/condition  Outcome: Progressing  Goal: Absence of fever/infection during neutropenic period  Description: INTERVENTIONS:  - Monitor WBC    Outcome: Progressing     Problem: SAFETY ADULT  Goal: Patient will remain free of falls  Description: INTERVENTIONS:  - Educate patient/family on patient safety including physical limitations  - Instruct patient to call for assistance with activity   - Consult OT/PT to assist with strengthening/mobility   - Keep Call bell within reach  - Keep bed low and locked with side rails adjusted as appropriate  - Keep care items and personal belongings within reach  - Initiate and maintain comfort rounds  - Make Fall Risk Sign visible to staff  - Offer Toileting every  Hours,  in advance of need  - Initiate/Maintain alarm  - Obtain necessary fall risk management equipment:   - Apply yellow socks and bracelet for high fall risk patients  - Consider moving patient to room near nurses station  Outcome: Progressing  Goal: Maintain or return to baseline ADL function  Description: INTERVENTIONS:  -  Assess patient's ability to carry out ADLs; assess patient's baseline for ADL function and identify physical deficits which impact ability to perform ADLs (bathing, care of mouth/teeth, toileting, grooming, dressing, etc.)  - Assess/evaluate cause of self-care deficits   - Assess range of motion  - Assess patient's mobility; develop plan if impaired  - Assess patient's need for assistive devices and provide as appropriate  - Encourage maximum independence but intervene and supervise when necessary  - Involve family in performance of ADLs  - Assess for home care needs following discharge   - Consider OT consult to assist with ADL evaluation and planning for discharge  - Provide patient education as appropriate  Outcome: Progressing  Goal: Maintains/Returns to pre admission functional level  Description: INTERVENTIONS:  - Perform AM-PAC 6 Click Basic Mobility/ Daily Activity assessment daily.  - Set and communicate daily mobility goal to care team and patient/family/caregiver.   - Collaborate with rehabilitation services on mobility goals if consulted  - Perform Range of Motion  times a day.  - Reposition patient every  hours.  - Dangle patient  times a day  - Stand patient  times a day  - Ambulate patient  times a day  - Out of bed to chair  times a day   - Out of bed for meals times a day  - Out of bed for toileting  - Record patient progress and toleration of activity level   Outcome: Progressing     Problem: DISCHARGE PLANNING  Goal: Discharge to home or other facility with appropriate resources  Description: INTERVENTIONS:  - Identify barriers to discharge w/patient and caregiver  - Arrange for  needed discharge resources and transportation as appropriate  - Identify discharge learning needs (meds, wound care, etc.)  - Arrange for interpretive services to assist at discharge as needed  - Refer to Case Management Department for coordinating discharge planning if the patient needs post-hospital services based on physician/advanced practitioner order or complex needs related to functional status, cognitive ability, or social support system  Outcome: Progressing     Problem: Knowledge Deficit  Goal: Patient/family/caregiver demonstrates understanding of disease process, treatment plan, medications, and discharge instructions  Description: Complete learning assessment and assess knowledge base.  Interventions:  - Provide teaching at level of understanding  - Provide teaching via preferred learning methods  Outcome: Progressing     Problem: Prexisting or High Potential for Compromised Skin Integrity  Goal: Skin integrity is maintained or improved  Description: INTERVENTIONS:  - Identify patients at risk for skin breakdown  - Assess and monitor skin integrity  - Assess and monitor nutrition and hydration status  - Monitor labs   - Assess for incontinence   - Turn and reposition patient  - Assist with mobility/ambulation  - Relieve pressure over bony prominences  - Avoid friction and shearing  - Provide appropriate hygiene as needed including keeping skin clean and dry  - Evaluate need for skin moisturizer/barrier cream  - Collaborate with interdisciplinary team   - Patient/family teaching  - Consider wound care consult   Outcome: Progressing

## 2024-01-28 NOTE — ASSESSMENT & PLAN NOTE
Scheduled:  APAP 975 mg Q8H  Lidoderm patch   PRN  Oxycodone 2.5 mg Q4 PRN moderate pain doses: 3  Oxycodone 5 mg Q4 PRN severe pain doses: 0  Dilaudid 0.2 Q2 PRN breakthrough pain: 0 doses  Plan to D/C on short course of oxycodone

## 2024-01-29 ENCOUNTER — TRANSITIONAL CARE MANAGEMENT (OUTPATIENT)
Dept: FAMILY MEDICINE CLINIC | Facility: CLINIC | Age: 84
End: 2024-01-29

## 2024-01-29 DIAGNOSIS — N39.44 NOCTURNAL ENURESIS: ICD-10-CM

## 2024-01-29 NOTE — UTILIZATION REVIEW
NOTIFICATION OF ADMISSION DISCHARGE   This is a Notification of Discharge from [facility]. Please be advised that this patient has been discharge from our facility. Below you will find the admission and discharge date and time including the patient’s disposition.   UTILIZATION REVIEW CONTACT:  Art Coffman  Utilization   Network Utilization Review Department  Phone: [phone] x carefully listen to the prompts. All voicemails are confidential.  Email: [email or web address]     ADMISSION INFORMATION  PRESENTATION DATE: 1/26/2024  1:39 PM  OBERVATION ADMISSION DATE:   INPATIENT ADMISSION DATE: 1/26/24  2:29 PM   DISCHARGE DATE: 1/28/2024  2:12 PM   DISPOSITION:Home/Self Care    Network Utilization Review Department  ATTENTION: Please call with any questions or concerns to [phone] and carefully listen to the prompts so that you are directed to the right person. All voicemails are confidential.   For Discharge needs, contact Care Management DC Support Team at [phone] opt. 2  Send all requests for admission clinical reviews, approved or denied determinations and any other requests to dedicated fax number below belonging to the campus where the patient is receiving treatment. List of dedicated fax numbers for the Facilities:  FACILITY NAME UR FAX NUMBER   ADMISSION DENIALS (Administrative/Medical Necessity) [phone]   DISCHARGE SUPPORT TEAM ([facility]) [phone]   PARENT CHILD HEALTH (Maternity/NICU/Pediatrics) [phone]   [facility] [phone]   [facility] [phone]   [facility] [phone]   [facility] [phone]   [facility] [phone]   [facility] [phone]   [facility] [phone]   [facility] [phone]   [facility]  [facility] [phone]   Critical access hospital [phone]   [facility] [phone]   [facility] [phone]

## 2024-01-29 NOTE — UTILIZATION REVIEW
NOTIFICATION OF INPATIENT ADMISSION   AUTHORIZATION REQUEST   SERVICING FACILITY:   Critical access hospital  Address: [address] [place] [address]  Tax ID: [id number]  NPI: [id number] ATTENDING PROVIDER:  Attending Name and NPI#: Marcelo Prado Do [[id number]]  Address: [address] [place] [address]  Phone: [phone]   ADMISSION INFORMATION:  Place of Service: Inpatient [facility]  Place of Service Code: 21  Inpatient Admission Date/Time: 1/26/24  2:29 PM  Discharge Date/Time: 1/28/2024  2:12 PM  Admitting Diagnosis Code/Description:  Fall [W19.XXXA]  Multiple rib fractures [S22.49XA]     UTILIZATION REVIEW CONTACT:  Art Coffman Utilization   Network Utilization Review Department  Phone: [phone]  Fax: [phone]  Email: [email or web address]  Contact for approvals/pending authorizations, clinical reviews, and discharge.     PHYSICIAN ADVISORY SERVICES:  Medical Necessity Denial & [place] Review  Phone: [phone]  Fax: [phone]  Email: [email or web address]     DISCHARGE SUPPORT TEAM:  For Patients Discharge Needs & Updates  Phone: [phone] opt. 2 Fax: [phone]  Email: [email or web address]

## 2024-01-30 ENCOUNTER — OFFICE VISIT (OUTPATIENT)
Dept: FAMILY MEDICINE CLINIC | Facility: CLINIC | Age: 84
End: 2024-01-30
Payer: COMMERCIAL

## 2024-01-30 VITALS
BODY MASS INDEX: 20.56 KG/M2 | SYSTOLIC BLOOD PRESSURE: 158 MMHG | TEMPERATURE: 97 F | HEART RATE: 66 BPM | RESPIRATION RATE: 16 BRPM | DIASTOLIC BLOOD PRESSURE: 60 MMHG | HEIGHT: 72 IN | WEIGHT: 151.8 LBS

## 2024-01-30 DIAGNOSIS — S27.0XXA CLOSED TRAUMATIC FRACTURE OF RIBS OF RIGHT SIDE WITH PNEUMOTHORAX: ICD-10-CM

## 2024-01-30 DIAGNOSIS — I10 BENIGN ESSENTIAL HYPERTENSION: ICD-10-CM

## 2024-01-30 DIAGNOSIS — E78.2 MIXED HYPERLIPIDEMIA: ICD-10-CM

## 2024-01-30 DIAGNOSIS — S22.41XA CLOSED TRAUMATIC FRACTURE OF RIBS OF RIGHT SIDE WITH PNEUMOTHORAX: ICD-10-CM
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
## 2024-01-30 DIAGNOSIS — N39.44 NOCTURNAL ENURESIS: ICD-10-CM

## 2024-01-30 PROCEDURE — 99496 TRANSJ CARE MGMT HIGH F2F 7D: CPT | Performed by: NURSE PRACTITIONER

## 2024-01-30 RX ORDER — LIDOCAINE 50 MG/G
1 PATCH TOPICAL DAILY
Qty: 20 PATCH | Refills: 0 | Status: SHIPPED | OUTPATIENT
Start: 2024-01-30

## 2024-01-30 RX ORDER — TAMSULOSIN HYDROCHLORIDE 0.4 MG/1
CAPSULE ORAL
Qty: 90 CAPSULE | Refills: 0 | Status: SHIPPED | OUTPATIENT
Start: 2024-01-30

## 2024-01-30 RX ORDER — OXYCODONE HYDROCHLORIDE AND ACETAMINOPHEN 5; 325 MG/1; MG/1
1 TABLET ORAL 2 TIMES DAILY PRN
Qty: 10 TABLET | Refills: 0 | Status: SHIPPED | OUTPATIENT
Start: 2024-01-30

## 2024-01-30 NOTE — PROGRESS NOTES
Assessment/Plan:    1. Ground-level fall  -     lidocaine (LIDODERM) 5 %; Apply 1 patch topically over 12 hours daily Remove & Discard patch within 12 hours or as directed by MD  -     oxyCODONE-acetaminophen (Percocet) 5-325 mg per tablet; Take 1 tablet by mouth 2 (two) times a day as needed for moderate pain Max Daily Amount: 2 tablets    2. Closed traumatic fracture of ribs of right side with hemo/pneumothorax  Assessment & Plan:  Denies any sob and stated that pain improved and will continue with incentive spirometry and will follow with trauma team in 2 weeks     Orders:  -     lidocaine (LIDODERM) 5 %; Apply 1 patch topically over 12 hours daily Remove & Discard patch within 12 hours or as directed by MD  -     oxyCODONE-acetaminophen (Percocet) 5-325 mg per tablet; Take 1 tablet by mouth 2 (two) times a day as needed for moderate pain Max Daily Amount: 2 tablets    3. Benign essential hypertension  Assessment & Plan:  BP elevated and as per son not taking norvasc and advised to resume that      4. Mixed hyperlipidemia  Assessment & Plan:  Complaint with statin and tolerating it well      5. Nocturnal enuresis  Assessment & Plan:  On flomax              Patient Instructions:  Supportive care discussed and advised.  Advised to RTO for any worsening and no improvement.   Follow up for no improvement and worsening of conditions.  Patient advised and educated when to see immediate medical care.    Return in about 2 months (around 3/30/2024) for Annual physical.      Future Appointments   Date Time Provider Department Center   2/13/2024  1:00 PM TRAUMA PROVIDER TRAUMA Practice-[facility]   3/29/2024 11:00 AM Frank Lombardi, DO [facility]           Subjective:      Patient ID: Liam Hall is a 83 y.o. male.    Chief Complaint   Patient presents with   • Transition of Care Management     Vicki Gusman CMA          Vitals:  /60   Pulse 66   Temp (!) 97 °F (36.1 °C)   Resp 16   Ht 6' (1.829 m)   Wt  68.9 kg (151 lb 12.8 oz)   BMI 20.59 kg/m²     HPI  Here today with son.  Patient is here to follow up after recent hospitalization. Son stated that patient was having lot of pain and took pain medications and he took one tablet instead of half tablet and finished and discussed possible complications of overuse of narcotics and will prescribe percocet for 5 days only to use only twice a day PRN as pateint stated that his pain is better now and advised to use mobic. Discussed that will not refill percocet    TCM Call     Date and time call was made  1/29/2024  9:56 AM    Hospital care reviewed  Discussed with Inpatient Physician    Patient was hospitialized at  [facility]    Date of Admission  01/26/24    Date of discharge  01/28/24    Diagnosis  Closed Trauma fracture of ribs R side with Hemo/pneumo    Disposition  Home    Were the patients medications reviewed and updated  Yes    Current Symptoms  Cough    Cough Severity  Mild      TCM Call     Post hospital issues  Reduced activity    Should patient be enrolled in anticoag monitoring?  No    Scheduled for follow up?  Not Clinically Warranted    Not clinically warranted  6/6/19 He is [facility]    Did you obtain your prescribed medications  Yes    Do you need help managing your prescriptions or medications  No    Is transportation to your appointment needed  No    I have advised the patient to call PCP with any new or worsening symptoms  Zuleima Mckeon LPN    Living Arrangements  Spouse or Significiant other    Support System  Partner; Family    The type of support provided  None    Do you have social support  Yes, as much as I need    Are you recieving any outpatient services  No    Are you recieving home care services  No    Are you using any community resources  No    Current waiver services  No    Have you fallen in the last 12 months  Yes    How many times  1    Interperter language line needed  No    Counseling   Patient; Family    Counseling topics  Diagnostic results; Prognosis                PHQ-2/9 Depression Screening    Little interest or pleasure in doing things: 0 - not at all  Feeling down, depressed, or hopeless: 0 - not at all  PHQ-2 Score: 0  PHQ-2 Interpretation: Negative depression screen             The following portions of the patient's history were reviewed and updated as appropriate: allergies, current medications, past family history, past medical history, past social history, past surgical history and problem list.      Review of Systems   HENT: Negative.     Respiratory:  Negative for apnea, chest tightness and shortness of breath.         As noted in HPI       Cardiovascular: Negative.    Gastrointestinal: Negative.    Musculoskeletal:         Using walker   Neurological: Negative.          Objective:    Social History     Tobacco Use   Smoking Status Former   • Current packs/day: 0.00   • Average packs/day: 2.0 packs/day for 30.0 years (60.0 ttl pk-yrs)   • Types: Cigarettes   • Start date: 6/15/1960   • Quit date: 1979   • Years since quittin.7   Smokeless Tobacco Never       Allergies: No Known Allergies      Current Outpatient Medications   Medication Sig Dispense Refill   • amLODIPine (NORVASC) 2.5 mg tablet take 1 tablet by mouth once daily 90 tablet 1   • Ascorbic Acid (VITAMIN C PO) Take 500 mg by mouth daily      • B Complex Vitamins (B-COMPLEX/B-12 PO) Take 1 tablet by mouth daily     • Calcium 150 MG TABS Take by mouth daily       • Cholecalciferol (VITAMIN D) 2000 units CAPS Take 1 capsule by mouth daily     • dutasteride (AVODART) 0.5 mg capsule take 1 capsule by mouth once daily 90 capsule 1   • EQ Aspirin Adult Low Dose 81 MG EC tablet Last dose 22      • lidocaine (LIDODERM) 5 % Apply 1 patch topically over 12 hours daily Remove & Discard patch within 12 hours or as directed by MD 20 patch 0   • meloxicam (MOBIC) 15 mg tablet Take 1 tablet (15 mg total) by mouth daily As  needed 90 tablet 0   • Omega-3 Fatty Acids (fish oil) 1,000 mg Take 1,000 mg by mouth daily     • oxyCODONE-acetaminophen (Percocet) 5-325 mg per tablet Take 1 tablet by mouth 2 (two) times a day as needed for moderate pain Max Daily Amount: 2 tablets 10 tablet 0   • polyethylene glycol (MIRALAX) 17 g packet Take 17 g by mouth daily     • polyethylene glycol (MIRALAX) 17 g packet Take 17 g by mouth daily 10 each 0   • rosuvastatin (CRESTOR) 20 MG tablet Take 1 tablet (20 mg total) by mouth daily at bedtime 90 tablet 3   • senna-docusate sodium (SENOKOT S) 8.6-50 mg per tablet Take 1 tablet by mouth daily at bedtime for 10 days 10 tablet 0   • senna-docusate sodium (SENOKOT-S) 8.6-50 mg per tablet Take 1 tablet by mouth daily for 7 days 7 tablet 0   • tamsulosin (FLOMAX) 0.4 mg take 1 capsule by mouth once daily with dinner 90 capsule 0     No current facility-administered medications for this visit.          Physical Exam  Constitutional:       Appearance: Normal appearance.   HENT:      Head: Normocephalic.      Nose: Nose normal.   Eyes:      Conjunctiva/sclera: Conjunctivae normal.   Cardiovascular:      Rate and Rhythm: Normal rate and regular rhythm.      Heart sounds: Normal heart sounds.   Pulmonary:      Effort: Pulmonary effort is normal.      Breath sounds: Normal breath sounds.   Chest:      Chest wall: Tenderness (midly tender on right chest but no swelling noted and symmterical expansion of chest noted) present. No swelling or edema.   Musculoskeletal:         General: No swelling or tenderness. Normal range of motion.   Skin:     General: Skin is warm and dry.      Findings: No rash.   Neurological:      Mental Status: He is alert and oriented to person, place, and time.   Psychiatric:         Mood and Affect: Mood normal.         Behavior: Behavior normal.         Thought Content: Thought content normal.         Judgment: Judgment normal.                     NAEL Tavarez

## 2024-01-30 NOTE — PATIENT INSTRUCTIONS
Oxycodone/Acetaminophen (By mouth)   Acetaminophen (t-hrrq-g-MIN-oh-fen), Oxycodone Hydrochloride (sv-b-UTY-done lisandra-droe-KLOR-flor)  Treats moderate to moderately severe pain. This medicine is a narcotic pain reliever.   Brand Name(s): Endocet, Nalocet, Percocet, Primlev, Prolate   There may be other brand names for this medicine.  When This Medicine Should Not Be Used:   This medicine is not right for everyone. Do not use it if you had an allergic reaction to acetaminophen or oxycodone, or if you have serious lung or breathing problems (including asthma, respiratory depression), or stomach or bowel blockage (including paralytic ileus).  How to Use This Medicine:   Capsule, Liquid, Tablet, Long Acting Tablet  Your doctor will tell you how much medicine to use. Do not use more than directed.  An overdose can be dangerous. Follow directions carefully so you do not get too much medicine at one time. Your doctor may also give naloxone to treat an overdose.  Oral liquid: Measure the oral liquid medicine with a marked measuring spoon, oral syringe, or medicine cup.  Swallow the extended-release tablet whole. Do not crush, break, or chew it. Do not lick or wet the tablet before placing it in your mouth. Do not give this medicine through a feeding tube.  This medicine should come with a Medication Guide. Ask your pharmacist for a copy if you do not have one.  Missed dose: If you miss a dose of this medicine, skip the missed dose and go back to your regular dosing schedule. Do not double doses.  Store the medicine in a closed container at room temperature, away from heat, moisture, and direct light. Drop off any unused narcotic medicine at a drug take-back location right away. If you do not have a drug take-back location near you, flush any unused narcotic medicine down the toilet. Check your local drug store and clinics for take-back locations. You can also check the [employer] web site for locations. Here is the link to the FDA  safe disposal of medicines website: www.fda.gov/drugs/resourcesforyou/consumers/buyingusingmedicinesafely/ensuringsafeuseofmedicine/safedisposalofmedicines/ydv381951.htm  Drugs and Foods to Avoid:   Ask your doctor or pharmacist before using any other medicine, including over-the-counter medicines, vitamins, and herbal products.  Do not use this medicine if you are using or have used an MAO inhibitor in the past 14 days.  Some medicines can affect how this medicine works. Tell your doctor if you are using any of the following:   Carbamazepine, erythromycin, ketoconazole, lamotrigine, mirtazapine, naltrexone, phenytoin, probenecid, propranolol, rifampin, ritonavir, tramadol, trazodone, zidovudine  Birth control pills  Blood pressure medicine  Diuretic (water pill)  Medicine to treat depression, anxiety, or mental health problems (including SNRIs, SSRIs, TCAs)  Phenothiazine medicine  Triptan medicine to treat migraine headaches  Tell your doctor if you use anything else that makes you sleepy. Some examples are allergy medicine, narcotic pain medicine, and alcohol. Tell your doctor if you are using buprenorphine, butorphanol, nalbuphine, pentazocine, a benzodiazepine, or a muscle relaxer (including cyclobenzaprine, metaxalone).  Do not drink alcohol while you are using this medicine. Acetaminophen can damage your liver, and your risk is higher if you also drink alcohol. Do not take acetaminophen without asking your doctor if you have 3 or more drinks of alcohol every day.  Warnings While Using This Medicine:   Tell your doctor if you are pregnant or breastfeeding, or if you have kidney disease, liver disease, heart disease, low blood pressure, breathing problems or lung disease (including COPD, sleep apnea), thyroid problems, Jake disease, pancreas or gallbladder problems, prostate problems, trouble urinating, or a stomach problems, or a history of head injury or brain damage, seizures, or alcohol or drug abuse.  Tell your doctor if you are allergic to codeine.  This medicine may cause the following problems:  High risk of overdose, which can lead to death  Respiratory depression (serious breathing problem that can be life-threatening)  Sleep-related breathing problems (including sleep apnea, sleep-related hypoxemia)  Liver problems  Serious skin reactions, including acute generalized exanthematous pustulosis, Gr-David syndrome, and toxic epidermal necrolysis  Adrenal gland problems  Low blood pressure  Seizures  Serotonin syndrome, when used with certain medicines  This medicine may make you dizzy or drowsy. Do not drive or do anything that could be dangerous until you know how this medicine affects you. Sit or lie down if you feel dizzy. Stand up carefully.  This medicine contains acetaminophen. Read the labels of all other medicines you are using to see if they also contain acetaminophen, or ask your doctor or pharmacist. Do not use more than 4 grams (4,000 milligrams) total of acetaminophen in one day.  This medicine can be habit-forming. Do not use more than your prescribed dose. Call your doctor if you think your medicine is not working.  Do not stop using this medicine suddenly. Your doctor will need to slowly decrease your dose before you stop it completely.  Tell any doctor or dentist who treats you that you are using this medicine. This medicine may affect certain medical test results.  This medicine could cause infertility. Talk with your doctor before using this medicine if you plan to have children.  This medicine may cause constipation, especially with long-term use. Ask your doctor if you should use a laxative to prevent and treat constipation.  Your doctor will do lab tests at regular visits to check on the effects of this medicine. Keep all appointments.  Keep all medicine out of the reach of children. Never share your medicine with anyone.  Possible Side Effects While Using This Medicine:   Call your  doctor right away if you notice any of these side effects:  Allergic reaction: Itching or hives, swelling in your face or hands, swelling or tingling in your mouth or throat, chest tightness, trouble breathing  Anxiety, restlessness, fast heartbeat, fever, muscle spasms, twitching, diarrhea, seeing or hearing things that are not there  Blistering, peeling, red skin rash  Blue lips, fingernails, or skin, change or loss of consciousness, shallow breathing, slow or uneven heartbeat, sweating, cold or clammy skin, pinpoint pupils  Changes in skin color, dark freckles, cold feeling, tiredness, weight loss  Confusion, trouble breathing, numbness or tingling in your hands, feet, or lips  Dark urine or pale stools, loss of appetite, stomach pain, yellow skin or eyes  Lightheadedness, dizziness, or fainting  Trouble breathing or slow breathing  If you notice these less serious side effects, talk with your doctor:   Headache  Constipation, nausea, or vomiting  Sleepiness  If you notice other side effects that you think are caused by this medicine, tell your doctor.   Call your doctor for medical advice about side effects. You may report side effects to FDA at 0-603-FDA-4420  © Copyright [employer] 2023 Information is for End User's use only and may not be sold, redistributed or otherwise used for commercial purposes.  The above information is an  only. It is not intended as medical advice for individual conditions or treatments. Talk to your doctor, nurse or pharmacist before following any medical regimen to see if it is safe and effective for you.

## 2024-01-30 NOTE — ASSESSMENT & PLAN NOTE
Denies any sob and stated that pain improved and will continue with incentive spirometry and will follow with trauma team in 2 weeks

## 2024-02-01 DIAGNOSIS — I10 ESSENTIAL HYPERTENSION: ICD-10-CM

## 2024-02-01 RX ORDER — AMLODIPINE BESYLATE 2.5 MG/1
2.5 TABLET ORAL DAILY
Qty: 90 TABLET | Refills: 0 | Status: SHIPPED | OUTPATIENT
Start: 2024-02-01

## 2024-02-01 NOTE — TELEPHONE ENCOUNTER
Patient stopped this medication and was told to restart it a few days ago. So he needs a new script sent in to pharmacy because refills are no longer any good.     Reason for call:   [x] Refill   [] Prior Auth  [] Other:     Office:   [x] PCP/Provider -   [] Specialty/Provider -     Medication: Amlodipine     Dose/Frequency: 2.5 mg tablet taken by mouth once daily     Quantity: 90    Pharmacy: [facility] [place] [address]     Does the patient have enough for 3 days?   [] Yes   [x] No - Send as HP to POD

## 2024-02-16 DIAGNOSIS — N40.0 ENLARGED PROSTATE ON RECTAL EXAMINATION: ICD-10-CM

## 2024-02-16 RX ORDER — DUTASTERIDE 0.5 MG/1
CAPSULE, LIQUID FILLED ORAL
Qty: 90 CAPSULE | Refills: 1 | Status: SHIPPED | OUTPATIENT
Start: 2024-02-16

## 2024-02-26 ENCOUNTER — NURSE TRIAGE (OUTPATIENT)
Age: 84
End: 2024-02-26

## 2024-03-22 ENCOUNTER — RA CDI HCC (OUTPATIENT)
Dept: OTHER | Facility: HOSPITAL | Age: 84
End: 2024-03-22

## 2024-03-22 PROBLEM — I25.2 OLD MYOCARDIAL INFARCTION: Status: ACTIVE | Noted: 2024-03-22

## 2024-03-22 NOTE — PROGRESS NOTES
HCC coding opportunities          Chart Reviewed number of suggestions sent to Provider: 1  D69.6     Patients Insurance     Medicare Insurance: [facility] [place] Medicare Advantage

## 2024-03-25 ENCOUNTER — OFFICE VISIT (OUTPATIENT)
Dept: FAMILY MEDICINE CLINIC | Facility: CLINIC | Age: 84
End: 2024-03-25
Payer: COMMERCIAL

## 2024-03-25 VITALS
HEIGHT: 67 IN | HEART RATE: 76 BPM | SYSTOLIC BLOOD PRESSURE: 168 MMHG | DIASTOLIC BLOOD PRESSURE: 56 MMHG | WEIGHT: 148 LBS | RESPIRATION RATE: 18 BRPM | BODY MASS INDEX: 23.23 KG/M2 | TEMPERATURE: 98.1 F

## 2024-03-25 DIAGNOSIS — E78.2 MIXED HYPERLIPIDEMIA: ICD-10-CM

## 2024-03-25 DIAGNOSIS — Z95.1 HX OF CABG: ICD-10-CM

## 2024-03-25 DIAGNOSIS — M25.552 HIP PAIN, ACUTE, LEFT: ICD-10-CM

## 2024-03-25 DIAGNOSIS — Z11.59 NEED FOR HEPATITIS C SCREENING TEST: ICD-10-CM

## 2024-03-25 DIAGNOSIS — Z12.5 SCREENING FOR PROSTATE CANCER: ICD-10-CM

## 2024-03-25 DIAGNOSIS — Z13.6 SCREENING FOR AAA (ABDOMINAL AORTIC ANEURYSM): ICD-10-CM

## 2024-03-25 DIAGNOSIS — N40.0 BENIGN PROSTATIC HYPERPLASIA, UNSPECIFIED WHETHER LOWER URINARY TRACT SYMPTOMS PRESENT: ICD-10-CM

## 2024-03-25 DIAGNOSIS — I10 BENIGN ESSENTIAL HTN: Primary | ICD-10-CM

## 2024-03-25 DIAGNOSIS — Z13.6 SCREENING FOR CARDIOVASCULAR CONDITION: ICD-10-CM

## 2024-03-25 DIAGNOSIS — Z00.00 MEDICARE ANNUAL WELLNESS VISIT, SUBSEQUENT: ICD-10-CM

## 2024-03-25 PROBLEM — C67.9 MALIGNANT NEOPLASM OF URINARY BLADDER, UNSPECIFIED SITE (HCC): Status: RESOLVED | Noted: 2023-03-16 | Resolved: 2024-03-25

## 2024-03-25 PROCEDURE — G0439 PPPS, SUBSEQ VISIT: HCPCS | Performed by: NURSE PRACTITIONER

## 2024-03-25 PROCEDURE — 99214 OFFICE O/P EST MOD 30 MIN: CPT | Performed by: NURSE PRACTITIONER

## 2024-03-25 RX ORDER — ROSUVASTATIN CALCIUM 20 MG/1
20 TABLET, COATED ORAL
Qty: 90 TABLET | Refills: 1 | Status: SHIPPED | OUTPATIENT
Start: 2024-03-25

## 2024-03-25 RX ORDER — AMLODIPINE BESYLATE 5 MG/1
5 TABLET ORAL DAILY
Qty: 90 TABLET | Refills: 1 | Status: SHIPPED | OUTPATIENT
Start: 2024-03-25 | End: 2024-09-21

## 2024-03-25 RX ORDER — MELOXICAM 15 MG/1
15 TABLET ORAL DAILY PRN
Qty: 30 TABLET | Refills: 0 | Status: SHIPPED | OUTPATIENT
Start: 2024-03-25 | End: 2024-04-24

## 2024-03-25 NOTE — PROGRESS NOTES
Assessment and Plan:     Problem List Items Addressed This Visit        Cardiovascular and Mediastinum    Benign essential HTN - Primary     BP elevated from last couple of times and will increase norvasc and will follow back in office in a month         Relevant Medications    amLODIPine (NORVASC) 5 mg tablet    rosuvastatin (CRESTOR) 20 MG tablet       Genitourinary    BPH (benign prostatic hyperplasia)     [place] urologist            Surgery/Wound/Pain    Hx of CABG    Relevant Medications    rosuvastatin (CRESTOR) 20 MG tablet       Other    Mixed hyperlipidemia     Complaint with statin and tolerating it well         Relevant Medications    rosuvastatin (CRESTOR) 20 MG tablet   Other Visit Diagnoses     Need for hepatitis C screening test        Relevant Orders    Hepatitis C antibody    Screening for cardiovascular condition        Relevant Orders    Lipid Panel with Direct LDL reflex    Screening for prostate cancer        Relevant Orders    PSA, Total Screen    Screening for AAA (abdominal aortic aneurysm)        Relevant Orders    US abdominal aorta screening aaa    Hip pain, acute, left        Relevant Medications    meloxicam (MOBIC) 15 mg tablet    Medicare annual wellness visit, subsequent               Preventive health issues were discussed with patient, and age appropriate screening tests were ordered as noted in patient's After Visit Summary.  Personalized health advice and appropriate referrals for health education or preventive services given if needed, as noted in patient's After Visit Summary.     History of Present Illness:     Patient presents for a Medicare Wellness Visit    HPI   Here with son today and wife also.  Denies any concerns and complaints.  Denies any chest tenderness and sob, chest pain and fever.  Complaint with medications and tolerating it well    Patient Care Team:  Frank Lombardi, DO as PCP - General (Family Medicine)  FRANK Diaz MD as Consulting Physician  (Cardiothoracic Surgery)  Dayron Kaye MD as Consulting Physician (Cardiology)  Johnny Garcia MD as Consulting Physician (Gastroenterology)  KARINE White Od as Consulting Physician (Optometry)     Review of Systems:     Review of Systems   HENT: Negative.     Respiratory: Negative.     Cardiovascular: Negative.    Gastrointestinal: Negative.    Musculoskeletal:  Positive for arthralgias.   Neurological: Negative.         Problem List:     Patient Active Problem List   Diagnosis   • Hx of CABG   • Coronary artery disease involving native coronary artery of native heart without angina pectoris   • Elevated homocysteine   • MTHFR mutation   • Vitamin D deficiency   • Nocturnal enuresis   • Benign prostatic hyperplasia with nocturia   • Benign essential HTN   • Mixed hyperlipidemia   • Urinary retention   • S/P TURP   • Sensorineural hearing loss (SNHL)   • Constipation   • Osteopenia of multiple sites   • External hemorrhoids   • 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HCC)   • Gross hematuria   • Leukocytosis   • Anemia   • Thrombocytopenia (HCC)   • BPH (benign prostatic hyperplasia)   • Primary osteoarthritis of left hip   • Ground-level fall   • Closed traumatic fracture of ribs of right side with hemo/pneumothorax   • Acute pain due to trauma   • Drug-induced platelet disorder   • Old myocardial infarction      Past Medical and Surgical History:     Past Medical History:   Diagnosis Date   • Arthritis    • CAD (coronary artery disease)    • Cataract    • Colon polyp    • COVID-19    • GERD (gastroesophageal reflux disease)    • Heart attack (HCC)    • Herpes zoster    • Hx of colonic polyps    • Hyperlipidemia    • Hypertension      Past Surgical History:   Procedure Laterality Date   • BACK SURGERY      disc removed 1985 / R....1985     • CATARACT EXTRACTION     • COLONOSCOPY     • CORONARY ARTERY BYPASS GRAFT  11/13/2014    LA....6/30/16   & CABG X3 with LIMA to LAD <SVG to OM-2, SVG to PDA  [place] Cortez Wheeler /  la....14      • EYE SURGERY      cataract /  R....     • FL RETROGRADE PYELOGRAM  2022   • HERNIA REPAIR      Groin / R....     • HIP SURGERY      1984 /  R....      • INGUINAL HERNIA REPAIR      R....     • JOINT REPLACEMENT  1985     right hip   • [place] CYSTO W/IRRIG & EVAC MULTPLE OBSTRUCTING CLOTS N/A 2021    Procedure: CYSTOSCOPY EVACUATION OF CLOTS WITH  FULGURATION OF PROSTATE;  Surgeon: Randy Driscoll MD;  Location: [facility];  Service: Urology   • [place] TRURL ELECTROSURG RESCJ PROSTATE BLEED COMPLETE N/A 2019    Procedure: CYSTOSCOPY, TRANSURETHRAL RESECTION OF PROSTATE (TURP);  Surgeon: Randy Driscoll MD;  Location: [facility];  Service: Urology   • TRANSURETHRAL RESECTION OF PROSTATE Bilateral 2022    Procedure: CYSTOSCOPY, TRANSURETHRAL RESECTION OF PROSTATE (TURP), RETROGRADE, PYELOGRAM;  Surgeon: Epi Small MD;  Location: [facility];  Service: Urology      Family History:     Family History   Problem Relation Age of Onset   • Coronary artery disease Father    • Hyperlipidemia Brother    • Sudden death Brother         cardiac   • Arthritis Family    • Mental illness Neg Hx       Social History:     Social History     Socioeconomic History   • Marital status: /Civil Union     Spouse name: None   • Number of children: None   • Years of education: None   • Highest education level: None   Occupational History   • None   Tobacco Use   • Smoking status: Former     Current packs/day: 0.00     Average packs/day: 2.0 packs/day for 30.0 years (60.0 ttl pk-yrs)     Types: Cigarettes     Start date: 6/15/1960     Quit date: 1979     Years since quittin.9   • Smokeless tobacco: Never   Vaping Use   • Vaping status: Never Used   Substance and Sexual Activity   • Alcohol use: Not Currently   • Drug use: No   • Sexual activity: None   Other Topics Concern   • None   Social History Narrative    Exercise cycling / walking    Exercise daily     Good sleep  hygiene    Sleeps 8-10 hrs a day      Social Determinants of Health     Financial Resource Strain: Low Risk  (12/1/2022)    Overall Financial Resource Strain (CARDIA)    • Difficulty of Paying Living Expenses: Not hard at all   Food Insecurity: No Food Insecurity (3/25/2024)    Hunger Vital Sign    • Worried About Running Out of Food in the Last Year: Never true    • Ran Out of Food in the Last Year: Never true   Transportation Needs: No Transportation Needs (3/25/2024)    PRAPARE - Transportation    • Lack of Transportation (Medical): No    • Lack of Transportation (Non-Medical): No   Physical Activity: Not on file   Stress: Not on file   Social Connections: Not on file   Intimate Partner Violence: Not on file   Housing Stability: Low Risk  (3/25/2024)    Housing Stability Vital Sign    • Unable to Pay for Housing in the Last Year: No    • Number of Places Lived in the Last Year: 1    • Unstable Housing in the Last Year: No      Medications and Allergies:     Current Outpatient Medications   Medication Sig Dispense Refill   • amLODIPine (NORVASC) 5 mg tablet Take 1 tablet (5 mg total) by mouth daily 90 tablet 1   • Ascorbic Acid (VITAMIN C PO) Take 500 mg by mouth daily      • B Complex Vitamins (B-COMPLEX/B-12 PO) Take 1 tablet by mouth daily     • Calcium 150 MG TABS Take by mouth daily       • Cholecalciferol (VITAMIN D) 2000 units CAPS Take 1 capsule by mouth daily     • dutasteride (AVODART) 0.5 mg capsule take 1 capsule by mouth once daily 90 capsule 1   • EQ Aspirin Adult Low Dose 81 MG EC tablet Last dose 1/9/22      • meloxicam (MOBIC) 15 mg tablet Take 1 tablet (15 mg total) by mouth daily as needed for moderate pain As needed 30 tablet 0   • Omega-3 Fatty Acids (fish oil) 1,000 mg Take 1,000 mg by mouth daily     • polyethylene glycol (MIRALAX) 17 g packet Take 17 g by mouth daily 10 each 0   • rosuvastatin (CRESTOR) 20 MG tablet Take 1 tablet (20 mg total) by mouth daily at bedtime 90 tablet 1   •  tamsulosin (FLOMAX) 0.4 mg take 1 capsule by mouth once daily with dinner 90 capsule 0   • polyethylene glycol (MIRALAX) 17 g packet Take 17 g by mouth daily (Patient not taking: Reported on 3/25/2024)     • senna-docusate sodium (SENOKOT S) 8.6-50 mg per tablet Take 1 tablet by mouth daily at bedtime for 10 days 10 tablet 0   • senna-docusate sodium (SENOKOT-S) 8.6-50 mg per tablet Take 1 tablet by mouth daily for 7 days 7 tablet 0     No current facility-administered medications for this visit.     No Known Allergies   Immunizations:     Immunization History   Administered Date(s) Administered   • COVID-19 MODERNA VACC 0.5 ML IM 03/16/2021, 04/13/2021   • INFLUENZA 11/02/2010, 11/11/2011, 10/15/2012, 11/13/2013   • Influenza Split High Dose Preservative Free IM 11/02/2015, 10/28/2016, 10/10/2019   • Influenza, Seasonal Vaccine, Quadrivalent, Adjuvanted, .5e 11/07/2022   • Influenza, high dose seasonal 0.7 mL 09/12/2018, 11/09/2021   • Influenza, injectable, quadrivalent, preservative free 0.5 mL 09/16/2020   • Pneumococcal 01/01/2005, 12/04/2012   • Pneumococcal Conjugate 13-Valent 09/12/2018   • Pneumococcal Polysaccharide PPV23 03/20/2015   • Tdap 09/12/2018   • Tetanus Toxoid, Unspecified 11/11/2011      Health Maintenance:     There are no preventive care reminders to display for this patient.      Topic Date Due   • Influenza Vaccine (1) 09/01/2023   • COVID-19 Vaccine (3 - 2023-24 season) 09/01/2023      Medicare Screening Tests and Risk Assessments:     Liam is here for his Subsequent Wellness visit. Last Medicare Wellness visit information reviewed, patient interviewed and updates made to the record today.      Health Risk Assessment:   Patient rates overall health as good. Patient feels that their physical health rating is slightly worse. Patient is satisfied with their life. Eyesight was rated as same. Hearing was rated as same. Patient feels that their emotional and mental health rating is same.  Patients states they are never, rarely angry. Patient states they are sometimes unusually tired/fatigued. Pain experienced in the last 7 days has been some. Patient's pain rating has been 2/10. Patient states that he has experienced no weight loss or gain in last 6 months.     Depression Screening:   PHQ-2 Score: 0      Fall Risk Screening:   In the past year, patient has experienced: history of falling in past year    Number of falls: 1  Injured during fall?: Yes    Feels unsteady when standing or walking?: Yes    Worried about falling?: No      Home Safety:  Patient does not have trouble with stairs inside or outside of their home. Patient has working smoke alarms and has working carbon monoxide detector. Home safety hazards include: not having non-slip bath and/or shower mats.     Nutrition:   Current diet is Regular.     Medications:   Patient is currently taking over-the-counter supplements. OTC medications include: see medication list. Patient is not able to manage medications. son    Activities of Daily Living (ADLs)/Instrumental Activities of Daily Living (IADLs):   Walk and transfer into and out of bed and chair?: Yes  Dress and groom yourself?: Yes    Bathe or shower yourself?: Yes    Feed yourself? Yes  Do your laundry/housekeeping?: Yes  Manage your money, pay your bills and track your expenses?: Yes  Make your own meals?: Yes    Do your own shopping?: Yes    Previous Hospitalizations:   Any hospitalizations or ED visits within the last 12 months?: Yes    How many hospitalizations have you had in the last year?: 1-2    Advance Care Planning:   Living will: No    Durable POA for healthcare: No    Advanced directive: No    Advanced directive counseling given: Yes    ACP document given: Yes    Patient declined ACP directive: No      Cognitive Screening:   Provider or family/friend/caregiver concerned regarding cognition?: No    PREVENTIVE SCREENINGS      Cardiovascular Screening:    General: Risks and  Benefits Discussed    Due for: Lipid Panel      Diabetes Screening:     General: Screening Current      Colorectal Cancer Screening:     General: Risks and Benefits Discussed and Patient Declines      Prostate Cancer Screening:    General: Risks and Benefits Discussed    Due for: PSA      Osteoporosis Screening:    General: Risks and Benefits Discussed and Patient Declines      Abdominal Aortic Aneurysm (AAA) Screening:    Risk factors include: tobacco use        General: Risks and Benefits Discussed    Due for: Screening AAA Ultrasound      Lung Cancer Screening:     General: Screening Not Indicated      Hepatitis C Screening:    General: Risks and Benefits Discussed    Hep C Screening Accepted: Yes      Screening, Brief Intervention, and Referral to Treatment (SBIRT)    Screening  Typical number of drinks in a day: 0  Typical number of drinks in a week: 0  Interpretation: Low risk drinking behavior.    AUDIT-C Screenin) How often did you have a drink containing alcohol in the past year? never  2) How many drinks did you have on a typical day when you were drinking in the past year? 0  3) How often did you have 6 or more drinks on one occasion in the past year? never    AUDIT-C Score: 0  Interpretation: Score 0-3 (male): Negative screen for alcohol misuse    Single Item Drug Screening:  How often have you used an illegal drug (including marijuana) or a prescription medication for non-medical reasons in the past year? never    Single Item Drug Screen Score: 0  Interpretation: Negative screen for possible drug use disorder    Brief Intervention  Alcohol & drug use screenings were reviewed. No concerns regarding substance use disorder identified.     Other Counseling Topics:   Car/seat belt/driving safety, skin self-exam, sunscreen and calcium and vitamin D intake and regular weightbearing exercise.     No results found.     Physical Exam:     BP Readings from Last 3 Encounters:   24 168/56   24  "158/60   01/28/24 158/62      /56   Pulse 76   Temp 98.1 °F (36.7 °C)   Resp 18   Ht 5' 7.25\" (1.708 m)   Wt 67.1 kg (148 lb)   BMI 23.01 kg/m²     Physical Exam  HENT:      Head: Normocephalic.      Right Ear: External ear normal.      Left Ear: External ear normal.      Nose: Nose normal.   Eyes:      Conjunctiva/sclera: Conjunctivae normal.   Cardiovascular:      Rate and Rhythm: Normal rate and regular rhythm.      Heart sounds: Normal heart sounds.   Pulmonary:      Effort: Pulmonary effort is normal.      Breath sounds: Normal breath sounds.   Chest:      Chest wall: No mass, deformity, swelling or tenderness.   Musculoskeletal:      Cervical back: Normal range of motion and neck supple.   Neurological:      Mental Status: He is alert and oriented to person, place, and time.   Psychiatric:         Mood and Affect: Mood normal.         Behavior: Behavior normal.         Thought Content: Thought content normal.         Judgment: Judgment normal.          NAEL Tavarez  "

## 2024-03-25 NOTE — PATIENT INSTRUCTIONS
Medicare Preventive Visit Patient Instructions  Thank you for completing your Welcome to Medicare Visit or Medicare Annual Wellness Visit today. Your next wellness visit will be due in one year (3/26/2025).  The screening/preventive services that you may require over the next 5-10 years are detailed below. Some tests may not apply to you based off risk factors and/or age. Screening tests ordered at today's visit but not completed yet may show as past due. Also, please note that scanned in results may not display below.  Preventive Screenings:  Service Recommendations Previous Testing/Comments   Colorectal Cancer Screening  Colonoscopy    Fecal Occult Blood Test (FOBT)/Fecal Immunochemical Test (FIT)  Fecal DNA/Cologuard Test  Flexible Sigmoidoscopy Age: 45-75 years old   Colonoscopy: every 10 years (May be performed more frequently if at higher risk)  OR  FOBT/FIT: every 1 year  OR  Cologuard: every 3 years  OR  Sigmoidoscopy: every 5 years  Screening may be recommended earlier than age 45 if at higher risk for colorectal cancer. Also, an individualized decision between you and your healthcare provider will decide whether screening between the ages of 76-85 would be appropriate. Colonoscopy: 12/14/2021  FOBT/FIT: Not on file  Cologuard: Not on file  Sigmoidoscopy: Not on file    Screening Not Indicated     Prostate Cancer Screening Individualized decision between patient and health care provider in men between ages of 55-69   Medicare will cover every 12 months beginning on the day after your 50th birthday PSA: 2.3 ng/mL     Screening Not Indicated     Hepatitis C Screening Once for adults born between 1945 and 1965  More frequently in patients at high risk for Hepatitis C Hep C Antibody: Not on file        Diabetes Screening 1-2 times per year if you're at risk for diabetes or have pre-diabetes Fasting glucose: 100 mg/dL (3/17/2023)  A1C: 5.2 % (11/14/2022)  Screening Current   Cholesterol Screening Once every 5  years if you don't have a lipid disorder. May order more often based on risk factors. Lipid panel: 11/14/2022  Screening Not Indicated  History Lipid Disorder      Other Preventive Screenings Covered by Medicare:  Abdominal Aortic Aneurysm (AAA) Screening: covered once if your at risk. You're considered to be at risk if you have a family history of AAA or a male between the age of 65-75 who smoking at least 100 cigarettes in your lifetime.  Lung Cancer Screening: covers low dose CT scan once per year if you meet all of the following conditions: (1) Age 55-77; (2) No signs or symptoms of lung cancer; (3) Current smoker or have quit smoking within the last 15 years; (4) You have a tobacco smoking history of at least 20 pack years (packs per day x number of years you smoked); (5) You get a written order from a healthcare provider.  Glaucoma Screening: covered annually if you're considered high risk: (1) You have diabetes OR (2) Family history of glaucoma OR (3)  aged 50 and older OR (4)  American aged 65 and older  Osteoporosis Screening: covered every 2 years if you meet one of the following conditions: (1) Have a vertebral abnormality; (2) On glucocorticoid therapy for more than 3 months; (3) Have primary hyperparathyroidism; (4) On osteoporosis medications and need to assess response to drug therapy.  HIV Screening: covered annually if you're between the age of 15-65. Also covered annually if you are younger than 15 and older than 65 with risk factors for HIV infection. For pregnant patients, it is covered up to 3 times per pregnancy.    Immunizations:  Immunization Recommendations   Influenza Vaccine Annual influenza vaccination during flu season is recommended for all persons aged >= 6 months who do not have contraindications   Pneumococcal Vaccine   * Pneumococcal conjugate vaccine = PCV13 (Prevnar 13), PCV15 (Vaxneuvance), PCV20 (Prevnar 20)  * Pneumococcal polysaccharide vaccine = PPSV23  (Pneumovax) Adults 19-63 yo with certain risk factors or if 65+ yo  If never received any pneumonia vaccine: recommend Prevnar 20 (PCV20)  Give PCV20 if previously received 1 dose of PCV13 or PPSV23   Hepatitis B Vaccine 3 dose series if at intermediate or high risk (ex: diabetes, end stage renal disease, liver disease)   Respiratory syncytial virus (RSV) Vaccine - COVERED BY MEDICARE PART D  * RSVPreF3 (Arexvy) CDC recommends that adults 60 years of age and older may receive a single dose of RSV vaccine using shared clinical decision-making (SCDM)   Tetanus (Td) Vaccine - COST NOT COVERED BY MEDICARE PART B Following completion of primary series, a booster dose should be given every 10 years to maintain immunity against tetanus. Td may also be given as tetanus wound prophylaxis.   Tdap Vaccine - COST NOT COVERED BY MEDICARE PART B Recommended at least once for all adults. For pregnant patients, recommended with each pregnancy.   Shingles Vaccine (Shingrix) - COST NOT COVERED BY MEDICARE PART B  2 shot series recommended in those 19 years and older who have or will have weakened immune systems or those 50 years and older     Health Maintenance Due:  There are no preventive care reminders to display for this patient.  Immunizations Due:      Topic Date Due   • Influenza Vaccine (1) 09/01/2023   • COVID-19 Vaccine (3 - 2023-24 season) 09/01/2023     Advance Directives   What are advance directives?  Advance directives are legal documents that state your wishes and plans for medical care. These plans are made ahead of time in case you lose your ability to make decisions for yourself. Advance directives can apply to any medical decision, such as the treatments you want, and if you want to donate organs.   What are the types of advance directives?  There are many types of advance directives, and each state has rules about how to use them. You may choose a combination of any of the following:  Living will:  This is a  written record of the treatment you want. You can also choose which treatments you do not want, which to limit, and which to stop at a certain time. This includes surgery, medicine, IV fluid, and tube feedings.   Durable power of  for healthcare (DPAHC):  This is a written record that states who you want to make healthcare choices for you when you are unable to make them for yourself. This person, called a proxy, is usually a family member or a friend. You may choose more than 1 proxy.  Do not resuscitate (DNR) order:  A DNR order is used in case your heart stops beating or you stop breathing. It is a request not to have certain forms of treatment, such as CPR. A DNR order may be included in other types of advance directives.  Medical directive:  This covers the care that you want if you are in a coma, near death, or unable to make decisions for yourself. You can list the treatments you want for each condition. Treatment may include pain medicine, surgery, blood transfusions, dialysis, IV or tube feedings, and a ventilator (breathing machine).  Values history:  This document has questions about your views, beliefs, and how you feel and think about life. This information can help others choose the care that you would choose.  Why are advance directives important?  An advance directive helps you control your care. Although spoken wishes may be used, it is better to have your wishes written down. Spoken wishes can be misunderstood, or not followed. Treatments may be given even if you do not want them. An advance directive may make it easier for your family to make difficult choices about your care.   Fall Prevention    Fall prevention  includes ways to make your home and other areas safer. It also includes ways you can move more carefully to prevent a fall. Health conditions that cause changes in your blood pressure, vision, or muscle strength and coordination may increase your risk for falls. Medicines may  also increase your risk for falls if they make you dizzy, weak, or sleepy.   Fall prevention tips:   Stand or sit up slowly.    Use assistive devices as directed.    Wear shoes that fit well and have soles that .    Wear a personal alarm.    Stay active.    Manage your medical conditions.    Home Safety Tips:  Add items to prevent falls in the bathroom.    Keep paths clear.    Install bright lights in your home.    Keep items you use often on shelves within reach.    Paint or place reflective tape on the edges of your stairs.       © Copyright [employer] 2018 Information is for End User's use only and may not be sold, redistributed or otherwise used for commercial purposes. All illustrations and images included in CareNotes® are the copyrighted property of [employer]., Inc[employer] or [employer]

## 2024-03-25 NOTE — ASSESSMENT & PLAN NOTE
BP elevated from last couple of times and will increase norvasc and will follow back in office in a month

## 2024-04-25 ENCOUNTER — OFFICE VISIT (OUTPATIENT)
Dept: FAMILY MEDICINE CLINIC | Facility: CLINIC | Age: 84
End: 2024-04-25
Payer: COMMERCIAL

## 2024-04-25 VITALS
TEMPERATURE: 97 F | DIASTOLIC BLOOD PRESSURE: 62 MMHG | HEART RATE: 77 BPM | RESPIRATION RATE: 18 BRPM | BODY MASS INDEX: 22.44 KG/M2 | HEIGHT: 67 IN | WEIGHT: 143 LBS | SYSTOLIC BLOOD PRESSURE: 130 MMHG

## 2024-04-25 DIAGNOSIS — N39.44 NOCTURNAL ENURESIS: ICD-10-CM

## 2024-04-25 DIAGNOSIS — I10 BENIGN ESSENTIAL HTN: ICD-10-CM

## 2024-04-25 DIAGNOSIS — I25.10 CORONARY ARTERY DISEASE INVOLVING NATIVE CORONARY ARTERY OF NATIVE HEART WITHOUT ANGINA PECTORIS: Primary | ICD-10-CM

## 2024-04-25 PROCEDURE — 99214 OFFICE O/P EST MOD 30 MIN: CPT | Performed by: NURSE PRACTITIONER

## 2024-04-25 PROCEDURE — G2211 COMPLEX E/M VISIT ADD ON: HCPCS | Performed by: NURSE PRACTITIONER

## 2024-04-25 NOTE — PROGRESS NOTES
"Assessment/Plan:    1. Coronary artery disease involving native coronary artery of native heart without angina pectoris  Assessment & Plan:  Complaint with statin and tolerating it well      2. Benign essential HTN  Assessment & Plan:  Stable with current regimen      3. Nocturnal enuresis  Assessment & Plan:  Stable with current regimen              Patient Instructions:  Return in about 2 months (around 2024), or if symptoms worsen or fail to improve, for Annual physical.      Future Appointments   Date Time Provider Department Center   2024 10:45 AM Frank Lombardi, DO [facility]           Subjective:      Patient ID: Liam Hall is a 84 y.o. male.    Chief Complaint   Patient presents with   • Hypertension     Bp follow up lw [facility]         Vitals:  /62   Pulse 77   Temp (!) 97 °F (36.1 °C)   Resp 18   Ht 5' 7.25\" (1.708 m)   Wt 64.9 kg (143 lb)   BMI 22.23 kg/m²     HPI  Here with son today.  Here for BP follow up. Tolerating current dose of norvasc without any issues. Denies chest pain, sob, headache and dizziness.  Complaint with medications for chronic illnesses and tolerating it well            The following portions of the patient's history were reviewed and updated as appropriate: allergies, current medications, past family history, past medical history, past social history, past surgical history and problem list.      Review of Systems   HENT: Negative.     Respiratory: Negative.     Cardiovascular: Negative.    Gastrointestinal: Negative.    Musculoskeletal:  Positive for arthralgias.         Objective:    Social History     Tobacco Use   Smoking Status Former   • Current packs/day: 0.00   • Average packs/day: 2.0 packs/day for 30.0 years (60.0 ttl pk-yrs)   • Types: Cigarettes   • Start date: 6/15/1960   • Quit date: 1979   • Years since quittin.0   • Passive exposure: Past   Smokeless Tobacco Never       Allergies: No Known Allergies      Current Outpatient " Medications   Medication Sig Dispense Refill   • amLODIPine (NORVASC) 5 mg tablet Take 1 tablet (5 mg total) by mouth daily 90 tablet 1   • Ascorbic Acid (VITAMIN C PO) Take 500 mg by mouth daily      • B Complex Vitamins (B-COMPLEX/B-12 PO) Take 1 tablet by mouth daily     • Calcium 150 MG TABS Take by mouth daily       • Cholecalciferol (VITAMIN D) 2000 units CAPS Take 1 capsule by mouth daily     • dutasteride (AVODART) 0.5 mg capsule take 1 capsule by mouth once daily 90 capsule 1   • EQ Aspirin Adult Low Dose 81 MG EC tablet Last dose 1/9/22      • meloxicam (MOBIC) 15 mg tablet Take 1 tablet (15 mg total) by mouth daily as needed for moderate pain As needed 30 tablet 0   • Omega-3 Fatty Acids (fish oil) 1,000 mg Take 1,000 mg by mouth daily     • polyethylene glycol (MIRALAX) 17 g packet Take 17 g by mouth daily 10 each 0   • rosuvastatin (CRESTOR) 20 MG tablet Take 1 tablet (20 mg total) by mouth daily at bedtime 90 tablet 1   • senna-docusate sodium (SENOKOT S) 8.6-50 mg per tablet Take 1 tablet by mouth daily at bedtime for 10 days 10 tablet 0   • tamsulosin (FLOMAX) 0.4 mg take 1 capsule by mouth once daily with dinner 90 capsule 0   • polyethylene glycol (MIRALAX) 17 g packet Take 17 g by mouth daily (Patient not taking: Reported on 4/25/2024)       No current facility-administered medications for this visit.          Physical Exam  Constitutional:       Appearance: Normal appearance.   HENT:      Head: Normocephalic.      Nose: Nose normal.   Eyes:      Conjunctiva/sclera: Conjunctivae normal.   Cardiovascular:      Rate and Rhythm: Normal rate and regular rhythm.      Heart sounds: Normal heart sounds.   Pulmonary:      Effort: Pulmonary effort is normal.      Breath sounds: Normal breath sounds.   Skin:     General: Skin is warm and dry.      Findings: No rash.   Neurological:      Mental Status: He is alert and oriented to person, place, and time.   Psychiatric:         Mood and Affect: Mood normal.          Behavior: Behavior normal.         Thought Content: Thought content normal.         Judgment: Judgment normal.                     NAEL Tavarez

## 2024-04-28 DIAGNOSIS — N39.44 NOCTURNAL ENURESIS: ICD-10-CM

## 2024-04-28 RX ORDER — TAMSULOSIN HYDROCHLORIDE 0.4 MG/1
CAPSULE ORAL
Qty: 90 CAPSULE | Refills: 1 | Status: SHIPPED | OUTPATIENT
Start: 2024-04-28
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
## 2024-05-20 ENCOUNTER — OFFICE VISIT (OUTPATIENT)
Dept: FAMILY MEDICINE CLINIC | Facility: CLINIC | Age: 84
End: 2024-05-20
Payer: COMMERCIAL

## 2024-05-20 VITALS
RESPIRATION RATE: 18 BRPM | SYSTOLIC BLOOD PRESSURE: 136 MMHG | HEIGHT: 67 IN | HEART RATE: 64 BPM | WEIGHT: 146.4 LBS | DIASTOLIC BLOOD PRESSURE: 60 MMHG | BODY MASS INDEX: 22.98 KG/M2 | TEMPERATURE: 97 F

## 2024-05-20 DIAGNOSIS — E72.11 HOMOCYSTINURIA (HCC): ICD-10-CM

## 2024-05-20 DIAGNOSIS — M54.50 CHRONIC LEFT-SIDED LOW BACK PAIN WITHOUT SCIATICA: ICD-10-CM

## 2024-05-20 DIAGNOSIS — G89.29 CHRONIC LEFT-SIDED LOW BACK PAIN WITHOUT SCIATICA: ICD-10-CM

## 2024-05-20 DIAGNOSIS — I10 BENIGN ESSENTIAL HTN: Primary | ICD-10-CM

## 2024-05-20 DIAGNOSIS — R41.3 MEMORY DEFICIT: ICD-10-CM

## 2024-05-20 DIAGNOSIS — M16.12 PRIMARY OSTEOARTHRITIS OF LEFT HIP: ICD-10-CM

## 2024-05-20 DIAGNOSIS — M25.552 HIP PAIN, ACUTE, LEFT: ICD-10-CM

## 2024-05-20 PROCEDURE — G2211 COMPLEX E/M VISIT ADD ON: HCPCS | Performed by: FAMILY MEDICINE

## 2024-05-20 PROCEDURE — 99213 OFFICE O/P EST LOW 20 MIN: CPT | Performed by: FAMILY MEDICINE

## 2024-05-20 RX ORDER — MELOXICAM 15 MG/1
15 TABLET ORAL DAILY PRN
Qty: 30 TABLET | Refills: 0 | Status: SHIPPED | OUTPATIENT
Start: 2024-05-20 | End: 2024-06-19

## 2024-05-20 NOTE — PROGRESS NOTES
Assessment/Plan:    1. Benign essential HTN  2. Chronic left-sided low back pain without sciatica  -     Ambulatory Referral to Physical Therapy; Future  3. Primary osteoarthritis of left hip  4. 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HCC)  5. Hip pain, acute, left  -     meloxicam (MOBIC) 15 mg tablet; Take 1 tablet (15 mg total) by mouth daily as needed for moderate pain As needed  6. Memory deficit      Advised on prevagen for memory  Advised on therapy for the back to decrease the tiarra for mobic  ON mobic the BP at last appt was not terribly high - risk is always high BP with MOBIC    Patient Instructions   Fall Prevention   AMBULATORY CARE:   Fall prevention  includes ways to make your home and other areas safer. Prevention also includes ways you can move more carefully to prevent a fall. Health conditions that cause changes in your blood pressure, vision, or muscle strength and coordination may increase your risk for falls. Medicines may also increase your risk for falls if they make you dizzy, weak, or sleepy.  Arrange to have someone call your local emergency number (911 in the [place]) if:   You have fallen and are found unconscious.    You have fallen and cannot move part of your body.    Call your doctor if:   You have fallen and have pain or a headache.    You have questions or concerns about your condition or care.    Fall prevention tips:   Stand or sit up slowly.  This may help you keep your balance and prevent falls.    Use assistive devices as directed.  Your healthcare provider may suggest that you use a cane or walker to help you keep your balance. You may need to have grab bars put in your bathroom near the toilet or in the shower.    Wear shoes that fit well and have soles that .  Wear shoes both inside and outside. Use slippers with good . Do not wear shoes with high heels.    Stay active.  Exercise can help strengthen your muscles and improve your balance. Your healthcare provider may recommend water aerobics or  walking. He or she may also recommend physical therapy to improve your coordination. Never start an exercise program without talking to your healthcare provider first.         Manage your medical conditions.  Keep all appointments with your healthcare providers. Visit your eye doctor as directed.    Home safety tips:       Wear a personal alarm.  This is a device that allows you to call for help if you fall. Ask your healthcare provider for more information.    Add items to prevent falls in the bathroom.  Put nonslip strips on your bath or shower floor to prevent you from slipping. Use a bath mat if you do not have carpet in the bathroom. This will prevent you from falling when you step out of the bath or shower. Use a shower seat so you do not need to stand while you shower. Sit on the toilet or a chair in your bathroom to dry yourself and put on clothing. This will prevent you from losing your balance from drying or dressing yourself while you are standing.    Keep paths clear.  Remove books, shoes, and other objects from walkways and stairs. Place cords for telephones and lamps out of the way so that you do not need to walk over them. Tape them down if you cannot move them. Remove small rugs. If you cannot remove a rug, secure it with double-sided tape. This will prevent you from tripping.    Install bright lights in your home.  Use night lights to help light paths to the bathroom or kitchen. Always turn on the light before you start walking.    Keep items you use often on shelves within reach.  Do not use a step stool to help you reach an item.    Paint or place reflective tape on the edges of your stairs.  This will help you see the stairs better.  Plan ahead in case you do fall:  Talk with family members, friends, and neighbors to create a fall plan. Someone will need to call for emergency help if you are injured or found unconscious. If possible, keep a mobile phone with you at all times, or wear an emergency  alert device. You can contact emergency services by pressing a button on the device. Ask your healthcare provider for more information.  Follow up with your doctor as directed:  Write down your questions so you remember to ask them during your visits.  © Copyright [employer] 2023 Information is for End User's use only and may not be sold, redistributed or otherwise used for commercial purposes.  The above information is an  only. It is not intended as medical advice for individual conditions or treatments. Talk to your doctor, nurse or pharmacist before following any medical regimen to see if it is safe and effective for you.       No follow-ups on file.    Subjective:      Patient ID: Liam Hall is a 84 y.o. male.    Chief Complaint   Patient presents with   • Back Pain     Lower left side; patient fell a few months ago and broke his right ribs, unsure if its related  YC       Pt states het gets a pain in his left lower back, on and off since he fell in his living room in dec or jan  Pain may last a day or two and dissapear  for a day or two  Pt's son treats it with meloxicam  Pt states they were told the meloxicam will raise the BP.     Son reports memopry issues          The following portions of the patient's history were reviewed and updated as appropriate: allergies, current medications, past family history, past medical history, past social history, past surgical history and problem list.    Review of Systems   Musculoskeletal:  Positive for back pain.   Psychiatric/Behavioral:  Positive for decreased concentration.          Current Outpatient Medications   Medication Sig Dispense Refill   • amLODIPine (NORVASC) 5 mg tablet Take 1 tablet (5 mg total) by mouth daily 90 tablet 1   • Ascorbic Acid (VITAMIN C PO) Take 500 mg by mouth daily      • B Complex Vitamins (B-COMPLEX/B-12 PO) Take 1 tablet by mouth daily     • Calcium 150 MG TABS Take by mouth daily       • Cholecalciferol (VITAMIN D)  "2000 units CAPS Take 1 capsule by mouth daily     • dutasteride (AVODART) 0.5 mg capsule take 1 capsule by mouth once daily 90 capsule 1   • EQ Aspirin Adult Low Dose 81 MG EC tablet Last dose 1/9/22      • meloxicam (MOBIC) 15 mg tablet Take 1 tablet (15 mg total) by mouth daily as needed for moderate pain As needed 30 tablet 0   • Omega-3 Fatty Acids (fish oil) 1,000 mg Take 1,000 mg by mouth daily     • polyethylene glycol (MIRALAX) 17 g packet Take 17 g by mouth daily     • polyethylene glycol (MIRALAX) 17 g packet Take 17 g by mouth daily 10 each 0   • rosuvastatin (CRESTOR) 20 MG tablet Take 1 tablet (20 mg total) by mouth daily at bedtime 90 tablet 1   • tamsulosin (FLOMAX) 0.4 mg take 1 capsule by mouth once daily with dinner 90 capsule 1     No current facility-administered medications for this visit.       Objective:    /60   Pulse 64   Temp (!) 97 °F (36.1 °C) (Tympanic)   Resp 18   Ht 5' 7.25\" (1.708 m)   Wt 66.4 kg (146 lb 6.4 oz)   BMI 22.76 kg/m²        Physical Exam  Musculoskeletal:      Comments: Mild pvms l l spine                Frank Lombardi, DOFalls Plan of Care: Balance, strength, and gait training instructions were provided.  "

## 2024-06-18 DIAGNOSIS — N39.44 NOCTURNAL ENURESIS: ICD-10-CM

## 2024-06-18 RX ORDER — TAMSULOSIN HYDROCHLORIDE 0.4 MG/1
0.4 CAPSULE ORAL
Qty: 90 CAPSULE | Refills: 1 | Status: SHIPPED | OUTPATIENT
Start: 2024-06-18

## 2024-06-25 ENCOUNTER — OFFICE VISIT (OUTPATIENT)
Dept: FAMILY MEDICINE CLINIC | Facility: CLINIC | Age: 84
End: 2024-06-25
Payer: COMMERCIAL

## 2024-06-25 VITALS
RESPIRATION RATE: 18 BRPM | OXYGEN SATURATION: 98 % | WEIGHT: 147 LBS | HEIGHT: 67 IN | BODY MASS INDEX: 23.07 KG/M2 | SYSTOLIC BLOOD PRESSURE: 110 MMHG | HEART RATE: 58 BPM | TEMPERATURE: 97.2 F | DIASTOLIC BLOOD PRESSURE: 66 MMHG

## 2024-06-25 DIAGNOSIS — R41.89 COGNITIVE DECLINE: Primary | ICD-10-CM

## 2024-06-25 DIAGNOSIS — K40.91 UNILATERAL RECURRENT INGUINAL HERNIA WITHOUT OBSTRUCTION OR GANGRENE: ICD-10-CM

## 2024-06-25 DIAGNOSIS — K59.00 CONSTIPATION, UNSPECIFIED CONSTIPATION TYPE: ICD-10-CM

## 2024-06-25 PROCEDURE — G2211 COMPLEX E/M VISIT ADD ON: HCPCS | Performed by: FAMILY MEDICINE

## 2024-06-25 PROCEDURE — 99214 OFFICE O/P EST MOD 30 MIN: CPT | Performed by: FAMILY MEDICINE

## 2024-06-25 RX ORDER — DONEPEZIL HYDROCHLORIDE 5 MG/1
5 TABLET, FILM COATED ORAL
Qty: 90 TABLET | Refills: 1 | Status: SHIPPED | OUTPATIENT
Start: 2024-06-25 | End: 2024-12-22

## 2024-06-25 NOTE — PROGRESS NOTES
Assessment/Plan:    1. Cognitive decline  -     Ambulatory Referral to Neurology; Future  2. Constipation, unspecified constipation type  -     linaCLOtide 145 MCG CAPS; Take 1 capsule (145 mcg total) by mouth daily at least 30 minutes prior to the first meal of the day  3. 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    No follow-ups on file.    Subjective:      Patient ID: Liam Hall is a 84 y.o. male.    Chief Complaint   Patient presents with   • Follow-up     2 months  rmklpn       Pt is sched month follow up    Pt states he has been constipated for a few days  Pt has had this problem before - was constipated for a while - seen by GI - he is supposed to drink metamucil on and off with a laxitive.  His wife was dx with cancer and he has forfgotten his drink for a while now.    Son states pt is having a problem with his rt lower quadrant states he has what feels like a lump .  No pain uless he really pushes on it  No BM today  Last BM was two days ago    Son also feels his mind is slipping quite a bit  Last night pt was getting up trying to get dressed like it is daytime  Tried prevagen for three months withiout helps          The following portions of the patient's history were reviewed and updated as appropriate: allergies, current medications, past family history, past medical history, past social history, past surgical history and problem list.    Review of Systems   Constitutional:  Negative for activity change, appetite change, chills, diaphoresis, fatigue, fever and unexpected weight change.    HENT:  Negative for congestion, dental problem, ear pain, mouth sores, sinus pressure, sinus pain, sore throat and trouble swallowing.    Eyes:  Negative for photophobia, discharge and itching.   Respiratory:  Negative for apnea, chest tightness and shortness of breath.    Cardiovascular:  Negative for chest pain, palpitations and leg swelling.   Gastrointestinal:  Positive for constipation. Negative for abdominal distention, abdominal pain, blood in stool, nausea and vomiting.   Endocrine: Negative for cold intolerance, heat intolerance, polydipsia, polyphagia and polyuria.   Genitourinary:  Negative for difficulty urinating.   Musculoskeletal:  Negative for arthralgias.   Skin:  Negative for color change and wound.   Neurological:  Negative for dizziness, syncope, speech difficulty and headaches.   Hematological:  Negative for adenopathy.   Psychiatric/Behavioral:  Positive for decreased concentration. Negative for agitation and behavioral problems.          Current Outpatient Medications   Medication Sig Dispense Refill   • amLODIPine (NORVASC) 5 mg tablet Take 1 tablet (5 mg total) by mouth daily 90 tablet 1   • Ascorbic Acid (VITAMIN C PO) Take 500 mg by mouth daily      • B Complex Vitamins (B-COMPLEX/B-12 PO) Take 1 tablet by mouth daily     • Calcium 150 MG TABS Take by mouth daily       • Cholecalciferol (VITAMIN D) 2000 units CAPS Take 1 capsule by mouth daily     • donepezil (ARICEPT) 5 mg tablet Take 1 tablet (5 mg total) by mouth daily at bedtime 90 tablet 1   • dutasteride (AVODART) 0.5 mg capsule take 1 capsule by mouth once daily 90 capsule 1   • EQ Aspirin Adult Low Dose 81 MG EC tablet Last dose 1/9/22      • linaCLOtide 145 MCG CAPS Take 1 capsule (145 mcg total) by mouth daily at least 30 minutes prior to the first meal of the day 90 capsule 3   • meloxicam (MOBIC) 15 mg tablet Take 1 tablet (15 mg total) by mouth daily as needed for moderate pain As needed 30 tablet 0   • Omega-3 Fatty Acids  "(fish oil) 1,000 mg Take 1,000 mg by mouth daily     • polyethylene glycol (MIRALAX) 17 g packet Take 17 g by mouth daily     • polyethylene glycol (MIRALAX) 17 g packet Take 17 g by mouth daily 10 each 0   • rosuvastatin (CRESTOR) 20 MG tablet Take 1 tablet (20 mg total) by mouth daily at bedtime 90 tablet 1   • tamsulosin (FLOMAX) 0.4 mg Take 1 capsule (0.4 mg total) by mouth daily with dinner 90 capsule 1     No current facility-administered medications for this visit.       Objective:    /66   Pulse 58   Temp (!) 97.2 °F (36.2 °C)   Resp 18   Ht 5' 7.25\" (1.708 m)   Wt 66.7 kg (147 lb)   SpO2 98%   BMI 22.85 kg/m²        Physical Exam  Abdominal:      Comments: Bulge in rt groin  Weakness in rt iunguinal area on exam   Psychiatric:         Cognition and Memory: Memory is impaired.                Frank Lombardi, DO  "

## 2024-07-02 ENCOUNTER — HOSPITAL ENCOUNTER (OUTPATIENT)
Dept: RADIOLOGY | Facility: HOSPITAL | Age: 84
Discharge: HOME | End: 2024-07-02
Attending: FAMILY MEDICINE
Payer: COMMERCIAL

## 2024-07-02 DIAGNOSIS — K40.91 UNILATERAL RECURRENT INGUINAL HERNIA WITHOUT OBSTRUCTION OR GANGRENE: ICD-10-CM

## 2024-07-02 PROCEDURE — 74176 CT ABD & PELVIS W/O CONTRAST: CPT

## 2024-07-09 ENCOUNTER — TELEPHONE (OUTPATIENT)
Dept: FAMILY MEDICINE CLINIC | Facility: CLINIC | Age: 84
End: 2024-07-09

## 2024-07-09 NOTE — TELEPHONE ENCOUNTER
Left voice mail for patient to return call. Please give Dr Lombardi's message and see how patient is feeling.  Thank you  Lily Nava MA

## 2024-07-09 NOTE — TELEPHONE ENCOUNTER
Please call pt the CT scan did not reveal any acute findings  How is he feeling - he is probably improving

## 2024-07-10 NOTE — TELEPHONE ENCOUNTER
Patient was returning a VM. I advise him of the message from the doctor and he understood. He also said that he is improving daily.

## 2024-07-16 ENCOUNTER — OFFICE VISIT (OUTPATIENT)
Dept: CARDIOLOGY CLINIC | Facility: CLINIC | Age: 84
End: 2024-07-16
Payer: COMMERCIAL

## 2024-07-16 VITALS
WEIGHT: 146 LBS | OXYGEN SATURATION: 98 % | BODY MASS INDEX: 22.91 KG/M2 | DIASTOLIC BLOOD PRESSURE: 70 MMHG | SYSTOLIC BLOOD PRESSURE: 130 MMHG | HEART RATE: 63 BPM | HEIGHT: 67 IN

## 2024-07-16 DIAGNOSIS — I25.10 CORONARY ARTERY DISEASE INVOLVING NATIVE CORONARY ARTERY OF NATIVE HEART WITHOUT ANGINA PECTORIS: Primary | ICD-10-CM

## 2024-07-16 DIAGNOSIS — Z95.1 HX OF CABG: ICD-10-CM

## 2024-07-16 PROCEDURE — 99214 OFFICE O/P EST MOD 30 MIN: CPT | Performed by: PHYSICIAN ASSISTANT

## 2024-07-16 PROCEDURE — 93000 ELECTROCARDIOGRAM COMPLETE: CPT | Performed by: PHYSICIAN ASSISTANT

## 2024-07-16 NOTE — PROGRESS NOTES
Progress Note - Cardiology Office  [facility]    Liam Hall 84 y.o. male MRN: [id number]  : 1940  Encounter: [id number]      Assessment:     Essential hypertension.  Coronary artery disease.  Hyperlipidemia.      Discussion Summary and Plan:    Essential hypertension.  - BP during today's office visit, 130/70.   - Currently on amlodipine 5 mg daily.  Continue at this time.    Coronary artery disease.  - Stable. Patient denies experiencing chest pain.  - History of NSTEMI s/p CABG x 3 (LIMA to LAD, SVG to OM-2, SVG to PDA) on 14.   - 22 nuclear stress test: Abnormal nuclear stress test showing myocardial scar involving basal inferior wall and ischemia of the mid anterior wall. EF is normal at 65%.   - Currently on Crestor 20 mg daily and omega-3 fatty acids.  - Currently on aspirin 81 mg daily.    Hyperlipidemia.  - Currently on Crestor 20 mg daily and omega-3 fatty acids.  - Repeat lipid panel ordered by PCP pending.  - 22 lipid panel: Cholesterol 93, triglycerides 44, HDL 49, LDL 35.      Patient / Caretaker was advised and educated to call our office  immediately if  patient has any new symptoms of chest pain/shortness of breath, near-syncope, syncope, light headedness sustained palpitations  or any other cardiovascular symptoms before their scheduled follow-up appointment.  Office number was provided #[phone].    Please call [phone] if any questions.  Counseling :  A description of the counseling.  Goals and Barriers.  Patient's ability to self care: Yes  Medication side effect reviewed with patient in detail and all their questions answered to their satisfaction.    HPI :     Liam Hall is a 84 y.o. male with PMHx of essential hypertension, CAD with history of NSTEMI s/p CABG x 3 (LIMA to LAD, SVG to OM-2, SVG to PDA) on 14, hyperlipidemia, who presents for routine outpatient cardiology follow-up.      Patient was last seen outpatient  cardiology office on 6/22/23.  Patient presents today for routine yearly cardiology follow-up.  Patient is accompanied by his son today.  They state that overall the patient is doing well.  Patient offers no complaints.  He denies experiencing chest pain, palpitations, shortness of breath at rest or with exertion, lower extremity edema, orthopnea, weight gain, lightheadedness, dizziness, headache, nausea, vomiting, or diaphoresis.    Review of Systems   All other systems reviewed and are negative.      Historical Information   Past Medical History:   Diagnosis Date    Arthritis     CAD (coronary artery disease)     Cataract     Colon polyp     COVID-19     GERD (gastroesophageal reflux disease)     Ground-level fall 01/27/2024    Heart attack (HCC)     Herpes zoster     Hx of colonic polyps     Hyperlipidemia     Hypertension      Past Surgical History:   Procedure Laterality Date    BACK SURGERY      disc removed 1985 / R....1985      CATARACT EXTRACTION      COLONOSCOPY      CORONARY ARTERY BYPASS GRAFT  11/13/2014    LA....6/30/16   & CABG X3 with LIMA to LAD <SVG to OM-2, SVG to PDA  [place] [facility] / [place]....12/19/14       EYE SURGERY      cataract /  R....1980      FL RETROGRADE PYELOGRAM  1/13/2022    HERNIA REPAIR      Groin / R....1988      HIP SURGERY      1984 /  R....1984       INGUINAL HERNIA REPAIR      R....2010      JOINT REPLACEMENT  1985     right hip    CA CYSTO W/IRRIG & EVAC MULTPLE OBSTRUCTING CLOTS N/A 5/18/2021    Procedure: CYSTOSCOPY EVACUATION OF CLOTS WITH  FULGURATION OF PROSTATE;  Surgeon: Randy Driscoll MD;  Location: [facility];  Service: Urology    CA TRURL ELECTROSURG RESCJ PROSTATE BLEED COMPLETE N/A 6/4/2019    Procedure: CYSTOSCOPY, TRANSURETHRAL RESECTION OF PROSTATE (TURP);  Surgeon: Randy Driscoll MD;  Location: [facility];  Service: Urology    TRANSURETHRAL RESECTION OF PROSTATE Bilateral 1/13/2022    Procedure: CYSTOSCOPY, TRANSURETHRAL RESECTION OF  PROSTATE (TURP), RETROGRADE, PYELOGRAM;  Surgeon: Epi Small MD;  Location: [facility];  Service: Urology     Social History     Substance and Sexual Activity   Alcohol Use Not Currently     Social History     Substance and Sexual Activity   Drug Use No     Social History     Tobacco Use   Smoking Status Former    Current packs/day: 0.00    Average packs/day: 2.0 packs/day for 30.0 years (60.0 ttl pk-yrs)    Types: Cigarettes    Start date: 6/15/1960    Quit date: 1979    Years since quittin.2    Passive exposure: Current   Smokeless Tobacco Never     Family History:   Family History   Problem Relation Age of Onset    Coronary artery disease Father     Hyperlipidemia Brother     Sudden death Brother         cardiac    Arthritis Family     Mental illness Neg Hx        Meds/Allergies     No Known Allergies    Current Outpatient Medications:     amLODIPine (NORVASC) 5 mg tablet, Take 1 tablet (5 mg total) by mouth daily, Disp: 90 tablet, Rfl: 1    Ascorbic Acid (VITAMIN C PO), Take 500 mg by mouth daily , Disp: , Rfl:     B Complex Vitamins (B-COMPLEX/B-12 PO), Take 1 tablet by mouth daily, Disp: , Rfl:     Calcium 150 MG TABS, Take by mouth daily  , Disp: , Rfl:     Cholecalciferol (VITAMIN D) 2000 units CAPS, Take 1 capsule by mouth daily, Disp: , Rfl:     donepezil (ARICEPT) 5 mg tablet, Take 1 tablet (5 mg total) by mouth daily at bedtime, Disp: 90 tablet, Rfl: 1    dutasteride (AVODART) 0.5 mg capsule, take 1 capsule by mouth once daily, Disp: 90 capsule, Rfl: 1    EQ Aspirin Adult Low Dose 81 MG EC tablet, Last dose 22 , Disp: , Rfl:     linaCLOtide 145 MCG CAPS, Take 1 capsule (145 mcg total) by mouth daily at least 30 minutes prior to the first meal of the day, Disp: 90 capsule, Rfl: 3    meloxicam (MOBIC) 15 mg tablet, Take 1 tablet (15 mg total) by mouth daily as needed for moderate pain As needed, Disp: 30 tablet, Rfl: 0    Omega-3 Fatty Acids (fish oil) 1,000 mg, Take 1,000 mg by mouth  "daily, Disp: , Rfl:     rosuvastatin (CRESTOR) 20 MG tablet, Take 1 tablet (20 mg total) by mouth daily at bedtime, Disp: 90 tablet, Rfl: 1    tamsulosin (FLOMAX) 0.4 mg, Take 1 capsule (0.4 mg total) by mouth daily with dinner, Disp: 90 capsule, Rfl: 1    Vitals: Blood pressure 130/70, pulse 63, height 5' 7.25\" (1.708 m), weight 66.2 kg (146 lb), SpO2 98%.    Body mass index is 22.7 kg/m².  Wt Readings from Last 3 Encounters:   24 66.2 kg (146 lb)   24 66.7 kg (147 lb)   24 66.4 kg (146 lb 6.4 oz)     Vitals:    24 1010   Weight: 66.2 kg (146 lb)     BP Readings from Last 3 Encounters:   24 130/70   24 110/66   24 136/60       Physical Exam:  Physical Exam  Vitals reviewed.   Constitutional:       General: He is not in acute distress.  Cardiovascular:      Rate and Rhythm: Normal rate and regular rhythm.      Pulses: Normal pulses.      Heart sounds: No murmur heard.  Pulmonary:      Effort: Pulmonary effort is normal. No respiratory distress.      Breath sounds: Normal breath sounds.   Abdominal:      General: Abdomen is flat. There is no distension.      Palpations: Abdomen is soft.      Tenderness: There is no abdominal tenderness.   Musculoskeletal:      Right lower leg: No edema.      Left lower leg: No edema.   Skin:     General: Skin is warm and dry.   Neurological:      Mental Status: He is alert and oriented to person, place, and time.         Diagnostic Studies Review Cardio:      EK/16/24 EKG: Sinus rhythm with first-degree AV block, 63 bpm.  Incomplete RBBB.  EKG appears unchanged compared to prior EKG.    Cardiac testing:     NM myocardial perfusion spect (rx stress and/or rest)  Result date: 22     Interpretation Summary    Abnormal nuclear stress test showing myocardial scar involving basal inferior wall and ischemia of the mid anterior wall.  EF is normal at 65%.    The ECG was positive for ischemia. The stress ECG is consistent with ischemia " after maximal exercise with dyspnea but no chest pain.    Perfusion: Fixed perfusion defect is noted in the basal inferior wall.  Reversible perfusion defect is noted in the mid anterior wall.  Summed differential score is 5.    Stress Function: Overall Left ventricular function post-stress is normal.  Calculated ejection fraction is 65%    There is no evidence of transient ischemic dilation (TID).      Lab Review   Lab Results   Component Value Date    WBC 4.94 01/28/2024    HGB 10.0 (L) 01/28/2024    HCT 30.3 (L) 01/28/2024     (H) 01/28/2024    RDW 13.8 01/28/2024     (L) 01/28/2024     BMP:  Lab Results   Component Value Date    SODIUM 139 01/28/2024    K 3.5 01/28/2024     01/28/2024    CO2 25 01/28/2024    ANIONGAP 12.8 12/30/2015    BUN 16 01/28/2024    CREATININE 0.74 01/28/2024    GLUC 120 01/28/2024    GLUF 100 (H) 03/17/2023    CALCIUM 8.8 01/28/2024    CORRECTEDCA 9.0 05/19/2021    EGFR 84 01/28/2024    MG 2.2 01/28/2024     LFT:  Lab Results   Component Value Date    AST 16 01/26/2024    ALT 14 01/26/2024    ALKPHOS 52 01/26/2024    TP 6.8 01/26/2024    ALB 4.3 01/26/2024     Lab Results   Component Value Date    BJO0LHQKCHLL 4.360 03/17/2023     Lab Results   Component Value Date    HGBA1C 5.2 11/14/2022     Lipid Profile:   Lab Results   Component Value Date    CHOLESTEROL 93 11/14/2022    HDL 49 11/14/2022    LDLCALC 35 11/14/2022    TRIG 44 11/14/2022     Jenny Duran PA-C

## 2024-08-05 DIAGNOSIS — N40.0 ENLARGED PROSTATE ON RECTAL EXAMINATION: ICD-10-CM

## 2024-08-05 RX ORDER — DUTASTERIDE 0.5 MG/1
CAPSULE, LIQUID FILLED ORAL
Qty: 90 CAPSULE | Refills: 1 | Status: SHIPPED | OUTPATIENT
Start: 2024-08-05

## 2024-09-10 DIAGNOSIS — Z95.1 HX OF CABG: ICD-10-CM

## 2024-09-10 DIAGNOSIS — I10 BENIGN ESSENTIAL HTN: ICD-10-CM

## 2024-09-10 RX ORDER — AMLODIPINE BESYLATE 5 MG/1
5 TABLET ORAL DAILY
Qty: 90 TABLET | Refills: 1 | Status: SHIPPED | OUTPATIENT
Start: 2024-09-10

## 2024-09-10 RX ORDER — ROSUVASTATIN CALCIUM 20 MG/1
20 TABLET, COATED ORAL
Qty: 30 TABLET | Refills: 0 | Status: SHIPPED | OUTPATIENT
Start: 2024-09-10

## 2024-10-10 DIAGNOSIS — I10 BENIGN ESSENTIAL HTN: ICD-10-CM

## 2024-10-10 DIAGNOSIS — Z95.1 HX OF CABG: ICD-10-CM

## 2024-10-11 RX ORDER — ROSUVASTATIN CALCIUM 20 MG/1
20 TABLET, COATED ORAL
Qty: 90 TABLET | Refills: 1 | Status: SHIPPED | OUTPATIENT
Start: 2024-10-11

## 2024-11-18 NOTE — TELEPHONE ENCOUNTER
Please call pt    Looks like pt has osteopenia, does not rise to the level of osteoporosis   I would suggest calcium at 1200 and vit d at 1000 units  Daily      Will retest him in 2 years 18-Nov-2024 10:08

## 2024-12-07 DIAGNOSIS — N39.44 NOCTURNAL ENURESIS: ICD-10-CM

## 2024-12-09 RX ORDER — TAMSULOSIN HYDROCHLORIDE 0.4 MG/1
CAPSULE ORAL
Qty: 90 CAPSULE | Refills: 1 | Status: SHIPPED | OUTPATIENT
Start: 2024-12-09

## 2024-12-15 DIAGNOSIS — K40.91 UNILATERAL RECURRENT INGUINAL HERNIA WITHOUT OBSTRUCTION OR GANGRENE: ICD-10-CM

## 2024-12-17 RX ORDER — DONEPEZIL HYDROCHLORIDE 5 MG/1
5 TABLET, FILM COATED ORAL
Qty: 90 TABLET | Refills: 1 | Status: SHIPPED | OUTPATIENT
Start: 2024-12-17

## 2024-12-30 ENCOUNTER — TELEPHONE (OUTPATIENT)
Dept: GASTROENTEROLOGY | Facility: CLINIC | Age: 84
End: 2024-12-30

## 2024-12-30 NOTE — TELEPHONE ENCOUNTER
Pt is coming due for a 3 year colon recall and needs to have an ov prior, left a vm for pt to return the call.

## 2025-02-05 DIAGNOSIS — N40.0 ENLARGED PROSTATE ON RECTAL EXAMINATION: ICD-10-CM

## 2025-02-06 RX ORDER — DUTASTERIDE 0.5 MG/1
0.5 CAPSULE, LIQUID FILLED ORAL DAILY
Qty: 90 CAPSULE | Refills: 0 | Status: SHIPPED | OUTPATIENT
Start: 2025-02-06

## 2025-02-14 NOTE — TELEPHONE ENCOUNTER
Patient called requesting refill for dutasteride. Patient made aware medication was refilled on 2/6/25 for 90 with 0 refills to [facility] pharmacy. Patient instructed to contact the pharmacy to obtain refills of medication. Patient verbalized understanding.

## 2025-03-03 DIAGNOSIS — I10 BENIGN ESSENTIAL HTN: ICD-10-CM

## 2025-03-04 RX ORDER — AMLODIPINE BESYLATE 5 MG/1
5 TABLET ORAL DAILY
Qty: 90 TABLET | Refills: 0 | Status: SHIPPED | OUTPATIENT
Start: 2025-03-04

## 2025-04-05 DIAGNOSIS — Z95.1 HX OF CABG: ICD-10-CM

## 2025-04-05 DIAGNOSIS — I10 BENIGN ESSENTIAL HTN: ICD-10-CM

## 2025-04-07 RX ORDER — ROSUVASTATIN CALCIUM 20 MG/1
20 TABLET, COATED ORAL
Qty: 90 TABLET | Refills: 1 | Status: SHIPPED | OUTPATIENT
Start: 2025-04-07

## 2025-05-08 DIAGNOSIS — N40.0 ENLARGED PROSTATE ON RECTAL EXAMINATION: ICD-10-CM

## 2025-05-09 RX ORDER — DUTASTERIDE 0.5 MG/1
0.5 CAPSULE, LIQUID FILLED ORAL DAILY
Qty: 90 CAPSULE | Refills: 0 | Status: SHIPPED | OUTPATIENT
Start: 2025-05-09

## 2025-06-14 DIAGNOSIS — I10 BENIGN ESSENTIAL HTN: ICD-10-CM

## 2025-06-14 DIAGNOSIS — N39.44 NOCTURNAL ENURESIS: ICD-10-CM

## 2025-06-15 DIAGNOSIS — K40.91 UNILATERAL RECURRENT INGUINAL HERNIA WITHOUT OBSTRUCTION OR GANGRENE: ICD-10-CM

## 2025-06-15 RX ORDER — AMLODIPINE BESYLATE 5 MG/1
5 TABLET ORAL DAILY
Qty: 90 TABLET | Refills: 0 | Status: SHIPPED | OUTPATIENT
Start: 2025-06-15

## 2025-06-15 RX ORDER — TAMSULOSIN HYDROCHLORIDE 0.4 MG/1
CAPSULE ORAL
Qty: 90 CAPSULE | Refills: 1 | Status: SHIPPED | OUTPATIENT
Start: 2025-06-15

## 2025-06-26 RX ORDER — DONEPEZIL HYDROCHLORIDE 5 MG/1
5 TABLET, FILM COATED ORAL
Qty: 90 TABLET | Refills: 1 | Status: SHIPPED | OUTPATIENT
Start: 2025-06-26

## 2025-06-26 NOTE — TELEPHONE ENCOUNTER
I spoke with patients son and he states that his fathers insurance has changed, he will not be covered again until October. He is scheduled 10/01/25 with you for his AWV. Can you refill his medications until he is able to come back in to the office?

## 2025-06-27 NOTE — PROGRESS NOTES
Cardiology Follow Up  Lynne Santillan  1940  [id number]  [address] [facility] [address] [address] [phone]    1  Coronary artery disease involving native coronary artery of native heart without angina pectoris  POCT ECG   2  NSTEMI (non-ST elevated myocardial infarction) (HCC)  POCT ECG    atorvastatin (LIPITOR) 40 mg tablet   3  Hx of CABG  POCT ECG   4  Anemia, unspecified type  POCT ECG   5  Exertional dyspnea  POCT ECG      Discussion/Plan:  CABG/3V CAD-- ekg unchanged  Significant 3 vessel cad   healed well  asymptomatic  LDL at goal  Blood pressure well-controlled  - aspirin 81mg  - wean down metoprolol 25mg daily- half tablet for two weeks, than every other day for two weeks, and than sotp  -  atorvastatin 40mg daily  - daily exercise     First-degree heart block/incomplete right bundle branch block- unchanged  Continue to monitor- wean off metoprolol    Mild carotid artery disease bilateral- atorvastatin 40mg + aspirin    Dark stools/lower hemoglobin -cologuard negative    Health screening- psa- one year    rtc- 6 months     Interval History:  initial visit: 74 yo gentleman with prior NSTEMI s/p 3V-CABG (LIMA to LAD, SVG to OM2, and SVG to PDA) s/p cardiac rehab walking one mile daily  I have reviewed all of his physical therapy notes and am appreciative of the excellent care  Compliant with his medications denying any significant symptoms  Denying any bleeding or rash  He denies having any chest pain or shortness of breath      /3: No exercise limitations  Denies having any chest tightness  Denies any any muscle aches  Denies any bleeding or bruising  Has been very active  Does daily walks without any symptoms  12, 2017: He reports exercising without any limitations  He denies any recurrence of chest discomfort  He denies having any dizziness or lightheadedness   He denies Pace                          having any medication intolerance  He denies any significant bleeding or bruising  09/17/2018: He denies having any chest tightness  He denies having any exertional shortness of breath  He has been compliant with his medications  He denies having any falls  He denies having any easy bleeding or bruising  Reviewed through his records  03/04/2019:  He denies having any exertional chest pain  He denies having exertional shortness of breath  He is compliant with his medications  We reviewed through his recent lab work which shows his cholesterol is well controlled  09/10/2018:  He denies having exertional chest pain or shortness of breath  He is compliant with his medications  Reports having colon cancer screening  Which was negative  He denies having any bright red blood per rectum  He is compliant with his aspirin therapy  His EKG remains unchanged  03/03/2020:  He is walking his dog without any significant shortness of breath or chest pain  His cholesterol remains controlled  His blood pressure is controlled  His EKG remains unchanged  He is compliant with his medications      Patient Active Problem List   Diagnosis    Hx of CABG    Coronary artery disease involving native coronary artery of native heart without angina pectoris    Elevated homocysteine (HCC)    MTHFR mutation ([place] [address] )    NSTEMI (non-ST elevated myocardial infarction) ([place] [address] )    Vitamin D deficiency    Nocturnal enuresis    Benign prostatic hyperplasia with nocturia    Essential hypertension    Mixed hyperlipidemia    GERD (gastroesophageal reflux disease)    Urinary tract infection associated with indwelling urethral catheter ([place] [address] )    S/P TURP     Past Medical History:   Diagnosis Date    Arthritis     CAD (coronary artery disease)     Cataract     Colon polyp     GERD (gastroesophageal reflux disease)     [facility]     Herpes zoster     Hx of colonic polyps     Hyperlipidemia     Hypertension      Social History     Socioeconomic History    Marital status: /Civil Union     Spouse name: Not on file    Number of children: Not on file    Years of education: Not on file    Highest education level: Not on file   Occupational History    Not on file   Social Needs    Financial resource strain: Not on file    Food insecurity:     Worry: Not on file     Inability: Not on file    Transportation needs:     Medical: Not on file     Non-medical: Not on file   Tobacco Use    Smoking status: Former Smoker     Last attempt to quit:      Years since quittin 1    Smokeless tobacco: Never Used   Substance and Sexual Activity    Alcohol use: Not Currently    Drug use: No    Sexual activity: Not on file   Lifestyle    Physical activity:     Days per week: Not on file     Minutes per session: Not on file    Stress: Not on file   Relationships    Social connections:     Talks on phone: Not on file     Gets together: Not on file     Attends [employer] service: Not on file     Active member of club or organization: Not on file     Attends meetings of clubs or organizations: Not on file     Relationship status: Not on file    Intimate partner violence:     Fear of current or ex partner: Not on file     Emotionally abused: Not on file     Physically abused: Not on file     Forced sexual activity: Not on file   Other Topics Concern    Not on file   Social History Narrative    Exercise cycling / walking    Exercise daily     Good sleep hygiene    Sleeps 8-10 hrs a day       Family History   Problem Relation Age of Onset    Coronary artery disease Father     Hyperlipidemia Brother     Sudden death Brother         cardiac    Arthritis Family     Mental illness Neg Hx      Past Surgical History:   Procedure Laterality Date    BACK SURGERY      disc removed 1985 R   1985      CATARACT EXTRACTION      COLONOSCOPY      [place]ONARY ARTERY BYPASS GRAFT  [place]    16   & CABG X3 with LIMA to LAD <SVG to OM-2, SVG to PDA  [place] Paul[place] Deshp[place]   12/19[place]14       EYE SURGERY      cataract /  R   Aretha Jerryo [address]      Groin / R   [facility] /  Sheryl Singh   [address]    2010      JOINT REPLACEMENT  1985     right hip    PA TRANSURETHRAL ELEC-SURG PROSTATECTOM N/A 6/4/2019    Procedure: CYSTOSCOPY, TRANSURETHRAL RESECTION OF PROSTATE (TURP); Surgeon: Swetha Zhang MD;  Location: [facility];  Service: Urology       Current Outpatient Medications:     amLODIPine (NORVASC) 2 5 mg tablet, Take 1 tablet (2 5 mg total) by mouth daily, Disp: 90 tablet, Rfl: 3    Ascorbic Acid (VITAMIN C PO), Take 500 mg by mouth daily , Disp: , Rfl:     aspirin (ECOTRIN LOW STRENGTH) 81 mg EC tablet, Take 1 tablet (81 mg total) by mouth daily with breakfast, Disp: 90 tablet, Rfl: 3    atorvastatin (LIPITOR) 40 mg tablet, Take 0 5 tablets (20 mg total) by mouth daily, Disp: 90 tablet, Rfl: 0    B Complex Vitamins (B-COMPLEX/B-12 PO), Take 1 tablet by mouth daily, Disp: , Rfl:     Calcium 150 MG TABS, Take by mouth daily, Disp: , Rfl:     Cholecalciferol (VITAMIN D) 2000 units CAPS, Take 1 capsule by mouth daily, Disp: , Rfl:     dutasteride (AVODART) 0 5 mg capsule, TAKE 1 CAPSULE BY MOUTH ONCE DAILY, Disp: 30 capsule, Rfl: 0    metoprolol succinate (TOPROL-XL) 25 mg 24 hr tablet, Take 1 tablet (25 mg total) by mouth daily, Disp: 90 tablet, Rfl: 3    Omega-3 Fatty Acids (OMEGA-3 FISH OIL) 1000 MG CAPS, Take by mouth, Disp: , Rfl:     tamsulosin (FLOMAX) 0 4 mg, TAKE 1 CAPSULE BY MOUTH ONCE DAILY WITH DINNER, Disp: , Rfl:   No Known Allergies    Review of Systems:  Review of Systems   Constitutional: Negative  HENT: Negative  Eyes: Negative  Respiratory: Negative  Cardiovascular: Negative  Gastrointestinal: Negative  Endocrine: Negative  Genitourinary: Negative  Musculoskeletal: Negative      Skin: Negative  Allergic/Immunologic: Negative  Neurological: Negative  Hematological: Negative  Psychiatric/Behavioral: Negative  Vitals:    03/03/20 1417   BP: 136/60   BP Location: Right arm   Patient Position: Sitting   Cuff Size: Standard   Pulse: 61   SpO2: 98%   Weight: 74 8 kg (165 lb)   Height: 5' 10" (1 778 m)     Physical Exam:  Physical Exam   Constitutional: He is oriented to person, place, and time  No distress  HENT:   Head: Normocephalic and atraumatic  Right Ear: External ear normal    Left Ear: External ear normal    pale   Eyes: Pupils are equal, round, and reactive to light  Conjunctivae are normal  Right eye exhibits no discharge  Left eye exhibits no discharge  No scleral icterus  Neck: Normal range of motion  Neck supple  No JVD present  No tracheal deviation present  No thyromegaly present  Cardiovascular: Normal rate and regular rhythm  Exam reveals gallop  Exam reveals no friction rub  No murmur heard  Pulmonary/Chest: Effort normal and breath sounds normal  No stridor  No respiratory distress  He has no wheezes  He has no rales  He exhibits no tenderness  Abdominal: Soft  Bowel sounds are normal  He exhibits no distension and no mass  There is no tenderness  There is no rebound and no guarding  Musculoskeletal: Normal range of motion  He exhibits no edema, tenderness or deformity  Neurological: He is alert and oriented to person, place, and time  He has normal reflexes  No cranial nerve deficit  He exhibits normal muscle tone  Coordination normal    Skin: Skin is warm and dry  No rash noted  He is not diaphoretic  No erythema  No pallor  Psychiatric: He has a normal mood and affect   His behavior is normal  Judgment and thought content normal        Labs:     Lab Results   Component Value Date    WBC 6 17 06/26/2019    HGB 12 3 06/26/2019    HCT 37 3 06/26/2019     (H) 06/26/2019     06/26/2019     Lab Results   Component Value Date     12/30/2015    K 4 1 01/16/2020     01/16/2020    CO2 29 01/16/2020    ANIONGAP 12 8 12/30/2015    BUN 22 01/16/2020    CREATININE 1 09 01/16/2020    GLUCOSE 104 12/30/2015    GLUF 109 (H) 01/16/2020    CALCIUM 8 7 01/16/2020    AST 22 01/16/2020    ALT 32 01/16/2020    ALKPHOS 66 01/16/2020    PROT 7 5 12/30/2015    BILITOT 0 6 12/30/2015    EGFR 64 01/16/2020     Lab Results   Component Value Date    CHOL 95 (L) 12/30/2015    CHOL 80 (L) 06/24/2015    CHOL 105 11/13/2014     Lab Results   Component Value Date    HDL 45 01/16/2020    HDL 43 06/26/2019    HDL 43 10/17/2018     Lab Results   Component Value Date    LDLCALC 48 01/16/2020    LDLCALC 60 06/26/2019    LDLCALC 42 10/17/2018     Lab Results   Component Value Date    TRIG 44 01/16/2020    TRIG 65 06/26/2019    TRIG 29 10/17/2018     Lab Results   Component Value Date    HGBA1C 5 6 06/24/2015       Imaging & Testing   I have personally reviewed pertinent reports  EKG: Personally reviewed  Sinus bradycardia incomplete RBBB  09/10/2019 normal sinus rhythm incomplete right bundle branch block no acute ST T wave changes from prior EKG  Sinus rhythm with first-degree AV block incomplete right bundle branch block    Cardiac testing:   CORONARY CIRCULATION:   Proximal LAD: There was a 90 % stenosis  1st diagonal: There was a 100 % stenosis  This lesion is a chronic total   occlusion  Mid circumflex: There was a 80 % stenosis  1st obtuse marginal: There was a 100 % stenosis  This lesion is a chronic    total   occlusion  Distal RCA: There was a 100 % stenosis  This lesion is a chronic   total occlusion  CARDIAC STRUCTURES:   Global left ventricular function was normal  EF calculated by contrast   ventriculography was 65 %         Breann Yeboah  Please call with any questions or suggestions    A description of the counseling:   Goals and Barriers:  Patient's ability to self care:  Medication side effect reviewed with patient in detail and all their questions answered  "This note has been constructed using a voice recognition system  Therefore there may be syntax, spelling, and/or grammatical errors   Please call if you have any questions  "

## (undated) DEVICE — EVACUATOR BLADDER ELLIK DISP STRL

## (undated) DEVICE — GROUNDING PAD UNIVERSAL SLW

## (undated) DEVICE — GLOVE SRG BIOGEL 8

## (undated) DEVICE — GUIDEWIRE STRGHT TIP 0.038 IN SOLO PLUS

## (undated) DEVICE — CYSTO TUBING TUR Y IRRIGATION

## (undated) DEVICE — LUBRICANT SURGILUBE TUBE 4 OZ  FLIP TOP

## (undated) DEVICE — POUCH UR CATCHER STERILE

## (undated) DEVICE — RADIOLOGY STERILE LABELS: Brand: CENTURION

## (undated) DEVICE — Device

## (undated) DEVICE — CYSTOSCOPY PACK: Brand: CONVERTORS

## (undated) DEVICE — CATH SECURE FOLEY

## (undated) DEVICE — SPONGE STICK WITH PVP-I: Brand: KENDALL

## (undated) DEVICE — RESECTOSCOPE LOOE ELECTRODE 27040F/6

## (undated) DEVICE — BAG URINE DRAINAGE 4000ML CONTINUOUS IRR

## (undated) DEVICE — STANDARD SURGICAL GOWN, L: Brand: CONVERTORS

## (undated) DEVICE — SYRINGE 20ML LL

## (undated) DEVICE — POV-IOD SOLUTION 4OZ BT

## (undated) DEVICE — URETERAL CATH 5 FR

## (undated) DEVICE — STRAP FOLEY CATH LEG 1545 9

## (undated) DEVICE — RESECTOSCOPE BALL ELECTRODE 24 FR 27040N/6

## (undated) DEVICE — STORZ LOOP ELECTRODE 24 FR

## (undated) DEVICE — GLOVE SRG BIOGEL 7.5

## (undated) DEVICE — FABRIC REINFORCED, SURGICAL GOWN, XL: Brand: CONVERTORS